# Patient Record
Sex: MALE | Race: OTHER | NOT HISPANIC OR LATINO | URBAN - METROPOLITAN AREA
[De-identification: names, ages, dates, MRNs, and addresses within clinical notes are randomized per-mention and may not be internally consistent; named-entity substitution may affect disease eponyms.]

---

## 2017-07-29 ENCOUNTER — EMERGENCY (EMERGENCY)
Facility: HOSPITAL | Age: 32
LOS: 0 days | Discharge: HOME | End: 2017-07-29
Attending: EMERGENCY MEDICINE
Payer: SELF-PAY

## 2017-07-29 VITALS
TEMPERATURE: 98 F | OXYGEN SATURATION: 98 % | DIASTOLIC BLOOD PRESSURE: 94 MMHG | HEART RATE: 98 BPM | WEIGHT: 285.06 LBS | RESPIRATION RATE: 16 BRPM | HEIGHT: 75 IN | SYSTOLIC BLOOD PRESSURE: 144 MMHG
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
## 2017-07-29 DIAGNOSIS — R41.82 ALTERED MENTAL STATUS, UNSPECIFIED: ICD-10-CM

## 2017-07-29 DIAGNOSIS — T59.91XA TOXIC EFFECT OF UNSPECIFIED GASES, FUMES AND VAPORS, ACCIDENTAL (UNINTENTIONAL), INITIAL ENCOUNTER: ICD-10-CM

## 2017-07-29 PROCEDURE — 99284 EMERGENCY DEPT VISIT MOD MDM: CPT

## 2017-07-29 NOTE — ED ADULT TRIAGE NOTE - CHIEF COMPLAINT QUOTE
pt was brought by ems for puffing some keyboard  and developed a period of unresponsiveness . pt states he was using that to get high .

## 2017-07-29 NOTE — ED PROVIDER NOTE - OBJECTIVE STATEMENT
31yo male with no pertinent pmh bibems for disorientation. Pt recalls was huffing aerosolized gas 1 hr ago. Per EMS, bystander noted pt was sleeping in car and called police. On arrival by police, pt was a little disoriented. On arrival by EMS, pt AOx3 and came to hospital willingly. Pt is now AOx3 denies symptoms before huffing and denies symptoms currently. Pt denies other drug use on board or alcohol.     ROS: No fever/chills. No photophobia/eye pain/changes in vision, No ear pain/sore throat/dysphagia, No chest pain/palpitations. No SOB/cough/stridor. No abdominal pain, N/V/D, no black/bloody bm. No dysuria/frequency/discharge, No headache. No Dizziness.  No rash.  No numbness/tingling/weakness. 33yo male with no pertinent pmh bibems for disorientation. Pt recalls was huffing aerosolized gas 1 hr ago to get "high". Per EMS, bystander noted pt was sleeping in car and called police. On arrival by police, pt was a little disoriented. On arrival by EMS, pt AOx3 and came to hospital willingly. Pt is now AOx3 denies symptoms before huffing and denies symptoms currently. Pt denies other drug use on board or alcohol.  Pt denies SI/HI/AH/VH.     ROS: No fever/chills. No photophobia/eye pain/changes in vision, No ear pain/sore throat/dysphagia, No chest pain/palpitations. No SOB/cough/stridor. No abdominal pain, N/V/D, no black/bloody bm. No dysuria/frequency/discharge, No headache. No Dizziness.  No rash.  No numbness/tingling/weakness.

## 2017-07-29 NOTE — ED PROVIDER NOTE - MEDICAL DECISION MAKING DETAILS
VSS. FS wnl, EKG wnl. Pt asymptomatic. Likely from huffing. Discussed results and outcome of testing with the patient.  Patient given copy of available results. Patient advised to please follow up with their PMD within the next 24 hours and return to the Emergency Department for worsening symptoms or any other concerns. VSS. . EKG wnl. Pt asymptomatic. Likely from huffing. Discussed results and outcome of testing with the patient.  Patient given copy of available results. Patient advised to please follow up with their PMD within the next 24 hours and return to the Emergency Department for worsening symptoms or any other concerns.

## 2017-07-29 NOTE — ED ADULT NURSE NOTE - CHPI ED SYMPTOMS NEG
no weakness/no numbness/no vomiting/no fever/no loss of consciousness/no confusion/no nausea/no dizziness/no blurred vision

## 2017-07-29 NOTE — ED ADULT NURSE NOTE - OBJECTIVE STATEMENT
To the ed via EMS bystanders observed him sleeping in the car. He admits to "huffing getting high. He is cooperative and came to the hospital willingly. Pt is  alert, cooperative and oriented able to ambulate steady gait.

## 2020-08-22 ENCOUNTER — INPATIENT (INPATIENT)
Facility: HOSPITAL | Age: 35
LOS: 13 days | Discharge: NURSING, REHAB OR LONG-TERM CARE | End: 2020-09-05
Attending: INTERNAL MEDICINE | Admitting: STUDENT IN AN ORGANIZED HEALTH CARE EDUCATION/TRAINING PROGRAM
Payer: MEDICAID

## 2020-08-22 ENCOUNTER — TRANSCRIPTION ENCOUNTER (OUTPATIENT)
Age: 35
End: 2020-08-22

## 2020-08-22 VITALS
WEIGHT: 240.08 LBS | HEIGHT: 75 IN | DIASTOLIC BLOOD PRESSURE: 86 MMHG | TEMPERATURE: 99 F | SYSTOLIC BLOOD PRESSURE: 124 MMHG | HEART RATE: 126 BPM | OXYGEN SATURATION: 100 % | RESPIRATION RATE: 18 BRPM

## 2020-08-22 DIAGNOSIS — S32.009A UNSPECIFIED FRACTURE OF UNSPECIFIED LUMBAR VERTEBRA, INITIAL ENCOUNTER FOR CLOSED FRACTURE: ICD-10-CM

## 2020-08-22 LAB
ALBUMIN SERPL ELPH-MCNC: 3.9 G/DL — SIGNIFICANT CHANGE UP (ref 3.3–5)
ALP SERPL-CCNC: 156 U/L — HIGH (ref 40–120)
ALT FLD-CCNC: 24 U/L — SIGNIFICANT CHANGE UP (ref 12–78)
ANION GAP SERPL CALC-SCNC: 9 MMOL/L — SIGNIFICANT CHANGE UP (ref 5–17)
APPEARANCE UR: CLEAR — SIGNIFICANT CHANGE UP
APTT BLD: 29.4 SEC — SIGNIFICANT CHANGE UP (ref 27.5–35.5)
AST SERPL-CCNC: 17 U/L — SIGNIFICANT CHANGE UP (ref 15–37)
BACTERIA # UR AUTO: ABNORMAL
BILIRUB SERPL-MCNC: 0.7 MG/DL — SIGNIFICANT CHANGE UP (ref 0.2–1.2)
BILIRUB UR-MCNC: NEGATIVE — SIGNIFICANT CHANGE UP
BUN SERPL-MCNC: 11 MG/DL — SIGNIFICANT CHANGE UP (ref 7–23)
CALCIUM SERPL-MCNC: 8.7 MG/DL — SIGNIFICANT CHANGE UP (ref 8.5–10.1)
CHLORIDE SERPL-SCNC: 101 MMOL/L — SIGNIFICANT CHANGE UP (ref 96–108)
CK SERPL-CCNC: 215 U/L — SIGNIFICANT CHANGE UP (ref 26–308)
CO2 SERPL-SCNC: 25 MMOL/L — SIGNIFICANT CHANGE UP (ref 22–31)
COLOR SPEC: YELLOW — SIGNIFICANT CHANGE UP
CREAT SERPL-MCNC: 1.04 MG/DL — SIGNIFICANT CHANGE UP (ref 0.5–1.3)
DIFF PNL FLD: NEGATIVE — SIGNIFICANT CHANGE UP
EPI CELLS # UR: SIGNIFICANT CHANGE UP
ETHANOL SERPL-MCNC: <10 MG/DL — SIGNIFICANT CHANGE UP (ref 0–10)
GLUCOSE BLDC GLUCOMTR-MCNC: 178 MG/DL — HIGH (ref 70–99)
GLUCOSE SERPL-MCNC: 178 MG/DL — HIGH (ref 70–99)
GLUCOSE UR QL: NEGATIVE MG/DL — SIGNIFICANT CHANGE UP
HCT VFR BLD CALC: 38.6 % — LOW (ref 39–50)
HGB BLD-MCNC: 13.9 G/DL — SIGNIFICANT CHANGE UP (ref 13–17)
INR BLD: 1.2 RATIO — HIGH (ref 0.88–1.16)
KETONES UR-MCNC: NEGATIVE — SIGNIFICANT CHANGE UP
LEUKOCYTE ESTERASE UR-ACNC: NEGATIVE — SIGNIFICANT CHANGE UP
LIDOCAIN IGE QN: 79 U/L — SIGNIFICANT CHANGE UP (ref 73–393)
MCHC RBC-ENTMCNC: 30.3 PG — SIGNIFICANT CHANGE UP (ref 27–34)
MCHC RBC-ENTMCNC: 36 GM/DL — SIGNIFICANT CHANGE UP (ref 32–36)
MCV RBC AUTO: 84.3 FL — SIGNIFICANT CHANGE UP (ref 80–100)
NITRITE UR-MCNC: NEGATIVE — SIGNIFICANT CHANGE UP
NRBC # BLD: 0 /100 WBCS — SIGNIFICANT CHANGE UP (ref 0–0)
PH UR: 6 — SIGNIFICANT CHANGE UP (ref 5–8)
PLATELET # BLD AUTO: 321 K/UL — SIGNIFICANT CHANGE UP (ref 150–400)
POTASSIUM SERPL-MCNC: 4.1 MMOL/L — SIGNIFICANT CHANGE UP (ref 3.5–5.3)
POTASSIUM SERPL-SCNC: 4.1 MMOL/L — SIGNIFICANT CHANGE UP (ref 3.5–5.3)
PROT SERPL-MCNC: 7.8 GM/DL — SIGNIFICANT CHANGE UP (ref 6–8.3)
PROT UR-MCNC: 30 MG/DL
PROTHROM AB SERPL-ACNC: 13.8 SEC — HIGH (ref 10.6–13.6)
RBC # BLD: 4.58 M/UL — SIGNIFICANT CHANGE UP (ref 4.2–5.8)
RBC # FLD: 12.5 % — SIGNIFICANT CHANGE UP (ref 10.3–14.5)
RBC CASTS # UR COMP ASSIST: SIGNIFICANT CHANGE UP /HPF (ref 0–4)
SARS-COV-2 RNA SPEC QL NAA+PROBE: SIGNIFICANT CHANGE UP
SODIUM SERPL-SCNC: 135 MMOL/L — SIGNIFICANT CHANGE UP (ref 135–145)
SP GR SPEC: 1.01 — SIGNIFICANT CHANGE UP (ref 1.01–1.02)
UROBILINOGEN FLD QL: NEGATIVE MG/DL — SIGNIFICANT CHANGE UP
WBC # BLD: 14.44 K/UL — HIGH (ref 3.8–10.5)
WBC # FLD AUTO: 14.44 K/UL — HIGH (ref 3.8–10.5)
WBC UR QL: SIGNIFICANT CHANGE UP

## 2020-08-22 PROCEDURE — 72133 CT LUMBAR SPINE W/O & W/DYE: CPT | Mod: 26

## 2020-08-22 PROCEDURE — 72100 X-RAY EXAM L-S SPINE 2/3 VWS: CPT | Mod: 26

## 2020-08-22 PROCEDURE — 72128 CT CHEST SPINE W/O DYE: CPT | Mod: 26

## 2020-08-22 PROCEDURE — 71045 X-RAY EXAM CHEST 1 VIEW: CPT | Mod: 26

## 2020-08-22 PROCEDURE — 72148 MRI LUMBAR SPINE W/O DYE: CPT | Mod: 26

## 2020-08-22 PROCEDURE — 93010 ELECTROCARDIOGRAM REPORT: CPT

## 2020-08-22 PROCEDURE — 99285 EMERGENCY DEPT VISIT HI MDM: CPT

## 2020-08-22 RX ORDER — SODIUM CHLORIDE 9 MG/ML
1000 INJECTION, SOLUTION INTRAVENOUS
Refills: 0 | Status: DISCONTINUED | OUTPATIENT
Start: 2020-08-22 | End: 2020-08-23

## 2020-08-22 RX ORDER — MORPHINE SULFATE 50 MG/1
4 CAPSULE, EXTENDED RELEASE ORAL ONCE
Refills: 0 | Status: DISCONTINUED | OUTPATIENT
Start: 2020-08-22 | End: 2020-08-22

## 2020-08-22 RX ORDER — SODIUM CHLORIDE 9 MG/ML
1000 INJECTION, SOLUTION INTRAVENOUS
Refills: 0 | Status: DISCONTINUED | OUTPATIENT
Start: 2020-08-22 | End: 2020-08-22

## 2020-08-22 RX ORDER — OXYCODONE AND ACETAMINOPHEN 5; 325 MG/1; MG/1
2 TABLET ORAL ONCE
Refills: 0 | Status: DISCONTINUED | OUTPATIENT
Start: 2020-08-22 | End: 2020-08-22

## 2020-08-22 RX ORDER — TETANUS TOXOID, REDUCED DIPHTHERIA TOXOID AND ACELLULAR PERTUSSIS VACCINE, ADSORBED 5; 2.5; 8; 8; 2.5 [IU]/.5ML; [IU]/.5ML; UG/.5ML; UG/.5ML; UG/.5ML
0.5 SUSPENSION INTRAMUSCULAR ONCE
Refills: 0 | Status: DISCONTINUED | OUTPATIENT
Start: 2020-08-22 | End: 2020-08-22

## 2020-08-22 RX ORDER — SODIUM CHLORIDE 9 MG/ML
1000 INJECTION INTRAMUSCULAR; INTRAVENOUS; SUBCUTANEOUS ONCE
Refills: 0 | Status: COMPLETED | OUTPATIENT
Start: 2020-08-22 | End: 2020-08-22

## 2020-08-22 RX ORDER — DIAZEPAM 5 MG
10 TABLET ORAL ONCE
Refills: 0 | Status: DISCONTINUED | OUTPATIENT
Start: 2020-08-22 | End: 2020-08-22

## 2020-08-22 RX ORDER — PIPERACILLIN AND TAZOBACTAM 4; .5 G/20ML; G/20ML
3.38 INJECTION, POWDER, LYOPHILIZED, FOR SOLUTION INTRAVENOUS EVERY 8 HOURS
Refills: 0 | Status: DISCONTINUED | OUTPATIENT
Start: 2020-08-22 | End: 2020-08-23

## 2020-08-22 RX ORDER — KETOROLAC TROMETHAMINE 30 MG/ML
60 SYRINGE (ML) INJECTION ONCE
Refills: 0 | Status: DISCONTINUED | OUTPATIENT
Start: 2020-08-22 | End: 2020-08-22

## 2020-08-22 RX ORDER — HYDROMORPHONE HYDROCHLORIDE 2 MG/ML
2 INJECTION INTRAMUSCULAR; INTRAVENOUS; SUBCUTANEOUS EVERY 4 HOURS
Refills: 0 | Status: DISCONTINUED | OUTPATIENT
Start: 2020-08-22 | End: 2020-08-23

## 2020-08-22 RX ADMIN — SODIUM CHLORIDE 75 MILLILITER(S): 9 INJECTION, SOLUTION INTRAVENOUS at 22:38

## 2020-08-22 RX ADMIN — Medication 60 MILLIGRAM(S): at 12:37

## 2020-08-22 RX ADMIN — Medication 60 MILLIGRAM(S): at 13:07

## 2020-08-22 RX ADMIN — OXYCODONE AND ACETAMINOPHEN 2 TABLET(S): 5; 325 TABLET ORAL at 13:37

## 2020-08-22 RX ADMIN — SODIUM CHLORIDE 50 MILLILITER(S): 9 INJECTION, SOLUTION INTRAVENOUS at 18:38

## 2020-08-22 RX ADMIN — Medication 10 MILLIGRAM(S): at 12:37

## 2020-08-22 RX ADMIN — HYDROMORPHONE HYDROCHLORIDE 2 MILLIGRAM(S): 2 INJECTION INTRAMUSCULAR; INTRAVENOUS; SUBCUTANEOUS at 21:33

## 2020-08-22 RX ADMIN — PIPERACILLIN AND TAZOBACTAM 25 GRAM(S): 4; .5 INJECTION, POWDER, LYOPHILIZED, FOR SOLUTION INTRAVENOUS at 21:33

## 2020-08-22 RX ADMIN — MORPHINE SULFATE 4 MILLIGRAM(S): 50 CAPSULE, EXTENDED RELEASE ORAL at 15:58

## 2020-08-22 RX ADMIN — MORPHINE SULFATE 4 MILLIGRAM(S): 50 CAPSULE, EXTENDED RELEASE ORAL at 16:30

## 2020-08-22 RX ADMIN — OXYCODONE AND ACETAMINOPHEN 2 TABLET(S): 5; 325 TABLET ORAL at 15:56

## 2020-08-22 RX ADMIN — SODIUM CHLORIDE 1000 MILLILITER(S): 9 INJECTION INTRAMUSCULAR; INTRAVENOUS; SUBCUTANEOUS at 12:58

## 2020-08-22 NOTE — CONSULT NOTE ADULT - SUBJECTIVE AND OBJECTIVE BOX
Patient is a 35y Male who presents c/o atraumatic intractable back pain since waking up this morning. Denies HS/LOC. Denies pain/injury elsewhere. Denies numbness/tingling/paresthesias/weakness. Denies bowel/bladder incontinence. Denies fevers/chills. No other complaints at this time.    HEALTH ISSUES - PROBLEM Dx:          MEDICATIONS  (STANDING):  lactated ringers. 1000 milliLiter(s) IV Continuous <Continuous>      Allergies    No Known Allergies    Intolerances        PAST MEDICAL & SURGICAL HISTORY:  No pertinent past medical history        Vital Signs Last 24 Hrs  T(C): 37.1 (08-22-20 @ 16:31), Max: 37.4 (08-22-20 @ 12:06)  T(F): 98.8 (08-22-20 @ 16:31), Max: 99.3 (08-22-20 @ 12:06)  HR: 99 (08-22-20 @ 16:31) (99 - 126)  BP: 143/95 (08-22-20 @ 16:31) (124/86 - 152/100)  BP(mean): --  RR: 18 (08-22-20 @ 16:31) (18 - 18)  SpO2: 99% (08-22-20 @ 16:31) (99% - 100%)    Gen: NAD  Spine PE:  Skin intact  No gross deformity  No midline TTP C/T/L/S spine  No bony step offs  No paraspinal muscle ttp/hypertonicity   Negative clonus  Negative babinski  Negative pak  No saddle anesthesia  +rectal tone    Motor:                   C5                C6              C7               C8           T1   R            5/5                5/5            5/5             5/5          5/5  L             5/5               5/5             5/5             5/5          5/5                L2             L3             L4               L5            S1  R         4/5           5/5          5/5             5/5           5/5  L          5/5          5/5           5/5             5/5           5/5    Sensory:            C5         C6         C7      C8       T1        (0=absent, 1=impaired, 2=normal, NT=not testable)  R         2            2           2        2         2  L          2            2           2        2         2               L2          L3         L4      L5       S1         (0=absent, 1=impaired, 2=normal, NT=not testable)  R         2            2            2        2        2  L          2            2           2        2         2                          13.9   14.44 )-----------( 321      ( 22 Aug 2020 12:55 )             38.6     22 Aug 2020 12:55    135    |  101    |  11     ----------------------------<  178    4.1     |  25     |  1.04     Ca    8.7        22 Aug 2020 12:55    TPro  7.8    /  Alb  3.9    /  TBili  0.7    /  DBili  x      /  AST  17     /  ALT  24     /  AlkPhos  156    22 Aug 2020 12:55          Imaging:   XRay lumbosacral spine: showing possible lumbosacral dissocation    A/P: 35y Male with atraumatic possible lumbosacral dissocation    No obvious neurological deficits other than R hip flexor weakness, however radiologic findings are concerning and may require operative intervention  NPO/IVF  Hold AC  Possible medical clearance needed for lumbosacral fixation  Pain control  FU Labs/imaging  SCDs  Will discuss with attending Dr. Villegas Patient is a 35y Male who presents c/o atraumatic intractable back pain since waking up this morning. Denies HS/LOC. Denies pain/injury elsewhere. Denies numbness/tingling/paresthesias/weakness. Denies bowel/bladder incontinence. Denies fevers/chills. No other complaints at this time.    HEALTH ISSUES - PROBLEM Dx:          MEDICATIONS  (STANDING):  lactated ringers. 1000 milliLiter(s) IV Continuous <Continuous>      Allergies    No Known Allergies    Intolerances        PAST MEDICAL & SURGICAL HISTORY:  No pertinent past medical history        Vital Signs Last 24 Hrs  T(C): 37.1 (08-22-20 @ 16:31), Max: 37.4 (08-22-20 @ 12:06)  T(F): 98.8 (08-22-20 @ 16:31), Max: 99.3 (08-22-20 @ 12:06)  HR: 99 (08-22-20 @ 16:31) (99 - 126)  BP: 143/95 (08-22-20 @ 16:31) (124/86 - 152/100)  BP(mean): --  RR: 18 (08-22-20 @ 16:31) (18 - 18)  SpO2: 99% (08-22-20 @ 16:31) (99% - 100%)    Gen: NAD  Spine PE:  Skin intact  No gross deformity  No midline TTP C/T/L/S spine  No bony step offs  No paraspinal muscle ttp/hypertonicity   Negative clonus  Negative babinski  Negative pak  No saddle anesthesia  +rectal tone    Motor:                   C5                C6              C7               C8           T1   R            5/5                5/5            5/5             5/5          5/5  L             5/5               5/5             5/5             5/5          5/5                L2             L3             L4               L5            S1  R         4/5           5/5          5/5             5/5           5/5  L          5/5          5/5           5/5             5/5           5/5    Sensory:            C5         C6         C7      C8       T1        (0=absent, 1=impaired, 2=normal, NT=not testable)  R         2            2           2        2         2  L          2            2           2        2         2               L2          L3         L4      L5       S1         (0=absent, 1=impaired, 2=normal, NT=not testable)  R         2            2            2        2        2  L          2            2           2        2         2                          13.9   14.44 )-----------( 321      ( 22 Aug 2020 12:55 )             38.6     22 Aug 2020 12:55    135    |  101    |  11     ----------------------------<  178    4.1     |  25     |  1.04     Ca    8.7        22 Aug 2020 12:55    TPro  7.8    /  Alb  3.9    /  TBili  0.7    /  DBili  x      /  AST  17     /  ALT  24     /  AlkPhos  156    22 Aug 2020 12:55          Imaging:   XRay lumbosacral spine: showing possible lumbosacral dissocation    A/P: 35y Male with atraumatic L5-S1 spondylptosis    No obvious neurological deficits other than R hip flexor weakness, however radiologic findings are concerning and may require operative intervention  NPO/IVF  Hold AC  Possible medical clearance needed for lumbosacral fixation  Pain control  FU Labs/imaging  SCDs  Will discuss with attending Dr. Villegas Patient is a 35y Male who presents c/o atraumatic intractable back pain since waking up this morning. Denies HS/LOC. Denies pain/injury elsewhere. Denies numbness/tingling/paresthesias/weakness. Denies bowel/bladder incontinence. Denies fevers/chills. No other complaints at this time.    HEALTH ISSUES - PROBLEM Dx:          MEDICATIONS  (STANDING):  lactated ringers. 1000 milliLiter(s) IV Continuous <Continuous>      Allergies    No Known Allergies    Intolerances        PAST MEDICAL & SURGICAL HISTORY:  No pertinent past medical history        Vital Signs Last 24 Hrs  T(C): 37.1 (08-22-20 @ 16:31), Max: 37.4 (08-22-20 @ 12:06)  T(F): 98.8 (08-22-20 @ 16:31), Max: 99.3 (08-22-20 @ 12:06)  HR: 99 (08-22-20 @ 16:31) (99 - 126)  BP: 143/95 (08-22-20 @ 16:31) (124/86 - 152/100)  BP(mean): --  RR: 18 (08-22-20 @ 16:31) (18 - 18)  SpO2: 99% (08-22-20 @ 16:31) (99% - 100%)    Gen: NAD  Spine PE:  Skin intact  No gross deformity  No midline TTP C/T/L/S spine  No bony step offs  No paraspinal muscle ttp/hypertonicity   Negative clonus  Negative babinski  Negative pak  No saddle anesthesia  +rectal tone    Motor:                   C5                C6              C7               C8           T1   R            5/5                5/5            5/5             5/5          5/5  L             5/5               5/5             5/5             5/5          5/5                L2             L3             L4               L5            S1  R         4/5           5/5          5/5             5/5           5/5  L          5/5          5/5           5/5             5/5           5/5    Sensory:            C5         C6         C7      C8       T1        (0=absent, 1=impaired, 2=normal, NT=not testable)  R         2            2           2        2         2  L          2            2           2        2         2               L2          L3         L4      L5       S1         (0=absent, 1=impaired, 2=normal, NT=not testable)  R         2            2            2        2        2  L          2            2           2        2         2                          13.9   14.44 )-----------( 321      ( 22 Aug 2020 12:55 )             38.6     22 Aug 2020 12:55    135    |  101    |  11     ----------------------------<  178    4.1     |  25     |  1.04     Ca    8.7        22 Aug 2020 12:55    TPro  7.8    /  Alb  3.9    /  TBili  0.7    /  DBili  x      /  AST  17     /  ALT  24     /  AlkPhos  156    22 Aug 2020 12:55          Imaging:   XRay lumbosacral spine: showing possible lumbosacral dissocation    A/P: 35y Male with atraumatic L5-S1 spondylptosis    No obvious neurological deficits other than R hip flexor weakness, however radiologic findings are concerning and may require operative intervention  NPO/IVF  Hold AC  medical clearance needed for lumbosacral fixation, possibly L3-S1  Strict bedrest  Pain control  FU Labs/imaging  SCDs  Will discuss with attending Dr. Villegas Patient is a 35y Male who presents c/o atraumatic intractable back pain since waking up this morning. Denies HS/LOC. Denies pain/injury elsewhere. Denies numbness/tingling/paresthesias/weakness. Denies bowel/bladder incontinence. Denies fevers/chills. No other complaints at this time.    HEALTH ISSUES - PROBLEM Dx:          MEDICATIONS  (STANDING):  lactated ringers. 1000 milliLiter(s) IV Continuous <Continuous>      Allergies    No Known Allergies    Intolerances        PAST MEDICAL & SURGICAL HISTORY:  No pertinent past medical history        Vital Signs Last 24 Hrs  T(C): 37.1 (08-22-20 @ 16:31), Max: 37.4 (08-22-20 @ 12:06)  T(F): 98.8 (08-22-20 @ 16:31), Max: 99.3 (08-22-20 @ 12:06)  HR: 99 (08-22-20 @ 16:31) (99 - 126)  BP: 143/95 (08-22-20 @ 16:31) (124/86 - 152/100)  BP(mean): --  RR: 18 (08-22-20 @ 16:31) (18 - 18)  SpO2: 99% (08-22-20 @ 16:31) (99% - 100%)    Gen: NAD  Spine PE:  Skin intact  No gross deformity  No midline TTP C/T/L/S spine  No bony step offs  No paraspinal muscle ttp/hypertonicity   Negative clonus  Negative babinski  Negative pak  No saddle anesthesia  +rectal tone    Motor:                   C5                C6              C7               C8           T1   R            5/5                5/5            5/5             5/5          5/5  L             5/5               5/5             5/5             5/5          5/5                L2             L3             L4               L5            S1  R         4/5           5/5          5/5             5/5           5/5  L          5/5          5/5           5/5             5/5           5/5    Sensory:            C5         C6         C7      C8       T1        (0=absent, 1=impaired, 2=normal, NT=not testable)  R         2            2           2        2         2  L          2            2           2        2         2               L2          L3         L4      L5       S1         (0=absent, 1=impaired, 2=normal, NT=not testable)  R         2            2            2        2        2  L          2            2           2        2         2                          13.9   14.44 )-----------( 321      ( 22 Aug 2020 12:55 )             38.6     22 Aug 2020 12:55    135    |  101    |  11     ----------------------------<  178    4.1     |  25     |  1.04     Ca    8.7        22 Aug 2020 12:55    TPro  7.8    /  Alb  3.9    /  TBili  0.7    /  DBili  x      /  AST  17     /  ALT  24     /  AlkPhos  156    22 Aug 2020 12:55          Imaging:   XRay lumbosacral spine: showing possible lumbosacral dissocation    A/P: 35y Male with atraumatic L5-S1 spondyloptosis    No obvious neurological deficits other than R hip flexor weakness, however radiologic findings are concerning and may require operative intervention  NPO/IVF  Hold AC  medical clearance needed for lumbosacral fixation, possibly L3-S1  Strict bedrest  Pain control  FU Labs/imaging  SCDs  Will discuss with attending Dr. Villegas

## 2020-08-22 NOTE — H&P ADULT - HISTORY OF PRESENT ILLNESS
34 y/o M no pertinent PMHx BIBA presents with c/o atraumatic lower  back pain since this morning. Denies nausea,  vomiting, fever, sob, chest pain,

## 2020-08-22 NOTE — ED PROVIDER NOTE - CLINICAL SUMMARY MEDICAL DECISION MAKING FREE TEXT BOX
pt with general back pain. will give pain medication, get x-ray, labs, and reassess. pt with general back pain. will give pain medication, get x-ray, labs, and reassess.  admitted for iv pain meds and reassessment pt with general back pain, abnormal xray ortho consulted  will give pain medication,, and reassess.  admitted for iv pain meds and reassessment

## 2020-08-22 NOTE — ED ADULT TRIAGE NOTE - HEIGHT IN FEET
Quality 226: Preventive Care And Screening: Tobacco Use: Screening And Cessation Intervention: Patient screened for tobacco use and is an ex/non-smoker Quality 111:Pneumonia Vaccination Status For Older Adults: Pneumococcal Vaccination Previously Received Detail Level: Generalized Quality 131: Pain Assessment And Follow-Up: Pain assessment NOT documented as being performed, documentation the patient is not eligible for a pain assessment using a standardized tool Quality 265: Biopsy Follow-Up: Biopsy results reviewed, communicated, tracked, and documented Quality 130: Documentation Of Current Medications In The Medical Record: Current Medications Documented Quality 110: Preventive Care And Screening: Influenza Immunization: Influenza Immunization previously received during influenza season Quality 431: Preventive Care And Screening: Unhealthy Alcohol Use - Screening: Patient screened for unhealthy alcohol use using a single question and scores less than 2 times per year 6

## 2020-08-22 NOTE — ED ADULT TRIAGE NOTE - CHIEF COMPLAINT QUOTE
atraumatic back pain for 4 hours today upon waking this morning  denies trama, denies difficult urinating , pedal and radial pulses equal and bilateral

## 2020-08-22 NOTE — CHART NOTE - NSCHARTNOTEFT_GEN_A_CORE
Notified by [facility] radiologist Dr. Armenta via phone at [phone] of updated MR and CT results with "severe findings"    MR LS:   IMPRESSION:  1. There are fractures of the L1 through L5 pedicles bilaterally leading  widening of the spinal canal with anterolisthesis of L5 on S1 with the entire  L5 vertebral body width, disruption of the anterior and posterior longitudinal  ligament at L5-S1 level.  2. There is a probable epidural hemorrhage and hemorrhage within the foramina  at L1-L5 levels.  3. There is enlargement of the psoas muscles bilaterally, with surrounding  edema and fluid.    CT LS: IMPRESSION:  1. There are fractures of the L1 through L5 pedicles bilaterally leading  widening of the spinal canal with anterolisthesis of L5 on S1 with the entire  L5 vertebral body width, disruption of the anterior and posterior longitudinal  ligaments at L5-S1 level.  2. Additionally, fractures of the L5 transverse processes.  3. There is epidural hemorrhage and foraminal hemorrhage at L1-L5 levels.  4. There is enlargement of the psoas muscles bilaterally, with surrounding  edema of fluid.  5. There is retroperitoneal fluid, probably retroperitoneal hemorrhage a  surrounding the psoas muscle and paraspinal tissues bilaterally.    Notified Ortho consult resFrances Cordova @ pager [phone] with findings.  No further recs at this time.

## 2020-08-22 NOTE — H&P ADULT - NSHPLABSRESULTS_GEN_ALL_CORE
13.9   14.44 )-----------( 321      ( 22 Aug 2020 12:55 )             38.6         135  |  101  |  11  ----------------------------<  178<H>  4.1   |  25  |  1.04    Ca    8.7      22 Aug 2020 12:55    TPro  7.8  /  Alb  3.9  /  TBili  0.7  /  DBili  x   /  AST  17  /  ALT  24  /  AlkPhos  156<H>  0822    PT/INR - ( 22 Aug 2020 18:35 )   PT: 13.8 sec;   INR: 1.20 ratio         PTT - ( 22 Aug 2020 18:35 )  PTT:29.4 sec  Urinalysis Basic - ( 22 Aug 2020 19:01 )    Color: Yellow / Appearance: Clear / S.010 / pH: x  Gluc: x / Ketone: Negative  / Bili: Negative / Urobili: Negative mg/dL   Blood: x / Protein: 30 mg/dL / Nitrite: Negative   Leuk Esterase: Negative / RBC: 0-2 /HPF / WBC 0-2   Sq Epi: x / Non Sq Epi: Occasional / Bacteria: Few

## 2020-08-22 NOTE — ED PROVIDER NOTE - PROGRESS NOTE DETAILS
Pt treated empirically for pain and  has no pain relief from meds and xray order to assessatraumatic back pain pt has no relief from pain medication, xray abnormal and Pt admitted for iv pain meds and orthro consult , possible surgical admission

## 2020-08-22 NOTE — CHART NOTE - NSCHARTNOTEFT_GEN_A_CORE
Patient s/e at bedside with Dr. Villegas. Reports no perineal anesthesia. Voided at bedside. Overall in pain but otherwise asymptomatic. D/w patient that his injury is highly unstable putting him at risk for paralysis, fecal/urinary incontinence, and further neurologic decline    PE  Vital Signs Last 24 Hrs  T(C): 37.8 (22 Aug 2020 21:50), Max: 37.8 (22 Aug 2020 21:50)  T(F): 100 (22 Aug 2020 21:50), Max: 100 (22 Aug 2020 21:50)  HR: 104 (22 Aug 2020 21:50) (99 - 126)  BP: 139/91 (22 Aug 2020 21:50) (124/86 - 152/100)  BP(mean): --  RR: 19 (22 Aug 2020 21:50) (18 - 19)  SpO2: 99% (22 Aug 2020 21:50) (99% - 100%)    Motor:                   C5                C6              C7               C8           T1   R            5/5                5/5            5/5             5/5          5/5  L             5/5               5/5             5/5             5/5          5/5                L2             L3             L4               L5            S1  R         5/5           5/5          5/5             5/5           5/5  L          5/5          5/5           5/5             5/5           5/5    Sensory:            C5         C6         C7      C8       T1        (0=absent, 1=impaired, 2=normal, NT=not testable)  R         2            2           2        2         2  L          2            2           2        2         2               L2          L3         L4      L5       S1         (0=absent, 1=impaired, 2=normal, NT=not testable)  R         2            2            2        2        2  L          2            2           2        2         2    +perineal sensation    Recommendations:  Patient has highly unstable lumbosacral dissociation, will require operative intervention tomorrow am  NPO/IVF  Hold AC  Strict bedrest  Recommend q1h neurovascular checks to r/o neurological compromise overnight  Please notify orthopaedic resident immediately if any new onset numbness/weakness develops  D/w Dr. Villegas who agrees with above plan.

## 2020-08-22 NOTE — H&P ADULT - PROBLEM SELECTOR PROBLEM 1
Closed fracture of lumbar vertebra, unspecified fracture morphology, unspecified lumbar vertebral level, initial encounter

## 2020-08-22 NOTE — ED PROVIDER NOTE - ATTENDING CONTRIBUTION TO CARE
I have seen the patient with the PA and agree with above examination and assessment and plan with the following addendum:        Focused PE:   General: NAD, alert and oriented  Head: Normocephalic, atraumatic  Eyes: PERRLA, EOMI  Cardiac: RRR, no murmurs, rubs or gallops  Resp: CTA, no wheezes, rales or ronchi  GI: Nondistended, nontender, no rebound or guarding  MSK: Back pain. Midline tenderness, no stepoff or deformity felt. Straight leg raise positive bilaterally.   Neuro: Alert and oriented, no focal deficits.  Ext: Non edematous, nontender.     Ddx: Atraumatic, no risk factors for epidural abscess, no fevers, no neuro deficits  Plan: pain control, cbc, cmp, xray, ortho consult, reassess

## 2020-08-22 NOTE — ED PROVIDER NOTE - CARE PLAN
Principal Discharge DX:	Intractable back pain  Secondary Diagnosis:	Back pain  Secondary Diagnosis:	Leukocytosis Principal Discharge DX:	Closed fracture of lumbar vertebra with spinal cord injury, initial encounter  Secondary Diagnosis:	Back pain  Secondary Diagnosis:	Leukocytosis  Secondary Diagnosis:	Intractable pain Principal Discharge DX:	Closed fracture of lumbar vertebra, unspecified fracture morphology, unspecified lumbar vertebral level, initial encounter  Secondary Diagnosis:	Back pain  Secondary Diagnosis:	Leukocytosis  Secondary Diagnosis:	Intractable pain

## 2020-08-22 NOTE — ED ADULT NURSE NOTE - NSIMPLEMENTINTERV_GEN_ALL_ED
Implemented All Fall with Harm Risk Interventions:  [place] to call system. Call bell, personal items and telephone within reach. Instruct patient to call for assistance. Room bathroom lighting operational. Non-slip footwear when patient is off stretcher. Physically safe environment: no spills, clutter or unnecessary equipment. Stretcher in lowest position, wheels locked, appropriate side rails in place. Provide visual cue, wrist band, yellow gown, etc. Monitor gait and stability. Monitor for mental status changes and reorient to person, place, and time. Review medications for side effects contributing to fall risk. Reinforce activity limits and safety measures with patient and family. Provide visual clues: red socks.

## 2020-08-22 NOTE — H&P ADULT - ATTENDING COMMENTS
s/p provisional fixation posterior - indicated for anterior interbody support - ALIF/xlif - R/B/E of the operation including blood loss/sexual dysfunction/need for addional surgery - neurologic injury disucssed and questions answered - he is well informed - surgery is necessary in order to allow him to attempt to moblize in any capacity in this emergency setting - there is not a nonsurgical approach to provide stablization for this fracture/dislocation of the spine

## 2020-08-22 NOTE — ED PROVIDER NOTE - OBJECTIVE STATEMENT
36 y/o M no pertinent PMHx BIBA presents with c/o back pain since this morning. 36 y/o M no pertinent PMHx BIBA presents with c/o atraumatic lower  back pain since this morning. Denies nausea,  vomiting, fever, sob, chest pain, flank pain, dysuria, hematuria, trauma,  incontinence, rash, neuro deficit 36 y/o M no pertinent PMHx BIBA presents with c/o atraumatic lower  back pain since this morning. Denies nausea,  vomiting, fever, sob, chest pain, flank pain, dysuria, hematuria, trauma,  incontinence, rash, neuro deficit, loc, head trauma Pt sts he woke up like this an denies street drug useage

## 2020-08-22 NOTE — H&P ADULT - ASSESSMENT
36 y/o M no pertinent PMHx BIBA presents with c/o atraumatic lower  back pain since this morning. Denies nausea,  vomiting, fever, sob, chest pain,

## 2020-08-22 NOTE — ED PROVIDER NOTE - MUSCULOSKELETAL, MLM
No spinal tenderness. pt is unable to walk limited to back pain. No spinal tenderness. pt is unable to walk limited to back pain. NO CVA  TENDERNES No spinal tenderness.  NO MIDLINE SPIANL TENDERNESS pt is unable to walk limited to back pain. NO CVA  NO RASH  Tenderness + SLE +RLE, Pt able to raise left and right leg but unable to sit up in the stretcher

## 2020-08-22 NOTE — ED PROVIDER NOTE - PRINCIPAL DIAGNOSIS
Intractable back pain Closed fracture of lumbar vertebra with spinal cord injury, initial encounter Closed fracture of lumbar vertebra, unspecified fracture morphology, unspecified lumbar vertebral level, initial encounter

## 2020-08-23 ENCOUNTER — RESULT REVIEW (OUTPATIENT)
Age: 35
End: 2020-08-23

## 2020-08-23 DIAGNOSIS — R52 PAIN, UNSPECIFIED: ICD-10-CM

## 2020-08-23 LAB
AMPHET UR-MCNC: NEGATIVE — SIGNIFICANT CHANGE UP
ANION GAP SERPL CALC-SCNC: 9 MMOL/L — SIGNIFICANT CHANGE UP (ref 5–17)
APTT BLD: 28.7 SEC — SIGNIFICANT CHANGE UP (ref 27.5–35.5)
BARBITURATES UR SCN-MCNC: NEGATIVE — SIGNIFICANT CHANGE UP
BENZODIAZ UR-MCNC: POSITIVE — SIGNIFICANT CHANGE UP
BUN SERPL-MCNC: 13 MG/DL — SIGNIFICANT CHANGE UP (ref 7–23)
CALCIUM SERPL-MCNC: 8 MG/DL — LOW (ref 8.5–10.1)
CHLORIDE SERPL-SCNC: 101 MMOL/L — SIGNIFICANT CHANGE UP (ref 96–108)
CO2 SERPL-SCNC: 23 MMOL/L — SIGNIFICANT CHANGE UP (ref 22–31)
COCAINE METAB.OTHER UR-MCNC: NEGATIVE — SIGNIFICANT CHANGE UP
CREAT SERPL-MCNC: 0.96 MG/DL — SIGNIFICANT CHANGE UP (ref 0.5–1.3)
CULTURE RESULTS: SIGNIFICANT CHANGE UP
GLUCOSE SERPL-MCNC: 122 MG/DL — HIGH (ref 70–99)
HCT VFR BLD CALC: 31.5 % — LOW (ref 39–50)
HCT VFR BLD CALC: 31.8 % — LOW (ref 39–50)
HGB BLD-MCNC: 10.9 G/DL — LOW (ref 13–17)
HGB BLD-MCNC: 11.2 G/DL — LOW (ref 13–17)
INR BLD: 1.25 RATIO — HIGH (ref 0.88–1.16)
MCHC RBC-ENTMCNC: 29.9 PG — SIGNIFICANT CHANGE UP (ref 27–34)
MCHC RBC-ENTMCNC: 30.6 PG — SIGNIFICANT CHANGE UP (ref 27–34)
MCHC RBC-ENTMCNC: 34.3 GM/DL — SIGNIFICANT CHANGE UP (ref 32–36)
MCHC RBC-ENTMCNC: 35.6 GM/DL — SIGNIFICANT CHANGE UP (ref 32–36)
MCV RBC AUTO: 86.1 FL — SIGNIFICANT CHANGE UP (ref 80–100)
MCV RBC AUTO: 87.1 FL — SIGNIFICANT CHANGE UP (ref 80–100)
METHADONE UR-MCNC: NEGATIVE — SIGNIFICANT CHANGE UP
NRBC # BLD: 0 /100 WBCS — SIGNIFICANT CHANGE UP (ref 0–0)
NRBC # BLD: 0 /100 WBCS — SIGNIFICANT CHANGE UP (ref 0–0)
OPIATES UR-MCNC: POSITIVE — SIGNIFICANT CHANGE UP
PCP SPEC-MCNC: SIGNIFICANT CHANGE UP
PCP UR-MCNC: NEGATIVE — SIGNIFICANT CHANGE UP
PLATELET # BLD AUTO: 228 K/UL — SIGNIFICANT CHANGE UP (ref 150–400)
PLATELET # BLD AUTO: 258 K/UL — SIGNIFICANT CHANGE UP (ref 150–400)
POTASSIUM SERPL-MCNC: 3.3 MMOL/L — LOW (ref 3.5–5.3)
POTASSIUM SERPL-SCNC: 3.3 MMOL/L — LOW (ref 3.5–5.3)
PROTHROM AB SERPL-ACNC: 14.4 SEC — HIGH (ref 10.6–13.6)
RBC # BLD: 3.65 M/UL — LOW (ref 4.2–5.8)
RBC # BLD: 3.66 M/UL — LOW (ref 4.2–5.8)
RBC # FLD: 12.6 % — SIGNIFICANT CHANGE UP (ref 10.3–14.5)
RBC # FLD: 13.1 % — SIGNIFICANT CHANGE UP (ref 10.3–14.5)
SARS-COV-2 IGG SERPL QL IA: NEGATIVE — SIGNIFICANT CHANGE UP
SARS-COV-2 IGM SERPL IA-ACNC: 0.08 INDEX — SIGNIFICANT CHANGE UP
SODIUM SERPL-SCNC: 133 MMOL/L — LOW (ref 135–145)
SPECIMEN SOURCE: SIGNIFICANT CHANGE UP
THC UR QL: NEGATIVE — SIGNIFICANT CHANGE UP
WBC # BLD: 10.58 K/UL — HIGH (ref 3.8–10.5)
WBC # BLD: 15.6 K/UL — HIGH (ref 3.8–10.5)
WBC # FLD AUTO: 10.58 K/UL — HIGH (ref 3.8–10.5)
WBC # FLD AUTO: 15.6 K/UL — HIGH (ref 3.8–10.5)

## 2020-08-23 PROCEDURE — 88304 TISSUE EXAM BY PATHOLOGIST: CPT | Mod: 26

## 2020-08-23 PROCEDURE — 74176 CT ABD & PELVIS W/O CONTRAST: CPT | Mod: 26

## 2020-08-23 RX ORDER — FOLIC ACID 0.8 MG
1 TABLET ORAL DAILY
Refills: 0 | Status: DISCONTINUED | OUTPATIENT
Start: 2020-08-24 | End: 2020-09-01

## 2020-08-23 RX ORDER — HYDROMORPHONE HYDROCHLORIDE 2 MG/ML
2 INJECTION INTRAMUSCULAR; INTRAVENOUS; SUBCUTANEOUS ONCE
Refills: 0 | Status: DISCONTINUED | OUTPATIENT
Start: 2020-08-23 | End: 2020-08-23

## 2020-08-23 RX ORDER — ACETAMINOPHEN 500 MG
650 TABLET ORAL EVERY 6 HOURS
Refills: 0 | Status: DISCONTINUED | OUTPATIENT
Start: 2020-08-24 | End: 2020-09-01

## 2020-08-23 RX ORDER — LANOLIN ALCOHOL/MO/W.PET/CERES
3 CREAM (GRAM) TOPICAL AT BEDTIME
Refills: 0 | Status: DISCONTINUED | OUTPATIENT
Start: 2020-08-24 | End: 2020-09-01

## 2020-08-23 RX ORDER — ASCORBIC ACID 60 MG
500 TABLET,CHEWABLE ORAL
Refills: 0 | Status: DISCONTINUED | OUTPATIENT
Start: 2020-08-24 | End: 2020-09-01

## 2020-08-23 RX ORDER — SENNA PLUS 8.6 MG/1
2 TABLET ORAL AT BEDTIME
Refills: 0 | Status: DISCONTINUED | OUTPATIENT
Start: 2020-08-24 | End: 2020-09-01

## 2020-08-23 RX ORDER — ONDANSETRON 8 MG/1
4 TABLET, FILM COATED ORAL EVERY 6 HOURS
Refills: 0 | Status: DISCONTINUED | OUTPATIENT
Start: 2020-08-24 | End: 2020-09-01

## 2020-08-23 RX ORDER — CHLORHEXIDINE GLUCONATE 213 G/1000ML
1 SOLUTION TOPICAL
Refills: 0 | Status: DISCONTINUED | OUTPATIENT
Start: 2020-08-23 | End: 2020-08-23

## 2020-08-23 RX ORDER — CYCLOBENZAPRINE HYDROCHLORIDE 10 MG/1
10 TABLET, FILM COATED ORAL EVERY 8 HOURS
Refills: 0 | Status: DISCONTINUED | OUTPATIENT
Start: 2020-08-24 | End: 2020-09-01

## 2020-08-23 RX ORDER — POTASSIUM CHLORIDE 20 MEQ
10 PACKET (EA) ORAL
Refills: 0 | Status: COMPLETED | OUTPATIENT
Start: 2020-08-23 | End: 2020-08-23

## 2020-08-23 RX ORDER — CEFAZOLIN SODIUM 1 G
2000 VIAL (EA) INJECTION EVERY 8 HOURS
Refills: 0 | Status: DISCONTINUED | OUTPATIENT
Start: 2020-08-24 | End: 2020-09-01

## 2020-08-23 RX ADMIN — Medication 100 MILLIEQUIVALENT(S): at 06:37

## 2020-08-23 RX ADMIN — Medication 100 MILLIEQUIVALENT(S): at 07:59

## 2020-08-23 RX ADMIN — HYDROMORPHONE HYDROCHLORIDE 2 MILLIGRAM(S): 2 INJECTION INTRAMUSCULAR; INTRAVENOUS; SUBCUTANEOUS at 00:27

## 2020-08-23 RX ADMIN — Medication 100 MILLIEQUIVALENT(S): at 05:34

## 2020-08-23 RX ADMIN — HYDROMORPHONE HYDROCHLORIDE 2 MILLIGRAM(S): 2 INJECTION INTRAMUSCULAR; INTRAVENOUS; SUBCUTANEOUS at 03:05

## 2020-08-23 RX ADMIN — CHLORHEXIDINE GLUCONATE 1 APPLICATION(S): 213 SOLUTION TOPICAL at 09:30

## 2020-08-23 RX ADMIN — HYDROMORPHONE HYDROCHLORIDE 2 MILLIGRAM(S): 2 INJECTION INTRAMUSCULAR; INTRAVENOUS; SUBCUTANEOUS at 00:23

## 2020-08-23 RX ADMIN — HYDROMORPHONE HYDROCHLORIDE 2 MILLIGRAM(S): 2 INJECTION INTRAMUSCULAR; INTRAVENOUS; SUBCUTANEOUS at 08:15

## 2020-08-23 RX ADMIN — HYDROMORPHONE HYDROCHLORIDE 2 MILLIGRAM(S): 2 INJECTION INTRAMUSCULAR; INTRAVENOUS; SUBCUTANEOUS at 08:00

## 2020-08-23 RX ADMIN — SODIUM CHLORIDE 75 MILLILITER(S): 9 INJECTION, SOLUTION INTRAVENOUS at 03:06

## 2020-08-23 RX ADMIN — PIPERACILLIN AND TAZOBACTAM 25 GRAM(S): 4; .5 INJECTION, POWDER, LYOPHILIZED, FOR SOLUTION INTRAVENOUS at 06:37

## 2020-08-23 RX ADMIN — HYDROMORPHONE HYDROCHLORIDE 2 MILLIGRAM(S): 2 INJECTION INTRAMUSCULAR; INTRAVENOUS; SUBCUTANEOUS at 03:20

## 2020-08-23 NOTE — CHART NOTE - NSCHARTNOTEFT_GEN_A_CORE
D/w ortho resident and ICU PA.  Chart reviewed.  Mr Zendejas is a 34 y/o male with no PMHX, presents to the ED with intractable back pain since yesterday AM.  Per HPI, denied any ?injury, fall, LOC.  Per chart notes, pt denied, numbness/tingling/paresthesias/weakness, bowel/bladder incontinence.  Pt had D/w ortho resident and ICU PA.  Chart reviewed.  Mr Zendejas is a 36 y/o male with no PMHX, presents to the ED with intractable back pain since yesterday AM.  Per HPI, denied any ?injury, fall, LOC.  Per chart notes, pt denied, numbness/tingling/paresthesias/weakness, bowel/bladder incontinence.  CT of thoraco-lumbosacral spine reports fractures of the L1 through L5 pedicles bilaterally leading widening of the spinal canal with anterolisthesis of L5 on S1 with the entire  L5 vertebral body width, disruption of the anterior and posterior longitudinal ligaments at L5-S1 level.  There are also fractures of the L5 transverse processes. There is epidural hemorrhage and foraminal hemorrhage at L1-L5 levels. There is enlargement of the psoas muscles bilaterally, with surroundingedema of fluid. There is retroperitoneal fluid, probably retroperitoneal hemorrhage a surrounding the psoas muscle and paraspinal tissues bilaterally. D/w ortho resident and ICU PA.  Chart reviewed.  Mr Zendejas is a 36 y/o male with no PMHX, presents to the ED with intractable back pain since yesterday AM.  Per HPI, denied any ?injury, fall, LOC.  Per chart notes, pt denied, numbness/tingling/paresthesias/weakness, bowel/bladder incontinence.  CT of thoraco-lumbosacral spine reports fractures of the L1 through L5 pedicles bilaterally leading widening of the spinal canal with anterolisthesis of L5 on S1 with the entire  L5 vertebral body width, disruption of the anterior and posterior longitudinal ligaments at L5-S1 level.  There are also fractures of the L5 transverse processes. There is epidural hemorrhage and foraminal hemorrhage at L1-L5 levels. There is enlargement of the psoas muscles bilaterally, with surrounding edema of fluid. There is retroperitoneal fluid, probably retroperitoneal hemorrhage a surrounding the psoas muscle and paraspinal tissues bilaterally.  MRI of Lumbosacral spine confirms same findings as above.    Assessment/Plan as above, D/w ortho resident and ICU PA.  Chart reviewed.  Mr Zendejas is a 34 y/o male with no PMHX, presents to the ED with intractable back pain since yesterday AM.  Per HPI, pt apparently fell down the stairs the night before.  Per chart notes, pt denied, numbness/tingling/paresthesias/weakness, bowel/bladder incontinence.  CT of thoraco-lumbosacral spine reports fractures of the L1 through L5 pedicles bilaterally leading widening of the spinal canal with anterolisthesis of L5 on S1 with the entire  L5 vertebral body width, disruption of the anterior and posterior longitudinal ligaments at L5-S1 level.  There are also fractures of the L5 transverse processes. There is epidural hemorrhage and foraminal hemorrhage at L1-L5 levels. There is enlargement of the psoas muscles bilaterally, with surrounding edema of fluid. There is retroperitoneal fluid, probably retroperitoneal hemorrhage a surrounding the psoas muscle and paraspinal tissues bilaterally.  MRI of Lumbosacral spine confirms same findings as above.    Assessment/Plan as above, L1-L5 fractures with disruption of ant and post ligaments at L1-L5 level, + ass epidural hemorrhage surrounding psoas muscle and paraspinal tissues.  Pt was evaluated by ortho - scheduled for spinal surgery -stabilization, laminectomy, decompression - this AM  Pt requires hourly neuro checks and if deteriorates, i.e., acute cord compression, may need urgent surgery  Will upgrade to ICU monitoring at this time.  Strict Bedrest, IV Hydration, analgesics, GI/DVT Preophylaxis

## 2020-08-23 NOTE — CHART NOTE - NSCHARTNOTEFT_GEN_A_CORE
Pt s/e at bedside    Motor:                   C5                C6              C7               C8           T1   R            5/5                5/5            5/5             5/5          5/5  L             5/5               5/5             5/5             5/5          5/5                L2             L3             L4               L5            S1  R         5/5           5/5          5/5             5/5           5/5  L          5/5          5/5           5/5             5/5           5/5    Sensory:            C5         C6         C7      C8       T1        (0=absent, 1=impaired, 2=normal, NT=not testable)  R         2            2           2        2         2  L          2            2           2        2         2               L2          L3         L4      L5       S1         (0=absent, 1=impaired, 2=normal, NT=not testable)  R         2            2            2        2        2  L          2            2           2        2         2      No obvious neuro deficits at this time. Appreciate q1h neuro checks. OR in Am. Appreciate excellent ICU/CCU care

## 2020-08-23 NOTE — CHART NOTE - NSCHARTNOTEFT_GEN_A_CORE
D/w ICU PA. Unable to accept patient due to insufficient nursing staff to comply with q1h neurovascular checks. Explained that patient is at high risk for neurological decline and requires frequent checks. Will discuss with nursing supervisor and escalate if needed.

## 2020-08-23 NOTE — PRE-OP CHECKLIST - SELECT TESTS ORDERED
CBC/INR/PT/PTT/BMP CBC/Urinalysis/BMP/CMP/Hepatic Function/PT/PTT/INR/EKG/CXR BMP/EKG/CXR/PT/PTT/Hepatic Function/CBC/CMP/INR/Type and Screen/Urinalysis

## 2020-08-23 NOTE — PROGRESS NOTE ADULT - SUBJECTIVE AND OBJECTIVE BOX
Patient is a 35y old  Male who presents with a chief complaint of low back pain (23 Aug 2020 06:37)      INTERVAL HPI/OVERNIGHT EVENTS:  pt is waiting for OR with no acute distress  MEDICATIONS  (STANDING):    MEDICATIONS  (PRN):      Allergies    No Known Allergies    Intolerances        REVIEW OF SYSTEMS:  CONSTITUTIONAL: No fever, weight loss, or fatigue  EYES: No eye pain, visual disturbances, or discharge  ENMT:  No difficulty hearing, tinnitus, vertigo; No sinus or throat pain  NECK: No pain or stiffness  BREASTS: No pain, masses, or nipple discharge  RESPIRATORY: No cough, wheezing, chills or hemoptysis; No shortness of breath  CARDIOVASCULAR: No chest pain, palpitations, dizziness, or leg swelling  GASTROINTESTINAL: No abdominal or epigastric pain. No nausea, vomiting, or hematemesis; No diarrhea or constipation. No melena or hematochezia.  GENITOURINARY: No dysuria, frequency, hematuria, or incontinence  NEUROLOGICAL: No headaches, memory loss, loss of strength, numbness, or tremors  SKIN: No itching, burning, rashes, or lesions   LYMPH NODES: No enlarged glands  ENDOCRINE: No heat or cold intolerance; No hair loss  MUSCULOSKELETAL: No joint pain or swelling; No muscle, back, or extremity pain  PSYCHIATRIC: No depression, anxiety, mood swings, or difficulty sleeping  HEME/LYMPH: No easy bruising, or bleeding gums  ALLERGY AND IMMUNOLOGIC: No hives or eczema    Vital Signs Last 24 Hrs  T(C): 37.1 (23 Aug 2020 11:27), Max: 37.8 (22 Aug 2020 21:50)  T(F): 98.7 (23 Aug 2020 11:27), Max: 100 (22 Aug 2020 21:50)  HR: 96 (23 Aug 2020 11:00) (86 - 108)  BP: 123/77 (23 Aug 2020 11:00) (114/71 - 145/46)  BP(mean): 88 (23 Aug 2020 11:00) (70 - 100)  RR: 12 (23 Aug 2020 11:00) (12 - 27)  SpO2: 98% (23 Aug 2020 11:00) (95% - 100%)    PHYSICAL EXAM:  GENERAL: NAD, well-groomed, well-developed  HEAD:  Atraumatic, Normocephalic  EYES: EOMI, PERRLA, conjunctiva and sclera clear  ENMT: No tonsillar erythema, exudates, or enlargement; Moist mucous membranes, Good dentition, No lesions  NECK: Supple, No JVD, Normal thyroid  NERVOUS SYSTEM:  Alert & Oriented X3, Good concentration; Motor Strength 5/5 B/L upper and lower extremities; DTRs 2+ intact and symmetric  CHEST/LUNG: Clear to percussion bilaterally; No rales, rhonchi, wheezing, or rubs  HEART: Regular rate and rhythm; No murmurs, rubs, or gallops  ABDOMEN: Soft, Nontender, Nondistended; Bowel sounds present  EXTREMITIES:  2+ Peripheral Pulses, No clubbing, cyanosis, or edema  LYMPH: No lymphadenopathy noted  SKIN: No rashes or lesions    LABS:                        11.2   10.58 )-----------( 258      ( 23 Aug 2020 04:21 )             31.5     08-    133<L>  |  101  |  13  ----------------------------<  122<H>  3.3<L>   |  23  |  0.96    Ca    8.0<L>      23 Aug 2020 04:21    TPro  7.8  /  Alb  3.9  /  TBili  0.7  /  DBili  x   /  AST  17  /  ALT  24  /  AlkPhos  156<H>  08-22    PT/INR - ( 23 Aug 2020 04:21 )   PT: 14.4 sec;   INR: 1.25 ratio         PTT - ( 23 Aug 2020 04:21 )  PTT:28.7 sec  Urinalysis Basic - ( 22 Aug 2020 19:01 )    Color: Yellow / Appearance: Clear / S.010 / pH: x  Gluc: x / Ketone: Negative  / Bili: Negative / Urobili: Negative mg/dL   Blood: x / Protein: 30 mg/dL / Nitrite: Negative   Leuk Esterase: Negative / RBC: 0-2 /HPF / WBC 0-2   Sq Epi: x / Non Sq Epi: Occasional / Bacteria: Few      CAPILLARY BLOOD GLUCOSE        CULTURES:    HEMOGLOBIN A1C:    CHOLESTEROL:        RADIOLOGY & ADDITIONAL TESTS:

## 2020-08-23 NOTE — PROGRESS NOTE ADULT - SUBJECTIVE AND OBJECTIVE BOX
Patient seen and examined at bedside. Pain well controlled. Denies nausea/vomiting. Denies any numbness or fecal/urinary incontinence. Voided spontaneously.    No Known Allergies      Vital Signs Last 24 Hrs  T(C): 37.1 (23 Aug 2020 01:45), Max: 37.8 (22 Aug 2020 21:50)  T(F): 98.7 (23 Aug 2020 01:45), Max: 100 (22 Aug 2020 21:50)  HR: 97 (23 Aug 2020 04:30) (93 - 126)  BP: 122/80 (23 Aug 2020 04:30) (117/69 - 152/100)  BP(mean): 88 (23 Aug 2020 04:30) (80 - 94)  RR: 19 (23 Aug 2020 04:30) (12 - 20)  SpO2: 97% (23 Aug 2020 04:30) (97% - 100%)    I&O's Detail    22 Aug 2020 07:01  -  23 Aug 2020 06:37  --------------------------------------------------------  IN:    lactated ringers.: 75 mL  Total IN: 75 mL    OUT:  Total OUT: 0 mL    Total NET: 75 mL          PE:   Gen: NAD  Spine:   Motor:                   C5                C6              C7               C8           T1   R            5/5                5/5            5/5             5/5          5/5  L             5/5               5/5             5/5             5/5          5/5                L2             L3             L4               L5            S1  R         5/5           5/5          5/5             5/5           5/5  L          5/5          5/5           5/5             5/5           5/5    Sensory:            C5         C6         C7      C8       T1        (0=absent, 1=impaired, 2=normal, NT=not testable)  R         2            2           2        2         2  L          2            2           2        2         2               L2          L3         L4      L5       S1         (0=absent, 1=impaired, 2=normal, NT=not testable)  R         2            2            2        2        2  L          2            2           2        2         2    DP 2+  Compartments soft  No calf ttp    A/P: 35yMale with complete lumbosacral dissociation going to OR today for possible L1-S1 PSF    -NPO/IVF  -No AC  -OR today  -Appreciated medical clearance  -FU AM labs  -Pain control  -PT/OT: Strict bedrest, spine precautions  -DVT ppx- SCDs  -Appreciate excellent ICU care

## 2020-08-23 NOTE — CONSULT NOTE ADULT - ASSESSMENT
34 y/o status post fall with spinous fracture and disrupted ligaments without neurovascular compromise at present. No signs of saddle anesthesia     Case discussed with EICU Dr. Deshpande   Suggest strict bedrest , Neuro checks q2h   Case discussed Ortho Resident Dr. Jain suggest defer preop CT Scan Abdomen and Pelvis in am proximate to surgery; surgical spinal intervention to occur in am. Acuity of case discussed   Maintain stabilize spine   Detailed discussion between nursing administration, ortho, ICU  -  ICU transfer at present with neuro monitoring with telemetry to minimize harm

## 2020-08-23 NOTE — CONSULT NOTE ADULT - SUBJECTIVE AND OBJECTIVE BOX
35 year old male describes fall down stairs and with injury to lower back pain 24 hours later. CT scan revealed closed lumbar fracture with OR pending in am     Patient seen moving all extremities irritated that he has to go to CT Scan for abdomen and pelvis.  Patient denies numbness and tingling in lower extremities, voiding     ICU Vital Signs Last 24 Hrs  T(C): 37.2 (22 Aug 2020 23:35), Max: 37.8 (22 Aug 2020 21:50)  T(F): 99 (22 Aug 2020 23:35), Max: 100 (22 Aug 2020 21:50)  HR: 108 (22 Aug 2020 23:35) (99 - 126)  BP: 136/78 (22 Aug 2020 23:35) (124/86 - 152/100)  RR: 18 (22 Aug 2020 23:35) (18 - 19)  SpO2: 97% (22 Aug 2020 23:35) (97% - 100%)    Physical Exam: GENERAL: NAD, well-groomed, well-developed, moving extermities bending at knee and instructed for strict bed rest   HEAD:  Atraumatic, Normocephalic  EYES: EOMI, PERRLA, conjunctiva and sclera clear  ENMT: No tonsillar erythema, exudates, or enlargement; Moist mucous membranes, Good dentition, No lesions  NECK: Supple, No JVD, Normal thyroid  NERVOUS SYSTEM:  Alert & Oriented X3, Good concentration; Motor Strength 5/5 B/L upper and lower extremities; DTRs 2+ intact and symmetric  CHEST/LUNG: Clear to percussion bilaterally; No rales, rhonchi, wheezing, or rubs  HEART: Regular rate and rhythm; No murmurs, rubs, or gallops  ABDOMEN: Soft, Nontender, Nondistended; Bowel sounds present  EXTREMITIES:  2+ Peripheral Pulses, No clubbing, cyanosis, or edema  LYMPH: No lymphadenopathy notedRECTAL: No masses, prostate normal size and smooth, Guiac negative   BREAST: No palpatble masses, skin no lesions no retractions, no discharages. adenexa no palpable masses noted   GYN: uterus normal size, adenexa no palpable masses, no CMT, no uterine discharge  SKIN: No rashes or lesions	  IMPRESSION:  1. There are fractures of the L1 through L5 pedicles bilaterally leading  widening of the spinal canal with anterolisthesis of L5 on S1 with the entire  L5 vertebral body width, disruption of the anterior and posterior longitudinal  ligament at L5-S1 level.  2. There is a probable epidural hemorrhage and hemorrhage within the foramina  at L1-L5 levels.  3. There is enlargement of the psoas muscles bilaterally, with surrounding  edema and fluid.    CT LS: IMPRESSION:  1. There are fractures of the L1 through L5 pedicles bilaterally leading  widening of the spinal canal with anterolisthesis of L5 on S1 with the entire  L5 vertebral body width, disruption of the anterior and posterior longitudinal  ligaments at L5-S1 level.  2. Additionally, fractures of the L5 transverse processes.  3. There is epidural hemorrhage and foraminal hemorrhage at L1-L5 levels.  4. There is enlargement of the psoas muscles bilaterally, with surrounding  edema of fluid.  5. There is retroperitoneal fluid, probably retroperitoneal hemorrhage

## 2020-08-24 DIAGNOSIS — N17.9 ACUTE KIDNEY FAILURE, UNSPECIFIED: ICD-10-CM

## 2020-08-24 LAB
ALBUMIN SERPL ELPH-MCNC: 2.4 G/DL — LOW (ref 3.3–5)
ALP SERPL-CCNC: 91 U/L — SIGNIFICANT CHANGE UP (ref 40–120)
ALT FLD-CCNC: 63 U/L — SIGNIFICANT CHANGE UP (ref 12–78)
ANION GAP SERPL CALC-SCNC: 5 MMOL/L — SIGNIFICANT CHANGE UP (ref 5–17)
ANION GAP SERPL CALC-SCNC: 6 MMOL/L — SIGNIFICANT CHANGE UP (ref 5–17)
ANION GAP SERPL CALC-SCNC: 6 MMOL/L — SIGNIFICANT CHANGE UP (ref 5–17)
ANION GAP SERPL CALC-SCNC: 7 MMOL/L — SIGNIFICANT CHANGE UP (ref 5–17)
ANION GAP SERPL CALC-SCNC: 9 MMOL/L — SIGNIFICANT CHANGE UP (ref 5–17)
APPEARANCE UR: CLEAR — SIGNIFICANT CHANGE UP
APTT BLD: 24.3 SEC — LOW (ref 27.5–35.5)
AST SERPL-CCNC: 104 U/L — HIGH (ref 15–37)
BACTERIA # UR AUTO: ABNORMAL
BASE EXCESS BLDA CALC-SCNC: -2.3 MMOL/L — LOW (ref -2–2)
BASE EXCESS BLDA CALC-SCNC: -3.9 MMOL/L — LOW (ref -2–2)
BILIRUB SERPL-MCNC: 0.5 MG/DL — SIGNIFICANT CHANGE UP (ref 0.2–1.2)
BILIRUB UR-MCNC: NEGATIVE — SIGNIFICANT CHANGE UP
BLOOD GAS COMMENTS: SIGNIFICANT CHANGE UP
BLOOD GAS COMMENTS: SIGNIFICANT CHANGE UP
BLOOD GAS SOURCE: SIGNIFICANT CHANGE UP
BLOOD GAS SOURCE: SIGNIFICANT CHANGE UP
BUN SERPL-MCNC: 23 MG/DL — SIGNIFICANT CHANGE UP (ref 7–23)
BUN SERPL-MCNC: 23 MG/DL — SIGNIFICANT CHANGE UP (ref 7–23)
BUN SERPL-MCNC: 24 MG/DL — HIGH (ref 7–23)
BUN SERPL-MCNC: 25 MG/DL — HIGH (ref 7–23)
BUN SERPL-MCNC: 26 MG/DL — HIGH (ref 7–23)
CALCIUM SERPL-MCNC: 7 MG/DL — LOW (ref 8.5–10.1)
CALCIUM SERPL-MCNC: 7.7 MG/DL — LOW (ref 8.5–10.1)
CHLORIDE SERPL-SCNC: 102 MMOL/L — SIGNIFICANT CHANGE UP (ref 96–108)
CHLORIDE SERPL-SCNC: 103 MMOL/L — SIGNIFICANT CHANGE UP (ref 96–108)
CHLORIDE SERPL-SCNC: 104 MMOL/L — SIGNIFICANT CHANGE UP (ref 96–108)
CHLORIDE SERPL-SCNC: 105 MMOL/L — SIGNIFICANT CHANGE UP (ref 96–108)
CHLORIDE SERPL-SCNC: 106 MMOL/L — SIGNIFICANT CHANGE UP (ref 96–108)
CK SERPL-CCNC: 5664 U/L — HIGH (ref 26–308)
CK SERPL-CCNC: CRITICAL HIGH U/L (ref 26–308)
CK SERPL-CCNC: CRITICAL HIGH U/L (ref 26–308)
CO2 SERPL-SCNC: 22 MMOL/L — SIGNIFICANT CHANGE UP (ref 22–31)
CO2 SERPL-SCNC: 23 MMOL/L — SIGNIFICANT CHANGE UP (ref 22–31)
CO2 SERPL-SCNC: 23 MMOL/L — SIGNIFICANT CHANGE UP (ref 22–31)
COLOR SPEC: YELLOW — SIGNIFICANT CHANGE UP
CREAT SERPL-MCNC: 1.31 MG/DL — HIGH (ref 0.5–1.3)
CREAT SERPL-MCNC: 1.61 MG/DL — HIGH (ref 0.5–1.3)
CREAT SERPL-MCNC: 1.7 MG/DL — HIGH (ref 0.5–1.3)
CREAT SERPL-MCNC: 1.98 MG/DL — HIGH (ref 0.5–1.3)
CREAT SERPL-MCNC: 2 MG/DL — HIGH (ref 0.5–1.3)
DIFF PNL FLD: ABNORMAL
EPI CELLS # UR: SIGNIFICANT CHANGE UP
GLUCOSE SERPL-MCNC: 141 MG/DL — HIGH (ref 70–99)
GLUCOSE SERPL-MCNC: 153 MG/DL — HIGH (ref 70–99)
GLUCOSE SERPL-MCNC: 158 MG/DL — HIGH (ref 70–99)
GLUCOSE SERPL-MCNC: 159 MG/DL — HIGH (ref 70–99)
GLUCOSE SERPL-MCNC: 174 MG/DL — HIGH (ref 70–99)
GLUCOSE UR QL: NEGATIVE MG/DL — SIGNIFICANT CHANGE UP
HCO3 BLDA-SCNC: 21 MMOL/L — SIGNIFICANT CHANGE UP (ref 21–29)
HCO3 BLDA-SCNC: 22 MMOL/L — SIGNIFICANT CHANGE UP (ref 21–29)
HCT VFR BLD CALC: 26.2 % — LOW (ref 39–50)
HCT VFR BLD CALC: 31 % — LOW (ref 39–50)
HGB BLD-MCNC: 10.6 G/DL — LOW (ref 13–17)
HGB BLD-MCNC: 9.4 G/DL — LOW (ref 13–17)
HOROWITZ INDEX BLDA+IHG-RTO: 30 — SIGNIFICANT CHANGE UP
HOROWITZ INDEX BLDA+IHG-RTO: 50 — SIGNIFICANT CHANGE UP
HYALINE CASTS # UR AUTO: ABNORMAL /LPF
INR BLD: 1.23 RATIO — HIGH (ref 0.88–1.16)
KETONES UR-MCNC: NEGATIVE — SIGNIFICANT CHANGE UP
LACTATE SERPL-SCNC: 1.1 MMOL/L — SIGNIFICANT CHANGE UP (ref 0.7–2)
LEUKOCYTE ESTERASE UR-ACNC: NEGATIVE — SIGNIFICANT CHANGE UP
MAGNESIUM SERPL-MCNC: 1.7 MG/DL — SIGNIFICANT CHANGE UP (ref 1.6–2.6)
MAGNESIUM SERPL-MCNC: 1.8 MG/DL — SIGNIFICANT CHANGE UP (ref 1.6–2.6)
MAGNESIUM SERPL-MCNC: 2 MG/DL — SIGNIFICANT CHANGE UP (ref 1.6–2.6)
MCHC RBC-ENTMCNC: 30.1 PG — SIGNIFICANT CHANGE UP (ref 27–34)
MCHC RBC-ENTMCNC: 30.2 PG — SIGNIFICANT CHANGE UP (ref 27–34)
MCHC RBC-ENTMCNC: 34.2 GM/DL — SIGNIFICANT CHANGE UP (ref 32–36)
MCHC RBC-ENTMCNC: 35.9 GM/DL — SIGNIFICANT CHANGE UP (ref 32–36)
MCV RBC AUTO: 84.2 FL — SIGNIFICANT CHANGE UP (ref 80–100)
MCV RBC AUTO: 88.1 FL — SIGNIFICANT CHANGE UP (ref 80–100)
NITRITE UR-MCNC: NEGATIVE — SIGNIFICANT CHANGE UP
NRBC # BLD: 0 /100 WBCS — SIGNIFICANT CHANGE UP (ref 0–0)
NRBC # BLD: 0 /100 WBCS — SIGNIFICANT CHANGE UP (ref 0–0)
PCO2 BLDA: 38 MMHG — SIGNIFICANT CHANGE UP (ref 32–46)
PCO2 BLDA: 39 MMHG — SIGNIFICANT CHANGE UP (ref 32–46)
PH BLD: 7.35 — SIGNIFICANT CHANGE UP (ref 7.35–7.45)
PH BLD: 7.38 — SIGNIFICANT CHANGE UP (ref 7.35–7.45)
PH UR: 6 — SIGNIFICANT CHANGE UP (ref 5–8)
PHOSPHATE SERPL-MCNC: 2.5 MG/DL — SIGNIFICANT CHANGE UP (ref 2.5–4.5)
PHOSPHATE SERPL-MCNC: 3.7 MG/DL — SIGNIFICANT CHANGE UP (ref 2.5–4.5)
PHOSPHATE SERPL-MCNC: 4.7 MG/DL — HIGH (ref 2.5–4.5)
PLATELET # BLD AUTO: 168 K/UL — SIGNIFICANT CHANGE UP (ref 150–400)
PLATELET # BLD AUTO: 192 K/UL — SIGNIFICANT CHANGE UP (ref 150–400)
PO2 BLDA: 142 MMHG — HIGH (ref 74–108)
PO2 BLDA: 213 MMHG — HIGH (ref 74–108)
POTASSIUM SERPL-MCNC: 4.4 MMOL/L — SIGNIFICANT CHANGE UP (ref 3.5–5.3)
POTASSIUM SERPL-MCNC: 5 MMOL/L — SIGNIFICANT CHANGE UP (ref 3.5–5.3)
POTASSIUM SERPL-MCNC: 5 MMOL/L — SIGNIFICANT CHANGE UP (ref 3.5–5.3)
POTASSIUM SERPL-MCNC: 5.7 MMOL/L — HIGH (ref 3.5–5.3)
POTASSIUM SERPL-MCNC: 6.6 MMOL/L — CRITICAL HIGH (ref 3.5–5.3)
POTASSIUM SERPL-SCNC: 4.4 MMOL/L — SIGNIFICANT CHANGE UP (ref 3.5–5.3)
POTASSIUM SERPL-SCNC: 5 MMOL/L — SIGNIFICANT CHANGE UP (ref 3.5–5.3)
POTASSIUM SERPL-SCNC: 5 MMOL/L — SIGNIFICANT CHANGE UP (ref 3.5–5.3)
POTASSIUM SERPL-SCNC: 5.7 MMOL/L — HIGH (ref 3.5–5.3)
POTASSIUM SERPL-SCNC: 6.6 MMOL/L — CRITICAL HIGH (ref 3.5–5.3)
PROT SERPL-MCNC: 5.3 GM/DL — LOW (ref 6–8.3)
PROT UR-MCNC: 15 MG/DL
PROTHROM AB SERPL-ACNC: 14.1 SEC — HIGH (ref 10.6–13.6)
RBC # BLD: 3.11 M/UL — LOW (ref 4.2–5.8)
RBC # BLD: 3.52 M/UL — LOW (ref 4.2–5.8)
RBC # FLD: 12.9 % — SIGNIFICANT CHANGE UP (ref 10.3–14.5)
RBC # FLD: 13.4 % — SIGNIFICANT CHANGE UP (ref 10.3–14.5)
RBC CASTS # UR COMP ASSIST: SIGNIFICANT CHANGE UP /HPF (ref 0–4)
SAO2 % BLDA: 100 % — HIGH (ref 92–96)
SAO2 % BLDA: 99 % — HIGH (ref 92–96)
SODIUM SERPL-SCNC: 130 MMOL/L — LOW (ref 135–145)
SODIUM SERPL-SCNC: 131 MMOL/L — LOW (ref 135–145)
SODIUM SERPL-SCNC: 132 MMOL/L — LOW (ref 135–145)
SODIUM SERPL-SCNC: 135 MMOL/L — SIGNIFICANT CHANGE UP (ref 135–145)
SODIUM SERPL-SCNC: 137 MMOL/L — SIGNIFICANT CHANGE UP (ref 135–145)
SP GR SPEC: 1.01 — SIGNIFICANT CHANGE UP (ref 1.01–1.02)
UROBILINOGEN FLD QL: NEGATIVE MG/DL — SIGNIFICANT CHANGE UP
WBC # BLD: 14.04 K/UL — HIGH (ref 3.8–10.5)
WBC # BLD: 15.39 K/UL — HIGH (ref 3.8–10.5)
WBC # FLD AUTO: 14.04 K/UL — HIGH (ref 3.8–10.5)
WBC # FLD AUTO: 15.39 K/UL — HIGH (ref 3.8–10.5)
WBC UR QL: SIGNIFICANT CHANGE UP

## 2020-08-24 PROCEDURE — 99291 CRITICAL CARE FIRST HOUR: CPT

## 2020-08-24 PROCEDURE — 93010 ELECTROCARDIOGRAM REPORT: CPT

## 2020-08-24 PROCEDURE — 71045 X-RAY EXAM CHEST 1 VIEW: CPT | Mod: 26

## 2020-08-24 RX ORDER — HYDROMORPHONE HYDROCHLORIDE 2 MG/ML
1 INJECTION INTRAMUSCULAR; INTRAVENOUS; SUBCUTANEOUS ONCE
Refills: 0 | Status: DISCONTINUED | OUTPATIENT
Start: 2020-08-24 | End: 2020-08-24

## 2020-08-24 RX ORDER — MAGNESIUM SULFATE 500 MG/ML
1 VIAL (ML) INJECTION ONCE
Refills: 0 | Status: COMPLETED | OUTPATIENT
Start: 2020-08-24 | End: 2020-08-24

## 2020-08-24 RX ORDER — ACETAMINOPHEN 500 MG
1000 TABLET ORAL ONCE
Refills: 0 | Status: COMPLETED | OUTPATIENT
Start: 2020-08-24 | End: 2020-08-24

## 2020-08-24 RX ORDER — MAGNESIUM SULFATE 500 MG/ML
2 VIAL (ML) INJECTION ONCE
Refills: 0 | Status: COMPLETED | OUTPATIENT
Start: 2020-08-24 | End: 2020-08-24

## 2020-08-24 RX ORDER — OXYCODONE HYDROCHLORIDE 5 MG/1
5 TABLET ORAL EVERY 4 HOURS
Refills: 0 | Status: DISCONTINUED | OUTPATIENT
Start: 2020-08-24 | End: 2020-08-24

## 2020-08-24 RX ORDER — PANTOPRAZOLE SODIUM 20 MG/1
40 TABLET, DELAYED RELEASE ORAL ONCE
Refills: 0 | Status: COMPLETED | OUTPATIENT
Start: 2020-08-24 | End: 2020-08-24

## 2020-08-24 RX ORDER — PHENYLEPHRINE HYDROCHLORIDE 10 MG/ML
0.5 INJECTION INTRAVENOUS
Qty: 40 | Refills: 0 | Status: DISCONTINUED | OUTPATIENT
Start: 2020-08-24 | End: 2020-08-25

## 2020-08-24 RX ORDER — CALCIUM GLUCONATE 100 MG/ML
2 VIAL (ML) INTRAVENOUS ONCE
Refills: 0 | Status: COMPLETED | OUTPATIENT
Start: 2020-08-24 | End: 2020-08-24

## 2020-08-24 RX ORDER — SODIUM CHLORIDE 9 MG/ML
1000 INJECTION, SOLUTION INTRAVENOUS
Refills: 0 | Status: DISCONTINUED | OUTPATIENT
Start: 2020-08-24 | End: 2020-08-26

## 2020-08-24 RX ORDER — SODIUM CHLORIDE 9 MG/ML
1000 INJECTION INTRAMUSCULAR; INTRAVENOUS; SUBCUTANEOUS ONCE
Refills: 0 | Status: COMPLETED | OUTPATIENT
Start: 2020-08-24 | End: 2020-08-24

## 2020-08-24 RX ORDER — HYDROMORPHONE HYDROCHLORIDE 2 MG/ML
1 INJECTION INTRAMUSCULAR; INTRAVENOUS; SUBCUTANEOUS EVERY 4 HOURS
Refills: 0 | Status: DISCONTINUED | OUTPATIENT
Start: 2020-08-24 | End: 2020-08-24

## 2020-08-24 RX ORDER — BENZOCAINE AND MENTHOL 5; 1 G/100ML; G/100ML
1 LIQUID ORAL
Refills: 0 | Status: DISCONTINUED | OUTPATIENT
Start: 2020-08-24 | End: 2020-08-24

## 2020-08-24 RX ORDER — PROPOFOL 10 MG/ML
10 INJECTION, EMULSION INTRAVENOUS
Qty: 1000 | Refills: 0 | Status: DISCONTINUED | OUTPATIENT
Start: 2020-08-24 | End: 2020-08-24

## 2020-08-24 RX ORDER — CHLORHEXIDINE GLUCONATE 213 G/1000ML
1 SOLUTION TOPICAL
Refills: 0 | Status: DISCONTINUED | OUTPATIENT
Start: 2020-08-24 | End: 2020-09-01

## 2020-08-24 RX ORDER — HYDROMORPHONE HYDROCHLORIDE 2 MG/ML
1.5 INJECTION INTRAMUSCULAR; INTRAVENOUS; SUBCUTANEOUS
Refills: 0 | Status: DISCONTINUED | OUTPATIENT
Start: 2020-08-24 | End: 2020-08-25

## 2020-08-24 RX ORDER — SODIUM CHLORIDE 9 MG/ML
500 INJECTION, SOLUTION INTRAVENOUS ONCE
Refills: 0 | Status: COMPLETED | OUTPATIENT
Start: 2020-08-24 | End: 2020-08-24

## 2020-08-24 RX ORDER — HYDROMORPHONE HYDROCHLORIDE 2 MG/ML
1 INJECTION INTRAMUSCULAR; INTRAVENOUS; SUBCUTANEOUS
Refills: 0 | Status: DISCONTINUED | OUTPATIENT
Start: 2020-08-24 | End: 2020-08-24

## 2020-08-24 RX ORDER — FENTANYL CITRATE 50 UG/ML
50 INJECTION INTRAVENOUS ONCE
Refills: 0 | Status: DISCONTINUED | OUTPATIENT
Start: 2020-08-24 | End: 2020-08-24

## 2020-08-24 RX ORDER — HYDROMORPHONE HYDROCHLORIDE 2 MG/ML
1 INJECTION INTRAMUSCULAR; INTRAVENOUS; SUBCUTANEOUS EVERY 6 HOURS
Refills: 0 | Status: DISCONTINUED | OUTPATIENT
Start: 2020-08-24 | End: 2020-08-24

## 2020-08-24 RX ORDER — OXYCODONE HYDROCHLORIDE 5 MG/1
10 TABLET ORAL EVERY 4 HOURS
Refills: 0 | Status: DISCONTINUED | OUTPATIENT
Start: 2020-08-24 | End: 2020-08-24

## 2020-08-24 RX ORDER — INSULIN HUMAN 100 [IU]/ML
10 INJECTION, SOLUTION SUBCUTANEOUS ONCE
Refills: 0 | Status: COMPLETED | OUTPATIENT
Start: 2020-08-24 | End: 2020-08-24

## 2020-08-24 RX ORDER — CHLORHEXIDINE GLUCONATE 213 G/1000ML
15 SOLUTION TOPICAL EVERY 12 HOURS
Refills: 0 | Status: DISCONTINUED | OUTPATIENT
Start: 2020-08-24 | End: 2020-08-24

## 2020-08-24 RX ORDER — FENTANYL CITRATE 50 UG/ML
0.5 INJECTION INTRAVENOUS
Qty: 2500 | Refills: 0 | Status: DISCONTINUED | OUTPATIENT
Start: 2020-08-24 | End: 2020-08-24

## 2020-08-24 RX ORDER — SODIUM CHLORIDE 9 MG/ML
1000 INJECTION INTRAMUSCULAR; INTRAVENOUS; SUBCUTANEOUS
Refills: 0 | Status: DISCONTINUED | OUTPATIENT
Start: 2020-08-24 | End: 2020-08-24

## 2020-08-24 RX ORDER — DEXTROSE 50 % IN WATER 50 %
50 SYRINGE (ML) INTRAVENOUS ONCE
Refills: 0 | Status: COMPLETED | OUTPATIENT
Start: 2020-08-24 | End: 2020-08-24

## 2020-08-24 RX ORDER — HYDROMORPHONE HYDROCHLORIDE 2 MG/ML
1 INJECTION INTRAMUSCULAR; INTRAVENOUS; SUBCUTANEOUS
Refills: 0 | Status: DISCONTINUED | OUTPATIENT
Start: 2020-08-24 | End: 2020-08-31

## 2020-08-24 RX ADMIN — SODIUM CHLORIDE 200 MILLILITER(S): 9 INJECTION, SOLUTION INTRAVENOUS at 22:21

## 2020-08-24 RX ADMIN — FENTANYL CITRATE 5.45 MICROGRAM(S)/KG/HR: 50 INJECTION INTRAVENOUS at 02:28

## 2020-08-24 RX ADMIN — FENTANYL CITRATE 50 MICROGRAM(S): 50 INJECTION INTRAVENOUS at 20:16

## 2020-08-24 RX ADMIN — Medication 100 MILLIGRAM(S): at 22:20

## 2020-08-24 RX ADMIN — HYDROMORPHONE HYDROCHLORIDE 1 MILLIGRAM(S): 2 INJECTION INTRAMUSCULAR; INTRAVENOUS; SUBCUTANEOUS at 11:17

## 2020-08-24 RX ADMIN — Medication 100 MILLIGRAM(S): at 14:22

## 2020-08-24 RX ADMIN — Medication 100 GRAM(S): at 15:07

## 2020-08-24 RX ADMIN — HYDROMORPHONE HYDROCHLORIDE 1 MILLIGRAM(S): 2 INJECTION INTRAMUSCULAR; INTRAVENOUS; SUBCUTANEOUS at 22:20

## 2020-08-24 RX ADMIN — Medication 100 MILLIGRAM(S): at 05:13

## 2020-08-24 RX ADMIN — CYCLOBENZAPRINE HYDROCHLORIDE 10 MILLIGRAM(S): 10 TABLET, FILM COATED ORAL at 14:19

## 2020-08-24 RX ADMIN — HYDROMORPHONE HYDROCHLORIDE 1 MILLIGRAM(S): 2 INJECTION INTRAMUSCULAR; INTRAVENOUS; SUBCUTANEOUS at 14:23

## 2020-08-24 RX ADMIN — OXYCODONE HYDROCHLORIDE 5 MILLIGRAM(S): 5 TABLET ORAL at 11:49

## 2020-08-24 RX ADMIN — HYDROMORPHONE HYDROCHLORIDE 1 MILLIGRAM(S): 2 INJECTION INTRAMUSCULAR; INTRAVENOUS; SUBCUTANEOUS at 22:35

## 2020-08-24 RX ADMIN — HYDROMORPHONE HYDROCHLORIDE 1 MILLIGRAM(S): 2 INJECTION INTRAMUSCULAR; INTRAVENOUS; SUBCUTANEOUS at 14:30

## 2020-08-24 RX ADMIN — PROPOFOL 6.53 MICROGRAM(S)/KG/MIN: 10 INJECTION, EMULSION INTRAVENOUS at 02:29

## 2020-08-24 RX ADMIN — Medication 500 MILLIGRAM(S): at 18:31

## 2020-08-24 RX ADMIN — HYDROMORPHONE HYDROCHLORIDE 1 MILLIGRAM(S): 2 INJECTION INTRAMUSCULAR; INTRAVENOUS; SUBCUTANEOUS at 19:16

## 2020-08-24 RX ADMIN — HYDROMORPHONE HYDROCHLORIDE 1 MILLIGRAM(S): 2 INJECTION INTRAMUSCULAR; INTRAVENOUS; SUBCUTANEOUS at 11:45

## 2020-08-24 RX ADMIN — HYDROMORPHONE HYDROCHLORIDE 1 MILLIGRAM(S): 2 INJECTION INTRAMUSCULAR; INTRAVENOUS; SUBCUTANEOUS at 15:53

## 2020-08-24 RX ADMIN — SODIUM CHLORIDE 125 MILLILITER(S): 9 INJECTION INTRAMUSCULAR; INTRAVENOUS; SUBCUTANEOUS at 04:24

## 2020-08-24 RX ADMIN — PROPOFOL 6.53 MICROGRAM(S)/KG/MIN: 10 INJECTION, EMULSION INTRAVENOUS at 05:12

## 2020-08-24 RX ADMIN — Medication 1 MILLIGRAM(S): at 11:49

## 2020-08-24 RX ADMIN — HYDROMORPHONE HYDROCHLORIDE 1 MILLIGRAM(S): 2 INJECTION INTRAMUSCULAR; INTRAVENOUS; SUBCUTANEOUS at 19:00

## 2020-08-24 RX ADMIN — CHLORHEXIDINE GLUCONATE 1 APPLICATION(S): 213 SOLUTION TOPICAL at 09:17

## 2020-08-24 RX ADMIN — OXYCODONE HYDROCHLORIDE 5 MILLIGRAM(S): 5 TABLET ORAL at 18:31

## 2020-08-24 RX ADMIN — PROPOFOL 6.53 MICROGRAM(S)/KG/MIN: 10 INJECTION, EMULSION INTRAVENOUS at 09:15

## 2020-08-24 RX ADMIN — SODIUM CHLORIDE 200 MILLILITER(S): 9 INJECTION, SOLUTION INTRAVENOUS at 14:23

## 2020-08-24 RX ADMIN — PHENYLEPHRINE HYDROCHLORIDE 20.4 MICROGRAM(S)/KG/MIN: 10 INJECTION INTRAVENOUS at 22:34

## 2020-08-24 RX ADMIN — INSULIN HUMAN 10 UNIT(S): 100 INJECTION, SOLUTION SUBCUTANEOUS at 03:04

## 2020-08-24 RX ADMIN — Medication 200 GRAM(S): at 03:22

## 2020-08-24 RX ADMIN — SODIUM CHLORIDE 500 MILLILITER(S): 9 INJECTION, SOLUTION INTRAVENOUS at 07:58

## 2020-08-24 RX ADMIN — HYDROMORPHONE HYDROCHLORIDE 1 MILLIGRAM(S): 2 INJECTION INTRAMUSCULAR; INTRAVENOUS; SUBCUTANEOUS at 16:10

## 2020-08-24 RX ADMIN — CHLORHEXIDINE GLUCONATE 15 MILLILITER(S): 213 SOLUTION TOPICAL at 05:13

## 2020-08-24 RX ADMIN — Medication 400 MILLIGRAM(S): at 20:01

## 2020-08-24 RX ADMIN — Medication 1 TABLET(S): at 11:49

## 2020-08-24 RX ADMIN — OXYCODONE HYDROCHLORIDE 5 MILLIGRAM(S): 5 TABLET ORAL at 12:15

## 2020-08-24 RX ADMIN — SODIUM CHLORIDE 150 MILLILITER(S): 9 INJECTION, SOLUTION INTRAVENOUS at 09:00

## 2020-08-24 RX ADMIN — OXYCODONE HYDROCHLORIDE 5 MILLIGRAM(S): 5 TABLET ORAL at 18:58

## 2020-08-24 RX ADMIN — PHENYLEPHRINE HYDROCHLORIDE 20.4 MICROGRAM(S)/KG/MIN: 10 INJECTION INTRAVENOUS at 02:28

## 2020-08-24 RX ADMIN — CYCLOBENZAPRINE HYDROCHLORIDE 10 MILLIGRAM(S): 10 TABLET, FILM COATED ORAL at 21:37

## 2020-08-24 RX ADMIN — FENTANYL CITRATE 50 MICROGRAM(S): 50 INJECTION INTRAVENOUS at 20:01

## 2020-08-24 RX ADMIN — SODIUM CHLORIDE 200 MILLILITER(S): 9 INJECTION, SOLUTION INTRAVENOUS at 23:32

## 2020-08-24 RX ADMIN — Medication 50 MILLILITER(S): at 03:04

## 2020-08-24 RX ADMIN — Medication 1000 MILLIGRAM(S): at 20:16

## 2020-08-24 RX ADMIN — SODIUM CHLORIDE 1000 MILLILITER(S): 9 INJECTION INTRAMUSCULAR; INTRAVENOUS; SUBCUTANEOUS at 04:16

## 2020-08-24 RX ADMIN — Medication 50 GRAM(S): at 23:31

## 2020-08-24 RX ADMIN — PANTOPRAZOLE SODIUM 40 MILLIGRAM(S): 20 TABLET, DELAYED RELEASE ORAL at 02:35

## 2020-08-24 NOTE — DIETITIAN INITIAL EVALUATION ADULT. - PERTINENT MEDS FT
MEDICATIONS  (STANDING):  ascorbic acid 500 milliGRAM(s) Oral two times a day  ceFAZolin   IVPB 2000 milliGRAM(s) IV Intermittent every 8 hours  chlorhexidine 4% Liquid 1 Application(s) Topical <User Schedule>  cyclobenzaprine 10 milliGRAM(s) Oral every 8 hours  folic acid 1 milliGRAM(s) Oral daily  HYDROmorphone  Injectable 1 milliGRAM(s) IV Push once  lactated ringers. 1000 milliLiter(s) (200 mL/Hr) IV Continuous <Continuous>  multivitamin 1 Tablet(s) Oral daily  phenylephrine    Infusion 0.5 MICROgram(s)/kG/Min (20.4 mL/Hr) IV Continuous <Continuous>  senna 2 Tablet(s) Oral at bedtime    MEDICATIONS  (PRN):  acetaminophen   Tablet .. 650 milliGRAM(s) Oral every 6 hours PRN Temp greater or equal to 38C (100.4F)  HYDROmorphone  Injectable 1 milliGRAM(s) IV Push every 4 hours PRN Severe Pain (7 - 10)  melatonin 3 milliGRAM(s) Oral at bedtime PRN Sleep  ondansetron Injectable 4 milliGRAM(s) IV Push every 6 hours PRN Nausea  oxyCODONE    IR 5 milliGRAM(s) Oral every 4 hours PRN Moderate Pain (4 - 6)

## 2020-08-24 NOTE — DIETITIAN INITIAL EVALUATION ADULT. - OTHER INFO
Pt reported good appetite. Pt follows mostly regular diet at home, however has been trying to eat healthier by eliminating sugar, eating more fruits/vegetables, less red meat. Pt reported weight fluctuates, however UBW around 240#. Pt denied difficulty chewing/swallowing; denied n/v/d/c; denied food allergies.

## 2020-08-24 NOTE — CONSULT NOTE ADULT - SUBJECTIVE AND OBJECTIVE BOX
[facility] NEPHROLOGY SERVICES, [facility]  NEPHROLOGY AND HYPERTENSION  [address]  [address]  [place] [address]  [phone]    MD DARRYL RICH MD ANDREY GONCHARUK, MD MADHU KORRAPATI, MD YELENA ROSENBERG, MD BINNY KOSHY, MD CHRISTOPHER CAPUTO, MD EDWARD BOVER, MD      Information from chart:  "Patient is a 35y old  Male who presents with a chief complaint of low back pain (24 Aug 2020 12:40)    HPI:  34 y/o M no pertinent PMHx BIBA presents with c/o atraumatic lower  back pain since this morning. Denies nausea,  vomiting, fever, sob, chest pain, (22 Aug 2020 19:16)   "    Patient with noted CHELO and hyperkalemia;   Post PRBC tx; CT with IV contrast  Post op with estimated 2200 ml EBL  Noted elevated CPK trend    PAST MEDICAL & SURGICAL HISTORY:  No pertinent past medical history    FAMILY HISTORY:    Allergies    No Known Allergies    Intolerances      Home Medications:    MEDICATIONS  (STANDING):  ascorbic acid 500 milliGRAM(s) Oral two times a day  ceFAZolin   IVPB 2000 milliGRAM(s) IV Intermittent every 8 hours  chlorhexidine 4% Liquid 1 Application(s) Topical <User Schedule>  cyclobenzaprine 10 milliGRAM(s) Oral every 8 hours  folic acid 1 milliGRAM(s) Oral daily  lactated ringers. 1000 milliLiter(s) (200 mL/Hr) IV Continuous <Continuous>  multivitamin 1 Tablet(s) Oral daily  phenylephrine    Infusion 0.5 MICROgram(s)/kG/Min (20.4 mL/Hr) IV Continuous <Continuous>  senna 2 Tablet(s) Oral at bedtime    MEDICATIONS  (PRN):  acetaminophen   Tablet .. 650 milliGRAM(s) Oral every 6 hours PRN Temp greater or equal to 38C (100.4F)  HYDROmorphone  Injectable 1 milliGRAM(s) IV Push every 3 hours PRN Moderate Pain (4 - 6)  HYDROmorphone  Injectable 1.5 milliGRAM(s) IV Push every 3 hours PRN Severe Pain (7 - 10)  melatonin 3 milliGRAM(s) Oral at bedtime PRN Sleep  ondansetron Injectable 4 milliGRAM(s) IV Push every 6 hours PRN Nausea    Vital Signs Last 24 Hrs  T(C): 37.1 (24 Aug 2020 20:12), Max: 37.3 (24 Aug 2020 07:32)  T(F): 98.8 (24 Aug 2020 20:12), Max: 99.1 (24 Aug 2020 07:32)  HR: 121 (24 Aug 2020 22:00) (87 - 135)  BP: 158/128 (24 Aug 2020 19:10) (97/75 - 158/128)  BP(mean): 136 (24 Aug 2020 19:10) (77 - 136)  RR: 0 (24 Aug 2020 22:00) (0 - 32)  SpO2: 99% (24 Aug 2020 22:00) (96% - 100%)  Mode: CPAP with PS  FiO2: 35  PEEP: 5  PS: 5  ITime: 0.9  MAP: 7  PIP: 11    Daily     Daily Weight in k.1 (24 Aug 2020 15:09)    20 @ 07:01  -  20 @ 07:00  --------------------------------------------------------  IN: 1409.2 mL / OUT: 2215 mL / NET: -805.8 mL    20 @ 07:01  -  20 @ 22:30  --------------------------------------------------------  IN: 3010.9 mL / OUT: 2270 mL / NET: 740.9 mL      CAPILLARY BLOOD GLUCOSE        PHYSICAL EXAM:      T(C): 37.1 (20 @ 20:12), Max: 37.3 (20 @ 07:32)  HR: 121 (20 @ 22:00) (87 - 135)  BP: 158/128 (20 @ 19:10) (97/75 - 158/128)  RR: 0 (20 22:00) (0 - 32)  SpO2: 99% (20 @ 22:00) (96% - 100%)  Wt(kg): --  Lungs clear  Heart S1S2  Abd soft NT ND  Extremities:   tr edema                  137  |  106  |  26<H>  ----------------------------<  174<H>  5.0   |  22  |  1.61<H>    Ca    7.7<L>      24 Aug 2020 11:46  Phos  3.7       Mg     1.7         TPro  5.3<L>  /  Alb  2.4<L>  /  TBili  0.5  /  DBili  x   /  AST  104<H>  /  ALT  63  /  AlkPhos  91                            9.4    15.39 )-----------( 192      ( 24 Aug 2020 11:47 )             26.2     Creatinine Trend: 1.61<--, 1.70<--, 2.00<--, 1.98<--, 0.96<--, 1.04<--  Urinalysis Basic - ( 24 Aug 2020 18:38 )    Color: Yellow / Appearance: Clear / S.015 / pH: x  Gluc: x / Ketone: Negative  / Bili: Negative / Urobili: Negative mg/dL   Blood: x / Protein: 15 mg/dL / Nitrite: Negative   Leuk Esterase: Negative / RBC: 0-2 /HPF / WBC 3-5   Sq Epi: x / Non Sq Epi: Occasional / Bacteria: Occasional      ABG - ( 24 Aug 2020 10:02 )  pH, Arterial: x     pH, Blood: 7.38  /  pCO2: 38    /  pO2: 142   / HCO3: 22    / Base Excess: -2.3  /  SaO2: 99            Assessment   CHELO multifactorial, pre renal azotemia; ATN risk with low MAP, IV contrast NSAIDs effect, rising CPK and blood loss  Hyperkalemia, spurious with CHELO and PRBC;    Plan  Agree with -200 ml/ hr LR   Follow CPK trend, if >10K will add IV bicarbonate for urine pH goal > 6.5    Beny Peters MD

## 2020-08-24 NOTE — CHART NOTE - NSCHARTNOTEFT_GEN_A_CORE
Rect pt from OR accompanied by anesthesia, ortho surgeon and RN, pt remains intubated, stable vitals post aleida, paralyzed. Per ortho pt s/p T11-Pelvis fusion with L2-S1 laminectomy. Orders placed by ortho team. Pt with 2 JETHRO drains in place and large midline back incision. He is s/p ~2200 mL blood loss in OR, and received 4 units prbc, 1 unit ffp and 1 unit plt intraop. No dvt chemoprophylaxis for now, scd only. Will obtain stat post op labs, abg and chest xray to confirm ett placement. Current plan to sedate and ensure adequate pain control with goal MAP 80 mmHg for adequate spinal perfusion. Will sbt in AM with hopes to extubate. Patient sister / family contacted to inform of pt status and completion of operative case and plan.

## 2020-08-24 NOTE — PROGRESS NOTE ADULT - SUBJECTIVE AND OBJECTIVE BOX
INTERVAL HPI/OVERNIGHT EVENTS:    S/P OR, noted to have EBL of 2200 now s/p Spondylolisthesis of lumbosacral region; Fusion of 7 to 12 spinal segments by posterior approach.  Now s/p 4 units PRBC, 1FFP and 1 Plt. s/p 1L bolus and 500cc bolus this am.  Phenylephrine 0.4mcg/kg/min    CENTRAL LINE: [ ] YES [ x] NO  LOCATION:       BUTLER: [x ] YES [ ] NO        A-LINE:  [x ] YES [ ] NO  LOCATION:       GLOBAL ISSUE/BEST PRACTICE:  Analgesia: Fentanyl   Sedation: NA  HOB elevation: yes  Stress ulcer prophylaxis: Protonix  VTE prophylaxis: HSQ  Oral Care: Chlorhexidine  Glycemic control: ISS/Lantus  Nutrition: NPO    REVIEW OF SYSTEMS: [x] Unable to obtain because: intubated and sedated    PHYSICAL EXAM:  Gen: intubated and sedated  HEENT: Intubated with ETT   EYES: EOMI, PERRLA, conjunctiva and sclera clear  ENMT: No tonsillar erythema, exudates, or enlargement; Moist mucous membranes, No lesions  NECK: Supple, No JVD, Normal thyroid  CHEST/LUNG: Clear to auscultation bilaterally; No rales, rhonchi, wheezing, or rubs  HEART: Regular rate and rhythm; No murmurs, rubs, or gallops  ABDOMEN: Soft, Nontender, Nondistended; Bowel sounds present  EXTREMITIES:  2+ Peripheral Pulses, No clubbing, cyanosis, or edema  BACK: large dressing c/d/i; JETHRO L 100cc/hr and R   NERVOUS SYSTEM:  Alert & Oriented, intubated moving all extremities;     ICU Vital Signs Last 24 Hrs  T(C): 37.3 (24 Aug 2020 07:32), Max: 37.3 (24 Aug 2020 07:32)  T(F): 99.1 (24 Aug 2020 07:32), Max: 99.1 (24 Aug 2020 07:32)  HR: 99 (24 Aug 2020 09:00) (86 - 122)  BP: 107/87 (24 Aug 2020 02:05) (97/75 - 128/76)  BP(mean): 92 (24 Aug 2020 02:05) (77 - 92)  ABP: 122/68 (24 Aug 2020 09:00) (98/59 - 130/74)  ABP(mean): 81 (24 Aug 2020 09:00) (70 - 87)  RR: 20 (24 Aug 2020 09:00) (11 - 22)  SpO2: 100% (24 Aug 2020 09:00) (98% - 100%)      I&O's Detail    23 Aug 2020 07:01  -  24 Aug 2020 07:00  --------------------------------------------------------  IN:    fentaNYL  Infusion: 103.3 mL    IV PiggyBack: 400 mL    lactated ringers.: 300 mL    phenylephrine   Infusion: 93.8 mL    propofol Infusion: 137.1 mL    sodium chloride 0.9%: 375 mL  Total IN: 1409.2 mL    OUT:    Bulb: 475 mL    Bulb: 30 mL    Voided: 1710 mL  Total OUT: 2215 mL    Total NET: -805.8 mL      24 Aug 2020 07:01  -  24 Aug 2020 09:31  --------------------------------------------------------  IN:    fentaNYL  Infusion: 54.4 mL    lactated ringers.: 300 mL    phenylephrine   Infusion: 36 mL    propofol Infusion: 52.2 mL  Total IN: 442.6 mL    OUT:    Bulb: 200 mL    Indwelling Catheter - Urethral: 100 mL  Total OUT: 300 mL    Total NET: 142.6 mL          MEDICATIONS  NEURO  Meds: acetaminophen   Tablet .. 650 milliGRAM(s) Oral every 6 hours PRN Temp greater or equal to 38C (100.4F)  cyclobenzaprine 10 milliGRAM(s) Oral every 8 hours  fentaNYL   Infusion 0.5 MICROgram(s)/kG/Hr (5.45 mL/Hr) IV Continuous <Continuous>  melatonin 3 milliGRAM(s) Oral at bedtime PRN Sleep  ondansetron Injectable 4 milliGRAM(s) IV Push every 6 hours PRN Nausea  propofol Infusion 10 MICROgram(s)/kG/Min (6.53 mL/Hr) IV Continuous <Continuous>    RESPIRATORY  ABG - ( 24 Aug 2020 02:16 )  pH: x     /  pCO2: x     /  pO2: x     / HCO3: x     / Base Excess: x     /  SaO2: x       Lactate: 1.1    Meds:     CARDIOVASCULAR  Meds: phenylephrine    Infusion 0.5 MICROgram(s)/kG/Min (20.4 mL/Hr) IV Continuous <Continuous>    GI/NUTRITION  Meds: senna 2 Tablet(s) Oral at bedtime    GENITOURINARY  Meds: ascorbic acid 500 milliGRAM(s) Oral two times a day  folic acid 1 milliGRAM(s) Oral daily  lactated ringers. 1000 milliLiter(s) IV Continuous <Continuous>  multivitamin 1 Tablet(s) Oral daily    HEMATOLOGIC  Meds:   [x] VTE Prophylaxis  INFECTIOUS DISEASES  Meds: ceFAZolin   IVPB 2000 milliGRAM(s) IV Intermittent every 8 hours    ENDOCRINE  CAPILLARY BLOOD GLUCOSE        Meds:   OTHER MEDICATIONS:  chlorhexidine 0.12% Liquid 15 milliLiter(s) Oral Mucosa every 12 hours  chlorhexidine 4% Liquid 1 Application(s) Topical <User Schedule>  :    LABS:                        10.6   14.04 )-----------( 168      ( 24 Aug 2020 02:16 )             31.0      08-24    135  |  105  |  24<H>  ----------------------------<  153<H>  5.0   |  23  |  1.70<H>    Ca    7.7<L>      24 Aug 2020 05:56  Phos  4.7     08-  Mg     2.0     -24    TPro  5.3<L>  /  Alb  2.4<L>  /  TBili  0.5  /  DBili  x   /  AST  104<H>  /  ALT  63  /  AlkPhos  91  08-24    PT/INR - ( 24 Aug 2020 02:16 )   PT: 14.1 sec;   INR: 1.23 ratio         PTT - ( 24 Aug 2020 02:16 )  PTT:24.3 sec  ## COVID Panel  COVID-19 PCR: NotDetec (20 @ 15:44)    Urinalysis Basic - ( 22 Aug 2020 19:01 )    Color: Yellow / Appearance: Clear / S.010 / pH: x  Gluc: x / Ketone: Negative  / Bili: Negative / Urobili: Negative mg/dL   Blood: x / Protein: 30 mg/dL / Nitrite: Negative   Leuk Esterase: Negative / RBC: 0-2 /HPF / WBC 0-2   Sq Epi: x / Non Sq Epi: Occasional / Bacteria: Few      Culture Results:   <10,000 CFU/mL Normal Urogenital Shira ( @ 01:17)      Mode: CPAP with PS, FiO2: 35, PEEP: 5, PS: 5, ITime: 0.9, MAP: 6, PIP: 11      RADIOLOGY & ADDITIONAL STUDIES: INTERVAL HPI/OVERNIGHT EVENTS:    S/P OR, noted to have EBL of 2200 now s/p T11-pelvis posterior spinal fusion, L2-S1 Laminectomy.  Now s/p 4 units PRBC, 1FFP and 1 Plt. s/p 1L bolus and 500cc bolus this am.  Phenylephrine 0.4mcg/kg/min    CENTRAL LINE: [ ] YES [ x] NO  LOCATION:       BUTLER: [x ] YES [ ] NO        A-LINE:  [x ] YES [ ] NO  LOCATION:       GLOBAL ISSUE/BEST PRACTICE:  Analgesia: Fentanyl   Sedation: NA  HOB elevation: yes  Stress ulcer prophylaxis: Protonix  VTE prophylaxis: HSQ  Oral Care: Chlorhexidine  Glycemic control: ISS/Lantus  Nutrition: NPO    REVIEW OF SYSTEMS: [x] Unable to obtain because: intubated and sedated    PHYSICAL EXAM:  Gen: intubated and sedated  HEENT: Intubated with ETT   EYES: EOMI, PERRLA, conjunctiva and sclera clear  ENMT: No tonsillar erythema, exudates, or enlargement; Moist mucous membranes, No lesions  NECK: Supple, No JVD, Normal thyroid  CHEST/LUNG: Clear to auscultation bilaterally; No rales, rhonchi, wheezing, or rubs  HEART: Regular rate and rhythm; No murmurs, rubs, or gallops  ABDOMEN: Soft, Nontender, Nondistended; Bowel sounds present  EXTREMITIES:  2+ Peripheral Pulses, No clubbing, cyanosis, or edema;   BACK: large dressing c/d/i; JETHRO L 100cc/hr and R   NERVOUS SYSTEM:  Alert & Oriented, intubated moving all extremities;     Exam after extubation:  LE: noted to have decreased plantar flexion strength noted to 1/5 and dorseflexion strength is 4/5 bilaterally; hip flexion 4/5 on r and 3+/5 on L     ICU Vital Signs Last 24 Hrs  T(C): 37.3 (24 Aug 2020 07:32), Max: 37.3 (24 Aug 2020 07:32)  T(F): 99.1 (24 Aug 2020 07:32), Max: 99.1 (24 Aug 2020 07:32)  HR: 99 (24 Aug 2020 09:00) (86 - 122)  BP: 107/87 (24 Aug 2020 02:05) (97/75 - 128/76)  BP(mean): 92 (24 Aug 2020 02:05) (77 - 92)  ABP: 122/68 (24 Aug 2020 09:00) (98/59 - 130/74)  ABP(mean): 81 (24 Aug 2020 09:00) (70 - 87)  RR: 20 (24 Aug 2020 09:00) (11 - 22)  SpO2: 100% (24 Aug 2020 09:00) (98% - 100%)      I&O's Detail    23 Aug 2020 07:01  -  24 Aug 2020 07:00  --------------------------------------------------------  IN:    fentaNYL  Infusion: 103.3 mL    IV PiggyBack: 400 mL    lactated ringers.: 300 mL    phenylephrine   Infusion: 93.8 mL    propofol Infusion: 137.1 mL    sodium chloride 0.9%: 375 mL  Total IN: 1409.2 mL    OUT:    Bulb: 475 mL    Bulb: 30 mL    Voided: 1710 mL  Total OUT: 2215 mL    Total NET: -805.8 mL      24 Aug 2020 07:01  -  24 Aug 2020 09:31  --------------------------------------------------------  IN:    fentaNYL  Infusion: 54.4 mL    lactated ringers.: 300 mL    phenylephrine   Infusion: 36 mL    propofol Infusion: 52.2 mL  Total IN: 442.6 mL    OUT:    Bulb: 200 mL    Indwelling Catheter - Urethral: 100 mL  Total OUT: 300 mL    Total NET: 142.6 mL          MEDICATIONS  NEURO  Meds: acetaminophen   Tablet .. 650 milliGRAM(s) Oral every 6 hours PRN Temp greater or equal to 38C (100.4F)  cyclobenzaprine 10 milliGRAM(s) Oral every 8 hours  fentaNYL   Infusion 0.5 MICROgram(s)/kG/Hr (5.45 mL/Hr) IV Continuous <Continuous>  melatonin 3 milliGRAM(s) Oral at bedtime PRN Sleep  ondansetron Injectable 4 milliGRAM(s) IV Push every 6 hours PRN Nausea  propofol Infusion 10 MICROgram(s)/kG/Min (6.53 mL/Hr) IV Continuous <Continuous>    RESPIRATORY  ABG - ( 24 Aug 2020 02:16 )  pH: x     /  pCO2: x     /  pO2: x     / HCO3: x     / Base Excess: x     /  SaO2: x       Lactate: 1.1    Meds:     CARDIOVASCULAR  Meds: phenylephrine    Infusion 0.5 MICROgram(s)/kG/Min (20.4 mL/Hr) IV Continuous <Continuous>    GI/NUTRITION  Meds: senna 2 Tablet(s) Oral at bedtime    GENITOURINARY  Meds: ascorbic acid 500 milliGRAM(s) Oral two times a day  folic acid 1 milliGRAM(s) Oral daily  lactated ringers. 1000 milliLiter(s) IV Continuous <Continuous>  multivitamin 1 Tablet(s) Oral daily    HEMATOLOGIC  Meds:   [x] VTE Prophylaxis  INFECTIOUS DISEASES  Meds: ceFAZolin   IVPB 2000 milliGRAM(s) IV Intermittent every 8 hours    ENDOCRINE  CAPILLARY BLOOD GLUCOSE        Meds:   OTHER MEDICATIONS:  chlorhexidine 0.12% Liquid 15 milliLiter(s) Oral Mucosa every 12 hours  chlorhexidine 4% Liquid 1 Application(s) Topical <User Schedule>  :    LABS:                        10.6   14.04 )-----------( 168      ( 24 Aug 2020 02:16 )             31.0      08-24    135  |  105  |  24<H>  ----------------------------<  153<H>  5.0   |  23  |  1.70<H>    Ca    7.7<L>      24 Aug 2020 05:56  Phos  4.7     08-  Mg     2.0     -24    TPro  5.3<L>  /  Alb  2.4<L>  /  TBili  0.5  /  DBili  x   /  AST  104<H>  /  ALT  63  /  AlkPhos  91  08-24    PT/INR - ( 24 Aug 2020 02:16 )   PT: 14.1 sec;   INR: 1.23 ratio         PTT - ( 24 Aug 2020 02:16 )  PTT:24.3 sec  ## COVID Panel  COVID-19 PCR: NotDetec (20 @ 15:44)    Urinalysis Basic - ( 22 Aug 2020 19:01 )    Color: Yellow / Appearance: Clear / S.010 / pH: x  Gluc: x / Ketone: Negative  / Bili: Negative / Urobili: Negative mg/dL   Blood: x / Protein: 30 mg/dL / Nitrite: Negative   Leuk Esterase: Negative / RBC: 0-2 /HPF / WBC 0-2   Sq Epi: x / Non Sq Epi: Occasional / Bacteria: Few      Culture Results:   <10,000 CFU/mL Normal Urogenital Shira ( @ 01:17)      Mode: CPAP with PS, FiO2: 35, PEEP: 5, PS: 5, ITime: 0.9, MAP: 6, PIP: 11      RADIOLOGY & ADDITIONAL STUDIES: INTERVAL HPI/OVERNIGHT EVENTS:    S/P OR, noted to have EBL of 2200 now s/p T11-pelvis posterior spinal fusion, L2-S1 Laminectomy.  Now s/p 4 units PRBC, 1FFP and 1 Plt. s/p 1L bolus and 500cc bolus this am.  Phenylephrine 0.4mcg/kg/min    CENTRAL LINE: [ ] YES [ x] NO  LOCATION:       BUTLER: [x ] YES [ ] NO        A-LINE:  [x ] YES [ ] NO  LOCATION:       GLOBAL ISSUE/BEST PRACTICE:  Analgesia: Fentanyl   Sedation: NA  HOB elevation: yes  Stress ulcer prophylaxis: Protonix  VTE prophylaxis: HSQ  Oral Care: Chlorhexidine  Glycemic control: ISS/Lantus  Nutrition: NPO    REVIEW OF SYSTEMS: [x] Unable to obtain because: intubated and sedated    PHYSICAL EXAM:  Gen: intubated and sedated  HEENT: Intubated with ETT   EYES: EOMI, PERRLA, conjunctiva and sclera clear  ENMT: No tonsillar erythema, exudates, or enlargement; Moist mucous membranes, No lesions  NECK: Supple, No JVD, Normal thyroid  CHEST/LUNG: Clear to auscultation bilaterally; No rales, rhonchi, wheezing, or rubs  HEART: Regular rate and rhythm; No murmurs, rubs, or gallops  ABDOMEN: Soft, Nontender, Nondistended; Bowel sounds present  EXTREMITIES:  2+ Peripheral Pulses, No clubbing, cyanosis, or edema;   BACK: large dressing c/d/i; JETHRO L 100cc/hr and R   NERVOUS SYSTEM:  Alert & Oriented, intubated moving all extremities;     Exam after extubation:  LE: noted to have decreased dorsiflexion strength noted to 2/5 and plantarflexion strength is 4/5 bilaterally; hip flexion 4/5 on r and 3+/5 on L     ICU Vital Signs Last 24 Hrs  T(C): 37.3 (24 Aug 2020 07:32), Max: 37.3 (24 Aug 2020 07:32)  T(F): 99.1 (24 Aug 2020 07:32), Max: 99.1 (24 Aug 2020 07:32)  HR: 99 (24 Aug 2020 09:00) (86 - 122)  BP: 107/87 (24 Aug 2020 02:05) (97/75 - 128/76)  BP(mean): 92 (24 Aug 2020 02:05) (77 - 92)  ABP: 122/68 (24 Aug 2020 09:00) (98/59 - 130/74)  ABP(mean): 81 (24 Aug 2020 09:00) (70 - 87)  RR: 20 (24 Aug 2020 09:00) (11 - 22)  SpO2: 100% (24 Aug 2020 09:00) (98% - 100%)      I&O's Detail    23 Aug 2020 07:01  -  24 Aug 2020 07:00  --------------------------------------------------------  IN:    fentaNYL  Infusion: 103.3 mL    IV PiggyBack: 400 mL    lactated ringers.: 300 mL    phenylephrine   Infusion: 93.8 mL    propofol Infusion: 137.1 mL    sodium chloride 0.9%: 375 mL  Total IN: 1409.2 mL    OUT:    Bulb: 475 mL    Bulb: 30 mL    Voided: 1710 mL  Total OUT: 2215 mL    Total NET: -805.8 mL      24 Aug 2020 07:01  -  24 Aug 2020 09:31  --------------------------------------------------------  IN:    fentaNYL  Infusion: 54.4 mL    lactated ringers.: 300 mL    phenylephrine   Infusion: 36 mL    propofol Infusion: 52.2 mL  Total IN: 442.6 mL    OUT:    Bulb: 200 mL    Indwelling Catheter - Urethral: 100 mL  Total OUT: 300 mL    Total NET: 142.6 mL          MEDICATIONS  NEURO  Meds: acetaminophen   Tablet .. 650 milliGRAM(s) Oral every 6 hours PRN Temp greater or equal to 38C (100.4F)  cyclobenzaprine 10 milliGRAM(s) Oral every 8 hours  fentaNYL   Infusion 0.5 MICROgram(s)/kG/Hr (5.45 mL/Hr) IV Continuous <Continuous>  melatonin 3 milliGRAM(s) Oral at bedtime PRN Sleep  ondansetron Injectable 4 milliGRAM(s) IV Push every 6 hours PRN Nausea  propofol Infusion 10 MICROgram(s)/kG/Min (6.53 mL/Hr) IV Continuous <Continuous>    RESPIRATORY  ABG - ( 24 Aug 2020 02:16 )  pH: x     /  pCO2: x     /  pO2: x     / HCO3: x     / Base Excess: x     /  SaO2: x       Lactate: 1.1    Meds:     CARDIOVASCULAR  Meds: phenylephrine    Infusion 0.5 MICROgram(s)/kG/Min (20.4 mL/Hr) IV Continuous <Continuous>    GI/NUTRITION  Meds: senna 2 Tablet(s) Oral at bedtime    GENITOURINARY  Meds: ascorbic acid 500 milliGRAM(s) Oral two times a day  folic acid 1 milliGRAM(s) Oral daily  lactated ringers. 1000 milliLiter(s) IV Continuous <Continuous>  multivitamin 1 Tablet(s) Oral daily    HEMATOLOGIC  Meds:   [x] VTE Prophylaxis  INFECTIOUS DISEASES  Meds: ceFAZolin   IVPB 2000 milliGRAM(s) IV Intermittent every 8 hours    ENDOCRINE  CAPILLARY BLOOD GLUCOSE        Meds:   OTHER MEDICATIONS:  chlorhexidine 0.12% Liquid 15 milliLiter(s) Oral Mucosa every 12 hours  chlorhexidine 4% Liquid 1 Application(s) Topical <User Schedule>  :    LABS:                        10.6   14.04 )-----------( 168      ( 24 Aug 2020 02:16 )             31.0      08-24    135  |  105  |  24<H>  ----------------------------<  153<H>  5.0   |  23  |  1.70<H>    Ca    7.7<L>      24 Aug 2020 05:56  Phos  4.7     -  Mg     2.0     -24    TPro  5.3<L>  /  Alb  2.4<L>  /  TBili  0.5  /  DBili  x   /  AST  104<H>  /  ALT  63  /  AlkPhos  91  08-24    PT/INR - ( 24 Aug 2020 02:16 )   PT: 14.1 sec;   INR: 1.23 ratio         PTT - ( 24 Aug 2020 02:16 )  PTT:24.3 sec  ## COVID Panel  COVID-19 PCR: NotDetec (20 @ 15:44)    Urinalysis Basic - ( 22 Aug 2020 19:01 )    Color: Yellow / Appearance: Clear / S.010 / pH: x  Gluc: x / Ketone: Negative  / Bili: Negative / Urobili: Negative mg/dL   Blood: x / Protein: 30 mg/dL / Nitrite: Negative   Leuk Esterase: Negative / RBC: 0-2 /HPF / WBC 0-2   Sq Epi: x / Non Sq Epi: Occasional / Bacteria: Few      Culture Results:   <10,000 CFU/mL Normal Urogenital Shira ( @ 01:17)      Mode: CPAP with PS, FiO2: 35, PEEP: 5, PS: 5, ITime: 0.9, MAP: 6, PIP: 11      RADIOLOGY & ADDITIONAL STUDIES:

## 2020-08-24 NOTE — AIRWAY REMOVAL NOTE  ADULT & PEDS - ARTIFICAL AIRWAY REMOVAL COMMENTS
extubated as per order without any complications, grace well, able to lift head up, speak,alert & awake,placed on oxygen 3 lNC

## 2020-08-24 NOTE — PROGRESS NOTE ADULT - SUBJECTIVE AND OBJECTIVE BOX
Pt seen and examined at bedside, resting comfortably. No acute events overnight, patient remains intubated and sedated. Denies numbness/tingling, chest pain, SOB.    T(C): 37.2 (08-24-20 @ 01:28), Max: 37.2 (08-24-20 @ 01:28)  HR: 87 (08-24-20 @ 05:00) (86 - 110)  BP: 107/87 (08-24-20 @ 02:05) (97/75 - 145/46)  RR: 20 (08-24-20 @ 05:00) (12 - 27)  SpO2: 100% (08-24-20 @ 05:00) (95% - 100%)                          10.6   14.04 )-----------( 168      ( 24 Aug 2020 02:16 )             31.0     24 Aug 2020 03:07    132    |  104    |  25     ----------------------------<  159    5.7     |  22     |  2.00     Ca    7.7        24 Aug 2020 03:07  Phos  4.7       24 Aug 2020 02:16  Mg     2.0       24 Aug 2020 02:16    TPro  5.3    /  Alb  2.4    /  TBili  0.5    /  DBili  x      /  AST  104    /  ALT  63     /  AlkPhos  91     24 Aug 2020 02:16      Physical Exam:  Gen: Intubated, sedated    Spine Exam:  Drains in place  Dressing C/D/I    Unable to do full motor/sensory exam due to patient being intubated and sedated  Patient moving all 4 extremities spontaneously with reduction of sedation    A/P: 35yMale s/p T11-pelvis posterior spinal fusion, L2-S1 Laminectomy    - Medical management per ICU team  - Monitor drain output  - TLSO to be ordered today  - Pain control  - PT/OT after extubation  - WBAT  - DVT prophylaxis - SCDs only  - Antibiotics until drains removed  - Encourage incentive spirometry, deep breathing exercises  - Will discuss with attending, Dr. Villegas and advise if plan changes    Umer Jones, DO  PGY-2, Orthopaedic Surgery Pt seen and examined at bedside, resting comfortably. No acute events overnight, patient remains intubated and sedated. Unable to provide ROS due to mental status.    T(C): 37.2 (08-24-20 @ 01:28), Max: 37.2 (08-24-20 @ 01:28)  HR: 87 (08-24-20 @ 05:00) (86 - 110)  BP: 107/87 (08-24-20 @ 02:05) (97/75 - 145/46)  RR: 20 (08-24-20 @ 05:00) (12 - 27)  SpO2: 100% (08-24-20 @ 05:00) (95% - 100%)                          10.6   14.04 )-----------( 168      ( 24 Aug 2020 02:16 )             31.0     24 Aug 2020 03:07    132    |  104    |  25     ----------------------------<  159    5.7     |  22     |  2.00     Ca    7.7        24 Aug 2020 03:07  Phos  4.7       24 Aug 2020 02:16  Mg     2.0       24 Aug 2020 02:16    TPro  5.3    /  Alb  2.4    /  TBili  0.5    /  DBili  x      /  AST  104    /  ALT  63     /  AlkPhos  91     24 Aug 2020 02:16      Physical Exam:  Gen: Intubated, sedated    Spine Exam:  Drains in place  Dressing C/D/I    Unable to do full motor/sensory exam due to patient being intubated and sedated  Patient moving all 4 extremities spontaneously with reduction of sedation    A/P: 35yMale s/p T11-pelvis posterior spinal fusion, L2-S1 Laminectomy    - Medical management per ICU team  - Monitor drain output  - TLSO to be ordered today  - Pain control  - PT/OT after extubation  - WBAT  - DVT prophylaxis - SCDs only  - Antibiotics until drains removed  - Encourage incentive spirometry, deep breathing exercises  - Will discuss with attending, Dr. Villegas and advise if plan changes    Umer Jones, DO  PGY-2, Orthopaedic Surgery Pt seen and examined at bedside, intubated. No acute events overnight, patient remains intubated and sedated. Unable to provide ROS due to sedation.    T(C): 37.2 (08-24-20 @ 01:28), Max: 37.2 (08-24-20 @ 01:28)  HR: 87 (08-24-20 @ 05:00) (86 - 110)  BP: 107/87 (08-24-20 @ 02:05) (97/75 - 145/46)  RR: 20 (08-24-20 @ 05:00) (12 - 27)  SpO2: 100% (08-24-20 @ 05:00) (95% - 100%)                          10.6   14.04 )-----------( 168      ( 24 Aug 2020 02:16 )             31.0     24 Aug 2020 03:07    132    |  104    |  25     ----------------------------<  159    5.7     |  22     |  2.00     Ca    7.7        24 Aug 2020 03:07  Phos  4.7       24 Aug 2020 02:16  Mg     2.0       24 Aug 2020 02:16    TPro  5.3    /  Alb  2.4    /  TBili  0.5    /  DBili  x      /  AST  104    /  ALT  63     /  AlkPhos  91     24 Aug 2020 02:16      Physical Exam:  Gen: Intubated, sedated    Spine Exam:  Drains in place  Dressing C/D/I    Unable to do full motor/sensory exam due to patient being intubated and sedated  Patient moving all 4 extremities spontaneously with reduction of sedation    A/P: 35yMale s/p T11-pelvis posterior spinal fusion, L2-S1 Laminectomy    - Medical management per ICU team  - Monitor drain output  - TLSO to be ordered today  - Pain control  - PT/OT after extubation  - WBAT  - DVT prophylaxis - SCDs only  - Antibiotics until drains removed  - Encourage incentive spirometry, deep breathing exercises  - Will discuss with attending, Dr. Villegas and advise if plan changes    Umer Jones, DO  PGY-2, Orthopaedic Surgery

## 2020-08-24 NOTE — PROGRESS NOTE ADULT - ASSESSMENT
34 y/o status post fall with spinous fracture and disrupted ligaments without neurovascular compromise at present. No signs of saddle anesthesia   L1-L5 fractures with disruption of ant and post ligaments at L1-L5 level, + ass epidural hemorrhage surrounding psoas muscle and paraspinal tissues; now s/p OR, noted to have EBL of 2200 now s/p T11-pelvis posterior spinal fusion, L2-S1 Laminectomy; .  S/p 4 units PRBC, 1FFP and 1 Plt. s/p 1L bolus and 500cc bolus this am. Pt was on phenylephrine 0.4mcg/kg/min this am, now weaned off after extubation.      Neuro: Exam as noted above, patient requiring pain control with Dilaudid 1mg q4h; oxycodone 10mg q6h; neuro checks q4H  CV: Maintain map>80 as per ortho recs  Pulm: No acute issues  GI: Regular diet  Renal/Metabolic: CHELO 2/2 likely rhabdomyolysis as expected as per ortho team; trend ck; monitor bun/cr. Cont IVF resusucitation for elevated CK;   ID: Cefazolin for ppx  Heme: S/p 2200 blood loss in OR; s/p 4 units prbc, 1FFP, and 1Plt.  Endo: No acute issues  Skin: Dressing clean dry and intact; JETHRO on L noted to have ~100cc/hr of serosanguinous fluid; ortho team aware  Dispo: Extubated this AM with rhabdomyolysis and CHELO; will continue to monitor.

## 2020-08-24 NOTE — PROGRESS NOTE ADULT - SUBJECTIVE AND OBJECTIVE BOX
Patient is a 35y old  Male who presents with a chief complaint of low back pain (24 Aug 2020 09:30)      INTERVAL HPI/OVERNIGHT EVENTS:  pt still in pain he said  MEDICATIONS  (STANDING):  ascorbic acid 500 milliGRAM(s) Oral two times a day  ceFAZolin   IVPB 2000 milliGRAM(s) IV Intermittent every 8 hours  chlorhexidine 4% Liquid 1 Application(s) Topical <User Schedule>  cyclobenzaprine 10 milliGRAM(s) Oral every 8 hours  fentaNYL   Infusion 0.5 MICROgram(s)/kG/Hr (5.45 mL/Hr) IV Continuous <Continuous>  folic acid 1 milliGRAM(s) Oral daily  lactated ringers. 1000 milliLiter(s) (150 mL/Hr) IV Continuous <Continuous>  multivitamin 1 Tablet(s) Oral daily  phenylephrine    Infusion 0.5 MICROgram(s)/kG/Min (20.4 mL/Hr) IV Continuous <Continuous>  propofol Infusion 10 MICROgram(s)/kG/Min (6.53 mL/Hr) IV Continuous <Continuous>  senna 2 Tablet(s) Oral at bedtime    MEDICATIONS  (PRN):  acetaminophen   Tablet .. 650 milliGRAM(s) Oral every 6 hours PRN Temp greater or equal to 38C (100.4F)  HYDROmorphone  Injectable 1 milliGRAM(s) IV Push every 4 hours PRN Severe Pain (7 - 10)  melatonin 3 milliGRAM(s) Oral at bedtime PRN Sleep  ondansetron Injectable 4 milliGRAM(s) IV Push every 6 hours PRN Nausea  oxyCODONE    IR 5 milliGRAM(s) Oral every 4 hours PRN Moderate Pain (4 - 6)      Allergies    No Known Allergies    Intolerances        REVIEW OF SYSTEMS:  CONSTITUTIONAL: No fever, weight loss, or fatigue  EYES: No eye pain, visual disturbances, or discharge  ENMT:  No difficulty hearing, tinnitus, vertigo; No sinus or throat pain  NECK: No pain or stiffness  BREASTS: No pain, masses, or nipple discharge  RESPIRATORY: No cough, wheezing, chills or hemoptysis; No shortness of breath  CARDIOVASCULAR: No chest pain, palpitations, dizziness, or leg swelling  GASTROINTESTINAL: No abdominal or epigastric pain. No nausea, vomiting, or hematemesis; No diarrhea or constipation. No melena or hematochezia.  GENITOURINARY: No dysuria, frequency, hematuria, or incontinence  NEUROLOGICAL: No headaches, memory loss, loss of strength, numbness, or tremors  SKIN: No itching, burning, rashes, or lesions   LYMPH NODES: No enlarged glands  ENDOCRINE: No heat or cold intolerance; No hair loss  MUSCULOSKELETAL: No joint pain or swelling; No muscle, back, or extremity pain  PSYCHIATRIC: No depression, anxiety, mood swings, or difficulty sleeping  HEME/LYMPH: No easy bruising, or bleeding gums  ALLERGY AND IMMUNOLOGIC: No hives or eczema    Vital Signs Last 24 Hrs  T(C): 36.9 (24 Aug 2020 11:14), Max: 37.3 (24 Aug 2020 07:32)  T(F): 98.4 (24 Aug 2020 11:14), Max: 99.1 (24 Aug 2020 07:32)  HR: 122 (24 Aug 2020 12:00) (87 - 135)  BP: 107/87 (24 Aug 2020 02:05) (97/75 - 107/87)  BP(mean): 92 (24 Aug 2020 02:05) (77 - 92)  RR: 20 (24 Aug 2020 12:00) (11 - 27)  SpO2: 100% (24 Aug 2020 12:00) (100% - 100%)    PHYSICAL EXAM:  GENERAL: NAD, well-groomed, well-developed  HEAD:  Atraumatic, Normocephalic  EYES: EOMI, PERRLA, conjunctiva and sclera clear  ENMT: No tonsillar erythema, exudates, or enlargement; Moist mucous membranes, Good dentition, No lesions  NECK: Supple, No JVD, Normal thyroid  NERVOUS SYSTEM:  Alert & Oriented X3, Good concentration; Motor Strength 5/5 B/L upper and lower extremities; DTRs 2+ intact and symmetric  CHEST/LUNG: Clear to percussion bilaterally; No rales, rhonchi, wheezing, or rubs  HEART: Regular rate and rhythm; No murmurs, rubs, or gallops  ABDOMEN: Soft, Nontender, Nondistended; Bowel sounds present  EXTREMITIES:  2+ Peripheral Pulses, No clubbing, cyanosis, or edema  LYMPH: No lymphadenopathy noted  SKIN: No rashes or lesions    LABS:                        9.4    15.39 )-----------( 192      ( 24 Aug 2020 11:47 )             26.2     08-24    137  |  106  |  26<H>  ----------------------------<  174<H>  5.0   |  22  |  1.61<H>    Ca    7.7<L>      24 Aug 2020 11:46  Phos  3.7       Mg     1.7         TPro  5.3<L>  /  Alb  2.4<L>  /  TBili  0.5  /  DBili  x   /  AST  104<H>  /  ALT  63  /  AlkPhos  91      PT/INR - ( 24 Aug 2020 02:16 )   PT: 14.1 sec;   INR: 1.23 ratio         PTT - ( 24 Aug 2020 02:16 )  PTT:24.3 sec  Urinalysis Basic - ( 22 Aug 2020 19:01 )    Color: Yellow / Appearance: Clear / S.010 / pH: x  Gluc: x / Ketone: Negative  / Bili: Negative / Urobili: Negative mg/dL   Blood: x / Protein: 30 mg/dL / Nitrite: Negative   Leuk Esterase: Negative / RBC: 0-2 /HPF / WBC 0-2   Sq Epi: x / Non Sq Epi: Occasional / Bacteria: Few      CAPILLARY BLOOD GLUCOSE        CULTURES:  Culture Results:   <10,000 CFU/mL Normal Urogenital Shira ( @ 01:17)    HEMOGLOBIN A1C:    CHOLESTEROL:    ABG - ( 24 Aug 2020 10:02 )  pH, Arterial: x     pH, Blood: 7.38  /  pCO2: 38    /  pO2: 142   / HCO3: 22    / Base Excess: -2.3  /  SaO2: 99                  RADIOLOGY & ADDITIONAL TESTS:

## 2020-08-24 NOTE — PROGRESS NOTE ADULT - SUBJECTIVE AND OBJECTIVE BOX
Ortho post-op check    Pt seen and examined at bedside, resting comfortably. Tolerated procedure well without complications. Denies numbness/tingling, chest pain, SOB.    T(C): 37.2 (08-24-20 @ 01:28), Max: 37.2 (08-24-20 @ 01:28)  HR: 87 (08-24-20 @ 05:00) (86 - 110)  BP: 107/87 (08-24-20 @ 02:05) (97/75 - 145/46)  RR: 20 (08-24-20 @ 05:00) (12 - 27)  SpO2: 100% (08-24-20 @ 05:00) (95% - 100%)                          10.6   14.04 )-----------( 168      ( 24 Aug 2020 02:16 )             31.0     24 Aug 2020 03:07    132    |  104    |  25     ----------------------------<  159    5.7     |  22     |  2.00     Ca    7.7        24 Aug 2020 03:07  Phos  4.7       24 Aug 2020 02:16  Mg     2.0       24 Aug 2020 02:16    TPro  5.3    /  Alb  2.4    /  TBili  0.5    /  DBili  x      /  AST  104    /  ALT  63     /  AlkPhos  91     24 Aug 2020 02:16      Physical Exam:  Gen: Intubated, sedated    Spine Exam:  Drains in place  Dressing C/D/I    Unable to do full motor/sensory exam due to patient being intubated and sedated  Patient moving all 4 extremities spontaneously with reduction of sedation    A/P: 35yMale s/p T11-pelvis posterior spinal fusion, L2-S1 Laminectomy    Tolerated procedure well without complications    - Medical management per ICU team  - Monitor drain output  - TLSO to be ordered today  - Pain control  - PT/OT after extubation  - WBAT  - DVT prophylaxis - SCDs only  - Antibiotics until drains removed  - Encourage incentive spirometry, deep breathing exercises  - Will discuss with attending, Dr. Villegas and advise if plan changes    Umer Jones, DO  PGY-2, Orthopaedic Surgery Ortho post-op check    Pt seen and examined at bedside, resting comfortably. Tolerated procedure well without complications. Unable to provide ROS due to mental status.    T(C): 37.2 (08-24-20 @ 01:28), Max: 37.2 (08-24-20 @ 01:28)  HR: 87 (08-24-20 @ 05:00) (86 - 110)  BP: 107/87 (08-24-20 @ 02:05) (97/75 - 145/46)  RR: 20 (08-24-20 @ 05:00) (12 - 27)  SpO2: 100% (08-24-20 @ 05:00) (95% - 100%)                          10.6   14.04 )-----------( 168      ( 24 Aug 2020 02:16 )             31.0     24 Aug 2020 03:07    132    |  104    |  25     ----------------------------<  159    5.7     |  22     |  2.00     Ca    7.7        24 Aug 2020 03:07  Phos  4.7       24 Aug 2020 02:16  Mg     2.0       24 Aug 2020 02:16    TPro  5.3    /  Alb  2.4    /  TBili  0.5    /  DBili  x      /  AST  104    /  ALT  63     /  AlkPhos  91     24 Aug 2020 02:16      Physical Exam:  Gen: Intubated, sedated    Spine Exam:  Drains in place  Dressing C/D/I    Unable to do full motor/sensory exam due to patient being intubated and sedated  Patient moving all 4 extremities spontaneously with reduction of sedation    A/P: 35yMale s/p T11-pelvis posterior spinal fusion, L2-S1 Laminectomy    Tolerated procedure well without complications    - Medical management per ICU team  - Monitor drain output  - TLSO to be ordered today  - Pain control  - PT/OT after extubation  - WBAT  - DVT prophylaxis - SCDs only  - Antibiotics until drains removed  - Encourage incentive spirometry, deep breathing exercises  - Will discuss with attending, Dr. Villegas and advise if plan changes    Umer Jones, DO  PGY-2, Orthopaedic Surgery Ortho post-op check    Pt seen and examined at bedside, intubated. Tolerated procedure well without complications. Unable to provide ROS due to sedation.    T(C): 37.2 (08-24-20 @ 01:28), Max: 37.2 (08-24-20 @ 01:28)  HR: 87 (08-24-20 @ 05:00) (86 - 110)  BP: 107/87 (08-24-20 @ 02:05) (97/75 - 145/46)  RR: 20 (08-24-20 @ 05:00) (12 - 27)  SpO2: 100% (08-24-20 @ 05:00) (95% - 100%)                          10.6   14.04 )-----------( 168      ( 24 Aug 2020 02:16 )             31.0     24 Aug 2020 03:07    132    |  104    |  25     ----------------------------<  159    5.7     |  22     |  2.00     Ca    7.7        24 Aug 2020 03:07  Phos  4.7       24 Aug 2020 02:16  Mg     2.0       24 Aug 2020 02:16    TPro  5.3    /  Alb  2.4    /  TBili  0.5    /  DBili  x      /  AST  104    /  ALT  63     /  AlkPhos  91     24 Aug 2020 02:16      Physical Exam:  Gen: Intubated, sedated    Spine Exam:  Drains in place  Dressing C/D/I    Unable to do full motor/sensory exam due to patient being intubated and sedated  Patient moving all 4 extremities spontaneously with reduction of sedation    A/P: 35yMale s/p T11-pelvis posterior spinal fusion, L2-S1 Laminectomy    Tolerated procedure well without complications    - Medical management per ICU team  - Monitor drain output  - TLSO to be ordered today  - Pain control  - PT/OT after extubation  - WBAT  - DVT prophylaxis - SCDs only  - Antibiotics until drains removed  - Encourage incentive spirometry, deep breathing exercises  - Will discuss with attending, Dr. Villegas and advise if plan changes    Umer Jones, DO  PGY-2, Orthopaedic Surgery

## 2020-08-24 NOTE — DIETITIAN INITIAL EVALUATION ADULT. - PERTINENT LABORATORY DATA
08-24 Na 137  Glu 174   K+ 5.0  Cr 1.61 BUN 26  WBC 15.39 K/uL<H> 08-24 Phos 3.7 mg/dL 08-24 Alb 2.4 g/dL<L>

## 2020-08-24 NOTE — BRIEF OPERATIVE NOTE - NSICDXBRIEFPOSTOP_GEN_ALL_CORE_FT
POST-OP DIAGNOSIS:  Spondylolisthesis of lumbosacral region 24-Aug-2020 02:08:25 Traumatic, L5-S1 spondyloptosis Umer Jones

## 2020-08-25 LAB
ANION GAP SERPL CALC-SCNC: 6 MMOL/L — SIGNIFICANT CHANGE UP (ref 5–17)
BUN SERPL-MCNC: 23 MG/DL — SIGNIFICANT CHANGE UP (ref 7–23)
CALCIUM SERPL-MCNC: 7.2 MG/DL — LOW (ref 8.5–10.1)
CHLORIDE SERPL-SCNC: 103 MMOL/L — SIGNIFICANT CHANGE UP (ref 96–108)
CK SERPL-CCNC: 9189 U/L — HIGH (ref 26–308)
CO2 SERPL-SCNC: 25 MMOL/L — SIGNIFICANT CHANGE UP (ref 22–31)
CREAT SERPL-MCNC: 1.03 MG/DL — SIGNIFICANT CHANGE UP (ref 0.5–1.3)
GLUCOSE SERPL-MCNC: 136 MG/DL — HIGH (ref 70–99)
HCT VFR BLD CALC: 22 % — LOW (ref 39–50)
HCT VFR BLD CALC: 22.6 % — LOW (ref 39–50)
HCT VFR BLD CALC: 26.1 % — LOW (ref 39–50)
HGB BLD-MCNC: 7.9 G/DL — LOW (ref 13–17)
HGB BLD-MCNC: 8 G/DL — LOW (ref 13–17)
HGB BLD-MCNC: 9 G/DL — LOW (ref 13–17)
MAGNESIUM SERPL-MCNC: 2.2 MG/DL — SIGNIFICANT CHANGE UP (ref 1.6–2.6)
MCHC RBC-ENTMCNC: 30 PG — SIGNIFICANT CHANGE UP (ref 27–34)
MCHC RBC-ENTMCNC: 30.7 PG — SIGNIFICANT CHANGE UP (ref 27–34)
MCHC RBC-ENTMCNC: 30.8 PG — SIGNIFICANT CHANGE UP (ref 27–34)
MCHC RBC-ENTMCNC: 34.5 GM/DL — SIGNIFICANT CHANGE UP (ref 32–36)
MCHC RBC-ENTMCNC: 35.4 GM/DL — SIGNIFICANT CHANGE UP (ref 32–36)
MCHC RBC-ENTMCNC: 35.9 GM/DL — SIGNIFICANT CHANGE UP (ref 32–36)
MCV RBC AUTO: 85.6 FL — SIGNIFICANT CHANGE UP (ref 80–100)
MCV RBC AUTO: 86.9 FL — SIGNIFICANT CHANGE UP (ref 80–100)
MCV RBC AUTO: 87 FL — SIGNIFICANT CHANGE UP (ref 80–100)
NRBC # BLD: 0 /100 WBCS — SIGNIFICANT CHANGE UP (ref 0–0)
PHOSPHATE SERPL-MCNC: 2.4 MG/DL — LOW (ref 2.5–4.5)
PLATELET # BLD AUTO: 126 K/UL — LOW (ref 150–400)
PLATELET # BLD AUTO: 134 K/UL — LOW (ref 150–400)
PLATELET # BLD AUTO: 147 K/UL — LOW (ref 150–400)
POTASSIUM SERPL-MCNC: 4.1 MMOL/L — SIGNIFICANT CHANGE UP (ref 3.5–5.3)
POTASSIUM SERPL-SCNC: 4.1 MMOL/L — SIGNIFICANT CHANGE UP (ref 3.5–5.3)
RBC # BLD: 2.57 M/UL — LOW (ref 4.2–5.8)
RBC # BLD: 2.6 M/UL — LOW (ref 4.2–5.8)
RBC # BLD: 3 M/UL — LOW (ref 4.2–5.8)
RBC # FLD: 13.4 % — SIGNIFICANT CHANGE UP (ref 10.3–14.5)
RBC # FLD: 13.4 % — SIGNIFICANT CHANGE UP (ref 10.3–14.5)
RBC # FLD: 13.5 % — SIGNIFICANT CHANGE UP (ref 10.3–14.5)
SODIUM SERPL-SCNC: 134 MMOL/L — LOW (ref 135–145)
WBC # BLD: 10.36 K/UL — SIGNIFICANT CHANGE UP (ref 3.8–10.5)
WBC # BLD: 10.49 K/UL — SIGNIFICANT CHANGE UP (ref 3.8–10.5)
WBC # BLD: 10.96 K/UL — HIGH (ref 3.8–10.5)
WBC # FLD AUTO: 10.36 K/UL — SIGNIFICANT CHANGE UP (ref 3.8–10.5)
WBC # FLD AUTO: 10.49 K/UL — SIGNIFICANT CHANGE UP (ref 3.8–10.5)
WBC # FLD AUTO: 10.96 K/UL — HIGH (ref 3.8–10.5)

## 2020-08-25 PROCEDURE — 99291 CRITICAL CARE FIRST HOUR: CPT

## 2020-08-25 PROCEDURE — 99221 1ST HOSP IP/OBS SF/LOW 40: CPT

## 2020-08-25 RX ORDER — FUROSEMIDE 40 MG
40 TABLET ORAL ONCE
Refills: 0 | Status: COMPLETED | OUTPATIENT
Start: 2020-08-25 | End: 2020-08-25

## 2020-08-25 RX ORDER — OXYCODONE HYDROCHLORIDE 5 MG/1
10 TABLET ORAL EVERY 4 HOURS
Refills: 0 | Status: DISCONTINUED | OUTPATIENT
Start: 2020-08-25 | End: 2020-09-01

## 2020-08-25 RX ADMIN — OXYCODONE HYDROCHLORIDE 10 MILLIGRAM(S): 5 TABLET ORAL at 11:21

## 2020-08-25 RX ADMIN — Medication 500 MILLIGRAM(S): at 18:04

## 2020-08-25 RX ADMIN — OXYCODONE HYDROCHLORIDE 10 MILLIGRAM(S): 5 TABLET ORAL at 19:48

## 2020-08-25 RX ADMIN — Medication 500 MILLIGRAM(S): at 05:55

## 2020-08-25 RX ADMIN — HYDROMORPHONE HYDROCHLORIDE 1.5 MILLIGRAM(S): 2 INJECTION INTRAMUSCULAR; INTRAVENOUS; SUBCUTANEOUS at 08:28

## 2020-08-25 RX ADMIN — HYDROMORPHONE HYDROCHLORIDE 1 MILLIGRAM(S): 2 INJECTION INTRAMUSCULAR; INTRAVENOUS; SUBCUTANEOUS at 22:51

## 2020-08-25 RX ADMIN — CYCLOBENZAPRINE HYDROCHLORIDE 10 MILLIGRAM(S): 10 TABLET, FILM COATED ORAL at 05:55

## 2020-08-25 RX ADMIN — CYCLOBENZAPRINE HYDROCHLORIDE 10 MILLIGRAM(S): 10 TABLET, FILM COATED ORAL at 21:01

## 2020-08-25 RX ADMIN — SODIUM CHLORIDE 200 MILLILITER(S): 9 INJECTION, SOLUTION INTRAVENOUS at 05:55

## 2020-08-25 RX ADMIN — HYDROMORPHONE HYDROCHLORIDE 1 MILLIGRAM(S): 2 INJECTION INTRAMUSCULAR; INTRAVENOUS; SUBCUTANEOUS at 22:21

## 2020-08-25 RX ADMIN — HYDROMORPHONE HYDROCHLORIDE 1 MILLIGRAM(S): 2 INJECTION INTRAMUSCULAR; INTRAVENOUS; SUBCUTANEOUS at 01:37

## 2020-08-25 RX ADMIN — Medication 1 TABLET(S): at 11:06

## 2020-08-25 RX ADMIN — SODIUM CHLORIDE 100 MILLILITER(S): 9 INJECTION, SOLUTION INTRAVENOUS at 20:59

## 2020-08-25 RX ADMIN — OXYCODONE HYDROCHLORIDE 10 MILLIGRAM(S): 5 TABLET ORAL at 15:42

## 2020-08-25 RX ADMIN — OXYCODONE HYDROCHLORIDE 10 MILLIGRAM(S): 5 TABLET ORAL at 11:06

## 2020-08-25 RX ADMIN — Medication 100 MILLIGRAM(S): at 05:55

## 2020-08-25 RX ADMIN — HYDROMORPHONE HYDROCHLORIDE 1 MILLIGRAM(S): 2 INJECTION INTRAMUSCULAR; INTRAVENOUS; SUBCUTANEOUS at 17:02

## 2020-08-25 RX ADMIN — Medication 62.5 MILLIMOLE(S): at 05:55

## 2020-08-25 RX ADMIN — OXYCODONE HYDROCHLORIDE 10 MILLIGRAM(S): 5 TABLET ORAL at 16:12

## 2020-08-25 RX ADMIN — Medication 1 MILLIGRAM(S): at 11:06

## 2020-08-25 RX ADMIN — Medication 100 MILLIGRAM(S): at 21:01

## 2020-08-25 RX ADMIN — Medication 40 MILLIGRAM(S): at 21:01

## 2020-08-25 RX ADMIN — HYDROMORPHONE HYDROCHLORIDE 1.5 MILLIGRAM(S): 2 INJECTION INTRAMUSCULAR; INTRAVENOUS; SUBCUTANEOUS at 03:52

## 2020-08-25 RX ADMIN — Medication 100 MILLIGRAM(S): at 14:28

## 2020-08-25 RX ADMIN — OXYCODONE HYDROCHLORIDE 10 MILLIGRAM(S): 5 TABLET ORAL at 20:51

## 2020-08-25 RX ADMIN — CYCLOBENZAPRINE HYDROCHLORIDE 10 MILLIGRAM(S): 10 TABLET, FILM COATED ORAL at 14:28

## 2020-08-25 RX ADMIN — HYDROMORPHONE HYDROCHLORIDE 1 MILLIGRAM(S): 2 INJECTION INTRAMUSCULAR; INTRAVENOUS; SUBCUTANEOUS at 17:17

## 2020-08-25 RX ADMIN — HYDROMORPHONE HYDROCHLORIDE 1 MILLIGRAM(S): 2 INJECTION INTRAMUSCULAR; INTRAVENOUS; SUBCUTANEOUS at 01:22

## 2020-08-25 RX ADMIN — HYDROMORPHONE HYDROCHLORIDE 1.5 MILLIGRAM(S): 2 INJECTION INTRAMUSCULAR; INTRAVENOUS; SUBCUTANEOUS at 08:13

## 2020-08-25 RX ADMIN — SENNA PLUS 2 TABLET(S): 8.6 TABLET ORAL at 21:01

## 2020-08-25 RX ADMIN — HYDROMORPHONE HYDROCHLORIDE 1.5 MILLIGRAM(S): 2 INJECTION INTRAMUSCULAR; INTRAVENOUS; SUBCUTANEOUS at 03:37

## 2020-08-25 NOTE — CONSULT NOTE ADULT - ASSESSMENT
IR consulted for IVC retrievable filter placement.    Pt had spinal surgery 2 days prior and will have upcoming spinal surgeries.  Pt cannot be anticoagulated 2/2 high risk of bleeding.  Pt will need an IVC filter due to long periods of immobility.      Pt febrile however pt had a transfusion today.  No leukocytosis.   Per critical care MD, there are no signs of infection/sepsis    -Will place IVC filter tomorrow  -d/w patient, consent obtained  -will place to remove filter in 5-6 months IR consulted for IVC retrievable filter placement.    Pt had spinal surgery 2 days prior and will have upcoming spinal surgeries.  Pt cannot be anticoagulated 2/2 high risk of bleeding.  Pt will need an IVC filter due to long periods of immobility.      Pt febrile however pt had a transfusion today.  No leukocytosis.   Per critical care MD, there are no signs of infection/sepsis    -Will place IVC filter tomorrow  -d/w patient, consent obtained  -will plan to remove filter in 5-6 months

## 2020-08-25 NOTE — CONSULT NOTE ADULT - SUBJECTIVE AND OBJECTIVE BOX
IR consulted for IVC filter placement.        HPI:  36 y/o M no pertinent PMHx BIBA presents with c/o atraumatic lower  back pain since this morning. Denies nausea,  vomiting, fever, sob, chest pain, (22 Aug 2020 19:16)        PAST MEDICAL & SURGICAL HISTORY:  No pertinent past medical history      Allergies    No Known Allergies    Intolerances        MEDICATIONS  (STANDING):  ascorbic acid 500 milliGRAM(s) Oral two times a day  ceFAZolin   IVPB 2000 milliGRAM(s) IV Intermittent every 8 hours  chlorhexidine 4% Liquid 1 Application(s) Topical <User Schedule>  cyclobenzaprine 10 milliGRAM(s) Oral every 8 hours  folic acid 1 milliGRAM(s) Oral daily  lactated ringers. 1000 milliLiter(s) (200 mL/Hr) IV Continuous <Continuous>  multivitamin 1 Tablet(s) Oral daily  phenylephrine    Infusion 0.5 MICROgram(s)/kG/Min (20.4 mL/Hr) IV Continuous <Continuous>  senna 2 Tablet(s) Oral at bedtime    MEDICATIONS  (PRN):  acetaminophen   Tablet .. 650 milliGRAM(s) Oral every 6 hours PRN Temp greater or equal to 38C (100.4F)  HYDROmorphone  Injectable 1 milliGRAM(s) IV Push every 3 hours PRN Severe Pain (7 - 10)  melatonin 3 milliGRAM(s) Oral at bedtime PRN Sleep  ondansetron Injectable 4 milliGRAM(s) IV Push every 6 hours PRN Nausea  oxyCODONE    IR 10 milliGRAM(s) Oral every 4 hours PRN Moderate Pain (4 - 6)        SOCIAL HISTORY:    FAMILY HISTORY:        PHYSICAL EXAM:    Vital Signs Last 24 Hrs  T(C): 37 (25 Aug 2020 12:45), Max: 37.6 (25 Aug 2020 10:30)  T(F): 98.6 (25 Aug 2020 12:45), Max: 99.6 (25 Aug 2020 10:30)  HR: 110 (25 Aug 2020 15:40) (104 - 124)  BP: 137/75 (25 Aug 2020 15:40) (104/83 - 158/128)  BP(mean): 89 (25 Aug 2020 15:40) (83 - 136)  RR: 28 (25 Aug 2020 15:40) (0 - 30)  SpO2: 100% (25 Aug 2020 15:40) (94% - 100%)    General:  Appears stated age, well-groomed, well-nourished, no distress  Lungs:  CTAB  Cardiovascular:  good S1, S2,   Abdomen:  Soft, non-tender, non-distended,   Extremities:  no calf tenderness/swelling b/l  Musculoskeletal:  Full ROM in all joints w/o swelling/tenderness/effusion  Neuro/Psych:  A &O x 3    LABS:                        8.0    10.36 )-----------( 126      ( 25 Aug 2020 15:23 )             22.6     08-25    134<L>  |  103  |  23  ----------------------------<  136<H>  4.1   |  25  |  1.03    Ca    7.2<L>      25 Aug 2020 03:38  Phos  2.4     08-25  Mg     2.2     08-25    TPro  5.3<L>  /  Alb  2.4<L>  /  TBili  0.5  /  DBili  x   /  AST  104<H>  /  ALT  63  /  AlkPhos  91  08-24    PT/INR - ( 24 Aug 2020 02:16 )   PT: 14.1 sec;   INR: 1.23 ratio         PTT - ( 24 Aug 2020 02:16 )  PTT:24.3 sec  Urinalysis Basic - ( 24 Aug 2020 18:38 )    Color: Yellow / Appearance: Clear / S.015 / pH: x  Gluc: x / Ketone: Negative  / Bili: Negative / Urobili: Negative mg/dL   Blood: x / Protein: 15 mg/dL / Nitrite: Negative   Leuk Esterase: Negative / RBC: 0-2 /HPF / WBC 3-5   Sq Epi: x / Non Sq Epi: Occasional / Bacteria: Occasional        RADIOLOGY & ADDITIONAL STUDIES: IR consulted for IVC retrievable filter placement.        34 y/o status post fall with spinous fracture and disrupted ligaments without neurovascular compromise at present. No signs of saddle anesthesia   L1-L5 fractures with disruption of ant and post ligaments at L1-L5 level, + ass epidural hemorrhage surrounding psoas muscle and paraspinal tissues; now s/p OR, noted to have EBL of 2200 now s/p T11-pelvis posterior spinal fusion, L2-S1 Laminectomy; .  S/p 4 units PRBC, 1FFP and 1 Plt. s/p 1L bolus and 500cc bolus this am. Pt was on phenylephrine 0.4mcg/kg/min this am, after dilaudid, weaned after receiving 1 unit prbc.          PAST MEDICAL & SURGICAL HISTORY:  No pertinent past medical history      Allergies    No Known Allergies    Intolerances        MEDICATIONS  (STANDING):  ascorbic acid 500 milliGRAM(s) Oral two times a day  ceFAZolin   IVPB 2000 milliGRAM(s) IV Intermittent every 8 hours  chlorhexidine 4% Liquid 1 Application(s) Topical <User Schedule>  cyclobenzaprine 10 milliGRAM(s) Oral every 8 hours  folic acid 1 milliGRAM(s) Oral daily  lactated ringers. 1000 milliLiter(s) (200 mL/Hr) IV Continuous <Continuous>  multivitamin 1 Tablet(s) Oral daily  phenylephrine    Infusion 0.5 MICROgram(s)/kG/Min (20.4 mL/Hr) IV Continuous <Continuous>  senna 2 Tablet(s) Oral at bedtime    MEDICATIONS  (PRN):  acetaminophen   Tablet .. 650 milliGRAM(s) Oral every 6 hours PRN Temp greater or equal to 38C (100.4F)  HYDROmorphone  Injectable 1 milliGRAM(s) IV Push every 3 hours PRN Severe Pain (7 - 10)  melatonin 3 milliGRAM(s) Oral at bedtime PRN Sleep  ondansetron Injectable 4 milliGRAM(s) IV Push every 6 hours PRN Nausea  oxyCODONE    IR 10 milliGRAM(s) Oral every 4 hours PRN Moderate Pain (4 - 6)        SOCIAL HISTORY:    FAMILY HISTORY:        PHYSICAL EXAM:    Vital Signs Last 24 Hrs  T(C): 37 (25 Aug 2020 12:45), Max: 37.6 (25 Aug 2020 10:30)  T(F): 98.6 (25 Aug 2020 12:45), Max: 99.6 (25 Aug 2020 10:30)  HR: 110 (25 Aug 2020 15:40) (104 - 124)  BP: 137/75 (25 Aug 2020 15:40) (104/83 - 158/128)  BP(mean): 89 (25 Aug 2020 15:40) (83 - 136)  RR: 28 (25 Aug 2020 15:40) (0 - 30)  SpO2: 100% (25 Aug 2020 15:40) (94% - 100%)    General:  Appears stated age, well-groomed, well-nourished, no distress  Lungs:  CTAB, tachypneic  Cardiovascular:  tachycardic  Abdomen:  Soft, non-tender, non-distended,   Extremities:  no edema   Neuro/Psych:  A &O x 3    LABS:                        8.0    10.36 )-----------( 126      ( 25 Aug 2020 15:23 )             22.6     08-25    134<L>  |  103  |  23  ----------------------------<  136<H>  4.1   |  25  |  1.03    Ca    7.2<L>      25 Aug 2020 03:38  Phos  2.4     08-25  Mg     2.2     08-25    TPro  5.3<L>  /  Alb  2.4<L>  /  TBili  0.5  /  DBili  x   /  AST  104<H>  /  ALT  63  /  AlkPhos  91  08-24    PT/INR - ( 24 Aug 2020 02:16 )   PT: 14.1 sec;   INR: 1.23 ratio         PTT - ( 24 Aug 2020 02:16 )  PTT:24.3 sec  Urinalysis Basic - ( 24 Aug 2020 18:38 )    Color: Yellow / Appearance: Clear / S.015 / pH: x  Gluc: x / Ketone: Negative  / Bili: Negative / Urobili: Negative mg/dL   Blood: x / Protein: 15 mg/dL / Nitrite: Negative   Leuk Esterase: Negative / RBC: 0-2 /HPF / WBC 3-5   Sq Epi: x / Non Sq Epi: Occasional / Bacteria: Occasional        RADIOLOGY & ADDITIONAL STUDIES:

## 2020-08-25 NOTE — PROGRESS NOTE ADULT - SUBJECTIVE AND OBJECTIVE BOX
[facility] NEPHROLOGY SERVICES, [facility]  NEPHROLOGY AND HYPERTENSION  [address]  [address]  [place] [address]  [phone]    MD DARRYL RICH MD ANDREY GONCHARUK, MD MADHU KORRAPATI, MD YELENA ROSENBERG, MD BINNY KOSHY, MD CHRISTOPHER CAPUTO, MD SIGRID MCINTYRE MD          Patient events noted  Feels ok     MEDICATIONS  (STANDING):  ascorbic acid 500 milliGRAM(s) Oral two times a day  ceFAZolin   IVPB 2000 milliGRAM(s) IV Intermittent every 8 hours  chlorhexidine 4% Liquid 1 Application(s) Topical <User Schedule>  cyclobenzaprine 10 milliGRAM(s) Oral every 8 hours  folic acid 1 milliGRAM(s) Oral daily  furosemide   Injectable 40 milliGRAM(s) IV Push once  lactated ringers. 1000 milliLiter(s) (200 mL/Hr) IV Continuous <Continuous>  multivitamin 1 Tablet(s) Oral daily  senna 2 Tablet(s) Oral at bedtime    MEDICATIONS  (PRN):  acetaminophen   Tablet .. 650 milliGRAM(s) Oral every 6 hours PRN Temp greater or equal to 38C (100.4F)  HYDROmorphone  Injectable 1 milliGRAM(s) IV Push every 3 hours PRN Severe Pain (7 - 10)  melatonin 3 milliGRAM(s) Oral at bedtime PRN Sleep  ondansetron Injectable 4 milliGRAM(s) IV Push every 6 hours PRN Nausea  oxyCODONE    IR 10 milliGRAM(s) Oral every 4 hours PRN Moderate Pain (4 - 6)      08-24-20 @ 07:01  -  08-25-20 @ 07:00  --------------------------------------------------------  IN: 5403.8 mL / OUT: 3745 mL / NET: 1658.8 mL    08-25-20 @ 07:01  -  08-25-20 @ 19:58  --------------------------------------------------------  IN: 2186 mL / OUT: 1690 mL / NET: 496 mL      PHYSICAL EXAM:      T(C): 37.7 (08-25-20 @ 17:45), Max: 38.2 (08-25-20 @ 16:00)  HR: 107 (08-25-20 @ 19:30) (104 - 121)  BP: 137/74 (08-25-20 @ 19:00) (124/82 - 145/76)  RR: 28 (08-25-20 @ 19:30) (0 - 30)  SpO2: 99% (08-25-20 @ 19:30) (94% - 100%)  Wt(kg): --  Lungs clear  Heart S1S2  Abd soft NT ND  Extremities:   1 -2 edema                                    8.0    10.36 )-----------( 126      ( 25 Aug 2020 15:23 )             22.6     08-25    134<L>  |  103  |  23  ----------------------------<  136<H>  4.1   |  25  |  1.03    Ca    7.2<L>      25 Aug 2020 03:38  Phos  2.4     08-25  Mg     2.2     08-25    TPro  5.3<L>  /  Alb  2.4<L>  /  TBili  0.5  /  DBili  x   /  AST  104<H>  /  ALT  63  /  AlkPhos  91  08-24    ABG - ( 24 Aug 2020 10:02 )  pH, Arterial: x     pH, Blood: 7.38  /  pCO2: 38    /  pO2: 142   / HCO3: 22    / Base Excess: -2.3  /  SaO2: 99                LIVER FUNCTIONS - ( 24 Aug 2020 02:16 )  Alb: 2.4 g/dL / Pro: 5.3 gm/dL / ALK PHOS: 91 U/L / ALT: 63 U/L / AST: 104 U/L / GGT: x           Creatinine Trend: 1.03<--, 1.31<--, 1.61<--, 1.70<--, 2.00<--, 1.98<--    CPK Trend  08-25-20 @ 03:38   -  9189<H>  --  08-24-20 @ 22:11   -  27042<HH>  --  08-24-20 @ 11:46   -  66334<HH>  --  08-24-20 @ 03:07   -  5664<H>  --      Assessment   CHELO multifactorial, pre renal azotemia; ATN risk with low MAP, IV contrast NSAIDs effect, rising CPK and blood loss  Hyperkalemia, spurious with CHELO and PRBC;  Improving CPK < 10K    Plan  Decrease IVF to 100 ml hr  Lasix 40 mg IV to maintain balance  Follow CPK trend, if >10K will add IV bicarbonate for urine pH goal > 6.5      Beny Peters MD

## 2020-08-25 NOTE — PROGRESS NOTE ADULT - SUBJECTIVE AND OBJECTIVE BOX
INTERVAL HPI/OVERNIGHT EVENTS:    Noted to have hypotension after receiving dilaudid iVP this AM with drop in H/H as well.  given 1 unit prbc given persistent drain output from L JETHRO of 900cc of blood discharge; ortho aware.  Weaned off phenylephrine later this am.    CENTRAL LINE: [ ] YES [ x] NO  LOCATION:       BUTLER: [ x] YES [ ] NO        A-LINE:  [ x] YES [ ] NO  to be removed     GLOBAL ISSUE/BEST PRACTICE:  Analgesia: oxycodone, dilaudid  Sedation:  NA  HOB elevation: yes  Stress ulcer prophylaxis: Protonix  VTE prophylaxis: HSQ  Oral Care: Chlorhexidine  Glycemic control: ISS/Lantus  Nutrition: Reg diet    REVIEW OF SYSTEMS: [x] Unable to obtain because:    CONSTITUTIONAL: No fever, weight loss, or fatigue  EYES: No eye pain, visual disturbances, or discharge  ENMT:  No difficulty hearing, tinnitus, vertigo; No sinus or throat pain  NECK: No pain or stiffness  RESPIRATORY: No cough, wheezing, chills or hemoptysis; No shortness of breath  CARDIOVASCULAR: No chest pain, palpitations, dizziness, or leg swelling  GASTROINTESTINAL: No abdominal or epigastric pain. No nausea, vomiting, or hematemesis; No diarrhea or constipation. No melena or hematochezia.  GENITOURINARY: No dysuria, frequency, hematuria, or incontinence  NEUROLOGICAL: + back pain; weakness with dorsiflexion bilaterally; No headaches, memory loss, numbness, or tremors  SKIN: No itching, burning, rashes, or lesions     PHYSICAL EXAM:    GENERAL: NAD, well-groomed, well-developed  HEAD:  Atraumatic, Normocephalic  EYES: EOMI, PERRLA, conjunctiva and sclera clear  CHEST/LUNG: Clear to auscultation bilaterally; No rales, rhonchi, wheezing, or rubs  HEART: Regular rate and rhythm; No murmurs, rubs, or gallops  ABDOMEN: Soft, Nontender, Nondistended; Bowel sounds present  EXTREMITIES:  2+ Peripheral Pulses, No clubbing, cyanosis, or edema; compartments in thigh and calf soft no edema.    BACK L JETHRO ~80-100cc/hr of bloody/serosanginous output; dressing clean dry intact.   NERVOUS SYSTEM:  Alert & Oriented X3, Good concentration; limited 2/2 pain; noted to have decreased plantar flexion strength noted to 4/5 and dorseflexion strength is 2/5 bilaterally; hip flexion 4/5 on r and 3+/5 on L ; DTRs 2+ intact and symmetric; sensation intact;     ICU Vital Signs Last 24 Hrs  T(C): 37 (25 Aug 2020 12:45), Max: 37.6 (25 Aug 2020 10:30)  T(F): 98.6 (25 Aug 2020 12:45), Max: 99.6 (25 Aug 2020 10:30)  HR: 112 (25 Aug 2020 13:30) (104 - 124)  BP: 131/70 (25 Aug 2020 13:30) (104/83 - 158/128)  BP(mean): 84 (25 Aug 2020 13:30) (84 - 136)  ABP: 139/94 (25 Aug 2020 10:30) (72/56 - 139/94)  ABP(mean): 105 (25 Aug 2020 10:30) (61 - 109)  RR: 16 (25 Aug 2020 13:30) (0 - 26)  SpO2: 99% (25 Aug 2020 13:30) (94% - 100%)      I&O's Detail    24 Aug 2020 07:  -  25 Aug 2020 07:00  --------------------------------------------------------  IN:    fentaNYL  Infusion: 54.4 mL    IV PiggyBack: 550 mL    lactated ringers.: 4650 mL    phenylephrine   Infusion: 97.2 mL    propofol Infusion: 52.2 mL  Total IN: 5403.8 mL    OUT:    Bulb: 1400 mL    Bulb: 135 mL    Indwelling Catheter - Urethral: 2210 mL  Total OUT: 3745 mL    Total NET: 1658.8 mL      25 Aug 2020 07:01  -  25 Aug 2020 15:27  --------------------------------------------------------  IN:    lactated ringers.: 1000 mL    Packed Red Blood Cells: 328 mL    phenylephrine   Infusion: 8 mL  Total IN: 1336 mL    OUT:    Bulb: 120 mL    Bulb: 30 mL    Indwelling Catheter - Urethral: 550 mL  Total OUT: 700 mL    Total NET: 636 mL          MEDICATIONS  NEURO  Meds: acetaminophen   Tablet .. 650 milliGRAM(s) Oral every 6 hours PRN Temp greater or equal to 38C (100.4F)  cyclobenzaprine 10 milliGRAM(s) Oral every 8 hours  HYDROmorphone  Injectable 1 milliGRAM(s) IV Push every 3 hours PRN Severe Pain (7 - 10)  melatonin 3 milliGRAM(s) Oral at bedtime PRN Sleep  ondansetron Injectable 4 milliGRAM(s) IV Push every 6 hours PRN Nausea  oxyCODONE    IR 10 milliGRAM(s) Oral every 4 hours PRN Moderate Pain (4 - 6)    RESPIRATORY  ABG - ( 24 Aug 2020 10:02 )  pH: x     /  pCO2: 38    /  pO2: 142   / HCO3: 22    / Base Excess: -2.3  /  SaO2: 99      Lactate: x                Meds:   CARDIOVASCULAR  Meds: phenylephrine    Infusion 0.5 MICROgram(s)/kG/Min (20.4 mL/Hr) IV Continuous <Continuous>    GI/NUTRITION  Meds: senna 2 Tablet(s) Oral at bedtime    GENITOURINARY  Meds: ascorbic acid 500 milliGRAM(s) Oral two times a day  folic acid 1 milliGRAM(s) Oral daily  lactated ringers. 1000 milliLiter(s) IV Continuous <Continuous>  multivitamin 1 Tablet(s) Oral daily    HEMATOLOGIC  Meds:   [x] VTE Prophylaxis  INFECTIOUS DISEASES  Meds: ceFAZolin   IVPB 2000 milliGRAM(s) IV Intermittent every 8 hours    ENDOCRINE  CAPILLARY BLOOD GLUCOSE        Meds:   OTHER MEDICATIONS:  chlorhexidine 4% Liquid 1 Application(s) Topical <User Schedule>  :    LABS:                        7.9    10.96 )-----------( 147      ( 25 Aug 2020 03:38 )             22.0      08-25    134<L>  |  103  |  23  ----------------------------<  136<H>  4.1   |  25  |  1.03    Ca    7.2<L>      25 Aug 2020 03:38  Phos  2.4     08-25  Mg     2.2     08-25    TPro  5.3<L>  /  Alb  2.4<L>  /  TBili  0.5  /  DBili  x   /  AST  104<H>  /  ALT  63  /  AlkPhos  91  08-24    PT/INR - ( 24 Aug 2020 02:16 )   PT: 14.1 sec;   INR: 1.23 ratio         PTT - ( 24 Aug 2020 02:16 )  PTT:24.3 sec  ## COVID Panel  COVID-19 PCR: NotDetec (20 @ 15:44)  	  Urinalysis Basic - ( 24 Aug 2020 18:38 )    Color: Yellow / Appearance: Clear / S.015 / pH: x  Gluc: x / Ketone: Negative  / Bili: Negative / Urobili: Negative mg/dL   Blood: x / Protein: 15 mg/dL / Nitrite: Negative   Leuk Esterase: Negative / RBC: 0-2 /HPF / WBC 3-5   Sq Epi: x / Non Sq Epi: Occasional / Bacteria: Occasional      Culture Results:   <10,000 CFU/mL Normal Urogenital Shira ( @ 01:17)            RADIOLOGY & ADDITIONAL STUDIES:

## 2020-08-25 NOTE — PROGRESS NOTE ADULT - SUBJECTIVE AND OBJECTIVE BOX
Patient is a 35y old  Male who presents with a chief complaint of low back pain (25 Aug 2020 11:45)      INTERVAL HPI/OVERNIGHT EVENTS:  pt is doing better  MEDICATIONS  (STANDING):  ascorbic acid 500 milliGRAM(s) Oral two times a day  ceFAZolin   IVPB 2000 milliGRAM(s) IV Intermittent every 8 hours  chlorhexidine 4% Liquid 1 Application(s) Topical <User Schedule>  cyclobenzaprine 10 milliGRAM(s) Oral every 8 hours  folic acid 1 milliGRAM(s) Oral daily  lactated ringers. 1000 milliLiter(s) (200 mL/Hr) IV Continuous <Continuous>  multivitamin 1 Tablet(s) Oral daily  phenylephrine    Infusion 0.5 MICROgram(s)/kG/Min (20.4 mL/Hr) IV Continuous <Continuous>  senna 2 Tablet(s) Oral at bedtime    MEDICATIONS  (PRN):  acetaminophen   Tablet .. 650 milliGRAM(s) Oral every 6 hours PRN Temp greater or equal to 38C (100.4F)  HYDROmorphone  Injectable 1 milliGRAM(s) IV Push every 3 hours PRN Severe Pain (7 - 10)  melatonin 3 milliGRAM(s) Oral at bedtime PRN Sleep  ondansetron Injectable 4 milliGRAM(s) IV Push every 6 hours PRN Nausea  oxyCODONE    IR 10 milliGRAM(s) Oral every 4 hours PRN Moderate Pain (4 - 6)      Allergies    No Known Allergies    Intolerances        REVIEW OF SYSTEMS:  CONSTITUTIONAL: No fever, weight loss, or fatigue  EYES: No eye pain, visual disturbances, or discharge  ENMT:  No difficulty hearing, tinnitus, vertigo; No sinus or throat pain  NECK: No pain or stiffness  BREASTS: No pain, masses, or nipple discharge  RESPIRATORY: No cough, wheezing, chills or hemoptysis; No shortness of breath  CARDIOVASCULAR: No chest pain, palpitations, dizziness, or leg swelling  GASTROINTESTINAL: No abdominal or epigastric pain. No nausea, vomiting, or hematemesis; No diarrhea or constipation. No melena or hematochezia.  GENITOURINARY: No dysuria, frequency, hematuria, or incontinence  NEUROLOGICAL: No headaches, memory loss, loss of strength, numbness, or tremors  SKIN: No itching, burning, rashes, or lesions   LYMPH NODES: No enlarged glands  ENDOCRINE: No heat or cold intolerance; No hair loss  MUSCULOSKELETAL: No joint pain or swelling; No muscle, back, or extremity pain  PSYCHIATRIC: No depression, anxiety, mood swings, or difficulty sleeping  HEME/LYMPH: No easy bruising, or bleeding gums  ALLERGY AND IMMUNOLOGIC: No hives or eczema    Vital Signs Last 24 Hrs  T(C): 37.3 (25 Aug 2020 10:58), Max: 37.6 (25 Aug 2020 10:30)  T(F): 99.1 (25 Aug 2020 10:58), Max: 99.6 (25 Aug 2020 10:30)  HR: 111 (25 Aug 2020 12:30) (104 - 124)  BP: 140/72 (25 Aug 2020 12:00) (104/83 - 158/128)  BP(mean): 84 (25 Aug 2020 12:00) (84 - 136)  RR: 14 (25 Aug 2020 12:30) (0 - 32)  SpO2: 100% (25 Aug 2020 12:30) (94% - 100%)    PHYSICAL EXAM:  GENERAL: NAD, well-groomed, well-developed  HEAD:  Atraumatic, Normocephalic  EYES: EOMI, PERRLA, conjunctiva and sclera clear  ENMT: No tonsillar erythema, exudates, or enlargement; Moist mucous membranes, Good dentition, No lesions  NECK: Supple, No JVD, Normal thyroid  NERVOUS SYSTEM:  Alert & Oriented X3, Good concentration; Motor Strength 5/5 B/L upper and lower extremities; DTRs 2+ intact and symmetric  CHEST/LUNG: Clear to percussion bilaterally; No rales, rhonchi, wheezing, or rubs  HEART: Regular rate and rhythm; No murmurs, rubs, or gallops  ABDOMEN: Soft, Nontender, Nondistended; Bowel sounds present  EXTREMITIES:  2+ Peripheral Pulses, No clubbing, cyanosis, or edema  LYMPH: No lymphadenopathy noted  SKIN: No rashes or lesions    LABS:                        7.9    10.96 )-----------( 147      ( 25 Aug 2020 03:38 )             22.0     08-25    134<L>  |  103  |  23  ----------------------------<  136<H>  4.1   |  25  |  1.03    Ca    7.2<L>      25 Aug 2020 03:38  Phos  2.4     08-25  Mg     2.2     08-25    TPro  5.3<L>  /  Alb  2.4<L>  /  TBili  0.5  /  DBili  x   /  AST  104<H>  /  ALT  63  /  AlkPhos  91  08-24    PT/INR - ( 24 Aug 2020 02:16 )   PT: 14.1 sec;   INR: 1.23 ratio         PTT - ( 24 Aug 2020 02:16 )  PTT:24.3 sec  Urinalysis Basic - ( 24 Aug 2020 18:38 )    Color: Yellow / Appearance: Clear / S.015 / pH: x  Gluc: x / Ketone: Negative  / Bili: Negative / Urobili: Negative mg/dL   Blood: x / Protein: 15 mg/dL / Nitrite: Negative   Leuk Esterase: Negative / RBC: 0-2 /HPF / WBC 3-5   Sq Epi: x / Non Sq Epi: Occasional / Bacteria: Occasional      CAPILLARY BLOOD GLUCOSE        CULTURES:  Culture Results:   <10,000 CFU/mL Normal Urogenital Shira ( @ 01:17)    HEMOGLOBIN A1C:    CHOLESTEROL:    ABG - ( 24 Aug 2020 10:02 )  pH, Arterial: x     pH, Blood: 7.38  /  pCO2: 38    /  pO2: 142   / HCO3: 22    / Base Excess: -2.3  /  SaO2: 99                  RADIOLOGY & ADDITIONAL TESTS:

## 2020-08-25 NOTE — PROGRESS NOTE ADULT - SUBJECTIVE AND OBJECTIVE BOX
Patient seen and examined at bedside. Pain well controlled. Denies nausea/vomiting.    No Known Allergies      Vital Signs Last 24 Hrs  T(C): 36.8 (25 Aug 2020 05:03), Max: 37.4 (24 Aug 2020 23:14)  T(F): 98.2 (25 Aug 2020 05:03), Max: 99.3 (24 Aug 2020 23:14)  HR: 106 (25 Aug 2020 06:30) (95 - 135)  BP: 158/128 (24 Aug 2020 19:10) (104/83 - 158/128)  BP(mean): 136 (24 Aug 2020 19:10) (86 - 136)  RR: 0 (25 Aug 2020 06:30) (0 - 32)  SpO2: 97% (25 Aug 2020 06:30) (94% - 100%)    I&O's Detail    24 Aug 2020 07:01  -  25 Aug 2020 07:00  --------------------------------------------------------  IN:    fentaNYL  Infusion: 54.4 mL    IV PiggyBack: 300 mL    lactated ringers.: 4450 mL    phenylephrine   Infusion: 97.2 mL    propofol Infusion: 52.2 mL  Total IN: 4953.8 mL    OUT:    Bulb: 120 mL    Bulb: 1000 mL    Indwelling Catheter - Urethral: 1875 mL  Total OUT: 2995 mL    Total NET: 1958.8 mL          PE:   Gen: NAD  Spine:   Dressing c/d/i  +JPx2 draining SS fluid  Motor:                   C5                C6              C7               C8           T1   R            5/5                5/5            5/5             5/5          5/5  L             5/5               5/5             5/5             5/5          5/5                L2             L3             L4               L5            S1  R         2/5           2/5          5/5             2/5           2/5  L          2/5          2/5           5/5             2/5           2/5    Sensory:            C5         C6         C7      C8       T1        (0=absent, 1=impaired, 2=normal, NT=not testable)  R         2            2           2        2         2  L          2            2           2        2         2               L2          L3         L4      L5       S1         (0=absent, 1=impaired, 2=normal, NT=not testable)  R         2            2            2        2        2  L          2            2           2        2         2    DP 2+  Compartments soft  No calf ttp    A/P: 35yMale s/p T11-Pelvis PSF, L2-5 laminectomy POD 2    -FU AM labs  -Pain control  -Monitor Drain output  -PT/OT: WBAT  -DVT ppx- SCDs  -No intraop dural tear  -Appreciate excellent ICU management  -Trend CKs Patient seen and examined at bedside. Pain well controlled. Denies nausea/vomiting. NO headaches    No Known Allergies      Vital Signs Last 24 Hrs  T(C): 36.8 (25 Aug 2020 05:03), Max: 37.4 (24 Aug 2020 23:14)  T(F): 98.2 (25 Aug 2020 05:03), Max: 99.3 (24 Aug 2020 23:14)  HR: 106 (25 Aug 2020 06:30) (95 - 135)  BP: 158/128 (24 Aug 2020 19:10) (104/83 - 158/128)  BP(mean): 136 (24 Aug 2020 19:10) (86 - 136)  RR: 0 (25 Aug 2020 06:30) (0 - 32)  SpO2: 97% (25 Aug 2020 06:30) (94% - 100%)    I&O's Detail    24 Aug 2020 07:01  -  25 Aug 2020 07:00  --------------------------------------------------------  IN:    fentaNYL  Infusion: 54.4 mL    IV PiggyBack: 300 mL    lactated ringers.: 4450 mL    phenylephrine   Infusion: 97.2 mL    propofol Infusion: 52.2 mL  Total IN: 4953.8 mL    OUT:    Bulb: 120 mL    Bulb: 1000 mL    Indwelling Catheter - Urethral: 1875 mL  Total OUT: 2995 mL    Total NET: 1958.8 mL          PE:   Gen: NAD  Spine:   Dressing c/d/i  +JPx2 draining SS fluid  Motor:                   C5                C6              C7               C8           T1   R            5/5                5/5            5/5             5/5          5/5  L             5/5               5/5             5/5             5/5          5/5                L2             L3             L4               L5            S1  R         2/5           2/5          5/5             2/5           2/5  L          2/5          2/5           5/5             2/5           2/5    Sensory:            C5         C6         C7      C8       T1        (0=absent, 1=impaired, 2=normal, NT=not testable)  R         2            2           2        2         2  L          2            2           2        2         2               L2          L3         L4      L5       S1         (0=absent, 1=impaired, 2=normal, NT=not testable)  R         2            2            2        2        2  L          2            2           2        2         2    DP 2+  Compartments soft  No calf ttp    A/P: 35yMale s/p T11-Pelvis PSF, L2-5 laminectomy POD 2    -FU AM labs  -Pain control  -Monitor Drain output  -PT/OT: WBAT  -DVT ppx- SCDs  -No intraop dural tear  -Appreciate excellent ICU management  -Trend CKs

## 2020-08-25 NOTE — PROGRESS NOTE ADULT - ASSESSMENT
36 y/o status post fall with spinous fracture and disrupted ligaments without neurovascular compromise at present. No signs of saddle anesthesia   L1-L5 fractures with disruption of ant and post ligaments at L1-L5 level, + ass epidural hemorrhage surrounding psoas muscle and paraspinal tissues; now s/p OR, noted to have EBL of 2200 now s/p T11-pelvis posterior spinal fusion, L2-S1 Laminectomy; .  S/p 4 units PRBC, 1FFP and 1 Plt. s/p 1L bolus and 500cc bolus this am. Pt was on phenylephrine 0.4mcg/kg/min this am, after dilaudid, weaned after receiving 1 unit prbc. .      Neuro: Exam as noted above, patient requiring pain control with Dilaudid 1mg q4h; oxycodone 10mg q4h; neuro checks q4H  CV: Maintain map>80 as per ortho recs; briefly required phenylephrine today resolved with 1 units prbc and after dilaudid ivp.    Pulm: No acute issues  GI: Regular diet  Renal/Metabolic: CHELO 2/2 likely rhabdomyolysis as expected as per ortho team; trend ck; monitor bun/cr. Cont IVF resusucitation with CK now downtrending and CHELO now improved.   ID: Cefazolin for ppx while drains remain.    Heme: S/p 2200 blood loss in OR; s/p now 5 units prbc, 1FFP, and 1Plt; repeat cbc today.    Endo: No acute issues  Skin: Dressing clean dry and intact; JETHRO on L noted to have ~100cc/hr of serosanguinous fluid; ortho team aware  Dispo: Extubated this AM with rhabdomyolysis and CHELO; will continue to monitor.

## 2020-08-25 NOTE — PROGRESS NOTE ADULT - SUBJECTIVE AND OBJECTIVE BOX
POD s/p open reduction lumbar fracture     bilateral ankle dorsiflexion loss   sensory appears intact

## 2020-08-25 NOTE — PROGRESS NOTE ADULT - ASSESSMENT
Getting resuscitation/medical treatment after L2-5 fracture dislocatoin which underwent large open reduction with initial stabilzation    Going to need interbody stabilizatoin once medically optomized - provisionally will plan on next week - will need xlif/alif     Will follow along ICU care at this point     Optomistic for return of tib ant function at this point

## 2020-08-26 LAB
ANION GAP SERPL CALC-SCNC: 6 MMOL/L — SIGNIFICANT CHANGE UP (ref 5–17)
BASOPHILS # BLD AUTO: 0.01 K/UL — SIGNIFICANT CHANGE UP (ref 0–0.2)
BASOPHILS NFR BLD AUTO: 0.1 % — SIGNIFICANT CHANGE UP (ref 0–2)
BLD GP AB SCN SERPL QL: SIGNIFICANT CHANGE UP
BUN SERPL-MCNC: 10 MG/DL — SIGNIFICANT CHANGE UP (ref 7–23)
CALCIUM SERPL-MCNC: 7.2 MG/DL — LOW (ref 8.5–10.1)
CHLORIDE SERPL-SCNC: 100 MMOL/L — SIGNIFICANT CHANGE UP (ref 96–108)
CK SERPL-CCNC: 4861 U/L — HIGH (ref 26–308)
CO2 SERPL-SCNC: 26 MMOL/L — SIGNIFICANT CHANGE UP (ref 22–31)
CREAT SERPL-MCNC: 0.64 MG/DL — SIGNIFICANT CHANGE UP (ref 0.5–1.3)
EOSINOPHIL # BLD AUTO: 0.02 K/UL — SIGNIFICANT CHANGE UP (ref 0–0.5)
EOSINOPHIL NFR BLD AUTO: 0.2 % — SIGNIFICANT CHANGE UP (ref 0–6)
GLUCOSE SERPL-MCNC: 117 MG/DL — HIGH (ref 70–99)
HCT VFR BLD CALC: 22.4 % — LOW (ref 39–50)
HCT VFR BLD CALC: 23 % — LOW (ref 39–50)
HCT VFR BLD CALC: 24.5 % — LOW (ref 39–50)
HGB BLD-MCNC: 7.9 G/DL — LOW (ref 13–17)
HGB BLD-MCNC: 8.2 G/DL — LOW (ref 13–17)
HGB BLD-MCNC: 8.6 G/DL — LOW (ref 13–17)
IMM GRANULOCYTES NFR BLD AUTO: 1.5 % — SIGNIFICANT CHANGE UP (ref 0–1.5)
LYMPHOCYTES # BLD AUTO: 0.58 K/UL — LOW (ref 1–3.3)
LYMPHOCYTES # BLD AUTO: 6.7 % — LOW (ref 13–44)
MAGNESIUM SERPL-MCNC: 2 MG/DL — SIGNIFICANT CHANGE UP (ref 1.6–2.6)
MCHC RBC-ENTMCNC: 30.3 PG — SIGNIFICANT CHANGE UP (ref 27–34)
MCHC RBC-ENTMCNC: 30.4 PG — SIGNIFICANT CHANGE UP (ref 27–34)
MCHC RBC-ENTMCNC: 30.5 PG — SIGNIFICANT CHANGE UP (ref 27–34)
MCHC RBC-ENTMCNC: 35.1 GM/DL — SIGNIFICANT CHANGE UP (ref 32–36)
MCHC RBC-ENTMCNC: 35.3 GM/DL — SIGNIFICANT CHANGE UP (ref 32–36)
MCHC RBC-ENTMCNC: 35.7 GM/DL — SIGNIFICANT CHANGE UP (ref 32–36)
MCV RBC AUTO: 85.5 FL — SIGNIFICANT CHANGE UP (ref 80–100)
MCV RBC AUTO: 86.2 FL — SIGNIFICANT CHANGE UP (ref 80–100)
MCV RBC AUTO: 86.3 FL — SIGNIFICANT CHANGE UP (ref 80–100)
MONOCYTES # BLD AUTO: 0.7 K/UL — SIGNIFICANT CHANGE UP (ref 0–0.9)
MONOCYTES NFR BLD AUTO: 8.1 % — SIGNIFICANT CHANGE UP (ref 2–14)
NEUTROPHILS # BLD AUTO: 7.24 K/UL — SIGNIFICANT CHANGE UP (ref 1.8–7.4)
NEUTROPHILS NFR BLD AUTO: 83.4 % — HIGH (ref 43–77)
NRBC # BLD: 0 /100 WBCS — SIGNIFICANT CHANGE UP (ref 0–0)
NRBC # BLD: 0 /100 WBCS — SIGNIFICANT CHANGE UP (ref 0–0)
NRBC # BLD: 1 /100 WBCS — HIGH (ref 0–0)
PHOSPHATE SERPL-MCNC: 2.2 MG/DL — LOW (ref 2.5–4.5)
PLATELET # BLD AUTO: 110 K/UL — LOW (ref 150–400)
PLATELET # BLD AUTO: 113 K/UL — LOW (ref 150–400)
PLATELET # BLD AUTO: 123 K/UL — LOW (ref 150–400)
POTASSIUM SERPL-MCNC: 3.4 MMOL/L — LOW (ref 3.5–5.3)
POTASSIUM SERPL-SCNC: 3.4 MMOL/L — LOW (ref 3.5–5.3)
RBC # BLD: 2.6 M/UL — LOW (ref 4.2–5.8)
RBC # BLD: 2.69 M/UL — LOW (ref 4.2–5.8)
RBC # BLD: 2.84 M/UL — LOW (ref 4.2–5.8)
RBC # FLD: 13.4 % — SIGNIFICANT CHANGE UP (ref 10.3–14.5)
RBC # FLD: 13.6 % — SIGNIFICANT CHANGE UP (ref 10.3–14.5)
RBC # FLD: 13.6 % — SIGNIFICANT CHANGE UP (ref 10.3–14.5)
SODIUM SERPL-SCNC: 132 MMOL/L — LOW (ref 135–145)
SURGICAL PATHOLOGY STUDY: SIGNIFICANT CHANGE UP
WBC # BLD: 7.57 K/UL — SIGNIFICANT CHANGE UP (ref 3.8–10.5)
WBC # BLD: 8.68 K/UL — SIGNIFICANT CHANGE UP (ref 3.8–10.5)
WBC # BLD: 9.15 K/UL — SIGNIFICANT CHANGE UP (ref 3.8–10.5)
WBC # FLD AUTO: 7.57 K/UL — SIGNIFICANT CHANGE UP (ref 3.8–10.5)
WBC # FLD AUTO: 8.68 K/UL — SIGNIFICANT CHANGE UP (ref 3.8–10.5)
WBC # FLD AUTO: 9.15 K/UL — SIGNIFICANT CHANGE UP (ref 3.8–10.5)

## 2020-08-26 PROCEDURE — 37191 INS ENDOVAS VENA CAVA FILTR: CPT

## 2020-08-26 PROCEDURE — 99233 SBSQ HOSP IP/OBS HIGH 50: CPT

## 2020-08-26 RX ORDER — POTASSIUM CHLORIDE 20 MEQ
40 PACKET (EA) ORAL ONCE
Refills: 0 | Status: COMPLETED | OUTPATIENT
Start: 2020-08-26 | End: 2020-08-26

## 2020-08-26 RX ORDER — POTASSIUM PHOSPHATE, MONOBASIC POTASSIUM PHOSPHATE, DIBASIC 236; 224 MG/ML; MG/ML
15 INJECTION, SOLUTION INTRAVENOUS ONCE
Refills: 0 | Status: COMPLETED | OUTPATIENT
Start: 2020-08-26 | End: 2020-08-26

## 2020-08-26 RX ORDER — POLYETHYLENE GLYCOL 3350 17 G/17G
17 POWDER, FOR SOLUTION ORAL DAILY
Refills: 0 | Status: DISCONTINUED | OUTPATIENT
Start: 2020-08-26 | End: 2020-09-01

## 2020-08-26 RX ORDER — FUROSEMIDE 40 MG
40 TABLET ORAL ONCE
Refills: 0 | Status: COMPLETED | OUTPATIENT
Start: 2020-08-26 | End: 2020-08-26

## 2020-08-26 RX ADMIN — OXYCODONE HYDROCHLORIDE 10 MILLIGRAM(S): 5 TABLET ORAL at 13:12

## 2020-08-26 RX ADMIN — Medication 500 MILLIGRAM(S): at 05:19

## 2020-08-26 RX ADMIN — HYDROMORPHONE HYDROCHLORIDE 1 MILLIGRAM(S): 2 INJECTION INTRAMUSCULAR; INTRAVENOUS; SUBCUTANEOUS at 19:56

## 2020-08-26 RX ADMIN — HYDROMORPHONE HYDROCHLORIDE 1 MILLIGRAM(S): 2 INJECTION INTRAMUSCULAR; INTRAVENOUS; SUBCUTANEOUS at 06:00

## 2020-08-26 RX ADMIN — HYDROMORPHONE HYDROCHLORIDE 1 MILLIGRAM(S): 2 INJECTION INTRAMUSCULAR; INTRAVENOUS; SUBCUTANEOUS at 05:19

## 2020-08-26 RX ADMIN — Medication 40 MILLIEQUIVALENT(S): at 18:40

## 2020-08-26 RX ADMIN — CYCLOBENZAPRINE HYDROCHLORIDE 10 MILLIGRAM(S): 10 TABLET, FILM COATED ORAL at 21:46

## 2020-08-26 RX ADMIN — HYDROMORPHONE HYDROCHLORIDE 1 MILLIGRAM(S): 2 INJECTION INTRAMUSCULAR; INTRAVENOUS; SUBCUTANEOUS at 14:36

## 2020-08-26 RX ADMIN — HYDROMORPHONE HYDROCHLORIDE 1 MILLIGRAM(S): 2 INJECTION INTRAMUSCULAR; INTRAVENOUS; SUBCUTANEOUS at 23:49

## 2020-08-26 RX ADMIN — OXYCODONE HYDROCHLORIDE 10 MILLIGRAM(S): 5 TABLET ORAL at 12:57

## 2020-08-26 RX ADMIN — HYDROMORPHONE HYDROCHLORIDE 1 MILLIGRAM(S): 2 INJECTION INTRAMUSCULAR; INTRAVENOUS; SUBCUTANEOUS at 10:46

## 2020-08-26 RX ADMIN — OXYCODONE HYDROCHLORIDE 10 MILLIGRAM(S): 5 TABLET ORAL at 17:24

## 2020-08-26 RX ADMIN — Medication 1 TABLET(S): at 14:43

## 2020-08-26 RX ADMIN — OXYCODONE HYDROCHLORIDE 10 MILLIGRAM(S): 5 TABLET ORAL at 02:30

## 2020-08-26 RX ADMIN — Medication 500 MILLIGRAM(S): at 17:23

## 2020-08-26 RX ADMIN — OXYCODONE HYDROCHLORIDE 10 MILLIGRAM(S): 5 TABLET ORAL at 08:10

## 2020-08-26 RX ADMIN — OXYCODONE HYDROCHLORIDE 10 MILLIGRAM(S): 5 TABLET ORAL at 07:49

## 2020-08-26 RX ADMIN — Medication 100 MILLIGRAM(S): at 21:45

## 2020-08-26 RX ADMIN — HYDROMORPHONE HYDROCHLORIDE 1 MILLIGRAM(S): 2 INJECTION INTRAMUSCULAR; INTRAVENOUS; SUBCUTANEOUS at 19:42

## 2020-08-26 RX ADMIN — SENNA PLUS 2 TABLET(S): 8.6 TABLET ORAL at 21:46

## 2020-08-26 RX ADMIN — OXYCODONE HYDROCHLORIDE 10 MILLIGRAM(S): 5 TABLET ORAL at 21:46

## 2020-08-26 RX ADMIN — HYDROMORPHONE HYDROCHLORIDE 1 MILLIGRAM(S): 2 INJECTION INTRAMUSCULAR; INTRAVENOUS; SUBCUTANEOUS at 10:31

## 2020-08-26 RX ADMIN — POTASSIUM PHOSPHATE, MONOBASIC POTASSIUM PHOSPHATE, DIBASIC 62.5 MILLIMOLE(S): 236; 224 INJECTION, SOLUTION INTRAVENOUS at 08:51

## 2020-08-26 RX ADMIN — CYCLOBENZAPRINE HYDROCHLORIDE 10 MILLIGRAM(S): 10 TABLET, FILM COATED ORAL at 06:27

## 2020-08-26 RX ADMIN — OXYCODONE HYDROCHLORIDE 10 MILLIGRAM(S): 5 TABLET ORAL at 22:46

## 2020-08-26 RX ADMIN — Medication 40 MILLIGRAM(S): at 18:40

## 2020-08-26 RX ADMIN — HYDROMORPHONE HYDROCHLORIDE 1 MILLIGRAM(S): 2 INJECTION INTRAMUSCULAR; INTRAVENOUS; SUBCUTANEOUS at 14:51

## 2020-08-26 RX ADMIN — CYCLOBENZAPRINE HYDROCHLORIDE 10 MILLIGRAM(S): 10 TABLET, FILM COATED ORAL at 14:36

## 2020-08-26 RX ADMIN — HYDROMORPHONE HYDROCHLORIDE 1 MILLIGRAM(S): 2 INJECTION INTRAMUSCULAR; INTRAVENOUS; SUBCUTANEOUS at 23:29

## 2020-08-26 RX ADMIN — OXYCODONE HYDROCHLORIDE 10 MILLIGRAM(S): 5 TABLET ORAL at 03:00

## 2020-08-26 RX ADMIN — Medication 100 MILLIGRAM(S): at 05:19

## 2020-08-26 RX ADMIN — Medication 1 MILLIGRAM(S): at 14:42

## 2020-08-26 RX ADMIN — CHLORHEXIDINE GLUCONATE 1 APPLICATION(S): 213 SOLUTION TOPICAL at 06:23

## 2020-08-26 RX ADMIN — Medication 3 MILLIGRAM(S): at 21:46

## 2020-08-26 RX ADMIN — Medication 100 MILLIGRAM(S): at 14:42

## 2020-08-26 NOTE — PROGRESS NOTE ADULT - SUBJECTIVE AND OBJECTIVE BOX
Patient is a 35y old  Male who presents with a chief complaint of low back pain (26 Aug 2020 11:24)      INTERVAL HPI/OVERNIGHT EVENTS:  pt is doing better  MEDICATIONS  (STANDING):  ascorbic acid 500 milliGRAM(s) Oral two times a day  ceFAZolin   IVPB 2000 milliGRAM(s) IV Intermittent every 8 hours  chlorhexidine 4% Liquid 1 Application(s) Topical <User Schedule>  cyclobenzaprine 10 milliGRAM(s) Oral every 8 hours  folic acid 1 milliGRAM(s) Oral daily  multivitamin 1 Tablet(s) Oral daily  polyethylene glycol 3350 17 Gram(s) Oral daily  senna 2 Tablet(s) Oral at bedtime    MEDICATIONS  (PRN):  acetaminophen   Tablet .. 650 milliGRAM(s) Oral every 6 hours PRN Temp greater or equal to 38C (100.4F)  HYDROmorphone  Injectable 1 milliGRAM(s) IV Push every 3 hours PRN Severe Pain (7 - 10)  melatonin 3 milliGRAM(s) Oral at bedtime PRN Sleep  ondansetron Injectable 4 milliGRAM(s) IV Push every 6 hours PRN Nausea  oxyCODONE    IR 10 milliGRAM(s) Oral every 4 hours PRN Moderate Pain (4 - 6)      Allergies    No Known Allergies    Intolerances        REVIEW OF SYSTEMS:  CONSTITUTIONAL: No fever, weight loss, or fatigue  EYES: No eye pain, visual disturbances, or discharge  ENMT:  No difficulty hearing, tinnitus, vertigo; No sinus or throat pain  NECK: No pain or stiffness  BREASTS: No pain, masses, or nipple discharge  RESPIRATORY: No cough, wheezing, chills or hemoptysis; No shortness of breath  CARDIOVASCULAR: No chest pain, palpitations, dizziness, or leg swelling  GASTROINTESTINAL: No abdominal or epigastric pain. No nausea, vomiting, or hematemesis; No diarrhea or constipation. No melena or hematochezia.  GENITOURINARY: No dysuria, frequency, hematuria, or incontinence  NEUROLOGICAL: No headaches, memory loss, loss of strength, numbness, or tremors  SKIN: No itching, burning, rashes, or lesions   LYMPH NODES: No enlarged glands  ENDOCRINE: No heat or cold intolerance; No hair loss  MUSCULOSKELETAL: No joint pain or swelling; No muscle, back, or extremity pain  PSYCHIATRIC: No depression, anxiety, mood swings, or difficulty sleeping  HEME/LYMPH: No easy bruising, or bleeding gums  ALLERGY AND IMMUNOLOGIC: No hives or eczema    Vital Signs Last 24 Hrs  T(C): 36.8 (26 Aug 2020 12:37), Max: 38.2 (25 Aug 2020 16:00)  T(F): 98.3 (26 Aug 2020 12:37), Max: 100.8 (25 Aug 2020 16:00)  HR: 102 (26 Aug 2020 12:37) (101 - 119)  BP: 129/73 (26 Aug 2020 12:37) (124/78 - 151/88)  BP(mean): 85 (26 Aug 2020 12:37) (80 - 97)  RR: 26 (26 Aug 2020 12:37) (16 - 30)  SpO2: 97% (26 Aug 2020 12:37) (96% - 100%)    PHYSICAL EXAM:  GENERAL: NAD, well-groomed, well-developed  HEAD:  Atraumatic, Normocephalic  EYES: EOMI, PERRLA, conjunctiva and sclera clear  ENMT: No tonsillar erythema, exudates, or enlargement; Moist mucous membranes, Good dentition, No lesions  NECK: Supple, No JVD, Normal thyroid  NERVOUS SYSTEM:  Alert & Oriented X3, Good concentration; Motor Strength 5/5 B/L upper and lower extremities; DTRs 2+ intact and symmetric  CHEST/LUNG: Clear to percussion bilaterally; No rales, rhonchi, wheezing, or rubs  HEART: Regular rate and rhythm; No murmurs, rubs, or gallops  ABDOMEN: Soft, Nontender, Nondistended; Bowel sounds present  EXTREMITIES:  2+ Peripheral Pulses, No clubbing, cyanosis, or edema  LYMPH: No lymphadenopathy noted  SKIN: No rashes or lesions    LABS:                        7.9    8.68  )-----------( 113      ( 26 Aug 2020 10:34 )             22.4     08-26    132<L>  |  100  |  10  ----------------------------<  117<H>  3.4<L>   |  26  |  0.64    Ca    7.2<L>      26 Aug 2020 05:13  Phos  2.2     08-26  Mg     2.0     08-26        Urinalysis Basic - ( 24 Aug 2020 18:38 )    Color: Yellow / Appearance: Clear / S.015 / pH: x  Gluc: x / Ketone: Negative  / Bili: Negative / Urobili: Negative mg/dL   Blood: x / Protein: 15 mg/dL / Nitrite: Negative   Leuk Esterase: Negative / RBC: 0-2 /HPF / WBC 3-5   Sq Epi: x / Non Sq Epi: Occasional / Bacteria: Occasional      CAPILLARY BLOOD GLUCOSE        CULTURES:    HEMOGLOBIN A1C:    CHOLESTEROL:        RADIOLOGY & ADDITIONAL TESTS:

## 2020-08-26 NOTE — PROGRESS NOTE ADULT - ASSESSMENT
36 y/o status post fall with spinous fracture and disrupted ligaments without neurovascular compromise at present. No signs of saddle anesthesia   L1-L5 fractures with disruption of ant and post ligaments at L1-L5 level, + ass epidural hemorrhage surrounding psoas muscle and paraspinal tissues; now s/p OR, noted to have EBL of 2200 now s/p T11-pelvis posterior spinal fusion, L2-S1 Laminectomy.  NPO overnight for possible IVC filter placement today with IR.     Neuro: Exam as noted above, patient requiring pain control with Dilaudid 1mg q4h; oxycodone 10mg q4h; neuro checks q4H  CV: Maintain map>80 as per ortho recs; off phenylephrine since yesterday after IVP dilaudid and dropping H/H likely from wound drainage, now improved  Pulm: No acute issues  GI: Regular diet  Renal/Metabolic: CHELO 2/2 likely rhabdomyolysis as expected as per ortho team; trend ck; monitor bun/cr. Cont IVF resusucitation with CK now downtrending and CHELO now improved.   ID: Cefazolin for ppx while drains remain.  Low grade fever yesterday, no further fevers, no constitutional symptoms to suggest infection at this time.    Heme: S/p 2200 blood loss in OR; s/p now 5 units prbc, 1FFP, and 1Plt; repeat cbc today.    Endo: No acute issues  Skin: Dressing clean dry and intact; JETHRO on L noted to have decreased drainage only 250cc/shift of serosanguinous fluid; ortho team aware  Dispo: Remains stable, likely transfer to floor pending staged ortho procedures; will go to IR for IVC filter today.  Plan discussed with patient at bedside.

## 2020-08-26 NOTE — CHART NOTE - NSCHARTNOTEFT_GEN_A_CORE
36 y/o status post fall with spinous fracture and disrupted ligaments without neurovascular compromise at present. No signs of saddle anesthesia   L1-L5 fractures with disruption of ant and post ligaments at L1-L5 level, + ass epidural hemorrhage surrounding psoas muscle and paraspinal tissues; now s/p OR, noted to have EBL of 2200 now s/p T11-pelvis posterior spinal fusion, L2-S1 Laminectomy.  s/p IVC filter placement today with IR.     Neuro: Exam as noted above, patient requiring pain control with Dilaudid 1mg q4h; oxycodone 10mg q4h; neuro checks q4H  CV: Maintain map>80 as per ortho recs; off phenylephrine since yesterday after IVP dilaudid and dropping H/H likely from wound drainage, now improved  Pulm: No acute issues  GI: Regular diet  Renal/Metabolic: CHELO 2/2 likely rhabdomyolysis as expected as per ortho team; trend ck; monitor bun/cr. Cont IVF resusucitation with CK now downtrending and CHELO now improved.   ID: Cefazolin for ppx while drains remain.  Low grade fever yesterday, no further fevers, no constitutional symptoms to suggest infection at this time.    Heme: S/p 2200 blood loss in OR; s/p now 5 units prbc, 1FFP, and 1Plt; repeat cbc today.    Endo: No acute issues  Skin: Dressing clean dry and intact; JETHRO on L noted to have decreased drainage only 250cc/shift of serosanguinous fluid; ortho team aware    Dispo: Stable for transfer to medical floor.    Ortho team aware and signout given.  Pt seen and d/w ICU attending Dr Whitmore  Pt placed under Dr Haile

## 2020-08-26 NOTE — PROGRESS NOTE ADULT - SUBJECTIVE AND OBJECTIVE BOX
INTERVAL HPI/OVERNIGHT EVENTS:    Noted to have isolated low grade fever yesterday, no other signs of infection noted;  Encouraging incentive spirometry, no cough, no urinary complaints at this time.  Now with normal temps.  JETHRO outputs now L 250 R 150 overnight.  NPO for IVC filter  CENTRAL LINE: [ ] YES [ x] NO  LOCATION:       BUTLER: [ x] YES [ ] NO        A-LINE:  [ ] YES [x ] NO  to be removed     GLOBAL ISSUE/BEST PRACTICE:  Analgesia: oxycodone, dilaudid  Sedation:  NA  HOB elevation: yes  Stress ulcer prophylaxis: Protonix  VTE prophylaxis: HSQ  Oral Care: Chlorhexidine  Glycemic control: ISS/Lantus  Nutrition: Reg diet    REVIEW OF SYSTEMS: [x] Unable to obtain because:    CONSTITUTIONAL: No fever overnight, weight loss, or fatigue  EYES: No eye pain, visual disturbances, or discharge  ENMT:  No difficulty hearing, tinnitus, vertigo; No sinus or throat pain  NECK: No pain or stiffness  RESPIRATORY: No cough, wheezing, chills or hemoptysis; No shortness of breath  CARDIOVASCULAR: No chest pain, palpitations, dizziness, or leg swelling  GASTROINTESTINAL: No abdominal or epigastric pain. No nausea, vomiting, or hematemesis; No diarrhea or constipation. No melena or hematochezia.  GENITOURINARY: No dysuria, frequency, hematuria, or incontinence  NEUROLOGICAL: + back pain; weakness with dorsiflexion bilaterally; No headaches, memory loss, numbness, or tremors  SKIN: No itching, burning, rashes, or lesions     PHYSICAL EXAM:    GENERAL: NAD, well-groomed, well-developed  HEAD:  Atraumatic, Normocephalic  EYES: EOMI, PERRLA, conjunctiva and sclera clear  CHEST/LUNG: Clear to auscultation bilaterally; No rales, rhonchi, wheezing, or rubs  HEART: Regular rate and rhythm; No murmurs, rubs, or gallops  ABDOMEN: Soft, Nontender, Nondistended; Bowel sounds present  EXTREMITIES:  2+ Peripheral Pulses, No clubbing, cyanosis, or edema; compartments in thigh and calf soft no edema.    BACK L JETHRO ~250 over 12 hours of bloody/serosanginous output; dressing clean dry intact.   NERVOUS SYSTEM:  Alert & Oriented X3, Good concentration; limited 2/2 pain; noted to have decreased plantar flexion strength noted to 4/5 and dorsiflexion strength is 2/5 bilaterally; hip flexion 4/5 on r and 3+/5 on L ; DTRs 2+ intact and symmetric; sensation intact;     ICU Vital Signs Last 24 Hrs  T(C): 36.9 (26 Aug 2020 04:58), Max: 38.2 (25 Aug 2020 16:00)  T(F): 98.4 (26 Aug 2020 04:58), Max: 100.8 (25 Aug 2020 16:00)  HR: 101 (26 Aug 2020 09:30) (101 - 119)  BP: 128/73 (26 Aug 2020 09:00) (124/78 - 151/88)  BP(mean): 85 (26 Aug 2020 09:00) (80 - 97)  ABP: --  ABP(mean): --  RR: 21 (26 Aug 2020 09:30) (14 - 30)  SpO2: 99% (26 Aug 2020 09:30) (98% - 100%)      I&O's Detail    25 Aug 2020 07:01  -  26 Aug 2020 07:00  --------------------------------------------------------  IN:    IV PiggyBack: 100 mL    lactated ringers.: 3100 mL    Packed Red Blood Cells: 634 mL    phenylephrine   Infusion: 8 mL  Total IN: 3842 mL    OUT:    Bulb: 380 mL    Bulb: 210 mL    Indwelling Catheter - Urethral: 3145 mL  Total OUT: 3735 mL    Total NET: 107 mL      26 Aug 2020 07:01  -  26 Aug 2020 11:24  --------------------------------------------------------  IN:    lactated ringers.: 200 mL  Total IN: 200 mL    OUT:    Bulb: 100 mL    Bulb: 100 mL    Indwelling Catheter - Urethral: 300 mL  Total OUT: 500 mL    Total NET: -300 mL          MEDICATIONS  NEURO  Meds: acetaminophen   Tablet .. 650 milliGRAM(s) Oral every 6 hours PRN Temp greater or equal to 38C (100.4F)  cyclobenzaprine 10 milliGRAM(s) Oral every 8 hours  HYDROmorphone  Injectable 1 milliGRAM(s) IV Push every 3 hours PRN Severe Pain (7 - 10)  melatonin 3 milliGRAM(s) Oral at bedtime PRN Sleep  ondansetron Injectable 4 milliGRAM(s) IV Push every 6 hours PRN Nausea  oxyCODONE    IR 10 milliGRAM(s) Oral every 4 hours PRN Moderate Pain (4 - 6)    RESPIRATORY    Meds:   CARDIOVASCULAR  Meds:   GI/NUTRITION  Meds: polyethylene glycol 3350 17 Gram(s) Oral daily  senna 2 Tablet(s) Oral at bedtime    GENITOURINARY  Meds: ascorbic acid 500 milliGRAM(s) Oral two times a day  folic acid 1 milliGRAM(s) Oral daily  multivitamin 1 Tablet(s) Oral daily    HEMATOLOGIC  Meds:   [x] VTE Prophylaxis  INFECTIOUS DISEASES  Meds: ceFAZolin   IVPB 2000 milliGRAM(s) IV Intermittent every 8 hours    ENDOCRINE  CAPILLARY BLOOD GLUCOSE        Meds:   OTHER MEDICATIONS:  chlorhexidine 4% Liquid 1 Application(s) Topical <User Schedule>  :    LABS:                        7.9    8.68  )-----------( 113      ( 26 Aug 2020 10:34 )             22.4      08-26    132<L>  |  100  |  10  ----------------------------<  117<H>  3.4<L>   |  26  |  0.64    Ca    7.2<L>      26 Aug 2020 05:13  Phos  2.2     08-  Mg     2.0     -    	    ## COVID Panel  COVID-19 PCR: NotDetec (20 @ 15:44)    Urinalysis Basic - ( 24 Aug 2020 18:38 )    Color: Yellow / Appearance: Clear / S.015 / pH: x  Gluc: x / Ketone: Negative  / Bili: Negative / Urobili: Negative mg/dL   Blood: x / Protein: 15 mg/dL / Nitrite: Negative   Leuk Esterase: Negative / RBC: 0-2 /HPF / WBC 3-5   Sq Epi: x / Non Sq Epi: Occasional / Bacteria: Occasional                RADIOLOGY & ADDITIONAL STUDIES:

## 2020-08-26 NOTE — PROGRESS NOTE ADULT - ASSESSMENT
A/P: 35yMale s/p T11-Pelvis PSF, L2-5 laminectomy POD 3    -FU AM labs  -Pain control  -Monitor Drain output  - Transfuse PRN d/w ICU team will hold on on transfusion at this time  - Passed some gas during examination, abdomen soft, ICU to give some oral GI med help  - Plan for IR retrievable IVC filter today due to immobilization risk of blood clot  -PT/OT: WBAT  -DVT ppx- SCDs  -No intraop dural tear  -Appreciate excellent ICU management  -Trend CKs

## 2020-08-26 NOTE — PROGRESS NOTE ADULT - SUBJECTIVE AND OBJECTIVE BOX
[facility] NEPHROLOGY SERVICES, [facility]  NEPHROLOGY AND HYPERTENSION  [address]  [address]  [place] [address]  [phone]    MD DARRYL RICH MD ANDREY GONCHARUK, MD MADHU KORRAPATI, MD YELENA ROSENBERG, MD RAKESH SHANKAR, MD SIGRID MCINTYRE MD          Patient events noted  For IVC filter    MEDICATIONS  (STANDING):  ascorbic acid 500 milliGRAM(s) Oral two times a day  ceFAZolin   IVPB 2000 milliGRAM(s) IV Intermittent every 8 hours  chlorhexidine 4% Liquid 1 Application(s) Topical <User Schedule>  cyclobenzaprine 10 milliGRAM(s) Oral every 8 hours  folic acid 1 milliGRAM(s) Oral daily  multivitamin 1 Tablet(s) Oral daily  polyethylene glycol 3350 17 Gram(s) Oral daily  senna 2 Tablet(s) Oral at bedtime    MEDICATIONS  (PRN):  acetaminophen   Tablet .. 650 milliGRAM(s) Oral every 6 hours PRN Temp greater or equal to 38C (100.4F)  HYDROmorphone  Injectable 1 milliGRAM(s) IV Push every 3 hours PRN Severe Pain (7 - 10)  melatonin 3 milliGRAM(s) Oral at bedtime PRN Sleep  ondansetron Injectable 4 milliGRAM(s) IV Push every 6 hours PRN Nausea  oxyCODONE    IR 10 milliGRAM(s) Oral every 4 hours PRN Moderate Pain (4 - 6)      08-25-20 @ 07:01 - 08-26-20 @ 07:00  --------------------------------------------------------  IN: 3842 mL / OUT: 3735 mL / NET: 107 mL    08-26-20 @ 07:01 - 08-26-20 @ 17:48  --------------------------------------------------------  IN: 300 mL / OUT: 900 mL / NET: -600 mL      PHYSICAL EXAM:      T(C): 36.4 (08-26-20 @ 15:21), Max: 37.4 (08-25-20 @ 21:37)  HR: 108 (08-26-20 @ 16:00) (101 - 119)  BP: 134/76 (08-26-20 @ 16:00) (124/78 - 151/88)  RR: 26 (08-26-20 @ 16:00) (15 - 28)  SpO2: 100% (08-26-20 @ 16:00) (96% - 100%)  Wt(kg): --  Lungs clear  Heart S1S2  Abd soft NT ND  Extremities:   1-2  edema                                    7.9    8.68  )-----------( 113      ( 26 Aug 2020 10:34 )             22.4     08-26    132<L>  |  100  |  10  ----------------------------<  117<H>  3.4<L>   |  26  |  0.64    Ca    7.2<L>      26 Aug 2020 05:13  Phos  2.2     08-26  Mg     2.0     08-26    CPK Trend  08-26-20 @ 10:34   -  4861<H>  --  08-25-20 @ 03:38   -  9189<H>  --  08-24-20 @ 22:11   -  79487<HH>  --  08-24-20 @ 11:46   -  28702<HH>  --  08-24-20 @ 03:07   -  5664<H>  --        Creatinine Trend: 0.64<--, 1.03<--, 1.31<--, 1.61<--, 1.70<--, 2.00<--    Assessment   CHELO multifactorial, pre renal azotemia; ATN risk with low MAP, IV contrast NSAIDs effect, rising CPK and blood loss  Hyperkalemia, spurious with CHELO and PRBC;  Improving CPK < 5 k, low risk for pigment injury    Plan  D/C IVF  Lasix once dose;     Beny Peters MD

## 2020-08-26 NOTE — PROGRESS NOTE ADULT - SUBJECTIVE AND OBJECTIVE BOX
Pt S/E at bedside, no acute events overnight, pain controlled, reports some difficulty passing gas.     Vital Signs Last 24 Hrs  T(C): 36.9 (26 Aug 2020 04:58), Max: 38.2 (25 Aug 2020 16:00)  T(F): 98.4 (26 Aug 2020 04:58), Max: 100.8 (25 Aug 2020 16:00)  HR: 106 (26 Aug 2020 07:01) (104 - 118)  BP: 140/86 (26 Aug 2020 07:01) (124/78 - 151/88)  BP(mean): 95 (26 Aug 2020 07:01) (80 - 97)  RR: 25 (26 Aug 2020 07:01) (12 - 30)  SpO2: 99% (26 Aug 2020 07:01) (96% - 100%)    Gen: NAD,     Spine:  Dressing clean dry intact  +JETHRO x2 with serosanguinous output  SILT L3-S1  SILT C5-T1  +DP/PT Pulses  +Radial Pulse  Compartments soft  No calf TTP B/L         Motor:            ElbowFlex    WristExt   ElbowExt   FingerFlex   FingerAbd     R            5/5                5/5            5/5             5/5               5/5  L             5/5               5/5             5/5             5/5               5/5              HipFlex   HipExt         KneeFlex   KneeExt   AnkleDorsi   AnklePlantar   HaluxDorsi   HaluxPlantar  R        5/5        5/5            3/5            3/5             2/5                 4/5              3+/5                4/5  L         5/5        5/5            3/5            3/5             2/5                 4/5              3+/5                4/5      Sensory:            C5         C6         C7      C8       T1        (0=absent, 1=impaired, 2=normal, NT=not testable)  R        2          2              2        2         2  L         2          2              2        2         2               L2          L3         L4      L5       S1         (0=absent, 1=impaired, 2=normal, NT=not testable)  R          2          2              2        2         2  L          2          2              2        2         2

## 2020-08-27 LAB
ALBUMIN SERPL ELPH-MCNC: 1.8 G/DL — LOW (ref 3.3–5)
ALP SERPL-CCNC: 90 U/L — SIGNIFICANT CHANGE UP (ref 40–120)
ALT FLD-CCNC: 52 U/L — SIGNIFICANT CHANGE UP (ref 12–78)
ANION GAP SERPL CALC-SCNC: 8 MMOL/L — SIGNIFICANT CHANGE UP (ref 5–17)
AST SERPL-CCNC: 98 U/L — HIGH (ref 15–37)
BASOPHILS # BLD AUTO: 0 K/UL — SIGNIFICANT CHANGE UP (ref 0–0.2)
BASOPHILS NFR BLD AUTO: 0 % — SIGNIFICANT CHANGE UP (ref 0–2)
BILIRUB SERPL-MCNC: 0.5 MG/DL — SIGNIFICANT CHANGE UP (ref 0.2–1.2)
BUN SERPL-MCNC: 8 MG/DL — SIGNIFICANT CHANGE UP (ref 7–23)
CALCIUM SERPL-MCNC: 7.4 MG/DL — LOW (ref 8.5–10.1)
CHLORIDE SERPL-SCNC: 98 MMOL/L — SIGNIFICANT CHANGE UP (ref 96–108)
CO2 SERPL-SCNC: 30 MMOL/L — SIGNIFICANT CHANGE UP (ref 22–31)
CREAT SERPL-MCNC: 0.59 MG/DL — SIGNIFICANT CHANGE UP (ref 0.5–1.3)
EOSINOPHIL # BLD AUTO: 0.07 K/UL — SIGNIFICANT CHANGE UP (ref 0–0.5)
EOSINOPHIL NFR BLD AUTO: 1 % — SIGNIFICANT CHANGE UP (ref 0–6)
GLUCOSE SERPL-MCNC: 123 MG/DL — HIGH (ref 70–99)
HCT VFR BLD CALC: 23.9 % — LOW (ref 39–50)
HGB BLD-MCNC: 8.4 G/DL — LOW (ref 13–17)
LYMPHOCYTES # BLD AUTO: 1.1 K/UL — SIGNIFICANT CHANGE UP (ref 1–3.3)
LYMPHOCYTES # BLD AUTO: 15 % — SIGNIFICANT CHANGE UP (ref 13–44)
MAGNESIUM SERPL-MCNC: 1.6 MG/DL — SIGNIFICANT CHANGE UP (ref 1.6–2.6)
MANUAL SMEAR VERIFICATION: SIGNIFICANT CHANGE UP
MCHC RBC-ENTMCNC: 30.4 PG — SIGNIFICANT CHANGE UP (ref 27–34)
MCHC RBC-ENTMCNC: 35.1 GM/DL — SIGNIFICANT CHANGE UP (ref 32–36)
MCV RBC AUTO: 86.6 FL — SIGNIFICANT CHANGE UP (ref 80–100)
MICROCYTES BLD QL: SLIGHT — SIGNIFICANT CHANGE UP
MONOCYTES # BLD AUTO: 0.51 K/UL — SIGNIFICANT CHANGE UP (ref 0–0.9)
MONOCYTES NFR BLD AUTO: 7 % — SIGNIFICANT CHANGE UP (ref 2–14)
NEUTROPHILS # BLD AUTO: 5.64 K/UL — SIGNIFICANT CHANGE UP (ref 1.8–7.4)
NEUTROPHILS NFR BLD AUTO: 77 % — SIGNIFICANT CHANGE UP (ref 43–77)
NRBC # BLD: 0 /100 — SIGNIFICANT CHANGE UP (ref 0–0)
NRBC # BLD: SIGNIFICANT CHANGE UP /100 WBCS (ref 0–0)
PHOSPHATE SERPL-MCNC: 2 MG/DL — LOW (ref 2.5–4.5)
PLAT MORPH BLD: NORMAL — SIGNIFICANT CHANGE UP
PLATELET # BLD AUTO: 108 K/UL — LOW (ref 150–400)
PLATELET COUNT - ESTIMATE: ABNORMAL
POLYCHROMASIA BLD QL SMEAR: SLIGHT — SIGNIFICANT CHANGE UP
POTASSIUM SERPL-MCNC: 3.2 MMOL/L — LOW (ref 3.5–5.3)
POTASSIUM SERPL-SCNC: 3.2 MMOL/L — LOW (ref 3.5–5.3)
PROT SERPL-MCNC: 5.1 GM/DL — LOW (ref 6–8.3)
RBC # BLD: 2.76 M/UL — LOW (ref 4.2–5.8)
RBC # FLD: 13.4 % — SIGNIFICANT CHANGE UP (ref 10.3–14.5)
RBC BLD AUTO: ABNORMAL
SODIUM SERPL-SCNC: 136 MMOL/L — SIGNIFICANT CHANGE UP (ref 135–145)
WBC # BLD: 7.33 K/UL — SIGNIFICANT CHANGE UP (ref 3.8–10.5)
WBC # FLD AUTO: 7.33 K/UL — SIGNIFICANT CHANGE UP (ref 3.8–10.5)

## 2020-08-27 PROCEDURE — 99232 SBSQ HOSP IP/OBS MODERATE 35: CPT

## 2020-08-27 RX ORDER — POTASSIUM CHLORIDE 20 MEQ
40 PACKET (EA) ORAL EVERY 4 HOURS
Refills: 0 | Status: COMPLETED | OUTPATIENT
Start: 2020-08-27 | End: 2020-08-28

## 2020-08-27 RX ADMIN — Medication 500 MILLIGRAM(S): at 06:22

## 2020-08-27 RX ADMIN — CYCLOBENZAPRINE HYDROCHLORIDE 10 MILLIGRAM(S): 10 TABLET, FILM COATED ORAL at 06:22

## 2020-08-27 RX ADMIN — Medication 650 MILLIGRAM(S): at 18:19

## 2020-08-27 RX ADMIN — HYDROMORPHONE HYDROCHLORIDE 1 MILLIGRAM(S): 2 INJECTION INTRAMUSCULAR; INTRAVENOUS; SUBCUTANEOUS at 09:00

## 2020-08-27 RX ADMIN — OXYCODONE HYDROCHLORIDE 10 MILLIGRAM(S): 5 TABLET ORAL at 17:13

## 2020-08-27 RX ADMIN — CHLORHEXIDINE GLUCONATE 1 APPLICATION(S): 213 SOLUTION TOPICAL at 06:22

## 2020-08-27 RX ADMIN — HYDROMORPHONE HYDROCHLORIDE 1 MILLIGRAM(S): 2 INJECTION INTRAMUSCULAR; INTRAVENOUS; SUBCUTANEOUS at 14:15

## 2020-08-27 RX ADMIN — Medication 650 MILLIGRAM(S): at 16:32

## 2020-08-27 RX ADMIN — Medication 1 MILLIGRAM(S): at 13:46

## 2020-08-27 RX ADMIN — OXYCODONE HYDROCHLORIDE 10 MILLIGRAM(S): 5 TABLET ORAL at 06:23

## 2020-08-27 RX ADMIN — Medication 1 TABLET(S): at 13:46

## 2020-08-27 RX ADMIN — OXYCODONE HYDROCHLORIDE 10 MILLIGRAM(S): 5 TABLET ORAL at 03:00

## 2020-08-27 RX ADMIN — CYCLOBENZAPRINE HYDROCHLORIDE 10 MILLIGRAM(S): 10 TABLET, FILM COATED ORAL at 22:09

## 2020-08-27 RX ADMIN — HYDROMORPHONE HYDROCHLORIDE 1 MILLIGRAM(S): 2 INJECTION INTRAMUSCULAR; INTRAVENOUS; SUBCUTANEOUS at 04:03

## 2020-08-27 RX ADMIN — OXYCODONE HYDROCHLORIDE 10 MILLIGRAM(S): 5 TABLET ORAL at 07:30

## 2020-08-27 RX ADMIN — Medication 40 MILLIEQUIVALENT(S): at 22:09

## 2020-08-27 RX ADMIN — HYDROMORPHONE HYDROCHLORIDE 1 MILLIGRAM(S): 2 INJECTION INTRAMUSCULAR; INTRAVENOUS; SUBCUTANEOUS at 20:10

## 2020-08-27 RX ADMIN — HYDROMORPHONE HYDROCHLORIDE 1 MILLIGRAM(S): 2 INJECTION INTRAMUSCULAR; INTRAVENOUS; SUBCUTANEOUS at 20:40

## 2020-08-27 RX ADMIN — OXYCODONE HYDROCHLORIDE 10 MILLIGRAM(S): 5 TABLET ORAL at 16:13

## 2020-08-27 RX ADMIN — HYDROMORPHONE HYDROCHLORIDE 1 MILLIGRAM(S): 2 INJECTION INTRAMUSCULAR; INTRAVENOUS; SUBCUTANEOUS at 08:17

## 2020-08-27 RX ADMIN — Medication 100 MILLIGRAM(S): at 06:22

## 2020-08-27 RX ADMIN — HYDROMORPHONE HYDROCHLORIDE 1 MILLIGRAM(S): 2 INJECTION INTRAMUSCULAR; INTRAVENOUS; SUBCUTANEOUS at 03:47

## 2020-08-27 RX ADMIN — Medication 3 MILLIGRAM(S): at 22:09

## 2020-08-27 RX ADMIN — Medication 500 MILLIGRAM(S): at 17:14

## 2020-08-27 RX ADMIN — HYDROMORPHONE HYDROCHLORIDE 1 MILLIGRAM(S): 2 INJECTION INTRAMUSCULAR; INTRAVENOUS; SUBCUTANEOUS at 13:46

## 2020-08-27 RX ADMIN — CYCLOBENZAPRINE HYDROCHLORIDE 10 MILLIGRAM(S): 10 TABLET, FILM COATED ORAL at 13:46

## 2020-08-27 RX ADMIN — Medication 100 MILLIGRAM(S): at 13:45

## 2020-08-27 RX ADMIN — OXYCODONE HYDROCHLORIDE 10 MILLIGRAM(S): 5 TABLET ORAL at 10:33

## 2020-08-27 RX ADMIN — Medication 100 MILLIGRAM(S): at 22:10

## 2020-08-27 RX ADMIN — OXYCODONE HYDROCHLORIDE 10 MILLIGRAM(S): 5 TABLET ORAL at 11:30

## 2020-08-27 RX ADMIN — OXYCODONE HYDROCHLORIDE 10 MILLIGRAM(S): 5 TABLET ORAL at 01:55

## 2020-08-27 NOTE — PROGRESS NOTE ADULT - ASSESSMENT
Patient s/p IVC filter placement.    Pt denies any neck/chest pain.  Insertion site is CDI.    Pt s/p spinal surgery and will have upcoming spinal surgeries.  Pt cannot be anticoagulated 2/2 high risk of bleeding.  Pt will need an IVC filter due to long periods of immobility.        -IR will recall patient in 5 months for filter retrieval Patient s/p IVC filter placement via right IJ approach  Pt denies any neck/chest pain.  Insertion site is CDI.    Pt s/p spinal surgery and will have upcoming spinal surgeries.  Pt cannot be anticoagulated 2/2 high risk of bleeding.  Pt will need an IVC filter due to long periods of immobility.        -IR will recall patient in 5 months for filter retrieval

## 2020-08-27 NOTE — PROGRESS NOTE ADULT - SUBJECTIVE AND OBJECTIVE BOX
Patient is a 35y old  Male who presents with a chief complaint of low back pain (27 Aug 2020 10:44)      INTERVAL HPI/OVERNIGHT EVENTS:  pt is doing better  MEDICATIONS  (STANDING):  ascorbic acid 500 milliGRAM(s) Oral two times a day  ceFAZolin   IVPB 2000 milliGRAM(s) IV Intermittent every 8 hours  chlorhexidine 4% Liquid 1 Application(s) Topical <User Schedule>  cyclobenzaprine 10 milliGRAM(s) Oral every 8 hours  folic acid 1 milliGRAM(s) Oral daily  multivitamin 1 Tablet(s) Oral daily  polyethylene glycol 3350 17 Gram(s) Oral daily  senna 2 Tablet(s) Oral at bedtime    MEDICATIONS  (PRN):  acetaminophen   Tablet .. 650 milliGRAM(s) Oral every 6 hours PRN Temp greater or equal to 38C (100.4F)  HYDROmorphone  Injectable 1 milliGRAM(s) IV Push every 3 hours PRN Severe Pain (7 - 10)  melatonin 3 milliGRAM(s) Oral at bedtime PRN Sleep  ondansetron Injectable 4 milliGRAM(s) IV Push every 6 hours PRN Nausea  oxyCODONE    IR 10 milliGRAM(s) Oral every 4 hours PRN Moderate Pain (4 - 6)      Allergies    No Known Allergies    Intolerances        REVIEW OF SYSTEMS:  CONSTITUTIONAL: No fever, weight loss, or fatigue  EYES: No eye pain, visual disturbances, or discharge  ENMT:  No difficulty hearing, tinnitus, vertigo; No sinus or throat pain  NECK: No pain or stiffness  BREASTS: No pain, masses, or nipple discharge  RESPIRATORY: No cough, wheezing, chills or hemoptysis; No shortness of breath  CARDIOVASCULAR: No chest pain, palpitations, dizziness, or leg swelling  GASTROINTESTINAL: No abdominal or epigastric pain. No nausea, vomiting, or hematemesis; No diarrhea or constipation. No melena or hematochezia.  GENITOURINARY: No dysuria, frequency, hematuria, or incontinence  NEUROLOGICAL: No headaches, memory loss, loss of strength, numbness, or tremors  SKIN: No itching, burning, rashes, or lesions   LYMPH NODES: No enlarged glands  ENDOCRINE: No heat or cold intolerance; No hair loss  MUSCULOSKELETAL: No joint pain or swelling; No muscle, back, or extremity pain  PSYCHIATRIC: No depression, anxiety, mood swings, or difficulty sleeping  HEME/LYMPH: No easy bruising, or bleeding gums  ALLERGY AND IMMUNOLOGIC: No hives or eczema    Vital Signs Last 24 Hrs  T(C): 36.7 (27 Aug 2020 06:02), Max: 37.1 (26 Aug 2020 21:08)  T(F): 98.1 (27 Aug 2020 06:02), Max: 98.8 (26 Aug 2020 21:08)  HR: 114 (27 Aug 2020 06:02) (103 - 114)  BP: 113/65 (27 Aug 2020 06:02) (113/65 - 147/72)  BP(mean): 87 (26 Aug 2020 18:30) (81 - 111)  RR: 18 (27 Aug 2020 06:02) (15 - 29)  SpO2: 97% (27 Aug 2020 06:02) (95% - 100%)    PHYSICAL EXAM:  GENERAL: NAD, well-groomed, well-developed  HEAD:  Atraumatic, Normocephalic  EYES: EOMI, PERRLA, conjunctiva and sclera clear  ENMT: No tonsillar erythema, exudates, or enlargement; Moist mucous membranes, Good dentition, No lesions  NECK: Supple, No JVD, Normal thyroid  NERVOUS SYSTEM:  Alert & Oriented X3, Good concentration; Motor Strength 5/5 B/L upper and lower extremities; DTRs 2+ intact and symmetric  CHEST/LUNG: Clear to percussion bilaterally; No rales, rhonchi, wheezing, or rubs  HEART: Regular rate and rhythm; No murmurs, rubs, or gallops  ABDOMEN: Soft, Nontender, Nondistended; Bowel sounds present  EXTREMITIES:  2+ Peripheral Pulses, No clubbing, cyanosis, or edema  LYMPH: No lymphadenopathy noted  SKIN: No rashes or lesions    LABS:                        8.4    7.33  )-----------( 108      ( 27 Aug 2020 07:17 )             23.9     08-27    136  |  98  |  8   ----------------------------<  123<H>  3.2<L>   |  30  |  0.59    Ca    7.4<L>      27 Aug 2020 07:17  Phos  2.0     08-27  Mg     1.6     08-27    TPro  5.1<L>  /  Alb  1.8<L>  /  TBili  0.5  /  DBili  x   /  AST  98<H>  /  ALT  52  /  AlkPhos  90  08-27        CAPILLARY BLOOD GLUCOSE        CULTURES:    HEMOGLOBIN A1C:    CHOLESTEROL:        RADIOLOGY & ADDITIONAL TESTS:

## 2020-08-27 NOTE — PROGRESS NOTE ADULT - SUBJECTIVE AND OBJECTIVE BOX
Patient s/p IVC filter placement.          PHYSICAL EXAM:    Vital Signs Last 24 Hrs  T(C): 36.7 (27 Aug 2020 06:02), Max: 37.1 (26 Aug 2020 21:08)  T(F): 98.1 (27 Aug 2020 06:02), Max: 98.8 (26 Aug 2020 21:08)  HR: 114 (27 Aug 2020 06:02) (102 - 114)  BP: 113/65 (27 Aug 2020 06:02) (113/65 - 147/72)  BP(mean): 87 (26 Aug 2020 18:30) (81 - 111)  RR: 18 (27 Aug 2020 06:02) (15 - 29)  SpO2: 97% (27 Aug 2020 06:02) (95% - 100%)    General:  A & O x3, NAD  Lungs:  CTAB  Cardiovascular:  good S1, S2,   Abdomen:  Soft, non-tender, non-distended, normoactive bowel sounds, no HSM  Extremities:  no calf swelling/tenderness b/l        LABS:                        8.4    7.33  )-----------( 108      ( 27 Aug 2020 07:17 )             23.9     08-27    136  |  98  |  8   ----------------------------<  123<H>  3.2<L>   |  30  |  0.59    Ca    7.4<L>      27 Aug 2020 07:17  Phos  2.0     08-27  Mg     1.6     08-27    TPro  5.1<L>  /  Alb  1.8<L>  /  TBili  0.5  /  DBili  x   /  AST  98<H>  /  ALT  52  /  AlkPhos  90  08-27          RADIOLOGY & ADDITIONAL STUDIES:

## 2020-08-27 NOTE — PROGRESS NOTE ADULT - SUBJECTIVE AND OBJECTIVE BOX
Subjective: c/o 6/10 lower back pain.       MEDICATIONS  (STANDING):  ascorbic acid 500 milliGRAM(s) Oral two times a day  ceFAZolin   IVPB 2000 milliGRAM(s) IV Intermittent every 8 hours  chlorhexidine 4% Liquid 1 Application(s) Topical <User Schedule>  cyclobenzaprine 10 milliGRAM(s) Oral every 8 hours  folic acid 1 milliGRAM(s) Oral daily  multivitamin 1 Tablet(s) Oral daily  polyethylene glycol 3350 17 Gram(s) Oral daily  senna 2 Tablet(s) Oral at bedtime    MEDICATIONS  (PRN):  acetaminophen   Tablet .. 650 milliGRAM(s) Oral every 6 hours PRN Temp greater or equal to 38C (100.4F)  HYDROmorphone  Injectable 1 milliGRAM(s) IV Push every 3 hours PRN Severe Pain (7 - 10)  melatonin 3 milliGRAM(s) Oral at bedtime PRN Sleep  ondansetron Injectable 4 milliGRAM(s) IV Push every 6 hours PRN Nausea  oxyCODONE    IR 10 milliGRAM(s) Oral every 4 hours PRN Moderate Pain (4 - 6)          T(C): 36.7 (08-27-20 @ 06:02), Max: 37.1 (08-26-20 @ 21:08)  HR: 114 (08-27-20 @ 06:02) (102 - 114)  BP: 113/65 (08-27-20 @ 06:02) (113/65 - 147/72)  RR: 18 (08-27-20 @ 06:02) (15 - 29)  SpO2: 97% (08-27-20 @ 06:02) (95% - 100%)  Wt(kg): --        I&O's Detail    26 Aug 2020 07:01  -  27 Aug 2020 07:00  --------------------------------------------------------  IN:    lactated ringers.: 300 mL    Oral Fluid: 800 mL    Packed Red Blood Cells: 304 mL  Total IN: 1404 mL    OUT:    Bulb: 400 mL    Bulb: 495 mL    Indwelling Catheter - Urethral: 1200 mL    Voided: 1200 mL  Total OUT: 3295 mL    Total NET: -1891 mL               PHYSICAL EXAM:    GENERAL: NAD  NECK: Supple, no inc in JVP  CHEST/LUNG: Clear  HEART: S1S2  ABDOMEN: Soft, Nontender, Nondistended; Bowel sounds present  EXTREMITIES:  no edema        LABS:  CBC Full  -  ( 27 Aug 2020 07:17 )  WBC Count : 7.33 K/uL  RBC Count : 2.76 M/uL  Hemoglobin : 8.4 g/dL  Hematocrit : 23.9 %  Platelet Count - Automated : 108 K/uL  Mean Cell Volume : 86.6 fl  Mean Cell Hemoglobin : 30.4 pg  Mean Cell Hemoglobin Concentration : 35.1 gm/dL  Auto Neutrophil # : 5.64 K/uL  Auto Lymphocyte # : 1.10 K/uL  Auto Monocyte # : 0.51 K/uL  Auto Eosinophil # : 0.07 K/uL  Auto Basophil # : 0.00 K/uL  Auto Neutrophil % : 77.0 %  Auto Lymphocyte % : 15.0 %  Auto Monocyte % : 7.0 %  Auto Eosinophil % : 1.0 %  Auto Basophil % : 0.0 %    08-27    136  |  98  |  8   ----------------------------<  123<H>  3.2<L>   |  30  |  0.59    Ca    7.4<L>      27 Aug 2020 07:17  Phos  2.0     08-27  Mg     1.6     08-27    TPro  5.1<L>  /  Alb  1.8<L>  /  TBili  0.5  /  DBili  x   /  AST  98<H>  /  ALT  52  /  AlkPhos  90  08-27          Impression:  CHELO -- multifactorial: pre-renal azotemia, hypotensive ischemic ATN, IV contrast, hemodynamic effect of NSAIDs, pigment nephropathy  Hyperkalemia, spurious with CHELO and PRBC;  Traumatic rhabdo    Recommendations:   Monitor off IVFs  Follow Cr, CPK in 24h

## 2020-08-27 NOTE — PROGRESS NOTE ADULT - SUBJECTIVE AND OBJECTIVE BOX
Pt S/E at bedside, no acute events overnight, pain controlled. Pt stepped down from ICU to medical floor yesterday, received 1 unit pRBCs for low H/H, IVC filter placed.    Vital Signs Last 24 Hrs  T(C): 36.9 (26 Aug 2020 04:58), Max: 38.2 (25 Aug 2020 16:00)  T(F): 98.4 (26 Aug 2020 04:58), Max: 100.8 (25 Aug 2020 16:00)  HR: 106 (26 Aug 2020 07:01) (104 - 118)  BP: 140/86 (26 Aug 2020 07:01) (124/78 - 151/88)  BP(mean): 95 (26 Aug 2020 07:01) (80 - 97)  RR: 25 (26 Aug 2020 07:01) (12 - 30)  SpO2: 99% (26 Aug 2020 07:01) (96% - 100%)    Gen: NAD,     Spine:  Dressing clean dry intact  +JETHRO x2 with serosanguinous output  SILT L3-S1  SILT C5-T1  +DP/PT Pulses  +Radial Pulse  Compartments soft  No calf TTP B/L         Motor:            ElbowFlex    WristExt   ElbowExt   FingerFlex   FingerAbd     R            5/5                5/5            5/5             5/5               5/5  L             5/5               5/5             5/5             5/5               5/5              HipFlex   HipExt         KneeFlex   KneeExt   AnkleDorsi   AnklePlantar   HaluxDorsi  R        5/5        5/5            3/5            3/5             2/5                 4/5              3+/5  L         5/5        5/5            3/5            3/5             2/5                 4/5              3+/5      Sensory:            C5         C6         C7      C8       T1        (0=absent, 1=impaired, 2=normal, NT=not testable)  R        2          2              2        2         2  L         2          2              2        2         2               L2          L3         L4      L5       S1         (0=absent, 1=impaired, 2=normal, NT=not testable)  R          2          2              2        2         2  L          2          2              2        2         2    A/P: 35yMale s/p T11-Pelvis PSF, L2-5 laminectomy POD 4    -FU AM labs  -Pain control  -Monitor Drain output  -PT/OT: WBAT  -DVT ppx- SCDs  -No intraop dural tear  -Appreciate excellent ICU management  -Trend CKs  -Plan for second procedure 9/1    Umer Jones DO  PGY2, Orthopaedic Surgery

## 2020-08-28 DIAGNOSIS — D50.0 IRON DEFICIENCY ANEMIA SECONDARY TO BLOOD LOSS (CHRONIC): ICD-10-CM

## 2020-08-28 LAB
ANION GAP SERPL CALC-SCNC: 5 MMOL/L — SIGNIFICANT CHANGE UP (ref 5–17)
BUN SERPL-MCNC: 7 MG/DL — SIGNIFICANT CHANGE UP (ref 7–23)
CALCIUM SERPL-MCNC: 7.5 MG/DL — LOW (ref 8.5–10.1)
CHLORIDE SERPL-SCNC: 98 MMOL/L — SIGNIFICANT CHANGE UP (ref 96–108)
CK SERPL-CCNC: 2829 U/L — HIGH (ref 26–308)
CO2 SERPL-SCNC: 30 MMOL/L — SIGNIFICANT CHANGE UP (ref 22–31)
CREAT SERPL-MCNC: 0.56 MG/DL — SIGNIFICANT CHANGE UP (ref 0.5–1.3)
GLUCOSE SERPL-MCNC: 118 MG/DL — HIGH (ref 70–99)
HCT VFR BLD CALC: 26.3 % — LOW (ref 39–50)
HGB BLD-MCNC: 8.8 G/DL — LOW (ref 13–17)
MCHC RBC-ENTMCNC: 29.7 PG — SIGNIFICANT CHANGE UP (ref 27–34)
MCHC RBC-ENTMCNC: 33.5 GM/DL — SIGNIFICANT CHANGE UP (ref 32–36)
MCV RBC AUTO: 88.9 FL — SIGNIFICANT CHANGE UP (ref 80–100)
NRBC # BLD: 0 /100 WBCS — SIGNIFICANT CHANGE UP (ref 0–0)
PLATELET # BLD AUTO: 134 K/UL — LOW (ref 150–400)
POTASSIUM SERPL-MCNC: 3.7 MMOL/L — SIGNIFICANT CHANGE UP (ref 3.5–5.3)
POTASSIUM SERPL-SCNC: 3.7 MMOL/L — SIGNIFICANT CHANGE UP (ref 3.5–5.3)
RBC # BLD: 2.96 M/UL — LOW (ref 4.2–5.8)
RBC # FLD: 13.6 % — SIGNIFICANT CHANGE UP (ref 10.3–14.5)
SODIUM SERPL-SCNC: 133 MMOL/L — LOW (ref 135–145)
WBC # BLD: 8.65 K/UL — SIGNIFICANT CHANGE UP (ref 3.8–10.5)
WBC # FLD AUTO: 8.65 K/UL — SIGNIFICANT CHANGE UP (ref 3.8–10.5)

## 2020-08-28 RX ADMIN — Medication 1 MILLIGRAM(S): at 12:53

## 2020-08-28 RX ADMIN — HYDROMORPHONE HYDROCHLORIDE 1 MILLIGRAM(S): 2 INJECTION INTRAMUSCULAR; INTRAVENOUS; SUBCUTANEOUS at 10:55

## 2020-08-28 RX ADMIN — OXYCODONE HYDROCHLORIDE 10 MILLIGRAM(S): 5 TABLET ORAL at 12:57

## 2020-08-28 RX ADMIN — OXYCODONE HYDROCHLORIDE 10 MILLIGRAM(S): 5 TABLET ORAL at 08:50

## 2020-08-28 RX ADMIN — OXYCODONE HYDROCHLORIDE 10 MILLIGRAM(S): 5 TABLET ORAL at 02:56

## 2020-08-28 RX ADMIN — CHLORHEXIDINE GLUCONATE 1 APPLICATION(S): 213 SOLUTION TOPICAL at 08:53

## 2020-08-28 RX ADMIN — HYDROMORPHONE HYDROCHLORIDE 1 MILLIGRAM(S): 2 INJECTION INTRAMUSCULAR; INTRAVENOUS; SUBCUTANEOUS at 20:32

## 2020-08-28 RX ADMIN — OXYCODONE HYDROCHLORIDE 10 MILLIGRAM(S): 5 TABLET ORAL at 17:11

## 2020-08-28 RX ADMIN — OXYCODONE HYDROCHLORIDE 10 MILLIGRAM(S): 5 TABLET ORAL at 02:26

## 2020-08-28 RX ADMIN — CYCLOBENZAPRINE HYDROCHLORIDE 10 MILLIGRAM(S): 10 TABLET, FILM COATED ORAL at 06:24

## 2020-08-28 RX ADMIN — HYDROMORPHONE HYDROCHLORIDE 1 MILLIGRAM(S): 2 INJECTION INTRAMUSCULAR; INTRAVENOUS; SUBCUTANEOUS at 07:13

## 2020-08-28 RX ADMIN — HYDROMORPHONE HYDROCHLORIDE 1 MILLIGRAM(S): 2 INJECTION INTRAMUSCULAR; INTRAVENOUS; SUBCUTANEOUS at 11:10

## 2020-08-28 RX ADMIN — Medication 100 MILLIGRAM(S): at 06:23

## 2020-08-28 RX ADMIN — Medication 100 MILLIGRAM(S): at 14:28

## 2020-08-28 RX ADMIN — Medication 500 MILLIGRAM(S): at 17:05

## 2020-08-28 RX ADMIN — HYDROMORPHONE HYDROCHLORIDE 1 MILLIGRAM(S): 2 INJECTION INTRAMUSCULAR; INTRAVENOUS; SUBCUTANEOUS at 01:05

## 2020-08-28 RX ADMIN — Medication 3 MILLIGRAM(S): at 23:35

## 2020-08-28 RX ADMIN — Medication 500 MILLIGRAM(S): at 06:24

## 2020-08-28 RX ADMIN — Medication 3 MILLIGRAM(S): at 02:30

## 2020-08-28 RX ADMIN — Medication 1 TABLET(S): at 12:53

## 2020-08-28 RX ADMIN — OXYCODONE HYDROCHLORIDE 10 MILLIGRAM(S): 5 TABLET ORAL at 22:25

## 2020-08-28 RX ADMIN — HYDROMORPHONE HYDROCHLORIDE 1 MILLIGRAM(S): 2 INJECTION INTRAMUSCULAR; INTRAVENOUS; SUBCUTANEOUS at 15:40

## 2020-08-28 RX ADMIN — OXYCODONE HYDROCHLORIDE 10 MILLIGRAM(S): 5 TABLET ORAL at 09:50

## 2020-08-28 RX ADMIN — CYCLOBENZAPRINE HYDROCHLORIDE 10 MILLIGRAM(S): 10 TABLET, FILM COATED ORAL at 14:36

## 2020-08-28 RX ADMIN — OXYCODONE HYDROCHLORIDE 10 MILLIGRAM(S): 5 TABLET ORAL at 18:11

## 2020-08-28 RX ADMIN — HYDROMORPHONE HYDROCHLORIDE 1 MILLIGRAM(S): 2 INJECTION INTRAMUSCULAR; INTRAVENOUS; SUBCUTANEOUS at 15:55

## 2020-08-28 RX ADMIN — Medication 40 MILLIEQUIVALENT(S): at 02:26

## 2020-08-28 RX ADMIN — OXYCODONE HYDROCHLORIDE 10 MILLIGRAM(S): 5 TABLET ORAL at 13:57

## 2020-08-28 RX ADMIN — HYDROMORPHONE HYDROCHLORIDE 1 MILLIGRAM(S): 2 INJECTION INTRAMUSCULAR; INTRAVENOUS; SUBCUTANEOUS at 06:43

## 2020-08-28 RX ADMIN — CYCLOBENZAPRINE HYDROCHLORIDE 10 MILLIGRAM(S): 10 TABLET, FILM COATED ORAL at 21:55

## 2020-08-28 RX ADMIN — HYDROMORPHONE HYDROCHLORIDE 1 MILLIGRAM(S): 2 INJECTION INTRAMUSCULAR; INTRAVENOUS; SUBCUTANEOUS at 20:02

## 2020-08-28 RX ADMIN — OXYCODONE HYDROCHLORIDE 10 MILLIGRAM(S): 5 TABLET ORAL at 21:55

## 2020-08-28 RX ADMIN — HYDROMORPHONE HYDROCHLORIDE 1 MILLIGRAM(S): 2 INJECTION INTRAMUSCULAR; INTRAVENOUS; SUBCUTANEOUS at 00:35

## 2020-08-28 RX ADMIN — Medication 100 MILLIGRAM(S): at 21:55

## 2020-08-28 NOTE — PROGRESS NOTE ADULT - SUBJECTIVE AND OBJECTIVE BOX
Patient is a 35y old  Male who presents with a chief complaint of low back pain (28 Aug 2020 06:30)      INTERVAL HPI/OVERNIGHT EVENTS:  pt is feeling better  MEDICATIONS  (STANDING):  ascorbic acid 500 milliGRAM(s) Oral two times a day  ceFAZolin   IVPB 2000 milliGRAM(s) IV Intermittent every 8 hours  chlorhexidine 4% Liquid 1 Application(s) Topical <User Schedule>  cyclobenzaprine 10 milliGRAM(s) Oral every 8 hours  folic acid 1 milliGRAM(s) Oral daily  multivitamin 1 Tablet(s) Oral daily  polyethylene glycol 3350 17 Gram(s) Oral daily  senna 2 Tablet(s) Oral at bedtime    MEDICATIONS  (PRN):  acetaminophen   Tablet .. 650 milliGRAM(s) Oral every 6 hours PRN Temp greater or equal to 38C (100.4F)  HYDROmorphone  Injectable 1 milliGRAM(s) IV Push every 3 hours PRN Severe Pain (7 - 10)  melatonin 3 milliGRAM(s) Oral at bedtime PRN Sleep  ondansetron Injectable 4 milliGRAM(s) IV Push every 6 hours PRN Nausea  oxyCODONE    IR 10 milliGRAM(s) Oral every 4 hours PRN Moderate Pain (4 - 6)      Allergies    No Known Allergies    Intolerances        REVIEW OF SYSTEMS:  CONSTITUTIONAL: No fever, weight loss, or fatigue  EYES: No eye pain, visual disturbances, or discharge  ENMT:  No difficulty hearing, tinnitus, vertigo; No sinus or throat pain  NECK: No pain or stiffness  BREASTS: No pain, masses, or nipple discharge  RESPIRATORY: No cough, wheezing, chills or hemoptysis; No shortness of breath  CARDIOVASCULAR: No chest pain, palpitations, dizziness, or leg swelling  GASTROINTESTINAL: No abdominal or epigastric pain. No nausea, vomiting, or hematemesis; No diarrhea or constipation. No melena or hematochezia.  GENITOURINARY: No dysuria, frequency, hematuria, or incontinence  NEUROLOGICAL: No headaches, memory loss, loss of strength, numbness, or tremors  SKIN: No itching, burning, rashes, or lesions   LYMPH NODES: No enlarged glands  ENDOCRINE: No heat or cold intolerance; No hair loss  MUSCULOSKELETAL: No joint pain or swelling; No muscle, back, or extremity pain  PSYCHIATRIC: No depression, anxiety, mood swings, or difficulty sleeping  HEME/LYMPH: No easy bruising, or bleeding gums  ALLERGY AND IMMUNOLOGIC: No hives or eczema    Vital Signs Last 24 Hrs  T(C): 37.4 (28 Aug 2020 11:12), Max: 38.6 (27 Aug 2020 17:29)  T(F): 99.4 (28 Aug 2020 11:12), Max: 101.4 (27 Aug 2020 17:29)  HR: 111 (28 Aug 2020 11:12) (110 - 114)  BP: 118/80 (28 Aug 2020 11:12) (118/80 - 143/78)  BP(mean): --  RR: 18 (28 Aug 2020 11:12) (17 - 19)  SpO2: 93% (28 Aug 2020 11:12) (93% - 97%)    PHYSICAL EXAM:  GENERAL: NAD, well-groomed, well-developed  HEAD:  Atraumatic, Normocephalic  EYES: EOMI, PERRLA, conjunctiva and sclera clear  ENMT: No tonsillar erythema, exudates, or enlargement; Moist mucous membranes, Good dentition, No lesions  NECK: Supple, No JVD, Normal thyroid  NERVOUS SYSTEM:  Alert & Oriented X3, Good concentration; Motor Strength 5/5 B/L upper and lower extremities; DTRs 2+ intact and symmetric  CHEST/LUNG: Clear to percussion bilaterally; No rales, rhonchi, wheezing, or rubs  HEART: Regular rate and rhythm; No murmurs, rubs, or gallops  ABDOMEN: Soft, Nontender, Nondistended; Bowel sounds present  EXTREMITIES:  2+ Peripheral Pulses, No clubbing, cyanosis, or edema  LYMPH: No lymphadenopathy noted  SKIN: No rashes or lesions    LABS:                        8.8    8.65  )-----------( 134      ( 28 Aug 2020 06:28 )             26.3     08-28    133<L>  |  98  |  7   ----------------------------<  118<H>  3.7   |  30  |  0.56    Ca    7.5<L>      28 Aug 2020 06:28  Phos  2.0     08-27  Mg     1.6     08-27    TPro  5.1<L>  /  Alb  1.8<L>  /  TBili  0.5  /  DBili  x   /  AST  98<H>  /  ALT  52  /  AlkPhos  90  08-27        CAPILLARY BLOOD GLUCOSE        CULTURES:    HEMOGLOBIN A1C:    CHOLESTEROL:        RADIOLOGY & ADDITIONAL TESTS:

## 2020-08-28 NOTE — PROGRESS NOTE ADULT - SUBJECTIVE AND OBJECTIVE BOX
NEPHROLOGY PROGRESS NOTE    CHIEF COMPLAINT:  Rhabdomyolysis    HPI:  Renal function stable and CPK down trending.    ROS:  no SOB    EXAM:  T(F): 100.5 (08-28-20 @ 13:26)  HR: 111 (08-28-20 @ 11:12)  BP: 118/80 (08-28-20 @ 11:12)  RR: 18 (08-28-20 @ 11:12)  SpO2: 93% (08-28-20 @ 11:12)    Conversant, in no apparent distress  Normal respiratory effort, lungs clear bilaterally  Heart RRR with no murmur, no peripheral edema         LABS                             8.8    8.65  )-----------( 134      ( 28 Aug 2020 06:28 )             26.3          08-28    133<L>  |  98  |  7   ----------------------------<  118<H>  3.7   |  30  |  0.56    Ca    7.5<L>      28 Aug 2020 06:28  Phos  2.0     08-27  Mg     1.6     08-27    CPK 2829    TPro  5.1<L>  /  Alb  1.8<L>  /  TBili  0.5  /  DBili  x   /  AST  98<H>  /  ALT  52  /  AlkPhos  90  08-27      Impression:  CHELO -- multifactorial: pre-renal azotemia, hypotensive ischemic ATN, IV contrast, hemodynamic effect of NSAIDs, pigment nephropathy  Traumatic rhabdomyolysis, resolving    Recommendations:   Monitor off IVFs  Follow Cr, CPK in 24h

## 2020-08-28 NOTE — PROGRESS NOTE ADULT - SUBJECTIVE AND OBJECTIVE BOX
Pt S/E at bedside, no acute events overnight, pt reporting pain at the time of exam but says that generally pain is well controlled. Pt says that he is having back pain, he reports that he has not worked with PT yet.    Vital Signs Last 24 Hrs  T(C): 37.5 (28 Aug 2020 06:19), Max: 38.6 (27 Aug 2020 17:29)  T(F): 99.5 (28 Aug 2020 06:19), Max: 101.4 (27 Aug 2020 17:29)  HR: 112 (28 Aug 2020 06:19) (110 - 114)  BP: 135/76 (28 Aug 2020 06:19) (135/76 - 143/78)  BP(mean): --  RR: 18 (28 Aug 2020 06:19) (17 - 19)  SpO2: 95% (28 Aug 2020 06:19) (95% - 97%)    Gen: NAD  Spine:  Dressing clean dry intact  +JETHRO x2 with serosanguinous output  SILT L3-S1  SILT C5-T1  +DP Pulses  +Radial Pulse  Compartments soft  No calf TTP B/L    Motor:            ElbowFlex    WristExt   ElbowExt   FingerFlex   FingerAbd     R            5/5                5/5            5/5             5/5               5/5  L             5/5               5/5             5/5             5/5               5/5              HipFlex   KneeExt   AnkleDorsi   AnklePlantar   HaluxDorsi  R        1/5        5/5            1/5            5/5              3/5                                 L         1/5        5/5            1/5            5/5              3/5                        Sensory:            C5         C6         C7      C8       T1        (0=absent, 1=impaired, 2=normal, NT=not testable)  R        2          2              2        2         2  L         2          2              2        2         2               L2          L3         L4      L5       S1         (0=absent, 1=impaired, 2=normal, NT=not testable)  R          2          2              2        2         2  L          2          2              2        2         2

## 2020-08-28 NOTE — PROGRESS NOTE ADULT - ASSESSMENT
A/P: 35yMale s/p T11-Pelvis PSF, L2-5 laminectomy POD 2    -FU AM labs  -Pain control  -Monitor Drain output  -PT/OT: WBAT  -DVT ppx- SCDs  -No intraop dural tear  -Appreciate excellent ICU management  -Trend CKs  -Plan for second procedure 9/1, Covid on 8/30.

## 2020-08-29 LAB
ANION GAP SERPL CALC-SCNC: 7 MMOL/L — SIGNIFICANT CHANGE UP (ref 5–17)
BUN SERPL-MCNC: 10 MG/DL — SIGNIFICANT CHANGE UP (ref 7–23)
CALCIUM SERPL-MCNC: 8 MG/DL — LOW (ref 8.5–10.1)
CHLORIDE SERPL-SCNC: 97 MMOL/L — SIGNIFICANT CHANGE UP (ref 96–108)
CO2 SERPL-SCNC: 32 MMOL/L — HIGH (ref 22–31)
CREAT SERPL-MCNC: 0.77 MG/DL — SIGNIFICANT CHANGE UP (ref 0.5–1.3)
GLUCOSE SERPL-MCNC: 106 MG/DL — HIGH (ref 70–99)
HCT VFR BLD CALC: 32.5 % — LOW (ref 39–50)
HGB BLD-MCNC: 11.1 G/DL — LOW (ref 13–17)
MCHC RBC-ENTMCNC: 29.6 PG — SIGNIFICANT CHANGE UP (ref 27–34)
MCHC RBC-ENTMCNC: 34.2 GM/DL — SIGNIFICANT CHANGE UP (ref 32–36)
MCV RBC AUTO: 86.7 FL — SIGNIFICANT CHANGE UP (ref 80–100)
NRBC # BLD: 0 /100 WBCS — SIGNIFICANT CHANGE UP (ref 0–0)
PLATELET # BLD AUTO: 151 K/UL — SIGNIFICANT CHANGE UP (ref 150–400)
POTASSIUM SERPL-MCNC: 4 MMOL/L — SIGNIFICANT CHANGE UP (ref 3.5–5.3)
POTASSIUM SERPL-SCNC: 4 MMOL/L — SIGNIFICANT CHANGE UP (ref 3.5–5.3)
RBC # BLD: 3.75 M/UL — LOW (ref 4.2–5.8)
RBC # FLD: 14.6 % — HIGH (ref 10.3–14.5)
SODIUM SERPL-SCNC: 136 MMOL/L — SIGNIFICANT CHANGE UP (ref 135–145)
WBC # BLD: 9.23 K/UL — SIGNIFICANT CHANGE UP (ref 3.8–10.5)
WBC # FLD AUTO: 9.23 K/UL — SIGNIFICANT CHANGE UP (ref 3.8–10.5)

## 2020-08-29 RX ADMIN — HYDROMORPHONE HYDROCHLORIDE 1 MILLIGRAM(S): 2 INJECTION INTRAMUSCULAR; INTRAVENOUS; SUBCUTANEOUS at 23:05

## 2020-08-29 RX ADMIN — HYDROMORPHONE HYDROCHLORIDE 1 MILLIGRAM(S): 2 INJECTION INTRAMUSCULAR; INTRAVENOUS; SUBCUTANEOUS at 23:30

## 2020-08-29 RX ADMIN — HYDROMORPHONE HYDROCHLORIDE 1 MILLIGRAM(S): 2 INJECTION INTRAMUSCULAR; INTRAVENOUS; SUBCUTANEOUS at 20:14

## 2020-08-29 RX ADMIN — OXYCODONE HYDROCHLORIDE 10 MILLIGRAM(S): 5 TABLET ORAL at 15:23

## 2020-08-29 RX ADMIN — Medication 100 MILLIGRAM(S): at 05:23

## 2020-08-29 RX ADMIN — POLYETHYLENE GLYCOL 3350 17 GRAM(S): 17 POWDER, FOR SOLUTION ORAL at 12:46

## 2020-08-29 RX ADMIN — HYDROMORPHONE HYDROCHLORIDE 1 MILLIGRAM(S): 2 INJECTION INTRAMUSCULAR; INTRAVENOUS; SUBCUTANEOUS at 17:03

## 2020-08-29 RX ADMIN — HYDROMORPHONE HYDROCHLORIDE 1 MILLIGRAM(S): 2 INJECTION INTRAMUSCULAR; INTRAVENOUS; SUBCUTANEOUS at 01:05

## 2020-08-29 RX ADMIN — CYCLOBENZAPRINE HYDROCHLORIDE 10 MILLIGRAM(S): 10 TABLET, FILM COATED ORAL at 21:05

## 2020-08-29 RX ADMIN — OXYCODONE HYDROCHLORIDE 10 MILLIGRAM(S): 5 TABLET ORAL at 11:01

## 2020-08-29 RX ADMIN — HYDROMORPHONE HYDROCHLORIDE 1 MILLIGRAM(S): 2 INJECTION INTRAMUSCULAR; INTRAVENOUS; SUBCUTANEOUS at 04:44

## 2020-08-29 RX ADMIN — HYDROMORPHONE HYDROCHLORIDE 1 MILLIGRAM(S): 2 INJECTION INTRAMUSCULAR; INTRAVENOUS; SUBCUTANEOUS at 08:17

## 2020-08-29 RX ADMIN — HYDROMORPHONE HYDROCHLORIDE 1 MILLIGRAM(S): 2 INJECTION INTRAMUSCULAR; INTRAVENOUS; SUBCUTANEOUS at 08:45

## 2020-08-29 RX ADMIN — CYCLOBENZAPRINE HYDROCHLORIDE 10 MILLIGRAM(S): 10 TABLET, FILM COATED ORAL at 05:24

## 2020-08-29 RX ADMIN — Medication 1 MILLIGRAM(S): at 12:46

## 2020-08-29 RX ADMIN — Medication 100 MILLIGRAM(S): at 21:05

## 2020-08-29 RX ADMIN — CYCLOBENZAPRINE HYDROCHLORIDE 10 MILLIGRAM(S): 10 TABLET, FILM COATED ORAL at 13:35

## 2020-08-29 RX ADMIN — HYDROMORPHONE HYDROCHLORIDE 1 MILLIGRAM(S): 2 INJECTION INTRAMUSCULAR; INTRAVENOUS; SUBCUTANEOUS at 19:54

## 2020-08-29 RX ADMIN — Medication 500 MILLIGRAM(S): at 17:02

## 2020-08-29 RX ADMIN — OXYCODONE HYDROCHLORIDE 10 MILLIGRAM(S): 5 TABLET ORAL at 21:05

## 2020-08-29 RX ADMIN — HYDROMORPHONE HYDROCHLORIDE 1 MILLIGRAM(S): 2 INJECTION INTRAMUSCULAR; INTRAVENOUS; SUBCUTANEOUS at 17:33

## 2020-08-29 RX ADMIN — HYDROMORPHONE HYDROCHLORIDE 1 MILLIGRAM(S): 2 INJECTION INTRAMUSCULAR; INTRAVENOUS; SUBCUTANEOUS at 00:35

## 2020-08-29 RX ADMIN — Medication 1 TABLET(S): at 12:46

## 2020-08-29 RX ADMIN — HYDROMORPHONE HYDROCHLORIDE 1 MILLIGRAM(S): 2 INJECTION INTRAMUSCULAR; INTRAVENOUS; SUBCUTANEOUS at 13:15

## 2020-08-29 RX ADMIN — OXYCODONE HYDROCHLORIDE 10 MILLIGRAM(S): 5 TABLET ORAL at 22:30

## 2020-08-29 RX ADMIN — HYDROMORPHONE HYDROCHLORIDE 1 MILLIGRAM(S): 2 INJECTION INTRAMUSCULAR; INTRAVENOUS; SUBCUTANEOUS at 12:45

## 2020-08-29 RX ADMIN — Medication 500 MILLIGRAM(S): at 05:24

## 2020-08-29 RX ADMIN — OXYCODONE HYDROCHLORIDE 10 MILLIGRAM(S): 5 TABLET ORAL at 15:53

## 2020-08-29 RX ADMIN — HYDROMORPHONE HYDROCHLORIDE 1 MILLIGRAM(S): 2 INJECTION INTRAMUSCULAR; INTRAVENOUS; SUBCUTANEOUS at 05:14

## 2020-08-29 RX ADMIN — Medication 100 MILLIGRAM(S): at 13:35

## 2020-08-29 RX ADMIN — OXYCODONE HYDROCHLORIDE 10 MILLIGRAM(S): 5 TABLET ORAL at 10:31

## 2020-08-29 NOTE — PROGRESS NOTE ADULT - SUBJECTIVE AND OBJECTIVE BOX
Patient is a 35y old  Male who presents with a chief complaint of low back pain (29 Aug 2020 09:05)      INTERVAL HPI/OVERNIGHT EVENTS:  pt is doing better  MEDICATIONS  (STANDING):  ascorbic acid 500 milliGRAM(s) Oral two times a day  ceFAZolin   IVPB 2000 milliGRAM(s) IV Intermittent every 8 hours  chlorhexidine 4% Liquid 1 Application(s) Topical <User Schedule>  cyclobenzaprine 10 milliGRAM(s) Oral every 8 hours  folic acid 1 milliGRAM(s) Oral daily  multivitamin 1 Tablet(s) Oral daily  polyethylene glycol 3350 17 Gram(s) Oral daily  senna 2 Tablet(s) Oral at bedtime    MEDICATIONS  (PRN):  acetaminophen   Tablet .. 650 milliGRAM(s) Oral every 6 hours PRN Temp greater or equal to 38C (100.4F)  HYDROmorphone  Injectable 1 milliGRAM(s) IV Push every 3 hours PRN Severe Pain (7 - 10)  melatonin 3 milliGRAM(s) Oral at bedtime PRN Sleep  ondansetron Injectable 4 milliGRAM(s) IV Push every 6 hours PRN Nausea  oxyCODONE    IR 10 milliGRAM(s) Oral every 4 hours PRN Moderate Pain (4 - 6)      Allergies    No Known Allergies    Intolerances        REVIEW OF SYSTEMS:  CONSTITUTIONAL: No fever, weight loss, or fatigue  EYES: No eye pain, visual disturbances, or discharge  ENMT:  No difficulty hearing, tinnitus, vertigo; No sinus or throat pain  NECK: No pain or stiffness  BREASTS: No pain, masses, or nipple discharge  RESPIRATORY: No cough, wheezing, chills or hemoptysis; No shortness of breath  CARDIOVASCULAR: No chest pain, palpitations, dizziness, or leg swelling  GASTROINTESTINAL: No abdominal or epigastric pain. No nausea, vomiting, or hematemesis; No diarrhea or constipation. No melena or hematochezia.  GENITOURINARY: No dysuria, frequency, hematuria, or incontinence  NEUROLOGICAL: No headaches, memory loss, loss of strength, numbness, or tremors  SKIN: No itching, burning, rashes, or lesions   LYMPH NODES: No enlarged glands  ENDOCRINE: No heat or cold intolerance; No hair loss  MUSCULOSKELETAL: No joint pain or swelling; No muscle, back, or extremity pain  PSYCHIATRIC: No depression, anxiety, mood swings, or difficulty sleeping  HEME/LYMPH: No easy bruising, or bleeding gums  ALLERGY AND IMMUNOLOGIC: No hives or eczema    Vital Signs Last 24 Hrs  T(C): 36.8 (29 Aug 2020 13:48), Max: 38.2 (28 Aug 2020 18:20)  T(F): 98.2 (29 Aug 2020 13:48), Max: 100.8 (28 Aug 2020 18:20)  HR: 100 (29 Aug 2020 13:48) (100 - 108)  BP: 128/85 (29 Aug 2020 13:48) (127/85 - 143/81)  BP(mean): --  RR: 17 (29 Aug 2020 13:48) (17 - 18)  SpO2: 95% (29 Aug 2020 13:48) (94% - 99%)    PHYSICAL EXAM:  GENERAL: NAD, well-groomed, well-developed  HEAD:  Atraumatic, Normocephalic  EYES: EOMI, PERRLA, conjunctiva and sclera clear  ENMT: No tonsillar erythema, exudates, or enlargement; Moist mucous membranes, Good dentition, No lesions  NECK: Supple, No JVD, Normal thyroid  NERVOUS SYSTEM:  Alert & Oriented X3, Good concentration; Motor Strength 5/5 B/L upper and lower extremities; DTRs 2+ intact and symmetric  CHEST/LUNG: Clear to percussion bilaterally; No rales, rhonchi, wheezing, or rubs  HEART: Regular rate and rhythm; No murmurs, rubs, or gallops  ABDOMEN: Soft, Nontender, Nondistended; Bowel sounds present  EXTREMITIES:  2+ Peripheral Pulses, No clubbing, cyanosis, or edema  LYMPH: No lymphadenopathy noted  SKIN: No rashes or lesions    LABS:                        11.1   9.23  )-----------( 151      ( 29 Aug 2020 10:14 )             32.5     08-29    136  |  97  |  10  ----------------------------<  106<H>  4.0   |  32<H>  |  0.77    Ca    8.0<L>      29 Aug 2020 10:14          CAPILLARY BLOOD GLUCOSE        CULTURES:    HEMOGLOBIN A1C:    CHOLESTEROL:        RADIOLOGY & ADDITIONAL TESTS:

## 2020-08-29 NOTE — PROGRESS NOTE ADULT - ASSESSMENT
A/P: 35yMale s/p T11-Pelvis PSF, L2-5 laminectomy POD 6    -FU AM labs  -Pain control  -Monitor Drain output  -PT/OT: WBAT  -DVT ppx- SCDs  -No intraop dural tear  -Appreciate excellent ICU management  -No PT per Dr. Rivera  -Trend CKs  -Plan for second procedure 9/1, Covid on 8/30.  -Will advise if plan changes

## 2020-08-29 NOTE — PROGRESS NOTE ADULT - SUBJECTIVE AND OBJECTIVE BOX
Subjective: c/o lower back pain.       MEDICATIONS  (STANDING):  ascorbic acid 500 milliGRAM(s) Oral two times a day  ceFAZolin   IVPB 2000 milliGRAM(s) IV Intermittent every 8 hours  chlorhexidine 4% Liquid 1 Application(s) Topical <User Schedule>  cyclobenzaprine 10 milliGRAM(s) Oral every 8 hours  folic acid 1 milliGRAM(s) Oral daily  multivitamin 1 Tablet(s) Oral daily  polyethylene glycol 3350 17 Gram(s) Oral daily  senna 2 Tablet(s) Oral at bedtime    MEDICATIONS  (PRN):  acetaminophen   Tablet .. 650 milliGRAM(s) Oral every 6 hours PRN Temp greater or equal to 38C (100.4F)  HYDROmorphone  Injectable 1 milliGRAM(s) IV Push every 3 hours PRN Severe Pain (7 - 10)  melatonin 3 milliGRAM(s) Oral at bedtime PRN Sleep  ondansetron Injectable 4 milliGRAM(s) IV Push every 6 hours PRN Nausea  oxyCODONE    IR 10 milliGRAM(s) Oral every 4 hours PRN Moderate Pain (4 - 6)          T(C): 37.2 (08-29-20 @ 05:43), Max: 38.2 (08-28-20 @ 16:38)  HR: 101 (08-29-20 @ 05:43) (101 - 111)  BP: 138/88 (08-29-20 @ 05:43) (118/80 - 148/81)  RR: 18 (08-29-20 @ 05:43) (18 - 18)  SpO2: 94% (08-29-20 @ 05:43) (93% - 99%)  Wt(kg): --        I&O's Detail    28 Aug 2020 07:01  -  29 Aug 2020 07:00  --------------------------------------------------------  IN:    Oral Fluid: 280 mL    Packed Red Blood Cells: 291 mL    Solution: 100 mL  Total IN: 671 mL    OUT:    Bulb: 33 mL    Bulb: 835 mL    Voided: 5650 mL  Total OUT: 6518 mL    Total NET: -5847 mL               PHYSICAL EXAM:    GENERAL: NAD  NECK: Supple, no inc in JVP  CHEST/LUNG: Clear  HEART: S1S2  ABDOMEN: Soft, Nontender, Nondistended; Bowel sounds present  EXTREMITIES:  no edema        LABS:  CBC Full  -  ( 28 Aug 2020 06:28 )  WBC Count : 8.65 K/uL  RBC Count : 2.96 M/uL  Hemoglobin : 8.8 g/dL  Hematocrit : 26.3 %  Platelet Count - Automated : 134 K/uL  Mean Cell Volume : 88.9 fl  Mean Cell Hemoglobin : 29.7 pg  Mean Cell Hemoglobin Concentration : 33.5 gm/dL  Auto Neutrophil # : x  Auto Lymphocyte # : x  Auto Monocyte # : x  Auto Eosinophil # : x  Auto Basophil # : x  Auto Neutrophil % : x  Auto Lymphocyte % : x  Auto Monocyte % : x  Auto Eosinophil % : x  Auto Basophil % : x    08-28    133<L>  |  98  |  7   ----------------------------<  118<H>  3.7   |  30  |  0.56    Ca    7.5<L>      28 Aug 2020 06:28        Impression:  CHELO -- multifactorial: pre-renal azotemia, hypotensive ischemic ATN, IV contrast, hemodynamic effect of NSAIDs, pigment nephropathy. Resolved  Traumatic rhabdo  POD 6 L2-5 laminectomy, T11-pelvis PSF      Recommendations:   Monitor off IVFs  Follow Cr, CPK in 24h

## 2020-08-29 NOTE — PROGRESS NOTE ADULT - SUBJECTIVE AND OBJECTIVE BOX
Pt S/E at bedside, no acute events overnight, pt reporting pain at the time of exam but says that generally pain is well controlled. Received two units PRBC yesterday, no new complaints today      Vital Signs Last 24 Hrs  T(C): 37.2 (29 Aug 2020 05:43), Max: 38.2 (28 Aug 2020 16:38)  T(F): 99 (29 Aug 2020 05:43), Max: 100.8 (28 Aug 2020 16:38)  HR: 101 (29 Aug 2020 05:43) (101 - 111)  BP: 138/88 (29 Aug 2020 05:43) (118/80 - 148/81)  BP(mean): --  RR: 18 (29 Aug 2020 05:43) (18 - 18)  SpO2: 94% (29 Aug 2020 05:43) (93% - 99%)    Gen: NAD  Spine:  Dressing clean dry intact  +JETHRO x2 with serosanguinous output  SILT L3-S1  SILT C5-T1  +DP Pulses  +Radial Pulse  Compartments soft  No calf TTP B/L    Motor:            ElbowFlex    WristExt   ElbowExt   FingerFlex   FingerAbd     R            5/5                5/5            5/5             5/5               5/5  L             5/5               5/5             5/5             5/5               5/5              HipFlex   KneeExt   AnkleDorsi   AnklePlantar   HaluxDorsi  R        1/5        5/5            1/5            5/5              3/5                                 L         1/5        5/5            1/5            5/5              3/5                        Sensory:            C5         C6         C7      C8       T1        (0=absent, 1=impaired, 2=normal, NT=not testable)  R        2          2              2        2         2  L         2          2              2        2         2               L2          L3         L4      L5       S1         (0=absent, 1=impaired, 2=normal, NT=not testable)  R          2          2              2        2         2  L          2          2              2        2         2

## 2020-08-30 LAB
ANION GAP SERPL CALC-SCNC: 5 MMOL/L — SIGNIFICANT CHANGE UP (ref 5–17)
ANION GAP SERPL CALC-SCNC: 8 MMOL/L — SIGNIFICANT CHANGE UP (ref 5–17)
BUN SERPL-MCNC: 11 MG/DL — SIGNIFICANT CHANGE UP (ref 7–23)
BUN SERPL-MCNC: 12 MG/DL — SIGNIFICANT CHANGE UP (ref 7–23)
CALCIUM SERPL-MCNC: 8.1 MG/DL — LOW (ref 8.5–10.1)
CALCIUM SERPL-MCNC: 8.3 MG/DL — LOW (ref 8.5–10.1)
CHLORIDE SERPL-SCNC: 95 MMOL/L — LOW (ref 96–108)
CHLORIDE SERPL-SCNC: 96 MMOL/L — SIGNIFICANT CHANGE UP (ref 96–108)
CK SERPL-CCNC: 1476 U/L — HIGH (ref 26–308)
CO2 SERPL-SCNC: 31 MMOL/L — SIGNIFICANT CHANGE UP (ref 22–31)
CO2 SERPL-SCNC: 31 MMOL/L — SIGNIFICANT CHANGE UP (ref 22–31)
CREAT SERPL-MCNC: 0.7 MG/DL — SIGNIFICANT CHANGE UP (ref 0.5–1.3)
CREAT SERPL-MCNC: 0.74 MG/DL — SIGNIFICANT CHANGE UP (ref 0.5–1.3)
GLUCOSE SERPL-MCNC: 102 MG/DL — HIGH (ref 70–99)
GLUCOSE SERPL-MCNC: 92 MG/DL — SIGNIFICANT CHANGE UP (ref 70–99)
HCT VFR BLD CALC: 35.4 % — LOW (ref 39–50)
HGB BLD-MCNC: 11.6 G/DL — LOW (ref 13–17)
MCHC RBC-ENTMCNC: 29.2 PG — SIGNIFICANT CHANGE UP (ref 27–34)
MCHC RBC-ENTMCNC: 32.8 GM/DL — SIGNIFICANT CHANGE UP (ref 32–36)
MCV RBC AUTO: 89.2 FL — SIGNIFICANT CHANGE UP (ref 80–100)
NRBC # BLD: 0 /100 WBCS — SIGNIFICANT CHANGE UP (ref 0–0)
PLATELET # BLD AUTO: 180 K/UL — SIGNIFICANT CHANGE UP (ref 150–400)
POTASSIUM SERPL-MCNC: 4.2 MMOL/L — SIGNIFICANT CHANGE UP (ref 3.5–5.3)
POTASSIUM SERPL-MCNC: 4.2 MMOL/L — SIGNIFICANT CHANGE UP (ref 3.5–5.3)
POTASSIUM SERPL-SCNC: 4.2 MMOL/L — SIGNIFICANT CHANGE UP (ref 3.5–5.3)
POTASSIUM SERPL-SCNC: 4.2 MMOL/L — SIGNIFICANT CHANGE UP (ref 3.5–5.3)
RBC # BLD: 3.97 M/UL — LOW (ref 4.2–5.8)
RBC # FLD: 14.1 % — SIGNIFICANT CHANGE UP (ref 10.3–14.5)
SARS-COV-2 RNA SPEC QL NAA+PROBE: SIGNIFICANT CHANGE UP
SODIUM SERPL-SCNC: 131 MMOL/L — LOW (ref 135–145)
SODIUM SERPL-SCNC: 135 MMOL/L — SIGNIFICANT CHANGE UP (ref 135–145)
WBC # BLD: 10.33 K/UL — SIGNIFICANT CHANGE UP (ref 3.8–10.5)
WBC # FLD AUTO: 10.33 K/UL — SIGNIFICANT CHANGE UP (ref 3.8–10.5)

## 2020-08-30 RX ADMIN — HYDROMORPHONE HYDROCHLORIDE 1 MILLIGRAM(S): 2 INJECTION INTRAMUSCULAR; INTRAVENOUS; SUBCUTANEOUS at 20:06

## 2020-08-30 RX ADMIN — HYDROMORPHONE HYDROCHLORIDE 1 MILLIGRAM(S): 2 INJECTION INTRAMUSCULAR; INTRAVENOUS; SUBCUTANEOUS at 05:36

## 2020-08-30 RX ADMIN — CHLORHEXIDINE GLUCONATE 1 APPLICATION(S): 213 SOLUTION TOPICAL at 07:12

## 2020-08-30 RX ADMIN — HYDROMORPHONE HYDROCHLORIDE 1 MILLIGRAM(S): 2 INJECTION INTRAMUSCULAR; INTRAVENOUS; SUBCUTANEOUS at 05:56

## 2020-08-30 RX ADMIN — HYDROMORPHONE HYDROCHLORIDE 1 MILLIGRAM(S): 2 INJECTION INTRAMUSCULAR; INTRAVENOUS; SUBCUTANEOUS at 16:06

## 2020-08-30 RX ADMIN — OXYCODONE HYDROCHLORIDE 10 MILLIGRAM(S): 5 TABLET ORAL at 12:30

## 2020-08-30 RX ADMIN — HYDROMORPHONE HYDROCHLORIDE 1 MILLIGRAM(S): 2 INJECTION INTRAMUSCULAR; INTRAVENOUS; SUBCUTANEOUS at 23:19

## 2020-08-30 RX ADMIN — SENNA PLUS 2 TABLET(S): 8.6 TABLET ORAL at 21:30

## 2020-08-30 RX ADMIN — OXYCODONE HYDROCHLORIDE 10 MILLIGRAM(S): 5 TABLET ORAL at 17:14

## 2020-08-30 RX ADMIN — HYDROMORPHONE HYDROCHLORIDE 1 MILLIGRAM(S): 2 INJECTION INTRAMUSCULAR; INTRAVENOUS; SUBCUTANEOUS at 23:34

## 2020-08-30 RX ADMIN — OXYCODONE HYDROCHLORIDE 10 MILLIGRAM(S): 5 TABLET ORAL at 22:30

## 2020-08-30 RX ADMIN — Medication 1 MILLIGRAM(S): at 11:48

## 2020-08-30 RX ADMIN — HYDROMORPHONE HYDROCHLORIDE 1 MILLIGRAM(S): 2 INJECTION INTRAMUSCULAR; INTRAVENOUS; SUBCUTANEOUS at 02:21

## 2020-08-30 RX ADMIN — OXYCODONE HYDROCHLORIDE 10 MILLIGRAM(S): 5 TABLET ORAL at 07:50

## 2020-08-30 RX ADMIN — Medication 100 MILLIGRAM(S): at 21:30

## 2020-08-30 RX ADMIN — Medication 500 MILLIGRAM(S): at 17:11

## 2020-08-30 RX ADMIN — OXYCODONE HYDROCHLORIDE 10 MILLIGRAM(S): 5 TABLET ORAL at 11:47

## 2020-08-30 RX ADMIN — OXYCODONE HYDROCHLORIDE 10 MILLIGRAM(S): 5 TABLET ORAL at 07:11

## 2020-08-30 RX ADMIN — Medication 100 MILLIGRAM(S): at 13:03

## 2020-08-30 RX ADMIN — HYDROMORPHONE HYDROCHLORIDE 1 MILLIGRAM(S): 2 INJECTION INTRAMUSCULAR; INTRAVENOUS; SUBCUTANEOUS at 16:21

## 2020-08-30 RX ADMIN — Medication 500 MILLIGRAM(S): at 05:36

## 2020-08-30 RX ADMIN — Medication 100 MILLIGRAM(S): at 05:37

## 2020-08-30 RX ADMIN — HYDROMORPHONE HYDROCHLORIDE 1 MILLIGRAM(S): 2 INJECTION INTRAMUSCULAR; INTRAVENOUS; SUBCUTANEOUS at 09:06

## 2020-08-30 RX ADMIN — HYDROMORPHONE HYDROCHLORIDE 1 MILLIGRAM(S): 2 INJECTION INTRAMUSCULAR; INTRAVENOUS; SUBCUTANEOUS at 02:01

## 2020-08-30 RX ADMIN — Medication 1 TABLET(S): at 11:48

## 2020-08-30 RX ADMIN — HYDROMORPHONE HYDROCHLORIDE 1 MILLIGRAM(S): 2 INJECTION INTRAMUSCULAR; INTRAVENOUS; SUBCUTANEOUS at 19:51

## 2020-08-30 RX ADMIN — CYCLOBENZAPRINE HYDROCHLORIDE 10 MILLIGRAM(S): 10 TABLET, FILM COATED ORAL at 21:30

## 2020-08-30 RX ADMIN — CYCLOBENZAPRINE HYDROCHLORIDE 10 MILLIGRAM(S): 10 TABLET, FILM COATED ORAL at 13:03

## 2020-08-30 RX ADMIN — CYCLOBENZAPRINE HYDROCHLORIDE 10 MILLIGRAM(S): 10 TABLET, FILM COATED ORAL at 05:36

## 2020-08-30 RX ADMIN — HYDROMORPHONE HYDROCHLORIDE 1 MILLIGRAM(S): 2 INJECTION INTRAMUSCULAR; INTRAVENOUS; SUBCUTANEOUS at 13:14

## 2020-08-30 RX ADMIN — OXYCODONE HYDROCHLORIDE 10 MILLIGRAM(S): 5 TABLET ORAL at 21:30

## 2020-08-30 RX ADMIN — HYDROMORPHONE HYDROCHLORIDE 1 MILLIGRAM(S): 2 INJECTION INTRAMUSCULAR; INTRAVENOUS; SUBCUTANEOUS at 12:59

## 2020-08-30 RX ADMIN — HYDROMORPHONE HYDROCHLORIDE 1 MILLIGRAM(S): 2 INJECTION INTRAMUSCULAR; INTRAVENOUS; SUBCUTANEOUS at 09:26

## 2020-08-30 NOTE — PROGRESS NOTE ADULT - SUBJECTIVE AND OBJECTIVE BOX
Patient seen and examined at bedside. Pain well controlled. Denies nausea/vomiting. Denies headache.     No Known Allergies      Vital Signs Last 24 Hrs  T(C): 36.7 (30 Aug 2020 05:16), Max: 37.3 (29 Aug 2020 17:19)  T(F): 98 (30 Aug 2020 05:16), Max: 99.2 (29 Aug 2020 17:19)  HR: 97 (30 Aug 2020 05:16) (97 - 106)  BP: 140/86 (30 Aug 2020 05:16) (128/85 - 140/86)  BP(mean): --  RR: 18 (30 Aug 2020 05:16) (17 - 18)  SpO2: 97% (30 Aug 2020 05:16) (95% - 97%)    I&O's Detail    29 Aug 2020 07:01  -  30 Aug 2020 07:00  --------------------------------------------------------  IN:    Oral Fluid: 1260 mL  Total IN: 1260 mL    OUT:    Bulb: 615 mL    Voided: 2450 mL  Total OUT: 3065 mL    Total NET: -1805 mL          PE:   Gen: NAD  Spine:   Dressing c/d/i  +JPx1 draining SS fluid  Motor:   Motor:            ElbowFlex    WristExt   ElbowExt   FingerFlex   FingerAbd     R            5/5                5/5            5/5             5/5               5/5  L             5/5               5/5             5/5             5/5               5/5              HipFlex   KneeExt   AnkleDorsi   AnklePlantar   HaluxDorsi  R        4/5        5/5            1/5            5/5              3/5                                 L         4/5        5/5            1/5            5/5              3/5     Sensory:            C5         C6         C7      C8       T1        (0=absent, 1=impaired, 2=normal, NT=not testable)  R         2            2           2        2         2  L          2            2           2        2         2               L2          L3         L4      L5       S1         (0=absent, 1=impaired, 2=normal, NT=not testable)  R         2            2            2        2        2  L          2            2           2        2         2    DP 2+  Compartments soft  No calf ttp    A/P: 35yMale s/p T11-Pelvis PSF, L2-S1 Laminectomy POD 7    -FU AM labs  -Pain control  -Monitor Drain output  -PT/OT: WBAT  -DVT ppx- SCDs  -Plan for RTOR Tuesday  -Appreciate medical clearance Patient seen and examined at bedside. Pain well controlled. Denies nausea/vomiting. Denies headache.     No Known Allergies      Vital Signs Last 24 Hrs  T(C): 36.7 (30 Aug 2020 05:16), Max: 37.3 (29 Aug 2020 17:19)  T(F): 98 (30 Aug 2020 05:16), Max: 99.2 (29 Aug 2020 17:19)  HR: 97 (30 Aug 2020 05:16) (97 - 106)  BP: 140/86 (30 Aug 2020 05:16) (128/85 - 140/86)  BP(mean): --  RR: 18 (30 Aug 2020 05:16) (17 - 18)  SpO2: 97% (30 Aug 2020 05:16) (95% - 97%)    I&O's Detail    29 Aug 2020 07:01  -  30 Aug 2020 07:00  --------------------------------------------------------  IN:    Oral Fluid: 1260 mL  Total IN: 1260 mL    OUT:    Bulb: 615 mL    Voided: 2450 mL  Total OUT: 3065 mL    Total NET: -1805 mL          PE:   Gen: NAD  Spine:   Dressing c/d/i  +JPx1 draining SS fluid  Motor:   Motor:            ElbowFlex    WristExt   ElbowExt   FingerFlex   FingerAbd     R            5/5                5/5            5/5             5/5               5/5  L             5/5               5/5             5/5             5/5               5/5              HipFlex   KneeExt   AnkleDorsi   AnklePlantar   HaluxDorsi  R        4/5        5/5            1/5            5/5              3/5                                 L         4/5        5/5            1/5            5/5              3/5     Sensory:            C5         C6         C7      C8       T1        (0=absent, 1=impaired, 2=normal, NT=not testable)  R         2            2           2        2         2  L          2            2           2        2         2               L2          L3         L4      L5       S1         (0=absent, 1=impaired, 2=normal, NT=not testable)  R         2            2            2        2        2  L          2            2           2        2         2    DP 2+  Compartments soft  No calf ttp    A/P: 35yMale s/p T11-Pelvis PSF, L2-S1 Laminectomy POD 7    -FU AM labs  -Pain control  -Monitor Drain output  -PT/OT: No PT until after OR  -DVT ppx- SCDs  -Plan for RTOR Tuesday  -Appreciate medical clearance

## 2020-08-30 NOTE — PROGRESS NOTE ADULT - SUBJECTIVE AND OBJECTIVE BOX
Patient is a 35y old  Male who presents with a chief complaint of low back pain (30 Aug 2020 08:10)      INTERVAL HPI/OVERNIGHT EVENTS:  pt is sleeping comfortably  MEDICATIONS  (STANDING):  ascorbic acid 500 milliGRAM(s) Oral two times a day  ceFAZolin   IVPB 2000 milliGRAM(s) IV Intermittent every 8 hours  chlorhexidine 4% Liquid 1 Application(s) Topical <User Schedule>  cyclobenzaprine 10 milliGRAM(s) Oral every 8 hours  folic acid 1 milliGRAM(s) Oral daily  multivitamin 1 Tablet(s) Oral daily  polyethylene glycol 3350 17 Gram(s) Oral daily  senna 2 Tablet(s) Oral at bedtime    MEDICATIONS  (PRN):  acetaminophen   Tablet .. 650 milliGRAM(s) Oral every 6 hours PRN Temp greater or equal to 38C (100.4F)  HYDROmorphone  Injectable 1 milliGRAM(s) IV Push every 3 hours PRN Severe Pain (7 - 10)  melatonin 3 milliGRAM(s) Oral at bedtime PRN Sleep  ondansetron Injectable 4 milliGRAM(s) IV Push every 6 hours PRN Nausea  oxyCODONE    IR 10 milliGRAM(s) Oral every 4 hours PRN Moderate Pain (4 - 6)      Allergies    No Known Allergies    Intolerances        REVIEW OF SYSTEMS:  CONSTITUTIONAL: No fever, weight loss, or fatigue  EYES: No eye pain, visual disturbances, or discharge  ENMT:  No difficulty hearing, tinnitus, vertigo; No sinus or throat pain  NECK: No pain or stiffness  BREASTS: No pain, masses, or nipple discharge  RESPIRATORY: No cough, wheezing, chills or hemoptysis; No shortness of breath  CARDIOVASCULAR: No chest pain, palpitations, dizziness, or leg swelling  GASTROINTESTINAL: No abdominal or epigastric pain. No nausea, vomiting, or hematemesis; No diarrhea or constipation. No melena or hematochezia.  GENITOURINARY: No dysuria, frequency, hematuria, or incontinence  NEUROLOGICAL: No headaches, memory loss, loss of strength, numbness, or tremors  SKIN: No itching, burning, rashes, or lesions   LYMPH NODES: No enlarged glands  ENDOCRINE: No heat or cold intolerance; No hair loss  MUSCULOSKELETAL: No joint pain or swelling; No muscle, back, or extremity pain  PSYCHIATRIC: No depression, anxiety, mood swings, or difficulty sleeping  HEME/LYMPH: No easy bruising, or bleeding gums  ALLERGY AND IMMUNOLOGIC: No hives or eczema    Vital Signs Last 24 Hrs  T(C): 36.7 (30 Aug 2020 05:16), Max: 37.3 (29 Aug 2020 17:19)  T(F): 98 (30 Aug 2020 05:16), Max: 99.2 (29 Aug 2020 17:19)  HR: 97 (30 Aug 2020 05:16) (97 - 106)  BP: 140/86 (30 Aug 2020 05:16) (128/85 - 140/86)  BP(mean): --  RR: 18 (30 Aug 2020 05:16) (17 - 18)  SpO2: 97% (30 Aug 2020 05:16) (95% - 97%)    PHYSICAL EXAM:  GENERAL: NAD, well-groomed, well-developed  HEAD:  Atraumatic, Normocephalic  EYES: EOMI, PERRLA, conjunctiva and sclera clear  ENMT: No tonsillar erythema, exudates, or enlargement; Moist mucous membranes, Good dentition, No lesions  NECK: Supple, No JVD, Normal thyroid  NERVOUS SYSTEM:  Alert & Oriented X3, Good concentration; Motor Strength 5/5 B/L upper and lower extremities; DTRs 2+ intact and symmetric  CHEST/LUNG: Clear to percussion bilaterally; No rales, rhonchi, wheezing, or rubs  HEART: Regular rate and rhythm; No murmurs, rubs, or gallops  ABDOMEN: Soft, Nontender, Nondistended; Bowel sounds present  EXTREMITIES:  2+ Peripheral Pulses, No clubbing, cyanosis, or edema  LYMPH: No lymphadenopathy noted  SKIN: No rashes or lesions    LABS:                        11.6   10.33 )-----------( 180      ( 30 Aug 2020 09:03 )             35.4     08-30    131<L>  |  95<L>  |  11  ----------------------------<  102<H>  4.2   |  31  |  0.70    Ca    8.3<L>      30 Aug 2020 09:03          CAPILLARY BLOOD GLUCOSE        CULTURES:    HEMOGLOBIN A1C:    CHOLESTEROL:        RADIOLOGY & ADDITIONAL TESTS:

## 2020-08-30 NOTE — PROGRESS NOTE ADULT - PROBLEM SELECTOR PROBLEM 3
CHELO (acute kidney injury)

## 2020-08-30 NOTE — PROGRESS NOTE ADULT - PROBLEM SELECTOR PLAN 3
renal follow up
renal follow up
renal consult called
renal follow up

## 2020-08-30 NOTE — PROGRESS NOTE ADULT - SUBJECTIVE AND OBJECTIVE BOX
Subjective: pain control improved.       MEDICATIONS  (STANDING):  ascorbic acid 500 milliGRAM(s) Oral two times a day  ceFAZolin   IVPB 2000 milliGRAM(s) IV Intermittent every 8 hours  chlorhexidine 4% Liquid 1 Application(s) Topical <User Schedule>  cyclobenzaprine 10 milliGRAM(s) Oral every 8 hours  folic acid 1 milliGRAM(s) Oral daily  multivitamin 1 Tablet(s) Oral daily  polyethylene glycol 3350 17 Gram(s) Oral daily  senna 2 Tablet(s) Oral at bedtime    MEDICATIONS  (PRN):  acetaminophen   Tablet .. 650 milliGRAM(s) Oral every 6 hours PRN Temp greater or equal to 38C (100.4F)  HYDROmorphone  Injectable 1 milliGRAM(s) IV Push every 3 hours PRN Severe Pain (7 - 10)  melatonin 3 milliGRAM(s) Oral at bedtime PRN Sleep  ondansetron Injectable 4 milliGRAM(s) IV Push every 6 hours PRN Nausea  oxyCODONE    IR 10 milliGRAM(s) Oral every 4 hours PRN Moderate Pain (4 - 6)          T(C): 36.7 (08-30-20 @ 05:16), Max: 37.3 (08-29-20 @ 17:19)  HR: 97 (08-30-20 @ 05:16) (97 - 106)  BP: 140/86 (08-30-20 @ 05:16) (128/85 - 140/86)  RR: 18 (08-30-20 @ 05:16) (17 - 18)  SpO2: 97% (08-30-20 @ 05:16) (95% - 97%)  Wt(kg): --        I&O's Detail    29 Aug 2020 07:01  -  30 Aug 2020 07:00  --------------------------------------------------------  IN:    Oral Fluid: 1260 mL  Total IN: 1260 mL    OUT:    Bulb: 715 mL    Voided: 2450 mL  Total OUT: 3165 mL    Total NET: -1905 mL               PHYSICAL EXAM:    GENERAL: NAD  NECK: Supple, no inc in JVP  CHEST/LUNG: Clear  HEART: S1S2  ABDOMEN: Soft, Nontender, Nondistended; Bowel sounds present  EXTREMITIES:  no edema        LABS:  CBC Full  -  ( 29 Aug 2020 10:14 )  WBC Count : 9.23 K/uL  RBC Count : 3.75 M/uL  Hemoglobin : 11.1 g/dL  Hematocrit : 32.5 %  Platelet Count - Automated : 151 K/uL  Mean Cell Volume : 86.7 fl  Mean Cell Hemoglobin : 29.6 pg  Mean Cell Hemoglobin Concentration : 34.2 gm/dL  Auto Neutrophil # : x  Auto Lymphocyte # : x  Auto Monocyte # : x  Auto Eosinophil # : x  Auto Basophil # : x  Auto Neutrophil % : x  Auto Lymphocyte % : x  Auto Monocyte % : x  Auto Eosinophil % : x  Auto Basophil % : x    08-30    135  |  96  |  12  ----------------------------<  92  4.2   |  31  |  0.74    Ca    8.1<L>      30 Aug 2020 06:56        Impression:  CHELO -- multifactorial: pre-renal azotemia, hypotensive ischemic ATN, IV contrast, hemodynamic effect of NSAIDs, pigment nephropathy. Resolved  Traumatic rhabdo. Resolving  POD7 L2-5 laminectomy, T11-pelvis PSF      Recommendations:   Monitor off IVFs  Follow Cr, CPK in 24h

## 2020-08-31 ENCOUNTER — TRANSCRIPTION ENCOUNTER (OUTPATIENT)
Age: 35
End: 2020-08-31

## 2020-08-31 LAB
ALBUMIN SERPL ELPH-MCNC: 1.8 G/DL — LOW (ref 3.3–5)
ALBUMIN SERPL ELPH-MCNC: 2 G/DL — LOW (ref 3.3–5)
ALP SERPL-CCNC: 159 U/L — HIGH (ref 40–120)
ALT FLD-CCNC: 85 U/L — HIGH (ref 12–78)
ANION GAP SERPL CALC-SCNC: 7 MMOL/L — SIGNIFICANT CHANGE UP (ref 5–17)
AST SERPL-CCNC: 59 U/L — HIGH (ref 15–37)
BILIRUB SERPL-MCNC: 0.6 MG/DL — SIGNIFICANT CHANGE UP (ref 0.2–1.2)
BUN SERPL-MCNC: 16 MG/DL — SIGNIFICANT CHANGE UP (ref 7–23)
CALCIUM SERPL-MCNC: 8.1 MG/DL — LOW (ref 8.5–10.1)
CHLORIDE SERPL-SCNC: 96 MMOL/L — SIGNIFICANT CHANGE UP (ref 96–108)
CK SERPL-CCNC: 874 U/L — HIGH (ref 26–308)
CO2 SERPL-SCNC: 29 MMOL/L — SIGNIFICANT CHANGE UP (ref 22–31)
CREAT SERPL-MCNC: 0.68 MG/DL — SIGNIFICANT CHANGE UP (ref 0.5–1.3)
GLUCOSE SERPL-MCNC: 100 MG/DL — HIGH (ref 70–99)
HCT VFR BLD CALC: 33.6 % — LOW (ref 39–50)
HGB BLD-MCNC: 11.1 G/DL — LOW (ref 13–17)
MCHC RBC-ENTMCNC: 29 PG — SIGNIFICANT CHANGE UP (ref 27–34)
MCHC RBC-ENTMCNC: 33 GM/DL — SIGNIFICANT CHANGE UP (ref 32–36)
MCV RBC AUTO: 87.7 FL — SIGNIFICANT CHANGE UP (ref 80–100)
NRBC # BLD: 0 /100 WBCS — SIGNIFICANT CHANGE UP (ref 0–0)
PLATELET # BLD AUTO: 215 K/UL — SIGNIFICANT CHANGE UP (ref 150–400)
POTASSIUM SERPL-MCNC: 3.8 MMOL/L — SIGNIFICANT CHANGE UP (ref 3.5–5.3)
POTASSIUM SERPL-SCNC: 3.8 MMOL/L — SIGNIFICANT CHANGE UP (ref 3.5–5.3)
PROT SERPL-MCNC: 6.1 GM/DL — SIGNIFICANT CHANGE UP (ref 6–8.3)
RBC # BLD: 3.83 M/UL — LOW (ref 4.2–5.8)
RBC # FLD: 13.9 % — SIGNIFICANT CHANGE UP (ref 10.3–14.5)
SODIUM SERPL-SCNC: 132 MMOL/L — LOW (ref 135–145)
WBC # BLD: 9.19 K/UL — SIGNIFICANT CHANGE UP (ref 3.8–10.5)
WBC # FLD AUTO: 9.19 K/UL — SIGNIFICANT CHANGE UP (ref 3.8–10.5)

## 2020-08-31 RX ORDER — KETOROLAC TROMETHAMINE 30 MG/ML
15 SYRINGE (ML) INJECTION ONCE
Refills: 0 | Status: DISCONTINUED | OUTPATIENT
Start: 2020-08-31 | End: 2020-08-31

## 2020-08-31 RX ORDER — SODIUM CHLORIDE 9 MG/ML
1000 INJECTION, SOLUTION INTRAVENOUS
Refills: 0 | Status: DISCONTINUED | OUTPATIENT
Start: 2020-08-31 | End: 2020-08-31

## 2020-08-31 RX ORDER — SODIUM CHLORIDE 9 MG/ML
1000 INJECTION INTRAMUSCULAR; INTRAVENOUS; SUBCUTANEOUS
Refills: 0 | Status: DISCONTINUED | OUTPATIENT
Start: 2020-08-31 | End: 2020-09-01

## 2020-08-31 RX ADMIN — Medication 15 MILLIGRAM(S): at 01:13

## 2020-08-31 RX ADMIN — CYCLOBENZAPRINE HYDROCHLORIDE 10 MILLIGRAM(S): 10 TABLET, FILM COATED ORAL at 22:03

## 2020-08-31 RX ADMIN — OXYCODONE HYDROCHLORIDE 10 MILLIGRAM(S): 5 TABLET ORAL at 20:02

## 2020-08-31 RX ADMIN — HYDROMORPHONE HYDROCHLORIDE 1 MILLIGRAM(S): 2 INJECTION INTRAMUSCULAR; INTRAVENOUS; SUBCUTANEOUS at 02:44

## 2020-08-31 RX ADMIN — Medication 1 TABLET(S): at 12:48

## 2020-08-31 RX ADMIN — HYDROMORPHONE HYDROCHLORIDE 1 MILLIGRAM(S): 2 INJECTION INTRAMUSCULAR; INTRAVENOUS; SUBCUTANEOUS at 06:51

## 2020-08-31 RX ADMIN — HYDROMORPHONE HYDROCHLORIDE 1 MILLIGRAM(S): 2 INJECTION INTRAMUSCULAR; INTRAVENOUS; SUBCUTANEOUS at 02:29

## 2020-08-31 RX ADMIN — Medication 500 MILLIGRAM(S): at 18:42

## 2020-08-31 RX ADMIN — HYDROMORPHONE HYDROCHLORIDE 1 MILLIGRAM(S): 2 INJECTION INTRAMUSCULAR; INTRAVENOUS; SUBCUTANEOUS at 06:36

## 2020-08-31 RX ADMIN — Medication 1 MILLIGRAM(S): at 12:48

## 2020-08-31 RX ADMIN — HYDROMORPHONE HYDROCHLORIDE 1 MILLIGRAM(S): 2 INJECTION INTRAMUSCULAR; INTRAVENOUS; SUBCUTANEOUS at 22:31

## 2020-08-31 RX ADMIN — Medication 100 MILLIGRAM(S): at 22:04

## 2020-08-31 RX ADMIN — SENNA PLUS 2 TABLET(S): 8.6 TABLET ORAL at 22:04

## 2020-08-31 RX ADMIN — OXYCODONE HYDROCHLORIDE 10 MILLIGRAM(S): 5 TABLET ORAL at 05:54

## 2020-08-31 RX ADMIN — Medication 100 MILLIGRAM(S): at 06:00

## 2020-08-31 RX ADMIN — OXYCODONE HYDROCHLORIDE 10 MILLIGRAM(S): 5 TABLET ORAL at 10:42

## 2020-08-31 RX ADMIN — OXYCODONE HYDROCHLORIDE 10 MILLIGRAM(S): 5 TABLET ORAL at 11:42

## 2020-08-31 RX ADMIN — HYDROMORPHONE HYDROCHLORIDE 1 MILLIGRAM(S): 2 INJECTION INTRAMUSCULAR; INTRAVENOUS; SUBCUTANEOUS at 13:20

## 2020-08-31 RX ADMIN — OXYCODONE HYDROCHLORIDE 10 MILLIGRAM(S): 5 TABLET ORAL at 21:00

## 2020-08-31 RX ADMIN — POLYETHYLENE GLYCOL 3350 17 GRAM(S): 17 POWDER, FOR SOLUTION ORAL at 12:48

## 2020-08-31 RX ADMIN — OXYCODONE HYDROCHLORIDE 10 MILLIGRAM(S): 5 TABLET ORAL at 15:43

## 2020-08-31 RX ADMIN — Medication 15 MILLIGRAM(S): at 00:58

## 2020-08-31 RX ADMIN — OXYCODONE HYDROCHLORIDE 10 MILLIGRAM(S): 5 TABLET ORAL at 01:32

## 2020-08-31 RX ADMIN — CYCLOBENZAPRINE HYDROCHLORIDE 10 MILLIGRAM(S): 10 TABLET, FILM COATED ORAL at 14:27

## 2020-08-31 RX ADMIN — OXYCODONE HYDROCHLORIDE 10 MILLIGRAM(S): 5 TABLET ORAL at 16:43

## 2020-08-31 RX ADMIN — HYDROMORPHONE HYDROCHLORIDE 1 MILLIGRAM(S): 2 INJECTION INTRAMUSCULAR; INTRAVENOUS; SUBCUTANEOUS at 13:07

## 2020-08-31 RX ADMIN — HYDROMORPHONE HYDROCHLORIDE 1 MILLIGRAM(S): 2 INJECTION INTRAMUSCULAR; INTRAVENOUS; SUBCUTANEOUS at 22:01

## 2020-08-31 RX ADMIN — CHLORHEXIDINE GLUCONATE 1 APPLICATION(S): 213 SOLUTION TOPICAL at 08:15

## 2020-08-31 RX ADMIN — HYDROMORPHONE HYDROCHLORIDE 1 MILLIGRAM(S): 2 INJECTION INTRAMUSCULAR; INTRAVENOUS; SUBCUTANEOUS at 09:28

## 2020-08-31 RX ADMIN — Medication 500 MILLIGRAM(S): at 05:54

## 2020-08-31 RX ADMIN — HYDROMORPHONE HYDROCHLORIDE 1 MILLIGRAM(S): 2 INJECTION INTRAMUSCULAR; INTRAVENOUS; SUBCUTANEOUS at 09:42

## 2020-08-31 RX ADMIN — HYDROMORPHONE HYDROCHLORIDE 1 MILLIGRAM(S): 2 INJECTION INTRAMUSCULAR; INTRAVENOUS; SUBCUTANEOUS at 18:43

## 2020-08-31 RX ADMIN — CYCLOBENZAPRINE HYDROCHLORIDE 10 MILLIGRAM(S): 10 TABLET, FILM COATED ORAL at 05:54

## 2020-08-31 RX ADMIN — OXYCODONE HYDROCHLORIDE 10 MILLIGRAM(S): 5 TABLET ORAL at 02:32

## 2020-08-31 RX ADMIN — Medication 100 MILLIGRAM(S): at 14:27

## 2020-08-31 RX ADMIN — SODIUM CHLORIDE 75 MILLILITER(S): 9 INJECTION INTRAMUSCULAR; INTRAVENOUS; SUBCUTANEOUS at 14:34

## 2020-08-31 RX ADMIN — HYDROMORPHONE HYDROCHLORIDE 1 MILLIGRAM(S): 2 INJECTION INTRAMUSCULAR; INTRAVENOUS; SUBCUTANEOUS at 18:55

## 2020-08-31 RX ADMIN — OXYCODONE HYDROCHLORIDE 10 MILLIGRAM(S): 5 TABLET ORAL at 06:54

## 2020-08-31 NOTE — PROGRESS NOTE ADULT - SUBJECTIVE AND OBJECTIVE BOX
[facility] NEPHROLOGY SERVICES, [facility]  NEPHROLOGY AND HYPERTENSION  [address]  [address]  [place] [address]  [phone]    MD DARRYL RICH MD ANDREY GONCHARUK, MD MADHU KORRAPATI, MD YELENA ROSENBERG, MD BINNY KOSHY, MD CHRISTOPHER CAPUTO, MD SIGRID MCINTYRE MD          Patient events noted    MEDICATIONS  (STANDING):  ascorbic acid 500 milliGRAM(s) Oral two times a day  ceFAZolin   IVPB 2000 milliGRAM(s) IV Intermittent every 8 hours  chlorhexidine 4% Liquid 1 Application(s) Topical <User Schedule>  cyclobenzaprine 10 milliGRAM(s) Oral every 8 hours  folic acid 1 milliGRAM(s) Oral daily  multivitamin 1 Tablet(s) Oral daily  polyethylene glycol 3350 17 Gram(s) Oral daily  senna 2 Tablet(s) Oral at bedtime  sodium chloride 0.9%. 1000 milliLiter(s) (75 mL/Hr) IV Continuous <Continuous>    MEDICATIONS  (PRN):  acetaminophen   Tablet .. 650 milliGRAM(s) Oral every 6 hours PRN Temp greater or equal to 38C (100.4F)  HYDROmorphone  Injectable 1 milliGRAM(s) IV Push every 3 hours PRN Severe Pain (7 - 10)  melatonin 3 milliGRAM(s) Oral at bedtime PRN Sleep  ondansetron Injectable 4 milliGRAM(s) IV Push every 6 hours PRN Nausea  oxyCODONE    IR 10 milliGRAM(s) Oral every 4 hours PRN Moderate Pain (4 - 6)      08-30-20 @ 07:01  -  08-31-20 @ 07:00  --------------------------------------------------------  IN: 250 mL / OUT: 1575 mL / NET: -1325 mL    08-31-20 @ 07:01  -  08-31-20 @ 19:44  --------------------------------------------------------  IN: 0 mL / OUT: 1655 mL / NET: -1655 mL      PHYSICAL EXAM:      T(C): 36.7 (08-31-20 @ 17:10), Max: 36.7 (08-30-20 @ 23:55)  HR: 104 (08-31-20 @ 17:10) (94 - 107)  BP: 139/80 (08-31-20 @ 17:10) (112/58 - 139/80)  RR: 18 (08-31-20 @ 17:10) (17 - 18)  SpO2: 100% (08-31-20 @ 17:10) (96% - 100%)  Wt(kg): --  Lungs clear  Heart S1S2  Abd soft NT ND  Extremities:   tr edema                                    11.1   9.19  )-----------( 215      ( 31 Aug 2020 06:21 )             33.6     08-31    132<L>  |  96  |  16  ----------------------------<  100<H>  3.8   |  29  |  0.68    Ca    8.1<L>      31 Aug 2020 06:21    TPro  6.1  /  Alb  1.8<L>  /  TBili  0.6  /  DBili  x   /  AST  59<H>  /  ALT  85<H>  /  AlkPhos  159<H>  08-31      LIVER FUNCTIONS - ( 31 Aug 2020 06:21 )  Alb: 1.8 g/dL / Pro: 6.1 gm/dL / ALK PHOS: 159 U/L / ALT: 85 U/L / AST: 59 U/L / GGT: x           Creatinine Trend: 0.68<--, 0.70<--, 0.74<--, 0.77<--, 0.56<--, 0.59<--  Creatine Kinase, Serum in AM (08.31.20 @ 06:21)    Creatine Kinase, Serum: 874: TYPE:(C=Critical, N=Notification, A=Abnormal) N  TESTS: CPK  DATE/TIME CALLED: 08/31/20 07:12  CALLED TO: DOROTHY GERMAIN RN  READ BACK (2 Patient Identifiers)(Y/N): Y  READ BACK VALUES (Y/N): Y  CALLED BY: CC U/L      Impression:  CHELO -- multifactorial: pre-renal azotemia, hypotensive ischemic ATN, IV contrast, hemodynamic effect of NSAIDs, pigment nephropathy. Resolved  Traumatic rhabdo. Resolving  POD7 L2-5 laminectomy, T11-pelvis PSF      Recommendations:   IVF to NS for 24 hrs;           Beny Peters MD

## 2020-08-31 NOTE — PROGRESS NOTE ADULT - SUBJECTIVE AND OBJECTIVE BOX
Patient is a 35y old  Male who presents with a chief complaint of low back pain (31 Aug 2020 05:45)      INTERVAL HPI/OVERNIGHT EVENTS:  pt with no new complaint  MEDICATIONS  (STANDING):  ascorbic acid 500 milliGRAM(s) Oral two times a day  ceFAZolin   IVPB 2000 milliGRAM(s) IV Intermittent every 8 hours  chlorhexidine 4% Liquid 1 Application(s) Topical <User Schedule>  cyclobenzaprine 10 milliGRAM(s) Oral every 8 hours  folic acid 1 milliGRAM(s) Oral daily  lactated ringers. 1000 milliLiter(s) (75 mL/Hr) IV Continuous <Continuous>  multivitamin 1 Tablet(s) Oral daily  polyethylene glycol 3350 17 Gram(s) Oral daily  senna 2 Tablet(s) Oral at bedtime    MEDICATIONS  (PRN):  acetaminophen   Tablet .. 650 milliGRAM(s) Oral every 6 hours PRN Temp greater or equal to 38C (100.4F)  HYDROmorphone  Injectable 1 milliGRAM(s) IV Push every 3 hours PRN Severe Pain (7 - 10)  melatonin 3 milliGRAM(s) Oral at bedtime PRN Sleep  ondansetron Injectable 4 milliGRAM(s) IV Push every 6 hours PRN Nausea  oxyCODONE    IR 10 milliGRAM(s) Oral every 4 hours PRN Moderate Pain (4 - 6)      Allergies    No Known Allergies    Intolerances        REVIEW OF SYSTEMS:  CONSTITUTIONAL: No fever, weight loss, or fatigue  EYES: No eye pain, visual disturbances, or discharge  ENMT:  No difficulty hearing, tinnitus, vertigo; No sinus or throat pain  NECK: No pain or stiffness  BREASTS: No pain, masses, or nipple discharge  RESPIRATORY: No cough, wheezing, chills or hemoptysis; No shortness of breath  CARDIOVASCULAR: No chest pain, palpitations, dizziness, or leg swelling  GASTROINTESTINAL: No abdominal or epigastric pain. No nausea, vomiting, or hematemesis; No diarrhea or constipation. No melena or hematochezia.  GENITOURINARY: No dysuria, frequency, hematuria, or incontinence  NEUROLOGICAL: No headaches, memory loss, loss of strength, numbness, or tremors  SKIN: No itching, burning, rashes, or lesions   LYMPH NODES: No enlarged glands  ENDOCRINE: No heat or cold intolerance; No hair loss  MUSCULOSKELETAL: No joint pain or swelling; No muscle, back, or extremity pain  PSYCHIATRIC: No depression, anxiety, mood swings, or difficulty sleeping  HEME/LYMPH: No easy bruising, or bleeding gums  ALLERGY AND IMMUNOLOGIC: No hives or eczema    Vital Signs Last 24 Hrs  T(C): 36.6 (31 Aug 2020 04:55), Max: 37.3 (30 Aug 2020 17:56)  T(F): 97.9 (31 Aug 2020 04:55), Max: 99.1 (30 Aug 2020 17:56)  HR: 94 (31 Aug 2020 04:55) (94 - 107)  BP: 123/77 (31 Aug 2020 04:55) (112/58 - 138/84)  BP(mean): --  RR: 18 (31 Aug 2020 04:55) (16 - 18)  SpO2: 96% (31 Aug 2020 04:55) (95% - 100%)    PHYSICAL EXAM:  GENERAL: NAD, well-groomed, well-developed  HEAD:  Atraumatic, Normocephalic  EYES: EOMI, PERRLA, conjunctiva and sclera clear  ENMT: No tonsillar erythema, exudates, or enlargement; Moist mucous membranes, Good dentition, No lesions  NECK: Supple, No JVD, Normal thyroid  NERVOUS SYSTEM:  Alert & Oriented X3, Good concentration; Motor Strength 5/5 B/L upper and lower extremities; DTRs 2+ intact and symmetric  CHEST/LUNG: Clear to percussion bilaterally; No rales, rhonchi, wheezing, or rubs  HEART: Regular rate and rhythm; No murmurs, rubs, or gallops  ABDOMEN: Soft, Nontender, Nondistended; Bowel sounds present  EXTREMITIES:  2+ Peripheral Pulses, No clubbing, cyanosis, or edema  LYMPH: No lymphadenopathy noted  SKIN: No rashes or lesions    LABS:                        11.1   9.19  )-----------( 215      ( 31 Aug 2020 06:21 )             33.6     08-31    132<L>  |  96  |  16  ----------------------------<  100<H>  3.8   |  29  |  0.68    Ca    8.1<L>      31 Aug 2020 06:21    TPro  6.1  /  Alb  1.8<L>  /  TBili  0.6  /  DBili  x   /  AST  59<H>  /  ALT  85<H>  /  AlkPhos  159<H>  08-31        CAPILLARY BLOOD GLUCOSE        CULTURES:    HEMOGLOBIN A1C:    CHOLESTEROL:        RADIOLOGY & ADDITIONAL TESTS:

## 2020-08-31 NOTE — CHART NOTE - NSCHARTNOTEFT_GEN_A_CORE
Nutrition follow-up note-    Nutrition Consult received for assessment (for nutrition optimization 2/2 pt going for back surgery 9/1). Met w/pt at bedside, pt reports consistent very good appetite and PO intake. Per recall pt usually consumes ~100% meals. Pt denies N/V/D/C or difficulty chewing/swallowing. PTA pt lives w/mother who does food-shopping and cooking. Per chart D/C plan is to SNF- short term. Discussed importance of ongoing good PO intake to maintain optimal nutritional status. Discussed importance of protein intake w/all meals/snacks. Pt receptive to offer of PO supplement. Diet education on healthy eating habits/portion control for gradual weight loss- deferred to until after surgery. Pt made aware RDN remains available.     Factors impacting intake: [x ] none [ ] nausea  [ ] vomiting [ ] diarrhea [ ] constipation  [ ]chewing problems [ ] swallowing issues  [ ] other:     Diet Prescription: Diet, Regular (08-24-20 @ 10:54)  Diet, NPO after Midnight:      NPO Start Date: 31-Aug-2020,   NPO Start Time: 23:59  Except Medications (08-31-20 @ 05:55)    Intake: 100%.    Current Weight: (8/31) 127.5 kg, (8/24) 128.2 kg, (8/22 admission) 108.9 kg? UBW reported previously- 240# (109 kg).  % Weight Change: 0.7 kg (0.5%) loss x 1 week (since 8/24).     Physical appearance: No edema documented. BMI= 35.1 (Obese Class II).     Pertinent Medications: MEDICATIONS  (STANDING):  ascorbic acid 500 milliGRAM(s) Oral two times a day  ceFAZolin   IVPB 2000 milliGRAM(s) IV Intermittent every 8 hours  chlorhexidine 4% Liquid 1 Application(s) Topical <User Schedule>  cyclobenzaprine 10 milliGRAM(s) Oral every 8 hours  folic acid 1 milliGRAM(s) Oral daily  lactated ringers. 1000 milliLiter(s) (75 mL/Hr) IV Continuous <Continuous>  multivitamin 1 Tablet(s) Oral daily  polyethylene glycol 3350 17 Gram(s) Oral daily  senna 2 Tablet(s) Oral at bedtime    MEDICATIONS  (PRN):  acetaminophen   Tablet .. 650 milliGRAM(s) Oral every 6 hours PRN Temp greater or equal to 38C (100.4F)  HYDROmorphone  Injectable 1 milliGRAM(s) IV Push every 3 hours PRN Severe Pain (7 - 10)  melatonin 3 milliGRAM(s) Oral at bedtime PRN Sleep  ondansetron Injectable 4 milliGRAM(s) IV Push every 6 hours PRN Nausea  oxyCODONE    IR 10 milliGRAM(s) Oral every 4 hours PRN Moderate Pain (4 - 6)    Pertinent Labs: 08-31 Na132 mmol/L<L> Glu 100 mg/dL<H> K+ 3.8 mmol/L Cr  0.68 mg/dL BUN 16 mg/dL 08-27 Phos 2.0 mg/dL<L> 08-31 Alb 1.8 g/dL<L>      Skin: intact.    Estimated Needs:   [x ] no change since previous assessment (08/24)  [ ] recalculated:     Previous Nutrition Diagnosis:   Nutrition diagnosis no... No active nutrition diagnosis identified at this time. Patient meeting estimated nutrient needs.    Nutrition Diagnosis is [ ] ongoing  [ ] resolved [x ] not applicable     New Nutrition Diagnosis: (08/31) Overweight/Obesity  Related to excessive energy intake and lack of physical activity  Signs/Symptoms BMI= 35.1 (Obesity class II).  Goals Pt will verbalize need for diet/lifestyle changes to attain healthy weight loss of 1-2#/week (post D/C from SNF).      Interventions:   Recommend:  [x] Continue current diet.  [ ] Change Diet To:  [x ] Nutrition Supplement- Ensure Enlive x 1/day (provides 350 kcal, 20 g protein).  [ ] Nutrition Support  [ ] Other:     Monitoring and Evaluation:   [x ] PO intake [ x ] Tolerance to diet prescription [ x ] weights [ x ] labs[ x ] follow up per protocol  [ ] other:

## 2020-08-31 NOTE — PROGRESS NOTE ADULT - SUBJECTIVE AND OBJECTIVE BOX
Patient seen and examined at bedside. Pain well controlled. Denies nausea/vomiting. Denies headache.     No Known Allergies      Vital Signs Last 24 Hrs  T(C): 36.6 (31 Aug 2020 04:55), Max: 37.3 (30 Aug 2020 17:56)  T(F): 97.9 (31 Aug 2020 04:55), Max: 99.1 (30 Aug 2020 17:56)  HR: 94 (31 Aug 2020 04:55) (94 - 107)  BP: 123/77 (31 Aug 2020 04:55) (112/58 - 138/84)  BP(mean): --  RR: 18 (31 Aug 2020 04:55) (16 - 18)  SpO2: 96% (31 Aug 2020 04:55) (95% - 100%)    PE:   Gen: NAD  Spine:   Dressing c/d/i  +JPx1 draining SS fluid  Motor:   Motor:            ElbowFlex    WristExt   ElbowExt   FingerFlex   FingerAbd     R            5/5                5/5            5/5             5/5               5/5  L             5/5               5/5             5/5             5/5               5/5              HipFlex   KneeExt   AnkleDorsi   AnklePlantar   HaluxDorsi  R        4/5        5/5            2/5            5/5              3/5                                 L         4/5        5/5            2/5            5/5              3/5     Sensory:            C5         C6         C7      C8       T1        (0=absent, 1=impaired, 2=normal, NT=not testable)  R         2            2           2        2         2  L          2            2           2        2         2               L2          L3         L4      L5       S1         (0=absent, 1=impaired, 2=normal, NT=not testable)  R         2            2            2        2        2  L          2            2           2        2         2    DP 2+  Compartments soft  No calf ttp    A/P: 35yMale s/p T11-Pelvis PSF, L2-S1 Laminectomy POD 8    -Dressing changed today  -FU AM labs  -Pain control  -Monitor Drain output  -PT/OT: No PT until after OR  -DVT ppx- SCDs  -Plan for XLIF L2-4 tomorrow  -NPO after midnight with IVF  -Medically cleared for OR

## 2020-09-01 ENCOUNTER — TRANSCRIPTION ENCOUNTER (OUTPATIENT)
Age: 35
End: 2020-09-01

## 2020-09-01 LAB
ANION GAP SERPL CALC-SCNC: 6 MMOL/L — SIGNIFICANT CHANGE UP (ref 5–17)
ANION GAP SERPL CALC-SCNC: 7 MMOL/L — SIGNIFICANT CHANGE UP (ref 5–17)
APTT BLD: 29.2 SEC — SIGNIFICANT CHANGE UP (ref 27.5–35.5)
BUN SERPL-MCNC: 12 MG/DL — SIGNIFICANT CHANGE UP (ref 7–23)
BUN SERPL-MCNC: 15 MG/DL — SIGNIFICANT CHANGE UP (ref 7–23)
CALCIUM SERPL-MCNC: 8 MG/DL — LOW (ref 8.5–10.1)
CALCIUM SERPL-MCNC: 8.1 MG/DL — LOW (ref 8.5–10.1)
CHLORIDE SERPL-SCNC: 101 MMOL/L — SIGNIFICANT CHANGE UP (ref 96–108)
CHLORIDE SERPL-SCNC: 98 MMOL/L — SIGNIFICANT CHANGE UP (ref 96–108)
CO2 SERPL-SCNC: 25 MMOL/L — SIGNIFICANT CHANGE UP (ref 22–31)
CO2 SERPL-SCNC: 26 MMOL/L — SIGNIFICANT CHANGE UP (ref 22–31)
CREAT SERPL-MCNC: 0.63 MG/DL — SIGNIFICANT CHANGE UP (ref 0.5–1.3)
CREAT SERPL-MCNC: 0.69 MG/DL — SIGNIFICANT CHANGE UP (ref 0.5–1.3)
GLUCOSE SERPL-MCNC: 127 MG/DL — HIGH (ref 70–99)
GLUCOSE SERPL-MCNC: 140 MG/DL — HIGH (ref 70–99)
HCT VFR BLD CALC: 33.4 % — LOW (ref 39–50)
HGB BLD-MCNC: 11 G/DL — LOW (ref 13–17)
INR BLD: 1.25 RATIO — HIGH (ref 0.88–1.16)
MCHC RBC-ENTMCNC: 29.1 PG — SIGNIFICANT CHANGE UP (ref 27–34)
MCHC RBC-ENTMCNC: 32.9 GM/DL — SIGNIFICANT CHANGE UP (ref 32–36)
MCV RBC AUTO: 88.4 FL — SIGNIFICANT CHANGE UP (ref 80–100)
NRBC # BLD: 0 /100 WBCS — SIGNIFICANT CHANGE UP (ref 0–0)
PCP SPEC-MCNC: SIGNIFICANT CHANGE UP
PLATELET # BLD AUTO: 261 K/UL — SIGNIFICANT CHANGE UP (ref 150–400)
POTASSIUM SERPL-MCNC: 4.1 MMOL/L — SIGNIFICANT CHANGE UP (ref 3.5–5.3)
POTASSIUM SERPL-MCNC: 4.9 MMOL/L — SIGNIFICANT CHANGE UP (ref 3.5–5.3)
POTASSIUM SERPL-SCNC: 4.1 MMOL/L — SIGNIFICANT CHANGE UP (ref 3.5–5.3)
POTASSIUM SERPL-SCNC: 4.9 MMOL/L — SIGNIFICANT CHANGE UP (ref 3.5–5.3)
PROTHROM AB SERPL-ACNC: 14.3 SEC — HIGH (ref 10.6–13.6)
RBC # BLD: 3.78 M/UL — LOW (ref 4.2–5.8)
RBC # FLD: 13.9 % — SIGNIFICANT CHANGE UP (ref 10.3–14.5)
SODIUM SERPL-SCNC: 130 MMOL/L — LOW (ref 135–145)
SODIUM SERPL-SCNC: 133 MMOL/L — LOW (ref 135–145)
WBC # BLD: 11.02 K/UL — HIGH (ref 3.8–10.5)
WBC # FLD AUTO: 11.02 K/UL — HIGH (ref 3.8–10.5)

## 2020-09-01 RX ORDER — OXYCODONE HYDROCHLORIDE 5 MG/1
5 TABLET ORAL EVERY 4 HOURS
Refills: 0 | Status: DISCONTINUED | OUTPATIENT
Start: 2020-09-01 | End: 2020-09-05

## 2020-09-01 RX ORDER — FAMOTIDINE 10 MG/ML
20 INJECTION INTRAVENOUS EVERY 12 HOURS
Refills: 0 | Status: DISCONTINUED | OUTPATIENT
Start: 2020-09-01 | End: 2020-09-05

## 2020-09-01 RX ORDER — ACETAMINOPHEN 500 MG
650 TABLET ORAL EVERY 6 HOURS
Refills: 0 | Status: DISCONTINUED | OUTPATIENT
Start: 2020-09-01 | End: 2020-09-05

## 2020-09-01 RX ORDER — HYDROMORPHONE HYDROCHLORIDE 2 MG/ML
1 INJECTION INTRAMUSCULAR; INTRAVENOUS; SUBCUTANEOUS
Refills: 0 | Status: DISCONTINUED | OUTPATIENT
Start: 2020-09-01 | End: 2020-09-01

## 2020-09-01 RX ORDER — ONDANSETRON 8 MG/1
4 TABLET, FILM COATED ORAL ONCE
Refills: 0 | Status: DISCONTINUED | OUTPATIENT
Start: 2020-09-01 | End: 2020-09-01

## 2020-09-01 RX ORDER — OXYCODONE HYDROCHLORIDE 5 MG/1
10 TABLET ORAL EVERY 4 HOURS
Refills: 0 | Status: DISCONTINUED | OUTPATIENT
Start: 2020-09-01 | End: 2020-09-05

## 2020-09-01 RX ORDER — ONDANSETRON 8 MG/1
4 TABLET, FILM COATED ORAL EVERY 6 HOURS
Refills: 0 | Status: DISCONTINUED | OUTPATIENT
Start: 2020-09-01 | End: 2020-09-05

## 2020-09-01 RX ORDER — CEFAZOLIN SODIUM 1 G
2000 VIAL (EA) INJECTION EVERY 8 HOURS
Refills: 0 | Status: DISCONTINUED | OUTPATIENT
Start: 2020-09-01 | End: 2020-09-05

## 2020-09-01 RX ORDER — SODIUM CHLORIDE 9 MG/ML
1000 INJECTION, SOLUTION INTRAVENOUS
Refills: 0 | Status: DISCONTINUED | OUTPATIENT
Start: 2020-09-01 | End: 2020-09-02

## 2020-09-01 RX ORDER — ACETAMINOPHEN 500 MG
1000 TABLET ORAL EVERY 8 HOURS
Refills: 0 | Status: COMPLETED | OUTPATIENT
Start: 2020-09-01 | End: 2020-09-01

## 2020-09-01 RX ORDER — HYDROMORPHONE HYDROCHLORIDE 2 MG/ML
0.5 INJECTION INTRAMUSCULAR; INTRAVENOUS; SUBCUTANEOUS EVERY 4 HOURS
Refills: 0 | Status: DISCONTINUED | OUTPATIENT
Start: 2020-09-01 | End: 2020-09-03

## 2020-09-01 RX ORDER — SODIUM CHLORIDE 9 MG/ML
1000 INJECTION, SOLUTION INTRAVENOUS
Refills: 0 | Status: DISCONTINUED | OUTPATIENT
Start: 2020-09-01 | End: 2020-09-01

## 2020-09-01 RX ORDER — DIAZEPAM 5 MG
5 TABLET ORAL EVERY 8 HOURS
Refills: 0 | Status: DISCONTINUED | OUTPATIENT
Start: 2020-09-01 | End: 2020-09-05

## 2020-09-01 RX ORDER — ACETAMINOPHEN 500 MG
1000 TABLET ORAL EVERY 8 HOURS
Refills: 0 | Status: DISCONTINUED | OUTPATIENT
Start: 2020-09-01 | End: 2020-09-05

## 2020-09-01 RX ORDER — MAGNESIUM HYDROXIDE 400 MG/1
30 TABLET, CHEWABLE ORAL EVERY 12 HOURS
Refills: 0 | Status: DISCONTINUED | OUTPATIENT
Start: 2020-09-01 | End: 2020-09-05

## 2020-09-01 RX ORDER — SENNA PLUS 8.6 MG/1
2 TABLET ORAL AT BEDTIME
Refills: 0 | Status: DISCONTINUED | OUTPATIENT
Start: 2020-09-01 | End: 2020-09-05

## 2020-09-01 RX ORDER — HYDROMORPHONE HYDROCHLORIDE 2 MG/ML
0.5 INJECTION INTRAMUSCULAR; INTRAVENOUS; SUBCUTANEOUS
Refills: 0 | Status: DISCONTINUED | OUTPATIENT
Start: 2020-09-01 | End: 2020-09-01

## 2020-09-01 RX ADMIN — OXYCODONE HYDROCHLORIDE 10 MILLIGRAM(S): 5 TABLET ORAL at 17:50

## 2020-09-01 RX ADMIN — Medication 5 MILLIGRAM(S): at 20:24

## 2020-09-01 RX ADMIN — HYDROMORPHONE HYDROCHLORIDE 0.5 MILLIGRAM(S): 2 INJECTION INTRAMUSCULAR; INTRAVENOUS; SUBCUTANEOUS at 19:01

## 2020-09-01 RX ADMIN — Medication 1000 MILLIGRAM(S): at 23:06

## 2020-09-01 RX ADMIN — OXYCODONE HYDROCHLORIDE 10 MILLIGRAM(S): 5 TABLET ORAL at 21:26

## 2020-09-01 RX ADMIN — OXYCODONE HYDROCHLORIDE 10 MILLIGRAM(S): 5 TABLET ORAL at 20:26

## 2020-09-01 RX ADMIN — OXYCODONE HYDROCHLORIDE 10 MILLIGRAM(S): 5 TABLET ORAL at 16:52

## 2020-09-01 RX ADMIN — HYDROMORPHONE HYDROCHLORIDE 1 MILLIGRAM(S): 2 INJECTION INTRAMUSCULAR; INTRAVENOUS; SUBCUTANEOUS at 03:36

## 2020-09-01 RX ADMIN — Medication 400 MILLIGRAM(S): at 22:51

## 2020-09-01 RX ADMIN — HYDROMORPHONE HYDROCHLORIDE 0.5 MILLIGRAM(S): 2 INJECTION INTRAMUSCULAR; INTRAVENOUS; SUBCUTANEOUS at 18:42

## 2020-09-01 RX ADMIN — SODIUM CHLORIDE 75 MILLILITER(S): 9 INJECTION, SOLUTION INTRAVENOUS at 18:42

## 2020-09-01 RX ADMIN — OXYCODONE HYDROCHLORIDE 10 MILLIGRAM(S): 5 TABLET ORAL at 01:33

## 2020-09-01 RX ADMIN — HYDROMORPHONE HYDROCHLORIDE 1 MILLIGRAM(S): 2 INJECTION INTRAMUSCULAR; INTRAVENOUS; SUBCUTANEOUS at 03:06

## 2020-09-01 RX ADMIN — OXYCODONE HYDROCHLORIDE 10 MILLIGRAM(S): 5 TABLET ORAL at 00:33

## 2020-09-01 RX ADMIN — CHLORHEXIDINE GLUCONATE 1 APPLICATION(S): 213 SOLUTION TOPICAL at 08:19

## 2020-09-01 RX ADMIN — HYDROMORPHONE HYDROCHLORIDE 1 MILLIGRAM(S): 2 INJECTION INTRAMUSCULAR; INTRAVENOUS; SUBCUTANEOUS at 07:18

## 2020-09-01 RX ADMIN — HYDROMORPHONE HYDROCHLORIDE 1 MILLIGRAM(S): 2 INJECTION INTRAMUSCULAR; INTRAVENOUS; SUBCUTANEOUS at 14:10

## 2020-09-01 RX ADMIN — Medication 100 MILLIGRAM(S): at 20:23

## 2020-09-01 RX ADMIN — SENNA PLUS 2 TABLET(S): 8.6 TABLET ORAL at 20:24

## 2020-09-01 RX ADMIN — HYDROMORPHONE HYDROCHLORIDE 1 MILLIGRAM(S): 2 INJECTION INTRAMUSCULAR; INTRAVENOUS; SUBCUTANEOUS at 06:48

## 2020-09-01 NOTE — DISCHARGE NOTE PROVIDER - CARE PROVIDER_API CALL
Gabrielle Villegas  ORTHOPAEDIC SURGERY  [address], [address]  [place] [address]  Phone: [phone]  Fax: [phone]  Follow Up Time:     Dave Rivera  ORTHOPAEDIC SURGERY  [address] [place] [address]  Phone: [phone]  Fax: [phone]  Follow Up Time: Gabrielle Villegas  ORTHOPAEDIC SURGERY  [address], [address]  [place] [address]  Phone: [phone]  Fax: [phone]  Follow Up Time:     Dave Rivera  ORTHOPAEDIC SURGERY  [address] [place] [address]  Phone: [phone]  Fax: [phone]  Follow Up Time:     Kristin Alicia  [facility] - RADIOLOGY  [address] [place] [address]  Phone: [phone]  Fax: [phone]  Follow Up Time:

## 2020-09-01 NOTE — DISCHARGE NOTE PROVIDER - CARE PROVIDERS DIRECT ADDRESSES
,DirectAddress_Unknown,DirectAddress_Unknown ,DirectAddress_Unknown,DirectAddress_Unknown,[email or web address]

## 2020-09-01 NOTE — DISCHARGE NOTE PROVIDER - NSDCCPCAREPLAN_GEN_ALL_CORE_FT
PRINCIPAL DISCHARGE DIAGNOSIS  Diagnosis: Closed fracture of lumbar vertebra, unspecified fracture morphology, unspecified lumbar vertebral level, initial encounter  Assessment and Plan of Treatment: 2 surgeries performed for fractured back after fall down stairs.  - Walk plenty with TLSO  - No lifting over 5 lbs  - Use TLSO brace as needed for comfort AND while ambulating  - Keep bandage on, clean and dry. Change to a new one if gets wet or dirty. If you had surgery, sponge bathe until cleared by your surgeon to shower.  - Leave drain in until Monday 9/7/2020  - Keflex 500mg TID until the drain is pulled on Monday   - No driving on pain meds  - See your surgeon in about 10 days in the office. Call to schedule.      SECONDARY DISCHARGE DIAGNOSES  Diagnosis: Back pain  Assessment and Plan of Treatment: As above.    Diagnosis: Leukocytosis  Assessment and Plan of Treatment: Stable.  Continue to monitor for any fevers.    Diagnosis: CHELO (acute kidney injury)  Assessment and Plan of Treatment: CHELO -- multifactorial: pre-renal azotemia, hypotensive ischemic ATN, IV contrast, hemodynamic effect of NSAIDs, pigment nephropathy.   Now resolved.  Stay hydrated. PRINCIPAL DISCHARGE DIAGNOSIS  Diagnosis: Closed fracture of lumbar vertebra, unspecified fracture morphology, unspecified lumbar vertebral level, initial encounter  Assessment and Plan of Treatment: 2 surgeries performed for fractured back after fall down stairs.  IVC filter placed 8/26/2020 for PE prophylaxis - will need to follow up with interventional radiology (Mayur) for eventual removal.  - Walk plenty with TLSO  - No lifting over 5 lbs  - Use TLSO brace as needed for comfort AND while ambulating  - Keep bandage on, clean and dry. Change to a new one if gets wet or dirty. If you had surgery, sponge bathe until cleared by your surgeon to shower.  - Leave drain in until Monday 9/7/2020  - Keflex 500mg TID until the drain is pulled on Monday   - No driving on pain meds  - See your surgeon in about 10 days in the office. Call to schedule.      SECONDARY DISCHARGE DIAGNOSES  Diagnosis: Back pain  Assessment and Plan of Treatment: As above.    Diagnosis: Leukocytosis  Assessment and Plan of Treatment: Stable.  Continue to monitor for any fevers.    Diagnosis: CHELO (acute kidney injury)  Assessment and Plan of Treatment: CHELO -- multifactorial: pre-renal azotemia, hypotensive ischemic ATN, IV contrast, hemodynamic effect of NSAIDs, pigment nephropathy.   Now resolved.  Stay hydrated.

## 2020-09-01 NOTE — DISCHARGE NOTE PROVIDER - NSDCFUADDINST_GEN_ALL_CORE_FT
1.	Pain Control  2.	Walking with full weight bearing as tolerated, with assistive devices (walker/Cane as Needed)  3.	PT as needed  4.	Follow up with Dr. Villegas/Miguel  as outpatient in 7-10 days after discharge from the hospital or rehab. Call office for appointment.  5.	Keep dressing clean and dry. Remove on Post Op Day three.  6.	No baths/hot tubs or soaks. 1.	Pain Control  2.	Walking with full weight bearing as tolerated, with assistive devices (walker/Cane as Needed)  3.	PT as needed  4.	Follow up with Dr. Villegas/Miguel  as outpatient in 7-10 days after discharge from the hospital or rehab. Call office for appointment.  5.	Keep dressing clean and dry. Remove on Post Op Day three.  6.	No baths/hot tubs or soaks.  7.         Please take Keflex 500mg 3 times a day until the drain is pulled on Monday     Drain Management  - Empty the JETHRO drain when full or at the end of the day   - Drain can be pulled on Monday 9/7/20. The drain is sewn in, so place cut the one suture that is holding the drain in place. Once the suture is cut, open the drain cap so that the drain is open. After both previous steps are done then the drain can be pulled. Pull lightly until drain is completely out. Place a 4x4 with Tegaderm over the drain wound.

## 2020-09-01 NOTE — DISCHARGE NOTE PROVIDER - PROVIDER TOKENS
PROVIDER:[TOKEN:[[id number]]],PROVIDER:[TOKEN:[[id number]]] PROVIDER:[TOKEN:[[id number]]],PROVIDER:[TOKEN:[[id number]]],PROVIDER:[TOKEN:[[id number]]]

## 2020-09-01 NOTE — PROGRESS NOTE ADULT - ASSESSMENT
A/P: 35yMale s/p L5-S1 ALIF, L3-4 Lateral interbody fusion POD 0    -FU labs  -Pain control  -Monitor Drain output  -PT/OT: WBAT  -DVT ppx- SCDs  -No intraop dural tear  -Appreciate medical management  -PT/OT tomorrow  -Plan for Acute rehab   -Will advise if plan changes

## 2020-09-01 NOTE — PROGRESS NOTE ADULT - SUBJECTIVE AND OBJECTIVE BOX
Post operative check. POD 0. Pt seen and examined at bedside. Pt doing well, no acute complaints at this time. Pt states he is still having weakness following surgery. Denies fever, chills, NV.    Vital Signs Last 24 Hrs  T(C): 36.7 (01 Sep 2020 05:10), Max: 37.1 (01 Sep 2020 00:20)  T(F): 98.1 (01 Sep 2020 05:10), Max: 98.8 (01 Sep 2020 00:20)  HR: 108 (01 Sep 2020 05:10) (104 - 108)  BP: 134/87 (01 Sep 2020 05:10) (130/79 - 139/80)  BP(mean): --  RR: 18 (01 Sep 2020 05:10) (18 - 20)  SpO2: 97% (01 Sep 2020 05:10) (97% - 100%)    Gen: NAD  Spine:  Dressing clean dry intact  +JETHRO  SILT L3-S1  SILT C5-T1  +DP Pulses  +Radial Pulse  Compartments soft  No calf TTP B/L    Motor:            ElbowFlex    WristExt   ElbowExt   FingerFlex   FingerAbd     R            5/5                5/5            5/5             5/5               5/5  L             5/5               5/5             5/5             5/5               5/5              HipFlex   KneeExt   AnkleDorsi   AnklePlantar   HaluxDorsi  R        4/5        5/5            1/5            5/5              3-/5                                 L         4/5        5/5            1/5            5/5              3-/5                        Sensory:            C5         C6         C7      C8       T1        (0=absent, 1=impaired, 2=normal, NT=not testable)  R        2          2              2        2         2  L         2          2              2        2         2               L2          L3         L4      L5       S1         (0=absent, 1=impaired, 2=normal, NT=not testable)  R          2          2              2        2         2  L          2          2              2        2         2

## 2020-09-01 NOTE — PROGRESS NOTE ADULT - SUBJECTIVE AND OBJECTIVE BOX
Patient seen and examined at bedside. Pain well controlled. Denies nausea/vomiting. Denies headache.     No Known Allergies      Vital Signs Last 24 Hrs  T(C): 36.7 (01 Sep 2020 05:10), Max: 37.1 (01 Sep 2020 00:20)  T(F): 98.1 (01 Sep 2020 05:10), Max: 98.8 (01 Sep 2020 00:20)  HR: 108 (01 Sep 2020 05:10) (104 - 108)  BP: 134/87 (01 Sep 2020 05:10) (122/76 - 139/80)  BP(mean): --  RR: 18 (01 Sep 2020 05:10) (17 - 20)  SpO2: 97% (01 Sep 2020 05:10) (97% - 100%)    PE:   Gen: NAD  Spine:   Dressing c/d/i  +JPx1 draining SS fluid  Motor:   Motor:            ElbowFlex    WristExt   ElbowExt   FingerFlex   FingerAbd     R            5/5                5/5            5/5             5/5               5/5  L             5/5               5/5             5/5             5/5               5/5              HipFlex   KneeExt   AnkleDorsi   AnklePlantar   HaluxDorsi  R        5/5        5/5            2/5            5/5              2/5                                 L         5/5        5/5            2/5            5/5              2/5     Sensory:            C5         C6         C7      C8       T1        (0=absent, 1=impaired, 2=normal, NT=not testable)  R         2            2           2        2         2  L          2            2           2        2         2               L2          L3         L4      L5       S1         (0=absent, 1=impaired, 2=normal, NT=not testable)  R         2            2            2        2        2  L          2            2           2        2         2    DP 2+  Compartments soft  No calf ttp    A/P: 35yMale s/p T11-Pelvis PSF, L2-S1 Laminectomy POD 9 going to OR today for L2-4 XLIF    -FU AM labs  -Pain control  -Monitor Drain output  -PT/OT: No PT until after OR  -DVT ppx- SCDs/IVCF  -Plan for XLIF L2-4 today  -NPO/IVF  -Medically cleared for OR

## 2020-09-01 NOTE — PROGRESS NOTE ADULT - SUBJECTIVE AND OBJECTIVE BOX
Patient is a 35y old  Male who presents with a chief complaint of low back pain (01 Sep 2020 05:57)      INTERVAL HPI/OVERNIGHT EVENTS:  pt has surgery this morning  MEDICATIONS  (STANDING):    MEDICATIONS  (PRN):      Allergies    No Known Allergies    Intolerances        REVIEW OF SYSTEMS:  CONSTITUTIONAL: No fever, weight loss, or fatigue  EYES: No eye pain, visual disturbances, or discharge  ENMT:  No difficulty hearing, tinnitus, vertigo; No sinus or throat pain  NECK: No pain or stiffness  BREASTS: No pain, masses, or nipple discharge  RESPIRATORY: No cough, wheezing, chills or hemoptysis; No shortness of breath  CARDIOVASCULAR: No chest pain, palpitations, dizziness, or leg swelling  GASTROINTESTINAL: No abdominal or epigastric pain. No nausea, vomiting, or hematemesis; No diarrhea or constipation. No melena or hematochezia.  GENITOURINARY: No dysuria, frequency, hematuria, or incontinence  NEUROLOGICAL: No headaches, memory loss, loss of strength, numbness, or tremors  SKIN: No itching, burning, rashes, or lesions   LYMPH NODES: No enlarged glands  ENDOCRINE: No heat or cold intolerance; No hair loss  MUSCULOSKELETAL: No joint pain or swelling; No muscle, back, or extremity pain  PSYCHIATRIC: No depression, anxiety, mood swings, or difficulty sleeping  HEME/LYMPH: No easy bruising, or bleeding gums  ALLERGY AND IMMUNOLOGIC: No hives or eczema    Vital Signs Last 24 Hrs  T(C): 36.7 (01 Sep 2020 05:10), Max: 37.1 (01 Sep 2020 00:20)  T(F): 98.1 (01 Sep 2020 05:10), Max: 98.8 (01 Sep 2020 00:20)  HR: 108 (01 Sep 2020 05:10) (104 - 108)  BP: 134/87 (01 Sep 2020 05:10) (122/76 - 139/80)  BP(mean): --  RR: 18 (01 Sep 2020 05:10) (17 - 20)  SpO2: 97% (01 Sep 2020 05:10) (97% - 100%)    PHYSICAL EXAM:  GENERAL: NAD, well-groomed, well-developed  HEAD:  Atraumatic, Normocephalic  EYES: EOMI, PERRLA, conjunctiva and sclera clear  ENMT: No tonsillar erythema, exudates, or enlargement; Moist mucous membranes, Good dentition, No lesions  NECK: Supple, No JVD, Normal thyroid  NERVOUS SYSTEM:  Alert & Oriented X3, Good concentration; Motor Strength 5/5 B/L upper and lower extremities; DTRs 2+ intact and symmetric  CHEST/LUNG: Clear to percussion bilaterally; No rales, rhonchi, wheezing, or rubs  HEART: Regular rate and rhythm; No murmurs, rubs, or gallops  ABDOMEN: Soft, Nontender, Nondistended; Bowel sounds present  EXTREMITIES:  2+ Peripheral Pulses, No clubbing, cyanosis, or edema  LYMPH: No lymphadenopathy noted  SKIN: No rashes or lesions    LABS:                        11.0   11.02 )-----------( 261      ( 01 Sep 2020 04:29 )             33.4     09-01    130<L>  |  98  |  15  ----------------------------<  127<H>  4.1   |  26  |  0.69    Ca    8.0<L>      01 Sep 2020 04:29    TPro  6.1  /  Alb  1.8<L>  /  TBili  0.6  /  DBili  x   /  AST  59<H>  /  ALT  85<H>  /  AlkPhos  159<H>  08-31    PT/INR - ( 01 Sep 2020 04:29 )   PT: 14.3 sec;   INR: 1.25 ratio         PTT - ( 01 Sep 2020 04:29 )  PTT:29.2 sec    CAPILLARY BLOOD GLUCOSE        CULTURES:    HEMOGLOBIN A1C:    CHOLESTEROL:        RADIOLOGY & ADDITIONAL TESTS:

## 2020-09-01 NOTE — DISCHARGE NOTE PROVIDER - NSDCMRMEDTOKEN_GEN_ALL_CORE_FT
Bilateral Ankle-Foot Orthoses: Difficulty ambulating   R26.2  TLSO Brace: Spine surgery and difficulty walking    R26.2  M43.26 Bilateral Ankle-Foot Orthoses: Difficulty ambulating   R26.2  diazePAM 5 mg oral tablet: 1 tab(s) orally every 8 hours  famotidine 20 mg oral tablet: 1 tab(s) orally every 12 hours, As needed, Dyspepsia  HYDROmorphone 2 mg oral tablet: 1 tab(s) orally every 4 hours, As Needed  Keflex 500 mg oral capsule: 1 cap(s) orally 3 times a day     Please take until the drain is out.  magnesium hydroxide 8% oral suspension: 30 milliliter(s) orally every 12 hours, As needed, Constipation  oxyCODONE 10 mg oral tablet, extended release: 1 tab(s) orally every 12 hours  senna oral tablet: 2 tab(s) orally once a day (at bedtime)  TLSO Brace: Spine surgery and difficulty walking    R26.2  M43.26

## 2020-09-01 NOTE — BRIEF OPERATIVE NOTE - OPERATION/FINDINGS
L5-S1 anterior lumbar interbody fusion  L3-4 lateral interbody fusion
Traumatic spondyloptosis of L5-S1  Bilateral pedicle fractures at L1-L5 with anterior displacement of entire lumbar spine  Epidural hematoma

## 2020-09-01 NOTE — BRIEF OPERATIVE NOTE - NSICDXBRIEFPREOP_GEN_ALL_CORE_FT
PRE-OP DIAGNOSIS:  Spondylolisthesis of lumbosacral region 24-Aug-2020 02:07:59 Traumatic, L5-S1 spondyloptosis Umer Jones
PRE-OP DIAGNOSIS:  Spondylolisthesis of lumbosacral region 24-Aug-2020 02:07:59 Traumatic, L5-S1 spondyloptosis Umer Jones

## 2020-09-01 NOTE — BRIEF OPERATIVE NOTE - NSICDXBRIEFPROCEDURE_GEN_ALL_CORE_FT
PROCEDURES:  DONNA, 1 level 01-Sep-2020 13:26:02  Dave Morales
PROCEDURES:  Fusion of 7 to 12 spinal segments by posterior approach 24-Aug-2020 02:06:56 [id number] Umer Jones  Lumbar laminectomy 24-Aug-2020 02:06:15 L2-S1 Umer Jones

## 2020-09-01 NOTE — DISCHARGE NOTE PROVIDER - INSTRUCTIONS
no alcohol with pain medication  encourage fluids daily Diet as tolerated  no alcohol with pain medication  encourage fluids daily

## 2020-09-01 NOTE — DISCHARGE NOTE PROVIDER - HOSPITAL COURSE
35 year old male describes fall down stairs and with injury to lower back pain 24 hours later. Xray shows CT scan revealed closed lumbar fracture with OR pending in am. Patient denies numbness and tingling in lower extremities, voiding .    Noted to have unstable lumbosacral fractures, complete lumbosacral dissociation requiring urgent OR on 8/24/2020.    	OP NOTE: Traumatic spondyloptosis of L5-S1    	Bilateral pedicle fractures at L1-L5 with anterior displacement of entire lumbar spine    	Epidural hematoma    Noted to also have CHELO, traumatic rhabdomyolysis - improving/resolved.    CHELO - renal consulted, s/p IVF with resolution.    IVC filter was placed by IR on 8/26/2020 for PE prophylaxis.    Pt then returned to OR on 9/1/2020.    	OP NOTE: L5-S1 anterior lumbar interbody fusion    	L3-4 lateral interbody fusion    PT recs acute rehab.    Pain control.    TOV was performed on 9/4/2020.    Cleared by ortho and attending [place]ong on 9/4/2020.

## 2020-09-02 LAB
ANION GAP SERPL CALC-SCNC: 7 MMOL/L — SIGNIFICANT CHANGE UP (ref 5–17)
BUN SERPL-MCNC: 11 MG/DL — SIGNIFICANT CHANGE UP (ref 7–23)
CALCIUM SERPL-MCNC: 7.9 MG/DL — LOW (ref 8.5–10.1)
CHLORIDE SERPL-SCNC: 101 MMOL/L — SIGNIFICANT CHANGE UP (ref 96–108)
CO2 SERPL-SCNC: 26 MMOL/L — SIGNIFICANT CHANGE UP (ref 22–31)
CREAT SERPL-MCNC: 0.54 MG/DL — SIGNIFICANT CHANGE UP (ref 0.5–1.3)
GLUCOSE SERPL-MCNC: 121 MG/DL — HIGH (ref 70–99)
HCT VFR BLD CALC: 30.6 % — LOW (ref 39–50)
HGB BLD-MCNC: 9.9 G/DL — LOW (ref 13–17)
MCHC RBC-ENTMCNC: 28.7 PG — SIGNIFICANT CHANGE UP (ref 27–34)
MCHC RBC-ENTMCNC: 32.4 GM/DL — SIGNIFICANT CHANGE UP (ref 32–36)
MCV RBC AUTO: 88.7 FL — SIGNIFICANT CHANGE UP (ref 80–100)
NRBC # BLD: 0 /100 WBCS — SIGNIFICANT CHANGE UP (ref 0–0)
PLATELET # BLD AUTO: 323 K/UL — SIGNIFICANT CHANGE UP (ref 150–400)
POTASSIUM SERPL-MCNC: 4.2 MMOL/L — SIGNIFICANT CHANGE UP (ref 3.5–5.3)
POTASSIUM SERPL-SCNC: 4.2 MMOL/L — SIGNIFICANT CHANGE UP (ref 3.5–5.3)
RBC # BLD: 3.45 M/UL — LOW (ref 4.2–5.8)
RBC # FLD: 13.5 % — SIGNIFICANT CHANGE UP (ref 10.3–14.5)
SODIUM SERPL-SCNC: 134 MMOL/L — LOW (ref 135–145)
WBC # BLD: 15.36 K/UL — HIGH (ref 3.8–10.5)
WBC # FLD AUTO: 15.36 K/UL — HIGH (ref 3.8–10.5)

## 2020-09-02 RX ORDER — OXYCODONE HYDROCHLORIDE 5 MG/1
10 TABLET ORAL EVERY 12 HOURS
Refills: 0 | Status: DISCONTINUED | OUTPATIENT
Start: 2020-09-02 | End: 2020-09-05

## 2020-09-02 RX ORDER — SODIUM CHLORIDE 9 MG/ML
1000 INJECTION, SOLUTION INTRAVENOUS
Refills: 0 | Status: DISCONTINUED | OUTPATIENT
Start: 2020-09-02 | End: 2020-09-04

## 2020-09-02 RX ORDER — SODIUM CHLORIDE 9 MG/ML
500 INJECTION, SOLUTION INTRAVENOUS ONCE
Refills: 0 | Status: COMPLETED | OUTPATIENT
Start: 2020-09-02 | End: 2020-09-02

## 2020-09-02 RX ADMIN — OXYCODONE HYDROCHLORIDE 10 MILLIGRAM(S): 5 TABLET ORAL at 11:54

## 2020-09-02 RX ADMIN — Medication 100 MILLIGRAM(S): at 12:02

## 2020-09-02 RX ADMIN — HYDROMORPHONE HYDROCHLORIDE 0.5 MILLIGRAM(S): 2 INJECTION INTRAMUSCULAR; INTRAVENOUS; SUBCUTANEOUS at 03:13

## 2020-09-02 RX ADMIN — OXYCODONE HYDROCHLORIDE 10 MILLIGRAM(S): 5 TABLET ORAL at 21:20

## 2020-09-02 RX ADMIN — Medication 5 MILLIGRAM(S): at 21:51

## 2020-09-02 RX ADMIN — HYDROMORPHONE HYDROCHLORIDE 0.5 MILLIGRAM(S): 2 INJECTION INTRAMUSCULAR; INTRAVENOUS; SUBCUTANEOUS at 12:49

## 2020-09-02 RX ADMIN — HYDROMORPHONE HYDROCHLORIDE 0.5 MILLIGRAM(S): 2 INJECTION INTRAMUSCULAR; INTRAVENOUS; SUBCUTANEOUS at 08:26

## 2020-09-02 RX ADMIN — Medication 5 MILLIGRAM(S): at 14:33

## 2020-09-02 RX ADMIN — SENNA PLUS 2 TABLET(S): 8.6 TABLET ORAL at 21:52

## 2020-09-02 RX ADMIN — HYDROMORPHONE HYDROCHLORIDE 0.5 MILLIGRAM(S): 2 INJECTION INTRAMUSCULAR; INTRAVENOUS; SUBCUTANEOUS at 16:15

## 2020-09-02 RX ADMIN — OXYCODONE HYDROCHLORIDE 10 MILLIGRAM(S): 5 TABLET ORAL at 18:05

## 2020-09-02 RX ADMIN — OXYCODONE HYDROCHLORIDE 10 MILLIGRAM(S): 5 TABLET ORAL at 21:52

## 2020-09-02 RX ADMIN — OXYCODONE HYDROCHLORIDE 10 MILLIGRAM(S): 5 TABLET ORAL at 06:08

## 2020-09-02 RX ADMIN — SODIUM CHLORIDE 75 MILLILITER(S): 9 INJECTION, SOLUTION INTRAVENOUS at 06:08

## 2020-09-02 RX ADMIN — OXYCODONE HYDROCHLORIDE 10 MILLIGRAM(S): 5 TABLET ORAL at 19:05

## 2020-09-02 RX ADMIN — Medication 100 MILLIGRAM(S): at 20:26

## 2020-09-02 RX ADMIN — HYDROMORPHONE HYDROCHLORIDE 0.5 MILLIGRAM(S): 2 INJECTION INTRAMUSCULAR; INTRAVENOUS; SUBCUTANEOUS at 08:41

## 2020-09-02 RX ADMIN — SODIUM CHLORIDE 1000 MILLILITER(S): 9 INJECTION, SOLUTION INTRAVENOUS at 06:08

## 2020-09-02 RX ADMIN — HYDROMORPHONE HYDROCHLORIDE 0.5 MILLIGRAM(S): 2 INJECTION INTRAMUSCULAR; INTRAVENOUS; SUBCUTANEOUS at 20:03

## 2020-09-02 RX ADMIN — HYDROMORPHONE HYDROCHLORIDE 0.5 MILLIGRAM(S): 2 INJECTION INTRAMUSCULAR; INTRAVENOUS; SUBCUTANEOUS at 03:28

## 2020-09-02 RX ADMIN — OXYCODONE HYDROCHLORIDE 10 MILLIGRAM(S): 5 TABLET ORAL at 07:08

## 2020-09-02 RX ADMIN — HYDROMORPHONE HYDROCHLORIDE 0.5 MILLIGRAM(S): 2 INJECTION INTRAMUSCULAR; INTRAVENOUS; SUBCUTANEOUS at 16:00

## 2020-09-02 RX ADMIN — SODIUM CHLORIDE 75 MILLILITER(S): 9 INJECTION, SOLUTION INTRAVENOUS at 20:28

## 2020-09-02 RX ADMIN — HYDROMORPHONE HYDROCHLORIDE 0.5 MILLIGRAM(S): 2 INJECTION INTRAMUSCULAR; INTRAVENOUS; SUBCUTANEOUS at 12:34

## 2020-09-02 RX ADMIN — Medication 5 MILLIGRAM(S): at 06:08

## 2020-09-02 RX ADMIN — HYDROMORPHONE HYDROCHLORIDE 0.5 MILLIGRAM(S): 2 INJECTION INTRAMUSCULAR; INTRAVENOUS; SUBCUTANEOUS at 20:18

## 2020-09-02 RX ADMIN — Medication 100 MILLIGRAM(S): at 03:13

## 2020-09-02 RX ADMIN — OXYCODONE HYDROCHLORIDE 10 MILLIGRAM(S): 5 TABLET ORAL at 10:54

## 2020-09-02 NOTE — OCCUPATIONAL THERAPY INITIAL EVALUATION ADULT - TRANSFER SAFETY CONCERNS NOTED: BED/CHAIR, REHAB EVAL
decreased step length/squat pivot/stepping too close to front of assistive device/decreased weight-shifting ability/stand pivot

## 2020-09-02 NOTE — OCCUPATIONAL THERAPY INITIAL EVALUATION ADULT - PRECAUTIONS/LIMITATIONS, REHAB EVAL
fall precautions/No bending , twisting or lifting , TLSO and bilateral AFO needs to bed  donned during  transfers. Pt has no control of BLE due to poor muscle strength./spinal precautions

## 2020-09-02 NOTE — OCCUPATIONAL THERAPY INITIAL EVALUATION ADULT - ADDITIONAL COMMENTS
Prior to admission, pt was functioning in her roles, self sufficient & ambulating independently without any assistive devices. Presently pt needs assistance with  functional mobility upper, / lower  body self care tasks due to pain, weakness, stiffness , decreased ROM, coordination and balance. Pt is right hand dominant and wears glasses for reading. Pt is unable to sit without support due to poor trunk control. Prior to admission, pt was functioning in her roles, self sufficient & ambulating independently without any assistive devices. Presently, pt needs assistance with  functional mobility upper / lower body self care tasks due to pain, weakness, stiffness, decreased ROM, coordination and balance. Pt is right hand dominant and wears glasses for reading. Pt is unable to sit without support due to poor trunk control.

## 2020-09-02 NOTE — OCCUPATIONAL THERAPY INITIAL EVALUATION ADULT - PLANNED THERAPY INTERVENTIONS, OT EVAL
ADL retraining/bed mobility training/stretching/transfer training/energy conservation techniques/neuromuscular re-education/motor coordination training/IADL retraining/fine motor coordination training/parent/caregiver training.../strengthening

## 2020-09-02 NOTE — PROGRESS NOTE ADULT - SUBJECTIVE AND OBJECTIVE BOX
Pt seen and examined at bedside. Pt states he has some pain but it has been undercontroll. Otherwise pt is doing well, no acute complaints at this time. Pt says he has had some al. Pt states he is still having weakness following surgery of foot dorseiflexion. Denies fever, chills, NV.    Vital Signs Last 24 Hrs  T(C): 37 (02 Sep 2020 06:20), Max: 37.4 (01 Sep 2020 22:13)  T(F): 98.6 (02 Sep 2020 06:20), Max: 99.4 (01 Sep 2020 22:13)  HR: 106 (02 Sep 2020 06:20) (97 - 120)  BP: 141/82 (02 Sep 2020 06:20) (132/84 - 148/86)  BP(mean): --  RR: 16 (02 Sep 2020 06:20) (14 - 20)  SpO2: 95% (02 Sep 2020 06:20) (95% - 100%)    Gen: NAD  Spine:  Dressing clean dry intact  +JETHRO  SILT L3-S1  SILT C5-T1  +DP Pulses  +Radial Pulse  Compartments soft  No calf TTP B/L    Motor:            ElbowFlex    WristExt   ElbowExt   FingerFlex   FingerAbd     R            5/5                5/5            5/5             5/5               5/5  L             5/5               5/5             5/5             5/5               5/5              HipFlex   KneeExt   AnkleDorsi   AnklePlantar   HaluxDorsi  R        4/5        5/5            1/5            5/5              3-/5                                 L         4/5        5/5            1/5            5/5              3-/5                        Sensory:            C5         C6         C7      C8       T1        (0=absent, 1=impaired, 2=normal, NT=not testable)  R        2          2              2        2         2  L         2          2              2        2         2               L2          L3         L4      L5       S1         (0=absent, 1=impaired, 2=normal, NT=not testable)  R          2          2              2        2         2  L          2          2              2        2         2

## 2020-09-02 NOTE — OCCUPATIONAL THERAPY INITIAL EVALUATION ADULT - IMPAIRED TRANSFERS: BED/CHAIR, REHAB EVAL
impaired sensory feedback/pain/decreased strength/impaired postural control/impaired balance/decreased flexibility/impaired motor control/abnormal muscle tone/narrow base of support/impaired coordination/decreased ROM

## 2020-09-02 NOTE — PROGRESS NOTE ADULT - ASSESSMENT
A/P: 35yMale s/p L5-S1 ALIF, L3-4 Lateral interbody fusion POD 1    -FU labs  -Pain control  -Monitor Drain output  -PT/OT: WBAT  -DVT ppx- SCDs  -No intraop dural tear  -Appreciate medical management  - Will discuss with attending regarding advancement of diet from clear liquids to regular diet  -PT/OT   -Plan for Acute rehab   -Will advise if plan changes

## 2020-09-02 NOTE — PROGRESS NOTE ADULT - SUBJECTIVE AND OBJECTIVE BOX
Patient is a 35y old  Male who presents with a chief complaint of low back pain (02 Sep 2020 06:48)      INTERVAL HPI/OVERNIGHT EVENTS:  pt is doing better, sitting in a chair  MEDICATIONS  (STANDING):  acetaminophen  IVPB .. 1000 milliGRAM(s) IV Intermittent every 8 hours  ceFAZolin   IVPB 2000 milliGRAM(s) IV Intermittent every 8 hours  diazepam    Tablet 5 milliGRAM(s) Oral every 8 hours  lactated ringers. 1000 milliLiter(s) (75 mL/Hr) IV Continuous <Continuous>  lactated ringers. 1000 milliLiter(s) (75 mL/Hr) IV Continuous <Continuous>  oxyCODONE  ER Tablet 10 milliGRAM(s) Oral every 12 hours  senna 2 Tablet(s) Oral at bedtime    MEDICATIONS  (PRN):  acetaminophen   Tablet .. 650 milliGRAM(s) Oral every 6 hours PRN Temp greater or equal to 38C (100.4F), Mild Pain (1 - 3)  famotidine    Tablet 20 milliGRAM(s) Oral every 12 hours PRN Dyspepsia  HYDROmorphone  Injectable 0.5 milliGRAM(s) IV Push every 4 hours PRN Severe Pain (7 - 10)  magnesium hydroxide Suspension 30 milliLiter(s) Oral every 12 hours PRN Constipation  ondansetron Injectable 4 milliGRAM(s) IV Push every 6 hours PRN Nausea  oxyCODONE    IR 5 milliGRAM(s) Oral every 4 hours PRN Moderate Pain (4 - 6)  oxyCODONE    IR 10 milliGRAM(s) Oral every 4 hours PRN Severe Pain (7 - 10)      Allergies    No Known Allergies    Intolerances        REVIEW OF SYSTEMS:  CONSTITUTIONAL: No fever, weight loss, or fatigue  EYES: No eye pain, visual disturbances, or discharge  ENMT:  No difficulty hearing, tinnitus, vertigo; No sinus or throat pain  NECK: No pain or stiffness  BREASTS: No pain, masses, or nipple discharge  RESPIRATORY: No cough, wheezing, chills or hemoptysis; No shortness of breath  CARDIOVASCULAR: No chest pain, palpitations, dizziness, or leg swelling  GASTROINTESTINAL: No abdominal or epigastric pain. No nausea, vomiting, or hematemesis; No diarrhea or constipation. No melena or hematochezia.  GENITOURINARY: No dysuria, frequency, hematuria, or incontinence  NEUROLOGICAL: No headaches, memory loss, loss of strength, numbness, or tremors  SKIN: No itching, burning, rashes, or lesions   LYMPH NODES: No enlarged glands  ENDOCRINE: No heat or cold intolerance; No hair loss  MUSCULOSKELETAL: No joint pain or swelling; No muscle, back, or extremity pain  PSYCHIATRIC: No depression, anxiety, mood swings, or difficulty sleeping  HEME/LYMPH: No easy bruising, or bleeding gums  ALLERGY AND IMMUNOLOGIC: No hives or eczema    Vital Signs Last 24 Hrs  T(C): 36.9 (02 Sep 2020 11:41), Max: 37.4 (01 Sep 2020 22:13)  T(F): 98.4 (02 Sep 2020 11:41), Max: 99.4 (01 Sep 2020 22:13)  HR: 119 (02 Sep 2020 11:41) (97 - 120)  BP: 137/86 (02 Sep 2020 11:41) (115/72 - 148/86)  BP(mean): --  RR: 17 (02 Sep 2020 11:39) (14 - 20)  SpO2: 95% (02 Sep 2020 11:39) (95% - 100%)    PHYSICAL EXAM:  GENERAL: NAD, well-groomed, well-developed  HEAD:  Atraumatic, Normocephalic  EYES: EOMI, PERRLA, conjunctiva and sclera clear  ENMT: No tonsillar erythema, exudates, or enlargement; Moist mucous membranes, Good dentition, No lesions  NECK: Supple, No JVD, Normal thyroid  NERVOUS SYSTEM:  Alert & Oriented X3, Good concentration; Motor Strength 5/5 B/L upper and lower extremities; DTRs 2+ intact and symmetric  CHEST/LUNG: Clear to percussion bilaterally; No rales, rhonchi, wheezing, or rubs  HEART: Regular rate and rhythm; No murmurs, rubs, or gallops  ABDOMEN: Soft, Nontender, Nondistended; Bowel sounds present  EXTREMITIES:  2+ Peripheral Pulses, No clubbing, cyanosis, or edema  LYMPH: No lymphadenopathy noted  SKIN: No rashes or lesions    LABS:                        9.9    15.36 )-----------( 323      ( 02 Sep 2020 06:27 )             30.6     09-02    134<L>  |  101  |  11  ----------------------------<  121<H>  4.2   |  26  |  0.54    Ca    7.9<L>      02 Sep 2020 06:27      PT/INR - ( 01 Sep 2020 04:29 )   PT: 14.3 sec;   INR: 1.25 ratio         PTT - ( 01 Sep 2020 04:29 )  PTT:29.2 sec    CAPILLARY BLOOD GLUCOSE        CULTURES:    HEMOGLOBIN A1C:    CHOLESTEROL:        RADIOLOGY & ADDITIONAL TESTS:

## 2020-09-02 NOTE — OCCUPATIONAL THERAPY INITIAL EVALUATION ADULT - RANGE OF MOTION EXAMINATION, LOWER EXTREMITY
ROM is significantly limited in BLE/bilateral LE Active Assistive ROM was WFL  (within functional limits)

## 2020-09-02 NOTE — OCCUPATIONAL THERAPY INITIAL EVALUATION ADULT - PERTINENT HX OF CURRENT PROBLEM, REHAB EVAL
Pt is a 35 year old admitted f with DX of intractable back pain due to s/p fall 2 weeks ago down the steps . Pt sustained injury to his bed and has since underwent 3 surgeries. Pt had abd L5 -S1, L3-L4 lateral fusion and most recent L5-S1 , L3-L4 interbody fusion Pt is a 35 year old admitted  with DX of intractable back pain due to s/p fall 2 weeks ago down 1/2 flight of steps . Pt sustained injury to his bed and has since underwent 2 surgeries and is slated for another surgery. Pt had abd L5 -S1, L3-L4 lateral fusion and most recent L5-S1 , L3-L4 interbody fusion

## 2020-09-02 NOTE — PHYSICAL THERAPY INITIAL EVALUATION ADULT - ADDITIONAL COMMENTS
Pt lives in a private house with mom no stairs to enter. Bedroom is upstairs however can stay on main level. Pt prior was I with ADLs and ambulates I

## 2020-09-02 NOTE — OCCUPATIONAL THERAPY INITIAL EVALUATION ADULT - IMPAIRMENTS CONTRIBUTING IMPAIRED BED MOBILITY, REHAB EVAL
narrow base of support/impaired postural control/abnormal muscle tone/pain/impaired sensory feedback/decreased strength/decreased flexibility/impaired balance/cognition/impaired coordination/decreased ROM/ataxic

## 2020-09-02 NOTE — OCCUPATIONAL THERAPY INITIAL EVALUATION ADULT - LIVES WITH, PROFILE
his mother in a private house with no steps to enter. Once inside, pt has to negotiate a flight of stairs  with 12 steps, no hand rail ,to access the bedroom and bathroom. The bathroom has a tub/shower combination, fixed shower head and standard toilet.

## 2020-09-02 NOTE — OCCUPATIONAL THERAPY INITIAL EVALUATION ADULT - STRENGTHENING, PT EVAL
Rx sent to wrong pharmacy.    Pt will increased BUE/LE muscle strength by 1 full grade to maximize independence with functional mobility and self care tasks within 12 weeks

## 2020-09-02 NOTE — OCCUPATIONAL THERAPY INITIAL EVALUATION ADULT - PROPRIOCEPTION, RUE, OT EVAL
Anesthetic History   No history of anesthetic complications            Review of Systems / Medical History  Patient summary reviewed, nursing notes reviewed and pertinent labs reviewed    Pulmonary          Smoker         Neuro/Psych   Within defined limits           Cardiovascular  Within defined limits  Hypertension: well controlled              Exercise tolerance: >4 METS     GI/Hepatic/Renal  Within defined limits              Endo/Other        Obesity and arthritis     Other Findings              Physical Exam    Airway  Mallampati: IV    Neck ROM: decreased range of motion   Mouth opening: Diminished (comment)     Cardiovascular    Rhythm: regular  Rate: normal         Dental  No notable dental hx       Pulmonary  Breath sounds clear to auscultation               Abdominal         Other Findings            Anesthetic Plan    ASA: 3  Anesthesia type: general          Induction: Intravenous  Anesthetic plan and risks discussed with: Patient and Spouse      Informed consent obtained. within normal limits

## 2020-09-02 NOTE — OCCUPATIONAL THERAPY INITIAL EVALUATION ADULT - GENERAL OBSERVATIONS, REHAB EVAL
Pt was seen for initial OT consult, encountered in bed on cardiac monitoring; pt was AA&Ox4, cooperative & followed commands. Pt c/o low back pain due to s/p L5-S1 Alif L3-L2. Spinal precautions were reviewed and maintained. Pt has poor muscle strength in BLE. This limits pt's activity tolerance ,balance, ADL management and functional mobility.

## 2020-09-03 LAB
ANION GAP SERPL CALC-SCNC: 5 MMOL/L — SIGNIFICANT CHANGE UP (ref 5–17)
APTT BLD: 40.6 SEC — HIGH (ref 27.5–35.5)
BUN SERPL-MCNC: 10 MG/DL — SIGNIFICANT CHANGE UP (ref 7–23)
CALCIUM SERPL-MCNC: 7.9 MG/DL — LOW (ref 8.5–10.1)
CHLORIDE SERPL-SCNC: 100 MMOL/L — SIGNIFICANT CHANGE UP (ref 96–108)
CO2 SERPL-SCNC: 29 MMOL/L — SIGNIFICANT CHANGE UP (ref 22–31)
CREAT SERPL-MCNC: 0.58 MG/DL — SIGNIFICANT CHANGE UP (ref 0.5–1.3)
GLUCOSE SERPL-MCNC: 109 MG/DL — HIGH (ref 70–99)
HCT VFR BLD CALC: 29.7 % — LOW (ref 39–50)
HGB BLD-MCNC: 9.8 G/DL — LOW (ref 13–17)
INR BLD: 1.36 RATIO — HIGH (ref 0.88–1.16)
MCHC RBC-ENTMCNC: 28.9 PG — SIGNIFICANT CHANGE UP (ref 27–34)
MCHC RBC-ENTMCNC: 33 GM/DL — SIGNIFICANT CHANGE UP (ref 32–36)
MCV RBC AUTO: 87.6 FL — SIGNIFICANT CHANGE UP (ref 80–100)
NRBC # BLD: 0 /100 WBCS — SIGNIFICANT CHANGE UP (ref 0–0)
PLATELET # BLD AUTO: 341 K/UL — SIGNIFICANT CHANGE UP (ref 150–400)
POTASSIUM SERPL-MCNC: 4.1 MMOL/L — SIGNIFICANT CHANGE UP (ref 3.5–5.3)
POTASSIUM SERPL-SCNC: 4.1 MMOL/L — SIGNIFICANT CHANGE UP (ref 3.5–5.3)
PROTHROM AB SERPL-ACNC: 15.5 SEC — HIGH (ref 10.6–13.6)
RBC # BLD: 3.39 M/UL — LOW (ref 4.2–5.8)
RBC # FLD: 13.9 % — SIGNIFICANT CHANGE UP (ref 10.3–14.5)
SARS-COV-2 RNA SPEC QL NAA+PROBE: SIGNIFICANT CHANGE UP
SODIUM SERPL-SCNC: 134 MMOL/L — LOW (ref 135–145)
WBC # BLD: 12.8 K/UL — HIGH (ref 3.8–10.5)
WBC # FLD AUTO: 12.8 K/UL — HIGH (ref 3.8–10.5)

## 2020-09-03 RX ORDER — HYDROMORPHONE HYDROCHLORIDE 2 MG/ML
2 INJECTION INTRAMUSCULAR; INTRAVENOUS; SUBCUTANEOUS EVERY 4 HOURS
Refills: 0 | Status: DISCONTINUED | OUTPATIENT
Start: 2020-09-03 | End: 2020-09-05

## 2020-09-03 RX ORDER — ACETAMINOPHEN 500 MG
1000 TABLET ORAL EVERY 8 HOURS
Refills: 0 | Status: DISCONTINUED | OUTPATIENT
Start: 2020-09-03 | End: 2020-09-03

## 2020-09-03 RX ORDER — ACETAMINOPHEN 500 MG
1000 TABLET ORAL EVERY 8 HOURS
Refills: 0 | Status: COMPLETED | OUTPATIENT
Start: 2020-09-03 | End: 2020-09-03

## 2020-09-03 RX ADMIN — HYDROMORPHONE HYDROCHLORIDE 0.5 MILLIGRAM(S): 2 INJECTION INTRAMUSCULAR; INTRAVENOUS; SUBCUTANEOUS at 08:23

## 2020-09-03 RX ADMIN — OXYCODONE HYDROCHLORIDE 10 MILLIGRAM(S): 5 TABLET ORAL at 02:50

## 2020-09-03 RX ADMIN — OXYCODONE HYDROCHLORIDE 10 MILLIGRAM(S): 5 TABLET ORAL at 01:56

## 2020-09-03 RX ADMIN — Medication 5 MILLIGRAM(S): at 22:14

## 2020-09-03 RX ADMIN — OXYCODONE HYDROCHLORIDE 10 MILLIGRAM(S): 5 TABLET ORAL at 10:08

## 2020-09-03 RX ADMIN — Medication 400 MILLIGRAM(S): at 13:14

## 2020-09-03 RX ADMIN — OXYCODONE HYDROCHLORIDE 10 MILLIGRAM(S): 5 TABLET ORAL at 06:00

## 2020-09-03 RX ADMIN — Medication 1000 MILLIGRAM(S): at 13:40

## 2020-09-03 RX ADMIN — HYDROMORPHONE HYDROCHLORIDE 0.5 MILLIGRAM(S): 2 INJECTION INTRAMUSCULAR; INTRAVENOUS; SUBCUTANEOUS at 00:00

## 2020-09-03 RX ADMIN — OXYCODONE HYDROCHLORIDE 10 MILLIGRAM(S): 5 TABLET ORAL at 11:00

## 2020-09-03 RX ADMIN — OXYCODONE HYDROCHLORIDE 10 MILLIGRAM(S): 5 TABLET ORAL at 21:10

## 2020-09-03 RX ADMIN — HYDROMORPHONE HYDROCHLORIDE 0.5 MILLIGRAM(S): 2 INJECTION INTRAMUSCULAR; INTRAVENOUS; SUBCUTANEOUS at 04:00

## 2020-09-03 RX ADMIN — OXYCODONE HYDROCHLORIDE 10 MILLIGRAM(S): 5 TABLET ORAL at 18:07

## 2020-09-03 RX ADMIN — OXYCODONE HYDROCHLORIDE 10 MILLIGRAM(S): 5 TABLET ORAL at 07:00

## 2020-09-03 RX ADMIN — Medication 5 MILLIGRAM(S): at 13:13

## 2020-09-03 RX ADMIN — OXYCODONE HYDROCHLORIDE 10 MILLIGRAM(S): 5 TABLET ORAL at 17:31

## 2020-09-03 RX ADMIN — SODIUM CHLORIDE 75 MILLILITER(S): 9 INJECTION, SOLUTION INTRAVENOUS at 10:08

## 2020-09-03 RX ADMIN — Medication 100 MILLIGRAM(S): at 04:00

## 2020-09-03 RX ADMIN — OXYCODONE HYDROCHLORIDE 10 MILLIGRAM(S): 5 TABLET ORAL at 16:45

## 2020-09-03 RX ADMIN — HYDROMORPHONE HYDROCHLORIDE 0.5 MILLIGRAM(S): 2 INJECTION INTRAMUSCULAR; INTRAVENOUS; SUBCUTANEOUS at 00:15

## 2020-09-03 RX ADMIN — HYDROMORPHONE HYDROCHLORIDE 0.5 MILLIGRAM(S): 2 INJECTION INTRAMUSCULAR; INTRAVENOUS; SUBCUTANEOUS at 04:15

## 2020-09-03 RX ADMIN — OXYCODONE HYDROCHLORIDE 10 MILLIGRAM(S): 5 TABLET ORAL at 15:55

## 2020-09-03 RX ADMIN — Medication 100 MILLIGRAM(S): at 20:08

## 2020-09-03 RX ADMIN — HYDROMORPHONE HYDROCHLORIDE 0.5 MILLIGRAM(S): 2 INJECTION INTRAMUSCULAR; INTRAVENOUS; SUBCUTANEOUS at 08:08

## 2020-09-03 RX ADMIN — SENNA PLUS 2 TABLET(S): 8.6 TABLET ORAL at 22:14

## 2020-09-03 RX ADMIN — Medication 5 MILLIGRAM(S): at 06:00

## 2020-09-03 RX ADMIN — OXYCODONE HYDROCHLORIDE 10 MILLIGRAM(S): 5 TABLET ORAL at 20:12

## 2020-09-03 RX ADMIN — Medication 100 MILLIGRAM(S): at 12:44

## 2020-09-03 NOTE — PROGRESS NOTE ADULT - ASSESSMENT
A/P: 35yMale s/p L5-S1 ALIF, L3-4 Lateral interbody fusion POD 2    -FU AM labs  -Pain control  -Monitor Drain output  -PT/OT: WBAT  -DVT ppx- SCDs  -No intraop dural tear  -Appreciate medical management  -Will discuss with attending regarding advancement of diet from clear liquids to regular diet  -PT/OT   -Plan for Acute rehab   -Will advise if plan changes

## 2020-09-03 NOTE — PROGRESS NOTE ADULT - SUBJECTIVE AND OBJECTIVE BOX
Patient is a 35y old  Male who presents with a chief complaint of low back pain (03 Sep 2020 06:40)      INTERVAL HPI/OVERNIGHT EVENTS:  pt is doing better  MEDICATIONS  (STANDING):  acetaminophen  IVPB .. 1000 milliGRAM(s) IV Intermittent every 8 hours  ceFAZolin   IVPB 2000 milliGRAM(s) IV Intermittent every 8 hours  diazepam    Tablet 5 milliGRAM(s) Oral every 8 hours  lactated ringers. 1000 milliLiter(s) (75 mL/Hr) IV Continuous <Continuous>  oxyCODONE  ER Tablet 10 milliGRAM(s) Oral every 12 hours  senna 2 Tablet(s) Oral at bedtime    MEDICATIONS  (PRN):  acetaminophen   Tablet .. 650 milliGRAM(s) Oral every 6 hours PRN Temp greater or equal to 38C (100.4F), Mild Pain (1 - 3)  famotidine    Tablet 20 milliGRAM(s) Oral every 12 hours PRN Dyspepsia  magnesium hydroxide Suspension 30 milliLiter(s) Oral every 12 hours PRN Constipation  ondansetron Injectable 4 milliGRAM(s) IV Push every 6 hours PRN Nausea  oxyCODONE    IR 5 milliGRAM(s) Oral every 4 hours PRN Moderate Pain (4 - 6)  oxyCODONE    IR 10 milliGRAM(s) Oral every 4 hours PRN Severe Pain (7 - 10)      Allergies    No Known Allergies    Intolerances        REVIEW OF SYSTEMS:  CONSTITUTIONAL: No fever, weight loss, or fatigue  EYES: No eye pain, visual disturbances, or discharge  ENMT:  No difficulty hearing, tinnitus, vertigo; No sinus or throat pain  NECK: No pain or stiffness  BREASTS: No pain, masses, or nipple discharge  RESPIRATORY: No cough, wheezing, chills or hemoptysis; No shortness of breath  CARDIOVASCULAR: No chest pain, palpitations, dizziness, or leg swelling  GASTROINTESTINAL: No abdominal or epigastric pain. No nausea, vomiting, or hematemesis; No diarrhea or constipation. No melena or hematochezia.  GENITOURINARY: No dysuria, frequency, hematuria, or incontinence  NEUROLOGICAL: No headaches, memory loss, loss of strength, numbness, or tremors  SKIN: No itching, burning, rashes, or lesions   LYMPH NODES: No enlarged glands  ENDOCRINE: No heat or cold intolerance; No hair loss  MUSCULOSKELETAL: No joint pain or swelling; No muscle, back, or extremity pain  PSYCHIATRIC: No depression, anxiety, mood swings, or difficulty sleeping  HEME/LYMPH: No easy bruising, or bleeding gums  ALLERGY AND IMMUNOLOGIC: No hives or eczema    Vital Signs Last 24 Hrs  T(C): 36.7 (03 Sep 2020 04:30), Max: 37.1 (03 Sep 2020 00:44)  T(F): 98.1 (03 Sep 2020 04:30), Max: 98.8 (03 Sep 2020 00:44)  HR: 112 (03 Sep 2020 08:07) (100 - 112)  BP: 127/76 (03 Sep 2020 08:07) (127/76 - 151/89)  BP(mean): --  RR: 17 (03 Sep 2020 04:30) (16 - 18)  SpO2: 94% (03 Sep 2020 04:30) (94% - 98%)    PHYSICAL EXAM:  GENERAL: NAD, well-groomed, well-developed  HEAD:  Atraumatic, Normocephalic  EYES: EOMI, PERRLA, conjunctiva and sclera clear  ENMT: No tonsillar erythema, exudates, or enlargement; Moist mucous membranes, Good dentition, No lesions  NECK: Supple, No JVD, Normal thyroid  NERVOUS SYSTEM:  Alert & Oriented X3, Good concentration; Motor Strength 5/5 B/L upper and lower extremities; DTRs 2+ intact and symmetric  CHEST/LUNG: Clear to percussion bilaterally; No rales, rhonchi, wheezing, or rubs  HEART: Regular rate and rhythm; No murmurs, rubs, or gallops  ABDOMEN: Soft, Nontender, Nondistended; Bowel sounds present  EXTREMITIES:  2+ Peripheral Pulses, No clubbing, cyanosis, or edema  LYMPH: No lymphadenopathy noted  SKIN: No rashes or lesions    LABS:                        9.8    12.80 )-----------( 341      ( 03 Sep 2020 04:55 )             29.7     09-03    134<L>  |  100  |  10  ----------------------------<  109<H>  4.1   |  29  |  0.58    Ca    7.9<L>      03 Sep 2020 04:55      PT/INR - ( 03 Sep 2020 04:55 )   PT: 15.5 sec;   INR: 1.36 ratio         PTT - ( 03 Sep 2020 04:55 )  PTT:40.6 sec    CAPILLARY BLOOD GLUCOSE        CULTURES:    HEMOGLOBIN A1C:    CHOLESTEROL:        RADIOLOGY & ADDITIONAL TESTS:

## 2020-09-03 NOTE — CHART NOTE - NSCHARTNOTEFT_GEN_A_CORE
Mr Zendejas is admitted to [facility] on 8/22 till now. pt underwent back surgery and still recovering in the hospital. I will update you upon discharge.

## 2020-09-03 NOTE — PROGRESS NOTE ADULT - SUBJECTIVE AND OBJECTIVE BOX
Pt seen and examined at bedside. Pt states he has some pain but it has been under control. Pt having difficulty sleeping, otherwise pt is doing well, no acute complaints at this time. Pt states he is still having weakness following surgery of foot dorsiflexion Denies fever, chills, NV.    Vital Signs Last 24 Hrs  T(C): 37 (02 Sep 2020 06:20), Max: 37.4 (01 Sep 2020 22:13)  T(F): 98.6 (02 Sep 2020 06:20), Max: 99.4 (01 Sep 2020 22:13)  HR: 106 (02 Sep 2020 06:20) (97 - 120)  BP: 141/82 (02 Sep 2020 06:20) (132/84 - 148/86)  BP(mean): --  RR: 16 (02 Sep 2020 06:20) (14 - 20)  SpO2: 95% (02 Sep 2020 06:20) (95% - 100%)    Gen: NAD  Spine:  Dressing clean dry intact  +JETHRO  SILT L3-S1  SILT C5-T1  +DP Pulses  +Radial Pulse  Compartments soft  No calf TTP B/L    Motor:            ElbowFlex    WristExt   ElbowExt   FingerFlex   FingerAbd     R            5/5                5/5            5/5             5/5               5/5  L             5/5               5/5             5/5             5/5               5/5              HipFlex   KneeExt   AnkleDorsi   AnklePlantar   HaluxDorsi  R        4/5        5/5            1/5            5/5              3-/5                                 L         4/5        5/5            1/5            5/5              3-/5                        Sensory:            C5         C6         C7      C8       T1        (0=absent, 1=impaired, 2=normal, NT=not testable)  R        2          2              2        2         2  L         2          2              2        2         2               L2          L3         L4      L5       S1         (0=absent, 1=impaired, 2=normal, NT=not testable)  R          2          2              2        2         2  L          2          2              2        2         2

## 2020-09-04 ENCOUNTER — TRANSCRIPTION ENCOUNTER (OUTPATIENT)
Age: 35
End: 2020-09-04

## 2020-09-04 LAB
ANION GAP SERPL CALC-SCNC: 5 MMOL/L — SIGNIFICANT CHANGE UP (ref 5–17)
BUN SERPL-MCNC: 10 MG/DL — SIGNIFICANT CHANGE UP (ref 7–23)
CALCIUM SERPL-MCNC: 8.5 MG/DL — SIGNIFICANT CHANGE UP (ref 8.5–10.1)
CHLORIDE SERPL-SCNC: 98 MMOL/L — SIGNIFICANT CHANGE UP (ref 96–108)
CO2 SERPL-SCNC: 30 MMOL/L — SIGNIFICANT CHANGE UP (ref 22–31)
CREAT SERPL-MCNC: 0.58 MG/DL — SIGNIFICANT CHANGE UP (ref 0.5–1.3)
GLUCOSE SERPL-MCNC: 101 MG/DL — HIGH (ref 70–99)
HCT VFR BLD CALC: 32.7 % — LOW (ref 39–50)
HGB BLD-MCNC: 10.4 G/DL — LOW (ref 13–17)
MCHC RBC-ENTMCNC: 29 PG — SIGNIFICANT CHANGE UP (ref 27–34)
MCHC RBC-ENTMCNC: 31.8 GM/DL — LOW (ref 32–36)
MCV RBC AUTO: 91.1 FL — SIGNIFICANT CHANGE UP (ref 80–100)
NRBC # BLD: 0 /100 WBCS — SIGNIFICANT CHANGE UP (ref 0–0)
PLATELET # BLD AUTO: 402 K/UL — HIGH (ref 150–400)
POTASSIUM SERPL-MCNC: 3.9 MMOL/L — SIGNIFICANT CHANGE UP (ref 3.5–5.3)
POTASSIUM SERPL-SCNC: 3.9 MMOL/L — SIGNIFICANT CHANGE UP (ref 3.5–5.3)
RBC # BLD: 3.59 M/UL — LOW (ref 4.2–5.8)
RBC # FLD: 13.6 % — SIGNIFICANT CHANGE UP (ref 10.3–14.5)
SODIUM SERPL-SCNC: 133 MMOL/L — LOW (ref 135–145)
WBC # BLD: 12.4 K/UL — HIGH (ref 3.8–10.5)
WBC # FLD AUTO: 12.4 K/UL — HIGH (ref 3.8–10.5)

## 2020-09-04 PROCEDURE — 72100 X-RAY EXAM L-S SPINE 2/3 VWS: CPT | Mod: 26

## 2020-09-04 RX ORDER — DIAZEPAM 5 MG
1 TABLET ORAL
Qty: 0 | Refills: 0 | DISCHARGE
Start: 2020-09-04

## 2020-09-04 RX ORDER — OXYCODONE HYDROCHLORIDE 5 MG/1
1 TABLET ORAL
Qty: 0 | Refills: 0 | DISCHARGE
Start: 2020-09-04

## 2020-09-04 RX ORDER — MAGNESIUM HYDROXIDE 400 MG/1
30 TABLET, CHEWABLE ORAL EVERY 12 HOURS
Refills: 0 | Status: DISCONTINUED | OUTPATIENT
Start: 2020-09-05 | End: 2020-10-12

## 2020-09-04 RX ORDER — HYDROMORPHONE HYDROCHLORIDE 2 MG/ML
2 INJECTION INTRAMUSCULAR; INTRAVENOUS; SUBCUTANEOUS EVERY 4 HOURS
Refills: 0 | Status: DISCONTINUED | OUTPATIENT
Start: 2020-09-05 | End: 2020-09-08

## 2020-09-04 RX ORDER — LACTOBACILLUS ACIDOPHILUS 100MM CELL
1 CAPSULE ORAL
Refills: 0 | Status: DISCONTINUED | OUTPATIENT
Start: 2020-09-05 | End: 2020-10-12

## 2020-09-04 RX ORDER — SENNA PLUS 8.6 MG/1
2 TABLET ORAL AT BEDTIME
Refills: 0 | Status: DISCONTINUED | OUTPATIENT
Start: 2020-09-05 | End: 2020-10-12

## 2020-09-04 RX ORDER — ACETAMINOPHEN 500 MG
1000 TABLET ORAL ONCE
Refills: 0 | Status: COMPLETED | OUTPATIENT
Start: 2020-09-04 | End: 2020-09-04

## 2020-09-04 RX ORDER — OXYCODONE HYDROCHLORIDE 5 MG/1
5 TABLET ORAL EVERY 4 HOURS
Refills: 0 | Status: DISCONTINUED | OUTPATIENT
Start: 2020-09-05 | End: 2020-09-08

## 2020-09-04 RX ORDER — FAMOTIDINE 10 MG/ML
1 INJECTION INTRAVENOUS
Qty: 0 | Refills: 0 | DISCHARGE
Start: 2020-09-04

## 2020-09-04 RX ORDER — SENNA PLUS 8.6 MG/1
2 TABLET ORAL
Qty: 0 | Refills: 0 | DISCHARGE
Start: 2020-09-04

## 2020-09-04 RX ORDER — OXYCODONE HYDROCHLORIDE 5 MG/1
10 TABLET ORAL EVERY 4 HOURS
Refills: 0 | Status: DISCONTINUED | OUTPATIENT
Start: 2020-09-05 | End: 2020-09-07

## 2020-09-04 RX ORDER — DIAZEPAM 5 MG
5 TABLET ORAL EVERY 8 HOURS
Refills: 0 | Status: DISCONTINUED | OUTPATIENT
Start: 2020-09-05 | End: 2020-09-08

## 2020-09-04 RX ORDER — OXYCODONE HYDROCHLORIDE 5 MG/1
10 TABLET ORAL EVERY 12 HOURS
Refills: 0 | Status: DISCONTINUED | OUTPATIENT
Start: 2020-09-05 | End: 2020-09-08

## 2020-09-04 RX ORDER — MAGNESIUM HYDROXIDE 400 MG/1
30 TABLET, CHEWABLE ORAL
Qty: 0 | Refills: 0 | DISCHARGE
Start: 2020-09-04

## 2020-09-04 RX ORDER — CEPHALEXIN 500 MG
1 CAPSULE ORAL
Qty: 15 | Refills: 0
Start: 2020-09-04 | End: 2020-09-08

## 2020-09-04 RX ORDER — HYDROMORPHONE HYDROCHLORIDE 2 MG/ML
1 INJECTION INTRAMUSCULAR; INTRAVENOUS; SUBCUTANEOUS
Qty: 0 | Refills: 0 | DISCHARGE
Start: 2020-09-04

## 2020-09-04 RX ORDER — ACETAMINOPHEN 500 MG
650 TABLET ORAL EVERY 6 HOURS
Refills: 0 | Status: DISCONTINUED | OUTPATIENT
Start: 2020-09-05 | End: 2020-10-08

## 2020-09-04 RX ADMIN — Medication 1000 MILLIGRAM(S): at 12:47

## 2020-09-04 RX ADMIN — HYDROMORPHONE HYDROCHLORIDE 2 MILLIGRAM(S): 2 INJECTION INTRAMUSCULAR; INTRAVENOUS; SUBCUTANEOUS at 03:20

## 2020-09-04 RX ADMIN — HYDROMORPHONE HYDROCHLORIDE 2 MILLIGRAM(S): 2 INJECTION INTRAMUSCULAR; INTRAVENOUS; SUBCUTANEOUS at 18:03

## 2020-09-04 RX ADMIN — HYDROMORPHONE HYDROCHLORIDE 2 MILLIGRAM(S): 2 INJECTION INTRAMUSCULAR; INTRAVENOUS; SUBCUTANEOUS at 22:31

## 2020-09-04 RX ADMIN — OXYCODONE HYDROCHLORIDE 10 MILLIGRAM(S): 5 TABLET ORAL at 08:00

## 2020-09-04 RX ADMIN — OXYCODONE HYDROCHLORIDE 10 MILLIGRAM(S): 5 TABLET ORAL at 12:25

## 2020-09-04 RX ADMIN — Medication 100 MILLIGRAM(S): at 20:15

## 2020-09-04 RX ADMIN — Medication 5 MILLIGRAM(S): at 14:32

## 2020-09-04 RX ADMIN — HYDROMORPHONE HYDROCHLORIDE 2 MILLIGRAM(S): 2 INJECTION INTRAMUSCULAR; INTRAVENOUS; SUBCUTANEOUS at 21:30

## 2020-09-04 RX ADMIN — HYDROMORPHONE HYDROCHLORIDE 2 MILLIGRAM(S): 2 INJECTION INTRAMUSCULAR; INTRAVENOUS; SUBCUTANEOUS at 10:27

## 2020-09-04 RX ADMIN — HYDROMORPHONE HYDROCHLORIDE 2 MILLIGRAM(S): 2 INJECTION INTRAMUSCULAR; INTRAVENOUS; SUBCUTANEOUS at 07:04

## 2020-09-04 RX ADMIN — OXYCODONE HYDROCHLORIDE 10 MILLIGRAM(S): 5 TABLET ORAL at 17:21

## 2020-09-04 RX ADMIN — SENNA PLUS 2 TABLET(S): 8.6 TABLET ORAL at 21:31

## 2020-09-04 RX ADMIN — HYDROMORPHONE HYDROCHLORIDE 2 MILLIGRAM(S): 2 INJECTION INTRAMUSCULAR; INTRAVENOUS; SUBCUTANEOUS at 02:21

## 2020-09-04 RX ADMIN — OXYCODONE HYDROCHLORIDE 10 MILLIGRAM(S): 5 TABLET ORAL at 18:03

## 2020-09-04 RX ADMIN — Medication 5 MILLIGRAM(S): at 21:30

## 2020-09-04 RX ADMIN — OXYCODONE HYDROCHLORIDE 10 MILLIGRAM(S): 5 TABLET ORAL at 13:13

## 2020-09-04 RX ADMIN — HYDROMORPHONE HYDROCHLORIDE 2 MILLIGRAM(S): 2 INJECTION INTRAMUSCULAR; INTRAVENOUS; SUBCUTANEOUS at 06:17

## 2020-09-04 RX ADMIN — OXYCODONE HYDROCHLORIDE 10 MILLIGRAM(S): 5 TABLET ORAL at 07:04

## 2020-09-04 RX ADMIN — Medication 5 MILLIGRAM(S): at 07:04

## 2020-09-04 RX ADMIN — OXYCODONE HYDROCHLORIDE 10 MILLIGRAM(S): 5 TABLET ORAL at 00:28

## 2020-09-04 RX ADMIN — HYDROMORPHONE HYDROCHLORIDE 2 MILLIGRAM(S): 2 INJECTION INTRAMUSCULAR; INTRAVENOUS; SUBCUTANEOUS at 17:21

## 2020-09-04 RX ADMIN — OXYCODONE HYDROCHLORIDE 10 MILLIGRAM(S): 5 TABLET ORAL at 22:30

## 2020-09-04 RX ADMIN — OXYCODONE HYDROCHLORIDE 10 MILLIGRAM(S): 5 TABLET ORAL at 22:35

## 2020-09-04 RX ADMIN — Medication 100 MILLIGRAM(S): at 12:25

## 2020-09-04 RX ADMIN — Medication 400 MILLIGRAM(S): at 12:25

## 2020-09-04 RX ADMIN — OXYCODONE HYDROCHLORIDE 10 MILLIGRAM(S): 5 TABLET ORAL at 01:30

## 2020-09-04 RX ADMIN — Medication 100 MILLIGRAM(S): at 04:06

## 2020-09-04 RX ADMIN — HYDROMORPHONE HYDROCHLORIDE 2 MILLIGRAM(S): 2 INJECTION INTRAMUSCULAR; INTRAVENOUS; SUBCUTANEOUS at 15:30

## 2020-09-04 RX ADMIN — HYDROMORPHONE HYDROCHLORIDE 2 MILLIGRAM(S): 2 INJECTION INTRAMUSCULAR; INTRAVENOUS; SUBCUTANEOUS at 14:32

## 2020-09-04 RX ADMIN — HYDROMORPHONE HYDROCHLORIDE 2 MILLIGRAM(S): 2 INJECTION INTRAMUSCULAR; INTRAVENOUS; SUBCUTANEOUS at 11:15

## 2020-09-04 NOTE — CHART NOTE - NSCHARTNOTEFT_GEN_A_CORE
Notified by ortho and RN on 2W at 1730 that pt is being discharged and pickup time is 1800.  Pt has been under care of orthopaedics for fractured spine s/p OR on 2 occasions during hospital stay.  As per Dr. Haile, pt is cleared for discharge from his medical standpoint.  BRITTNEE RN who states that pt still has JETHRO in (to leave in until 9/7/2020 as per ortho notes) and that francis was removed for TOV today at 1600.  Given that patient has not passed TOV at this present time, will hold discharge until patient is able to void, given recent spinal surgery.  Ortho resident Polo Villarreal aware.  Discharge updated at the request of ortho and medical attending.  BRITTNEE ACP supervisor. Notified by ortho and RN on 2W at 1730 that pt is being discharged and pickup time is 1800.  Pt has been under care of orthopaedics for fractured spine s/p OR on 2 occasions during hospital stay.  As per Dr. Haile, pt is cleared for discharge from his medical standpoint.  BRITTNEE RN who states that pt still has JETHRO in (to leave in until 9/7/2020 as per ortho notes) and that francis was removed for TOV today at 1600.  Given that patient has not passed TOV at this present time, will hold discharge until patient is able to void, given recent spinal surgery.  Ortho resident Polo Villarreal aware.  Discharge updated at the request of ortho and medical attending.  BRITTNEE ACP supervisor.  SW is aware of discharge postponement until pt is able to void on his own.

## 2020-09-04 NOTE — H&P ADULT - NSHPPHYSICALEXAM_GEN_ALL_CORE
PHYSICAL EXAM  VITALS  T(C): 37 (09-04-20 @ 06:26), Max: 37.4 (09-04-20 @ 00:10)  HR: 110 (09-04-20 @ 06:26) (110 - 118)  BP: 128/78 (09-04-20 @ 06:26) (127/71 - 134/75)  RR: 16 (09-04-20 @ 06:26) (16 - 18)  SpO2: 97% (09-04-20 @ 06:26) (95% - 97%)    Gen - NAD, Comfortable  HEENT - NCAT, EOMI, MMM  Neck - Supple, No limited ROM  Pulm - CTAB, No wheeze, No rhonchi, No crackles  Cardiovascular - RRR, S1S2, No murmurs  Abdomen - Soft, NT/ND, +BS  Extremities - No C/C/E, No calf tenderness  Neuro-     Cognitive - AAOx3     Communication - Fluent, No dysarthria     Attention: Intact      Judgement: Good evidence of judgement     Memory: Recall 3 objects immediate and 3 min later         Cranial Nerves - CN 2-12 intact     Motor -                     LEFT    UE - ShAB 5/5, EF 5/5, EE 5/5, WE 5/5,  5/5                    RIGHT UE - ShAB 5/5, EF 5/5, EE 5/5, WE 5/5,  5/5                    LEFT    LE - HF 5/5, KE 5/5, DF 5/5, PF 5/5                    RIGHT LE - HF 5/5, KE 5/5, DF 5/5, PF 5/5        Sensory - Intact to LT     Reflexes - DTR Intact, No primitive reflexive     Coordination - FTN intact     Tone - normal  Psychiatric - Mood stable, Affect WNL  Skin:  all skin intact PHYSICAL EXAM  VITALS  T(C): 37 (09-04-20 @ 06:26), Max: 37.4 (09-04-20 @ 00:10)  HR: 110 (09-04-20 @ 06:26) (110 - 118)  BP: 128/78 (09-04-20 @ 06:26) (127/71 - 134/75)  RR: 16 (09-04-20 @ 06:26) (16 - 18)  SpO2: 97% (09-04-20 @ 06:26) (95% - 97%)    Gen - NAD, uncomfortable  HEENT - NCAT, EOMI  Neck - Supple, + limited ROM  Pulm - CTAB, No wheeze, No rhonchi, No crackles  Cardiovascular - RRR, S1S2, No murmurs  Abdomen - Soft, NT/ND, +BS, +incision left of midline with dressing c/d/i  Extremities - No C/C/E, No calf tenderness  Neuro-     Cognitive - AAOx3     Communication - Fluent, No dysarthria     Attention: Intact      Judgement: Good evidence of judgement     Memory: Recall 3 objects immediate and 3 min later         Cranial Nerves - CN 2-12 intact     Motor -                     LEFT    UE - ShAB 5/5, EF 5/5, EE 5/5, WE 5/5,  5/5                    RIGHT UE - ShAB 5/5, EF 5/5, EE 5/5, WE 5/5,  5/5                    LEFT    LE - HF 1/5 (limited by pain), KE 3/5, DF 0/5, PF 3/5                    RIGHT LE - HF 1/5 (limited by pain), KE 3/5, DF 1/5, PF 3/5        Sensory - Intact to LT     Reflexes - DTR Intact, No primitive reflexive     Coordination - FTN intact     Tone - normal  Psychiatric - Mood stable, Affect WNL  Skin:  deferred per patient refusal PHYSICAL EXAM  VITALS  T(C): 37 (09-04-20 @ 06:26), Max: 37.4 (09-04-20 @ 00:10)  HR: 110 (09-04-20 @ 06:26) (110 - 118)  BP: 128/78 (09-04-20 @ 06:26) (127/71 - 134/75)  RR: 16 (09-04-20 @ 06:26) (16 - 18)  SpO2: 97% (09-04-20 @ 06:26) (95% - 97%)    Gen - NAD, uncomfortable  HEENT - NCAT, EOMI  Neck - Supple, + limited ROM  Pulm - CTAB, No wheeze, No rhonchi, No crackles  Cardiovascular - RRR, S1S2, No murmurs  Abdomen - Soft, NT/ND, +BS, +incision left of midline with dressing c/d/i  Extremities - No C/C/E, No calf tenderness  Neuro-     Cognitive - AAOx3     Communication - Fluent, No dysarthria     Attention: Intact      Judgement: Good evidence of judgement     Memory: Recall 3 objects immediate and 3 min later         Cranial Nerves - CN 2-12 intact     Motor -                     LEFT    UE - ShAB 5/5, EF 5/5, EE 5/5, WE 5/5,  5/5                    RIGHT UE - ShAB 5/5, EF 5/5, EE 5/5, WE 5/5,  5/5                    LEFT    LE - HF 1/5 (limited by pain), KE 3/5, DF 0/5, PF 3/5                    RIGHT LE - HF 1/5 (limited by pain), KE 3/5, DF 1/5, PF 3/5        Sensory - Intact to LT     Reflexes - DTR Intact, No primitive reflexive     Coordination - FTN intact     Tone - normal  Psychiatric - Mood stable, Affect WNL  Skin:  abdominal incision as above, midline incision on back from T11 to S2 with staples, mid back w/ JETHRO drain (full, serosanguinous drainage), lacy pink rash under bandages which were soaked through with serosanguinous drainage

## 2020-09-04 NOTE — DISCHARGE NOTE NURSING/CASE MANAGEMENT/SOCIAL WORK - PATIENT PORTAL LINK FT
You can access the FollowMyHealth Patient Portal offered by [facility] by registering at the following website: [email or web address]. By joining [employer]’s FollowMyHealth portal, you will also be able to view your health information using other applications (apps) compatible with our system.

## 2020-09-04 NOTE — H&P ADULT - ATTENDING COMMENTS
Patient seen and discussed with resident. Agree with above assessment and plan.   start inpatient rehab program    MEDICATIONS  (STANDING):  ceFAZolin   IVPB 2000 milliGRAM(s) IV Intermittent every 8 hours  diazepam    Tablet 5 milliGRAM(s) Oral every 8 hours  HYDROmorphone   Tablet 2 milliGRAM(s) Oral every 4 hours  influenza   Vaccine 0.5 milliLiter(s) IntraMuscular once  lactobacillus acidophilus 1 Tablet(s) Oral two times a day  oxyCODONE  ER Tablet 10 milliGRAM(s) Oral every 12 hours  senna 2 Tablet(s) Oral at bedtime    MEDICATIONS  (PRN):  acetaminophen   Tablet .. 650 milliGRAM(s) Oral every 6 hours PRN Temp greater or equal to 38C (100.4F), Mild Pain (1 - 3)  famotidine    Tablet 20 milliGRAM(s) Oral every 12 hours PRN Dyspepsia  magnesium hydroxide Suspension 30 milliLiter(s) Oral every 12 hours PRN Constipation  oxyCODONE    IR 5 milliGRAM(s) Oral every 4 hours PRN Moderate Pain (4 - 6)  oxyCODONE    IR 10 milliGRAM(s) Oral every 4 hours PRN Severe Pain (7 - 10)

## 2020-09-04 NOTE — H&P ADULT - NSHPREVIEWOFSYSTEMS_GEN_ALL_CORE
REVIEW OF SYSTEMS  Constitutional - Denies fevers, chills  HEENT - Denies changes in vision or hearing  Respiratory - Denies cough, dyspnea  Cardiovascular - Denies chest pain, palpitations  Gastrointestinal - Denies n/v, constipation, bowel incontinence  Genitourinary - Denies dysuria, urinary incontinence  Neurological - Denies weakness, numbness, headaches  Skin - Denies rashes  Musculoskeletal - Denies arthralgia, myalgias, back pain  Psychiatric - Denies depressed mood, anxiety REVIEW OF SYSTEMS  Constitutional - Denies fevers, chills  HEENT - Denies changes in vision or hearing  Respiratory - Denies cough, dyspnea  Cardiovascular - Denies chest pain, palpitations  Gastrointestinal - Denies n/v, constipation, bowel incontinence  Genitourinary - Denies dysuria, urinary incontinence  Neurological - ++ weakness. Denies numbness, headaches  Skin - Denies rashes  Musculoskeletal - ++ low back pain, +right lateral hip pain, +muscle spasms. Denies joint pain  Psychiatric - Denies depressed mood, anxiety

## 2020-09-04 NOTE — CHART NOTE - NSCHARTNOTEFT_GEN_A_CORE
Custom AFO.s fitted to patient . They will require some modifications that cannot be done on site.  AFO's will be returned as soon as possible. He will continue using pre radha devices.            Marshall Katz CO [facility]berg p&o  [phone]

## 2020-09-04 NOTE — CHART NOTE - NSCHARTNOTEFT_GEN_A_CORE
Discussed patient case and images with attending. Pt clear from an orthopedic standpoint for acute rehab.  -Leave drain in until Monday  -Recommend Keflex 500mg TID until the drain is pulled on Monday   -Please see DC instructions for care and removal while at rehab  -Orthopaedically stable for discharge  -Follow up w/ Dr. Villegas 1-2 weeks after discharge. Call office to schedule appointment.  -Discharge planning  -All patient's questions answered. Patient understands and agrees w/ above plan.  -Will discuss w/ attending and advise if plan changes.

## 2020-09-04 NOTE — H&P ADULT - NSHPSOCIALHISTORY_GEN_ALL_CORE
SOCIAL HISTORY  Smoking - Denied  EtOH - Denied   Drugs - Denied    FUNCTIONAL HISTORY  lives with his mother in a private house with no steps to enter. Once inside, pt has to negotiate a flight of stairs  with 12 steps, no hand rail ,to access the bedroom and bathroom. The bathroom has a tub/shower combination, fixed shower head and standard toilet.  Independent PTA.  Working in construction.     CURRENT FUNCTIONAL STATUS   - Bed Mobility: max assist  - Transfers: max assist   - Gait: not yet assessed  - ADLs: not yet assessed SOCIAL HISTORY  Smoking - Denied  EtOH - Rare, twice per month  Drugs - Daily Cannabis    FUNCTIONAL HISTORY  Lives with his mother in a private house with no steps to enter. Once inside, pt has to negotiate a flight of stairs  with 12 steps, no hand rail, to access the bedroom and bathroom. The bathroom has a tub/shower combination, fixed shower head and standard toilet.  Independent PTA.  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    CURRENT FUNCTIONAL STATUS   - Bed Mobility: max assist  - Transfers: max assist   - Gait: not yet assessed  - ADLs: not yet assessed

## 2020-09-04 NOTE — PROGRESS NOTE ADULT - ASSESSMENT
35M s/p L5-S1 ALIF, L3-4 Lateral interbody fusion POD 3    FU AM labs  Pain control  Monitor Drain output  PT/OT: WBAT  DVT ppx- SCDs  No intraop dural tear  Appreciate medical management  Will discuss with attending regarding advancement of diet from clear liquids to regular diet  PT/OT   Plan for Acute rehab   Will advise if plan changes

## 2020-09-04 NOTE — DISCHARGE NOTE NURSING/CASE MANAGEMENT/SOCIAL WORK - NSDCPNDISPN_GEN_ALL_CORE
Side effects of pain management treatment/Activities of daily living, including home environment that might     exacerbate pain or reduce effectiveness of the pain management plan of care as well as strategies to address these issues/Education provided on the pain management plan of care/Safe use, storage and disposal of opioids when prescribed Education provided on the pain management plan of care/Activities of daily living, including home environment that might     exacerbate pain or reduce effectiveness of the pain management plan of care as well as strategies to address these issues/Side effects of pain management treatment

## 2020-09-04 NOTE — H&P ADULT - NSHPLABSRESULTS_GEN_ALL_CORE
RECENT LABS/IMAGING                        10.4   12.40 )-----------( 402      ( 04 Sep 2020 07:34 )             32.7     09-04    133<L>  |  98  |  10  ----------------------------<  101<H>  3.9   |  30  |  0.58    Ca    8.5      04 Sep 2020 07:34            MR LS:   IMPRESSION:  1. There are fractures of the L1 through L5 pedicles bilaterally leading  widening of the spinal canal with anterolisthesis of L5 on S1 with the entire  L5 vertebral body width, disruption of the anterior and posterior longitudinal  ligament at L5-S1 level.  2. There is a probable epidural hemorrhage and hemorrhage within the foramina  at L1-L5 levels.  3. There is enlargement of the psoas muscles bilaterally, with surrounding  edema and fluid.    CT LS: IMPRESSION:  1. There are fractures of the L1 through L5 pedicles bilaterally leading  widening of the spinal canal with anterolisthesis of L5 on S1 with the entire  L5 vertebral body width, disruption of the anterior and posterior longitudinal  ligaments at L5-S1 level.  2. Additionally, fractures of the L5 transverse processes.  3. There is epidural hemorrhage and foraminal hemorrhage at L1-L5 levels.  4. There is enlargement of the psoas muscles bilaterally, with surrounding  edema of fluid.  5. There is retroperitoneal fluid, probably retroperitoneal hemorrhage a  surrounding the psoas muscle and paraspinal tissues bilaterally. RECENT LABS/IMAGING    09-05    133<L>  |  97  |  11  ----------------------------<  109<H>  3.9   |  29  |  0.72    09-04    133<L>  |  98  |  10  ----------------------------<  101<H>  3.9   |  30  |  0.58    09-03    134<L>  |  100  |  10  ----------------------------<  109<H>  4.1   |  29  |  0.58    Ca    8.3<L>      05 Sep 2020 06:20  Ca    8.5      04 Sep 2020 07:34  Ca    7.9<L>      03 Sep 2020 04:55                        10.9   13.73 )-----------( 435      ( 05 Sep 2020 06:20 )             34.0                         10.4   12.40 )-----------( 402      ( 04 Sep 2020 07:34 )             32.7                         9.8    12.80 )-----------( 341      ( 03 Sep 2020 04:55 )             29.7     CAPILLARY BLOOD GLUCOSE      MR Lumbar Spine 8/22/20:   IMPRESSION:  1. There are fractures of the L1 through L5 pedicles bilaterally leading  widening of the spinal canal with anterolisthesis of L5 on S1 with the entire  L5 vertebral body width, disruption of the anterior and posterior longitudinal  ligament at L5-S1 level.  2. There is a probable epidural hemorrhage and hemorrhage within the foramina  at L1-L5 levels.  3. There is enlargement of the psoas muscles bilaterally, with surrounding  edema and fluid.    CT Lumbar Spine w/wo contrast 8/22/20:  IMPRESSION:  1. There are fractures of the L1 through L5 pedicles bilaterally leading  widening of the spinal canal with anterolisthesis of L5 on S1 with the entire  L5 vertebral body width, disruption of the anterior and posterior longitudinal  ligaments at L5-S1 level.  2. Additionally, fractures of the L5 transverse processes.  3. There is epidural hemorrhage and foraminal hemorrhage at L1-L5 levels.  4. There is enlargement of the psoas muscles bilaterally, with surrounding  edema of fluid.  5. There is retroperitoneal fluid, probably retroperitoneal hemorrhage a  surrounding the psoas muscle and paraspinal tissues bilaterally.    CT Thoracic Spine No Cont 8/22/20:  No acute fracture or malalignment of the thoracic spine.  Fractures of the L2 pedicles bilaterally, correlate with CT L spine  findings.  Diffuse sclerosis of the bones, a nonspecific finding.      CT Abdomen and Pelvis No Cont 8/23/20:  Redemonstration of L1-L5 pedicle fractures and anterolisthesis of L5 on S1 as seen on lumbosacral spine CT and MRI, associated with retroperitoneal hematoma.    Xray Lumbosacral Spine 4 View 9/4/20:  The patient is status post spinal fusion and discectomy/spacer placement. Hardware appears to be in place. There are midline skin staples. The heights of the vertebral bodies are preserved. No lytic or blastic lesions are identified. RECENT LABS/IMAGING    09-05    133<L>  |  97  |  11  ----------------------------<  109<H>  3.9   |  29  |  0.72    09-04    133<L>  |  98  |  10  ----------------------------<  101<H>  3.9   |  30  |  0.58    09-03    134<L>  |  100  |  10  ----------------------------<  109<H>  4.1   |  29  |  0.58    Ca    8.3<L>      05 Sep 2020 06:20  Ca    8.5      04 Sep 2020 07:34  Ca    7.9<L>      03 Sep 2020 04:55                        10.9   13.73 )-----------( 435      ( 05 Sep 2020 06:20 )             34.0                         10.4   12.40 )-----------( 402      ( 04 Sep 2020 07:34 )             32.7                         9.8    12.80 )-----------( 341      ( 03 Sep 2020 04:55 )             29.7       MR Lumbar Spine 8/22/20:   IMPRESSION:  1. There are fractures of the L1 through L5 pedicles bilaterally leading  widening of the spinal canal with anterolisthesis of L5 on S1 with the entire  L5 vertebral body width, disruption of the anterior and posterior longitudinal  ligament at L5-S1 level.  2. There is a probable epidural hemorrhage and hemorrhage within the foramina  at L1-L5 levels.  3. There is enlargement of the psoas muscles bilaterally, with surrounding  edema and fluid.    CT Lumbar Spine w/wo contrast 8/22/20:  IMPRESSION:  1. There are fractures of the L1 through L5 pedicles bilaterally leading  widening of the spinal canal with anterolisthesis of L5 on S1 with the entire  L5 vertebral body width, disruption of the anterior and posterior longitudinal  ligaments at L5-S1 level.  2. Additionally, fractures of the L5 transverse processes.  3. There is epidural hemorrhage and foraminal hemorrhage at L1-L5 levels.  4. There is enlargement of the psoas muscles bilaterally, with surrounding  edema of fluid.  5. There is retroperitoneal fluid, probably retroperitoneal hemorrhage a  surrounding the psoas muscle and paraspinal tissues bilaterally.    CT Thoracic Spine No Cont 8/22/20:  No acute fracture or malalignment of the thoracic spine.  Fractures of the L2 pedicles bilaterally, correlate with CT L spine  findings.  Diffuse sclerosis of the bones, a nonspecific finding.      CT Abdomen and Pelvis No Cont 8/23/20:  Redemonstration of L1-L5 pedicle fractures and anterolisthesis of L5 on S1 as seen on lumbosacral spine CT and MRI, associated with retroperitoneal hematoma.    Xray Lumbosacral Spine 4 View 9/4/20:  The patient is status post spinal fusion and discectomy/spacer placement. Hardware appears to be in place. There are midline skin staples. The heights of the vertebral bodies are preserved. No lytic or blastic lesions are identified.

## 2020-09-04 NOTE — PROGRESS NOTE ADULT - SUBJECTIVE AND OBJECTIVE BOX
Pt S/E at bedside, no acute events overnight, pain controlled. Pt continues to have LE weakness. Denies any other complaints.     Vital Signs Last 24 Hrs  T(C): 37.4 (04 Sep 2020 00:10), Max: 37.4 (04 Sep 2020 00:10)  T(F): 99.3 (04 Sep 2020 00:10), Max: 99.3 (04 Sep 2020 00:10)  HR: 116 (04 Sep 2020 00:10) (111 - 118)  BP: 131/79 (04 Sep 2020 00:10) (127/71 - 134/75)  BP(mean): --  RR: 17 (04 Sep 2020 00:10) (17 - 18)  SpO2: 97% (04 Sep 2020 00:10) (95% - 99%)    Gen: NAD  Spine:  Dressing clean dry intact  +JETHRO  SILT L3-S1  SILT C5-T1  +DP Pulses  +Radial Pulse  Compartments soft  No calf TTP B/L    Motor:            ElbowFlex    WristExt   ElbowExt   FingerFlex   FingerAbd     R            5/5                5/5            5/5             5/5               5/5  L             5/5               5/5             5/5             5/5               5/5              HipFlex   KneeExt   AnkleDorsi   AnklePlantar   HaluxDorsi  R        4/5        5/5            1/5            5/5              3-/5                                 L         4/5        5/5            1/5            5/5              3-/5                        Sensory:            C5         C6         C7      C8       T1        (0=absent, 1=impaired, 2=normal, NT=not testable)  R        2          2              2        2         2  L         2          2              2        2         2               L2          L3         L4      L5       S1         (0=absent, 1=impaired, 2=normal, NT=not testable)  R          2          2              2        2         2  L          2          2              2        2         2

## 2020-09-04 NOTE — PROGRESS NOTE ADULT - SUBJECTIVE AND OBJECTIVE BOX
Patient is a 35y old  Male who presents with a chief complaint of low back pain (04 Sep 2020 06:41)      INTERVAL HPI/OVERNIGHT EVENTS:  no new episode  MEDICATIONS  (STANDING):  acetaminophen  IVPB .. 1000 milliGRAM(s) IV Intermittent every 8 hours  ceFAZolin   IVPB 2000 milliGRAM(s) IV Intermittent every 8 hours  diazepam    Tablet 5 milliGRAM(s) Oral every 8 hours  HYDROmorphone   Tablet 2 milliGRAM(s) Oral every 4 hours  oxyCODONE  ER Tablet 10 milliGRAM(s) Oral every 12 hours  senna 2 Tablet(s) Oral at bedtime    MEDICATIONS  (PRN):  acetaminophen   Tablet .. 650 milliGRAM(s) Oral every 6 hours PRN Temp greater or equal to 38C (100.4F), Mild Pain (1 - 3)  famotidine    Tablet 20 milliGRAM(s) Oral every 12 hours PRN Dyspepsia  magnesium hydroxide Suspension 30 milliLiter(s) Oral every 12 hours PRN Constipation  ondansetron Injectable 4 milliGRAM(s) IV Push every 6 hours PRN Nausea  oxyCODONE    IR 5 milliGRAM(s) Oral every 4 hours PRN Moderate Pain (4 - 6)  oxyCODONE    IR 10 milliGRAM(s) Oral every 4 hours PRN Severe Pain (7 - 10)      Allergies    No Known Allergies    Intolerances        REVIEW OF SYSTEMS:  CONSTITUTIONAL: No fever, weight loss, or fatigue  EYES: No eye pain, visual disturbances, or discharge  ENMT:  No difficulty hearing, tinnitus, vertigo; No sinus or throat pain  NECK: No pain or stiffness  BREASTS: No pain, masses, or nipple discharge  RESPIRATORY: No cough, wheezing, chills or hemoptysis; No shortness of breath  CARDIOVASCULAR: No chest pain, palpitations, dizziness, or leg swelling  GASTROINTESTINAL: No abdominal or epigastric pain. No nausea, vomiting, or hematemesis; No diarrhea or constipation. No melena or hematochezia.  GENITOURINARY: No dysuria, frequency, hematuria, or incontinence  NEUROLOGICAL: No headaches, memory loss, loss of strength, numbness, or tremors  SKIN: No itching, burning, rashes, or lesions   LYMPH NODES: No enlarged glands  ENDOCRINE: No heat or cold intolerance; No hair loss  MUSCULOSKELETAL: No joint pain or swelling; No muscle, back, or extremity pain  PSYCHIATRIC: No depression, anxiety, mood swings, or difficulty sleeping  HEME/LYMPH: No easy bruising, or bleeding gums  ALLERGY AND IMMUNOLOGIC: No hives or eczema    Vital Signs Last 24 Hrs  T(C): 37 (04 Sep 2020 06:26), Max: 37.4 (04 Sep 2020 00:10)  T(F): 98.6 (04 Sep 2020 06:26), Max: 99.3 (04 Sep 2020 00:10)  HR: 110 (04 Sep 2020 06:26) (110 - 118)  BP: 128/78 (04 Sep 2020 06:26) (127/71 - 134/75)  BP(mean): --  RR: 16 (04 Sep 2020 06:26) (16 - 18)  SpO2: 97% (04 Sep 2020 06:26) (95% - 99%)    PHYSICAL EXAM:  GENERAL: NAD, well-groomed, well-developed  HEAD:  Atraumatic, Normocephalic  EYES: EOMI, PERRLA, conjunctiva and sclera clear  ENMT: No tonsillar erythema, exudates, or enlargement; Moist mucous membranes, Good dentition, No lesions  NECK: Supple, No JVD, Normal thyroid  NERVOUS SYSTEM:  Alert & Oriented X3, Good concentration; Motor Strength 5/5 B/L upper and lower extremities; DTRs 2+ intact and symmetric  CHEST/LUNG: Clear to percussion bilaterally; No rales, rhonchi, wheezing, or rubs  HEART: Regular rate and rhythm; No murmurs, rubs, or gallops  ABDOMEN: Soft, Nontender, Nondistended; Bowel sounds present  EXTREMITIES:  2+ Peripheral Pulses, No clubbing, cyanosis, or edema  LYMPH: No lymphadenopathy noted  SKIN: No rashes or lesions    LABS:                        10.4   12.40 )-----------( 402      ( 04 Sep 2020 07:34 )             32.7     09-04    133<L>  |  98  |  10  ----------------------------<  101<H>  3.9   |  30  |  0.58    Ca    8.5      04 Sep 2020 07:34      PT/INR - ( 03 Sep 2020 04:55 )   PT: 15.5 sec;   INR: 1.36 ratio         PTT - ( 03 Sep 2020 04:55 )  PTT:40.6 sec    CAPILLARY BLOOD GLUCOSE        CULTURES:    HEMOGLOBIN A1C:    CHOLESTEROL:        RADIOLOGY & ADDITIONAL TESTS:

## 2020-09-04 NOTE — H&P ADULT - ASSESSMENT
Assessment/Plan:  BRUNO CHO is a 35y 35M admitted to [facility] on 8/22/20 with back pain, found to have fractures of L1-L5 pedicles bilaterally, leading to widening of the spinal canal with anterolisthesis of L5 on S1 with the entire L5 vertebral body width, disruption of the ALL and PLL at L5-S1 level, probable epidural hemorrhage and hemorrhage within the foramina at L1-L5, and enlargement/edema of bilateral psoas muscle.  Patient underwent evacuation of epidural hematoma, T11-pelvis posterior spinal fusion, and L2-S1 Laminectomy on 8/23/20 and subsequent L5-S1 anterior interbody fusion and L3-4 lateral interbody fusion on 9/1.  Hospital course c/b acute blood loss anemia requiring multiple transfusions, traumatic rhabodmyolysis, and pain.  Patient now admitted for a multidisciplinary rehab program. 09-04-20 @ 14:37    Comprehensive Multidisciplinary Rehab Program:  - Start comprehensive rehab program of PT/OT - 3 hours a day, 5 days a week  - P&O as needed     -------------  MEDICAL MANAGEMENT     #Traumatic Spinal Cord Injury  - pt with complete anterolisthesis of L5 on S1, multiple pedicular fractures, and epidural hemorrhage   - s/p T11-pelvis posterior spinal fusion, and L2-S1 Laminectomy on 8/23/20 and subsequent L5-S1 anterior interbody fusion and L3-4 lateral interbody fusion on 9/1  - pain control as below  - daily PT/OT  - spinal & Fall precautions  - TLSO/AFOs when OOB  - monitor bladder/bowel function out of concern for neurogenic bowel  - monitor JETHRO drain outpt; c/w cefazolin while drains in place   - will need to discuss drain removal with ortho when output decreases     #Rhabdomyolysis  - 2/2 trauma   - largely resolved: <900 on 8/31  - encourage PO fluids    #Pain Control  - c/w dilaudid, oxycontin, oxycodone  - valium for muscle spasms     #Bladder Management  - francis removed 9/4 am  - monitor bladder scans, PVR PRN as pt at risk for neurogenic bladder     #IVC filter  - placed 8/26  - will need removed in 5-6 months       DVT prophylaxis:   - IVC filnter  - SCDs      Outpatient Follow-up:    Gabrielle Villegas  ORTHOPAEDIC SURGERY  [address] [facility], [address] [place] [address]  Phone: [phone]  Fax: [phone]  Follow Up Time:     Dave Rivera  ORTHOPAEDIC SURGERY  [address] [place] [address]  Phone: [phone]  Fax: [phone]    ---------------    Goals: Safe discharge to Home  Estimated Length of Stay: 10-14 days  Rehab Potential: Good  Medical Prognosis: Good  Estimated Disposition: Home with home care    PRESCREEN COMPARISON:  I have reviewed the prescreen information and I have found no relevant changes between the preadmission screening and my post admission evaluation.    RATIONALE FOR INPATIENT ADMISSION: Patient demonstrates the following:  [X] Medically appropriate for rehabilitation admission  [X] Has attainable rehab goals with an appropriate initial discharge plan  [X]Has rehabilitation potential (expected to make a significant improvement within a reasonable period of time)  [X] Requires close medical management by a rehab physician, rehab nursing care, Hospitalist and comprehensive interdisciplinary team (including PT, OT and/or SLP, Prosthetics and Orthotics) Assessment/Plan:  BRUNO CHO is a 35y 35M admitted to [facility] on 8/22/20 with back pain, found to have fractures of L1-L5 pedicles bilaterally, leading to widening of the spinal canal with anterolisthesis of L5 on S1 with the entire L5 vertebral body width, disruption of the ALL and PLL at L5-S1 level, probable epidural hemorrhage and hemorrhage within the foramina at L1-L5, and enlargement/edema of bilateral psoas muscle.  Patient underwent evacuation of epidural hematoma, T11-pelvis posterior spinal fusion, and L2-S1 Laminectomy on 8/23/20 and subsequent L5-S1 anterior interbody fusion and L3-4 lateral interbody fusion on 9/1.  Hospital course c/b acute blood loss anemia requiring multiple transfusions, traumatic rhabodmyolysis, and pain.  Patient now admitted for a multidisciplinary rehab program. 09-04-20 @ 14:37    Comprehensive Multidisciplinary Rehab Program:  - Start comprehensive rehab program of PT/OT - 3 hours a day, 5 days a week  - P&O as needed     -------------  MEDICAL MANAGEMENT     #Traumatic Spinal Cord Injury  - pt with complete anterolisthesis of L5 on S1, multiple pedicular fractures, and epidural hemorrhage   - s/p T11-pelvis posterior spinal fusion, and L2-S1 Laminectomy on 8/23/20 and subsequent L5-S1 anterior interbody fusion and L3-4 lateral interbody fusion on 9/1  - pain control as below  - daily PT/OT  - spinal & Fall precautions  - TLSO/AFOs when OOB  - monitor bladder/bowel function out of concern for neurogenic bowel  - monitor JETHRO drain outpt; c/w cefazolin while drains in place   - will need to discuss drain removal with ortho when output decreases     #Rhabdomyolysis  - 2/2 trauma   - largely resolved: <900 on 8/31  - encourage PO fluids    #Pain Control  - c/w dilaudid, oxycontin, oxycodone  - valium for muscle spasms     #Bladder Management  - francis removed 9/4 am  - monitor bladder scans, PVR PRN as pt at risk for neurogenic bladder     #IVC filter  - placed 8/26  - will need removed in 5-6 months       DVT prophylaxis:   - IVC filnter  - SCDs      Outpatient Follow-up:    Gabrielle Villegas  ORTHOPAEDIC SURGERY  [address] [facility], [address] [place] [address]  Phone: [phone]  Fax: [phone]  Follow Up Time:     Dave Rivera  ORTHOPAEDIC SURGERY  [address] [place] [address]  Phone: [phone]  Fax: [phone]    ---------------    Goals: Safe discharge to Home  Estimated Length of Stay: 10-14 days  Rehab Potential: Good  Medical Prognosis: Good  Estimated Disposition: Home with home care    PRESCREEN COMPARISON:  I have reviewed the prescreen information and I have found no relevant changes between the preadmission screening and my post admission evaluation.    RATIONALE FOR INPATIENT ADMISSION: Patient demonstrates the following:  [X] Medically appropriate for rehabilitation admission  [X] Has attainable rehab goals with an appropriate initial discharge plan  [X]Has rehabilitation potential (expected to make a significant improvement within a reasonable period of time)  [X] Requires close medical management by a rehab physician, rehab nursing care, Hospitalist and comprehensive interdisciplinary team (including PT, OT and/or SLP, Prosthetics and Orthotics) Assessment/Plan:  BRUNO CHO is a 35y 35M admitted to [facility] on 8/22/20 with back pain, found to have fractures of L1-L5 pedicles bilaterally, leading to widening of the spinal canal with anterolisthesis of L5 on S1 with the entire L5 vertebral body width, disruption of the ALL and PLL at L5-S1 level, probable epidural hemorrhage and hemorrhage within the foramina at L1-L5, and enlargement/edema of bilateral psoas muscle.  Patient underwent evacuation of epidural hematoma, T11-pelvis posterior spinal fusion, and L2-S1 Laminectomy on 8/23/20 and subsequent L5-S1 anterior interbody fusion and L3-4 lateral interbody fusion on 9/1.  Hospital course c/b acute blood loss anemia requiring multiple transfusions, traumatic rhabodmyolysis, and intractable pain.  Patient now admitted for a multidisciplinary rehab program. 09-04-20 @ 14:37    Comprehensive Multidisciplinary Rehab Program:  - Start comprehensive rehab program of PT/OT - 3 hours a day, 5 days a week  - P&O as needed    -------------  MEDICAL MANAGEMENT     #Traumatic Spinal Cord Injury  - pt with complete anterolisthesis of L5 on S1, multiple pedicular fractures, and epidural hemorrhage   - s/p T11-pelvis posterior spinal fusion, and L2-S1 Laminectomy on 8/23/20 and subsequent L5-S1 anterior interbody fusion and L3-4 lateral interbody fusion on 9/1  - pain control as below  - PT/OT  - spinal & Fall precautions  - TLSO/AFOs when OOB  - monitor bladder/bowel function out of concern for neurogenic bowel  - monitor JETHRO drain outpt; Keflex 500mg Q8h while drain is in place, currently ordered through 9/8 in case drain discontinuation is postponed  - Drain dc instructions (d/w ortho when output decreases) Per DC note: can be pulled on Monday 9/7/20. The drain is sewn in, so place cut the one suture that is holding the drain in place. Once the suture is cut, open the drain cap so that the drain is open. After both previous steps are done then the drain can be pulled. Pull lightly until drain is completely out. Place a 4x4 with Tegaderm over the drain wound.    #Rhabdomyolysis  - 2/2 trauma   - largely resolved: <900 on 8/31  - encourage PO fluids    #Pain Control  - c/w dilaudid, oxycontin scheduled. oxycodone prn  - valium for muscle spasms     #Bladder Management  - francis removed 9/4 am  - monitor bladder scans, PVR PRN as pt at risk for neurogenic bladder     #IVC filter  - placed 8/26  - will need removed in 5-6 months       DVT prophylaxis:   - IVC filter  - SCDs      Outpatient Follow-up:    Gabrielle Villegas  ORTHOPAEDIC SURGERY  [address] [facility], [address]  [place] [address]  Phone: [phone]  Fax: [phone]  Follow Up Time:     Dave Rivera  ORTHOPAEDIC SURGERY  [address] [place] [address]  Phone: [phone]  Fax: [phone]    ---------------    Goals: Safe discharge to Home  Estimated Length of Stay: 10-14 days  Rehab Potential: Good  Medical Prognosis: Good  Estimated Disposition: Home with home care    PRESCREEN COMPARISON:  I have reviewed the prescreen information and I have found no relevant changes between the preadmission screening and my post admission evaluation.    RATIONALE FOR INPATIENT ADMISSION: Patient demonstrates the following:  [X] Medically appropriate for rehabilitation admission  [X] Has attainable rehab goals with an appropriate initial discharge plan  [X]Has rehabilitation potential (expected to make a significant improvement within a reasonable period of time)  [X] Requires close medical management by a rehab physician, rehab nursing care, Hospitalist and comprehensive interdisciplinary team (including PT, OT and/or SLP, Prosthetics and Orthotics) need for outpatient follow-up/lab results/return to ED if symptoms worsen, persist or questions arise

## 2020-09-05 ENCOUNTER — INPATIENT (INPATIENT)
Facility: HOSPITAL | Age: 35
LOS: 36 days | Discharge: HOME HEALTH | DRG: 949 | End: 2020-10-12
Attending: PHYSICAL MEDICINE & REHABILITATION | Admitting: PHYSICAL MEDICINE & REHABILITATION
Payer: MEDICAID

## 2020-09-05 VITALS
TEMPERATURE: 98 F | SYSTOLIC BLOOD PRESSURE: 128 MMHG | HEART RATE: 102 BPM | WEIGHT: 240.3 LBS | RESPIRATION RATE: 16 BRPM | HEIGHT: 75 IN | OXYGEN SATURATION: 98 % | DIASTOLIC BLOOD PRESSURE: 83 MMHG

## 2020-09-05 VITALS
SYSTOLIC BLOOD PRESSURE: 125 MMHG | OXYGEN SATURATION: 98 % | RESPIRATION RATE: 20 BRPM | HEART RATE: 114 BPM | DIASTOLIC BLOOD PRESSURE: 62 MMHG

## 2020-09-05 DIAGNOSIS — S34.121D: ICD-10-CM

## 2020-09-05 DIAGNOSIS — I82.403 ACUTE EMBOLISM AND THROMBOSIS OF UNSPECIFIED DEEP VEINS OF LOWER EXTREMITY, BILATERAL: ICD-10-CM

## 2020-09-05 DIAGNOSIS — K21.9 GASTRO-ESOPHAGEAL REFLUX DISEASE WITHOUT ESOPHAGITIS: ICD-10-CM

## 2020-09-05 DIAGNOSIS — Z79.01 LONG TERM (CURRENT) USE OF ANTICOAGULANTS: ICD-10-CM

## 2020-09-05 DIAGNOSIS — R26.9 UNSPECIFIED ABNORMALITIES OF GAIT AND MOBILITY: ICD-10-CM

## 2020-09-05 DIAGNOSIS — M79.2 NEURALGIA AND NEURITIS, UNSPECIFIED: ICD-10-CM

## 2020-09-05 DIAGNOSIS — D72.829 ELEVATED WHITE BLOOD CELL COUNT, UNSPECIFIED: ICD-10-CM

## 2020-09-05 DIAGNOSIS — S32.029D UNSPECIFIED FRACTURE OF SECOND LUMBAR VERTEBRA, SUBSEQUENT ENCOUNTER FOR FRACTURE WITH ROUTINE HEALING: ICD-10-CM

## 2020-09-05 DIAGNOSIS — M43.15 SPONDYLOLISTHESIS, THORACOLUMBAR REGION: ICD-10-CM

## 2020-09-05 DIAGNOSIS — S34.121A: ICD-10-CM

## 2020-09-05 DIAGNOSIS — S32.049D UNSPECIFIED FRACTURE OF FOURTH LUMBAR VERTEBRA, SUBSEQUENT ENCOUNTER FOR FRACTURE WITH ROUTINE HEALING: ICD-10-CM

## 2020-09-05 DIAGNOSIS — R33.9 RETENTION OF URINE, UNSPECIFIED: ICD-10-CM

## 2020-09-05 DIAGNOSIS — G47.00 INSOMNIA, UNSPECIFIED: ICD-10-CM

## 2020-09-05 DIAGNOSIS — Z98.1 ARTHRODESIS STATUS: ICD-10-CM

## 2020-09-05 DIAGNOSIS — S32.019D UNSPECIFIED FRACTURE OF FIRST LUMBAR VERTEBRA, SUBSEQUENT ENCOUNTER FOR FRACTURE WITH ROUTINE HEALING: ICD-10-CM

## 2020-09-05 DIAGNOSIS — N31.9 NEUROMUSCULAR DYSFUNCTION OF BLADDER, UNSPECIFIED: ICD-10-CM

## 2020-09-05 DIAGNOSIS — M62.838 OTHER MUSCLE SPASM: ICD-10-CM

## 2020-09-05 DIAGNOSIS — R25.2 CRAMP AND SPASM: ICD-10-CM

## 2020-09-05 DIAGNOSIS — E87.1 HYPO-OSMOLALITY AND HYPONATREMIA: ICD-10-CM

## 2020-09-05 DIAGNOSIS — D69.6 THROMBOCYTOPENIA, UNSPECIFIED: ICD-10-CM

## 2020-09-05 DIAGNOSIS — F12.90 CANNABIS USE, UNSPECIFIED, UNCOMPLICATED: ICD-10-CM

## 2020-09-05 DIAGNOSIS — R60.0 LOCALIZED EDEMA: ICD-10-CM

## 2020-09-05 DIAGNOSIS — Z79.2 LONG TERM (CURRENT) USE OF ANTIBIOTICS: ICD-10-CM

## 2020-09-05 DIAGNOSIS — M21.371 FOOT DROP, RIGHT FOOT: ICD-10-CM

## 2020-09-05 DIAGNOSIS — R00.0 TACHYCARDIA, UNSPECIFIED: ICD-10-CM

## 2020-09-05 DIAGNOSIS — Z95.818 PRESENCE OF OTHER CARDIAC IMPLANTS AND GRAFTS: ICD-10-CM

## 2020-09-05 DIAGNOSIS — T79.6XXD TRAUMATIC ISCHEMIA OF MUSCLE, SUBSEQUENT ENCOUNTER: ICD-10-CM

## 2020-09-05 DIAGNOSIS — G82.22 PARAPLEGIA, INCOMPLETE: ICD-10-CM

## 2020-09-05 DIAGNOSIS — S32.039D UNSPECIFIED FRACTURE OF THIRD LUMBAR VERTEBRA, SUBSEQUENT ENCOUNTER FOR FRACTURE WITH ROUTINE HEALING: ICD-10-CM

## 2020-09-05 DIAGNOSIS — Z51.89 ENCOUNTER FOR OTHER SPECIFIED AFTERCARE: ICD-10-CM

## 2020-09-05 DIAGNOSIS — R50.9 FEVER, UNSPECIFIED: ICD-10-CM

## 2020-09-05 DIAGNOSIS — Z79.891 LONG TERM (CURRENT) USE OF OPIATE ANALGESIC: ICD-10-CM

## 2020-09-05 DIAGNOSIS — Z91.81 HISTORY OF FALLING: ICD-10-CM

## 2020-09-05 DIAGNOSIS — S32.059D UNSPECIFIED FRACTURE OF FIFTH LUMBAR VERTEBRA, SUBSEQUENT ENCOUNTER FOR FRACTURE WITH ROUTINE HEALING: ICD-10-CM

## 2020-09-05 DIAGNOSIS — D62 ACUTE POSTHEMORRHAGIC ANEMIA: ICD-10-CM

## 2020-09-05 DIAGNOSIS — F43.23 ADJUSTMENT DISORDER WITH MIXED ANXIETY AND DEPRESSED MOOD: ICD-10-CM

## 2020-09-05 DIAGNOSIS — M21.372 FOOT DROP, LEFT FOOT: ICD-10-CM

## 2020-09-05 LAB
ANION GAP SERPL CALC-SCNC: 7 MMOL/L — SIGNIFICANT CHANGE UP (ref 5–17)
BUN SERPL-MCNC: 11 MG/DL — SIGNIFICANT CHANGE UP (ref 7–23)
CALCIUM SERPL-MCNC: 8.3 MG/DL — LOW (ref 8.5–10.1)
CHLORIDE SERPL-SCNC: 97 MMOL/L — SIGNIFICANT CHANGE UP (ref 96–108)
CO2 SERPL-SCNC: 29 MMOL/L — SIGNIFICANT CHANGE UP (ref 22–31)
CREAT SERPL-MCNC: 0.72 MG/DL — SIGNIFICANT CHANGE UP (ref 0.5–1.3)
GLUCOSE SERPL-MCNC: 109 MG/DL — HIGH (ref 70–99)
HCT VFR BLD CALC: 34 % — LOW (ref 39–50)
HGB BLD-MCNC: 10.9 G/DL — LOW (ref 13–17)
MCHC RBC-ENTMCNC: 28.8 PG — SIGNIFICANT CHANGE UP (ref 27–34)
MCHC RBC-ENTMCNC: 32.1 GM/DL — SIGNIFICANT CHANGE UP (ref 32–36)
MCV RBC AUTO: 89.7 FL — SIGNIFICANT CHANGE UP (ref 80–100)
NRBC # BLD: 0 /100 WBCS — SIGNIFICANT CHANGE UP (ref 0–0)
PLATELET # BLD AUTO: 435 K/UL — HIGH (ref 150–400)
POTASSIUM SERPL-MCNC: 3.9 MMOL/L — SIGNIFICANT CHANGE UP (ref 3.5–5.3)
POTASSIUM SERPL-SCNC: 3.9 MMOL/L — SIGNIFICANT CHANGE UP (ref 3.5–5.3)
RBC # BLD: 3.79 M/UL — LOW (ref 4.2–5.8)
RBC # FLD: 13.6 % — SIGNIFICANT CHANGE UP (ref 10.3–14.5)
SODIUM SERPL-SCNC: 133 MMOL/L — LOW (ref 135–145)
WBC # BLD: 13.73 K/UL — HIGH (ref 3.8–10.5)
WBC # FLD AUTO: 13.73 K/UL — HIGH (ref 3.8–10.5)

## 2020-09-05 PROCEDURE — 99223 1ST HOSP IP/OBS HIGH 75: CPT

## 2020-09-05 RX ORDER — FAMOTIDINE 10 MG/ML
20 INJECTION INTRAVENOUS EVERY 12 HOURS
Refills: 0 | Status: ACTIVE | OUTPATIENT
Start: 2020-09-05 | End: 2021-08-03

## 2020-09-05 RX ORDER — CEPHALEXIN 500 MG
500 CAPSULE ORAL EVERY 8 HOURS
Refills: 0 | Status: DISCONTINUED | OUTPATIENT
Start: 2020-09-05 | End: 2020-09-08

## 2020-09-05 RX ORDER — INFLUENZA VIRUS VACCINE 15; 15; 15; 15 UG/.5ML; UG/.5ML; UG/.5ML; UG/.5ML
0.5 SUSPENSION INTRAMUSCULAR ONCE
Refills: 0 | Status: COMPLETED | OUTPATIENT
Start: 2020-09-05 | End: 2020-09-05

## 2020-09-05 RX ORDER — CEFAZOLIN SODIUM 1 G
2000 VIAL (EA) INJECTION EVERY 8 HOURS
Refills: 0 | Status: DISCONTINUED | OUTPATIENT
Start: 2020-09-05 | End: 2020-09-05

## 2020-09-05 RX ORDER — CEPHALEXIN 500 MG
500 CAPSULE ORAL EVERY 8 HOURS
Refills: 0 | Status: DISCONTINUED | OUTPATIENT
Start: 2020-09-05 | End: 2020-09-05

## 2020-09-05 RX ADMIN — OXYCODONE HYDROCHLORIDE 5 MILLIGRAM(S): 5 TABLET ORAL at 22:30

## 2020-09-05 RX ADMIN — Medication 100 MILLIGRAM(S): at 11:54

## 2020-09-05 RX ADMIN — HYDROMORPHONE HYDROCHLORIDE 2 MILLIGRAM(S): 2 INJECTION INTRAMUSCULAR; INTRAVENOUS; SUBCUTANEOUS at 21:11

## 2020-09-05 RX ADMIN — OXYCODONE HYDROCHLORIDE 10 MILLIGRAM(S): 5 TABLET ORAL at 18:03

## 2020-09-05 RX ADMIN — OXYCODONE HYDROCHLORIDE 5 MILLIGRAM(S): 5 TABLET ORAL at 17:00

## 2020-09-05 RX ADMIN — Medication 500 MILLIGRAM(S): at 21:11

## 2020-09-05 RX ADMIN — Medication 650 MILLIGRAM(S): at 08:30

## 2020-09-05 RX ADMIN — Medication 5 MILLIGRAM(S): at 05:19

## 2020-09-05 RX ADMIN — OXYCODONE HYDROCHLORIDE 10 MILLIGRAM(S): 5 TABLET ORAL at 07:41

## 2020-09-05 RX ADMIN — HYDROMORPHONE HYDROCHLORIDE 2 MILLIGRAM(S): 2 INJECTION INTRAMUSCULAR; INTRAVENOUS; SUBCUTANEOUS at 09:27

## 2020-09-05 RX ADMIN — HYDROMORPHONE HYDROCHLORIDE 2 MILLIGRAM(S): 2 INJECTION INTRAMUSCULAR; INTRAVENOUS; SUBCUTANEOUS at 05:20

## 2020-09-05 RX ADMIN — Medication 5 MILLIGRAM(S): at 23:26

## 2020-09-05 RX ADMIN — OXYCODONE HYDROCHLORIDE 10 MILLIGRAM(S): 5 TABLET ORAL at 08:30

## 2020-09-05 RX ADMIN — SENNA PLUS 2 TABLET(S): 8.6 TABLET ORAL at 21:12

## 2020-09-05 RX ADMIN — HYDROMORPHONE HYDROCHLORIDE 2 MILLIGRAM(S): 2 INJECTION INTRAMUSCULAR; INTRAVENOUS; SUBCUTANEOUS at 22:00

## 2020-09-05 RX ADMIN — Medication 5 MILLIGRAM(S): at 16:24

## 2020-09-05 RX ADMIN — HYDROMORPHONE HYDROCHLORIDE 2 MILLIGRAM(S): 2 INJECTION INTRAMUSCULAR; INTRAVENOUS; SUBCUTANEOUS at 18:04

## 2020-09-05 RX ADMIN — Medication 1 TABLET(S): at 18:02

## 2020-09-05 RX ADMIN — Medication 650 MILLIGRAM(S): at 07:41

## 2020-09-05 RX ADMIN — OXYCODONE HYDROCHLORIDE 10 MILLIGRAM(S): 5 TABLET ORAL at 11:44

## 2020-09-05 RX ADMIN — OXYCODONE HYDROCHLORIDE 10 MILLIGRAM(S): 5 TABLET ORAL at 06:29

## 2020-09-05 RX ADMIN — Medication 100 MILLIGRAM(S): at 04:08

## 2020-09-05 RX ADMIN — HYDROMORPHONE HYDROCHLORIDE 2 MILLIGRAM(S): 2 INJECTION INTRAMUSCULAR; INTRAVENOUS; SUBCUTANEOUS at 14:39

## 2020-09-05 RX ADMIN — OXYCODONE HYDROCHLORIDE 10 MILLIGRAM(S): 5 TABLET ORAL at 05:19

## 2020-09-05 RX ADMIN — OXYCODONE HYDROCHLORIDE 5 MILLIGRAM(S): 5 TABLET ORAL at 02:41

## 2020-09-05 RX ADMIN — HYDROMORPHONE HYDROCHLORIDE 2 MILLIGRAM(S): 2 INJECTION INTRAMUSCULAR; INTRAVENOUS; SUBCUTANEOUS at 03:36

## 2020-09-05 RX ADMIN — OXYCODONE HYDROCHLORIDE 5 MILLIGRAM(S): 5 TABLET ORAL at 16:24

## 2020-09-05 RX ADMIN — HYDROMORPHONE HYDROCHLORIDE 2 MILLIGRAM(S): 2 INJECTION INTRAMUSCULAR; INTRAVENOUS; SUBCUTANEOUS at 15:15

## 2020-09-05 RX ADMIN — OXYCODONE HYDROCHLORIDE 5 MILLIGRAM(S): 5 TABLET ORAL at 01:03

## 2020-09-05 RX ADMIN — HYDROMORPHONE HYDROCHLORIDE 2 MILLIGRAM(S): 2 INJECTION INTRAMUSCULAR; INTRAVENOUS; SUBCUTANEOUS at 06:28

## 2020-09-05 RX ADMIN — OXYCODONE HYDROCHLORIDE 5 MILLIGRAM(S): 5 TABLET ORAL at 20:48

## 2020-09-05 RX ADMIN — HYDROMORPHONE HYDROCHLORIDE 2 MILLIGRAM(S): 2 INJECTION INTRAMUSCULAR; INTRAVENOUS; SUBCUTANEOUS at 10:30

## 2020-09-05 RX ADMIN — HYDROMORPHONE HYDROCHLORIDE 2 MILLIGRAM(S): 2 INJECTION INTRAMUSCULAR; INTRAVENOUS; SUBCUTANEOUS at 02:23

## 2020-09-05 RX ADMIN — HYDROMORPHONE HYDROCHLORIDE 2 MILLIGRAM(S): 2 INJECTION INTRAMUSCULAR; INTRAVENOUS; SUBCUTANEOUS at 18:45

## 2020-09-05 RX ADMIN — OXYCODONE HYDROCHLORIDE 10 MILLIGRAM(S): 5 TABLET ORAL at 18:45

## 2020-09-05 NOTE — PROGRESS NOTE ADULT - REASON FOR ADMISSION
low back pain

## 2020-09-05 NOTE — PROGRESS NOTE ADULT - SUBJECTIVE AND OBJECTIVE BOX
Patient is a 35y old  Male who presents with a chief complaint of low back pain (05 Sep 2020 08:01)      INTERVAL HPI/OVERNIGHT EVENTS:  pt is doing better  MEDICATIONS  (STANDING):  acetaminophen  IVPB .. 1000 milliGRAM(s) IV Intermittent every 8 hours  ceFAZolin   IVPB 2000 milliGRAM(s) IV Intermittent every 8 hours  diazepam    Tablet 5 milliGRAM(s) Oral every 8 hours  HYDROmorphone   Tablet 2 milliGRAM(s) Oral every 4 hours  oxyCODONE  ER Tablet 10 milliGRAM(s) Oral every 12 hours  senna 2 Tablet(s) Oral at bedtime    MEDICATIONS  (PRN):  acetaminophen   Tablet .. 650 milliGRAM(s) Oral every 6 hours PRN Temp greater or equal to 38C (100.4F), Mild Pain (1 - 3)  famotidine    Tablet 20 milliGRAM(s) Oral every 12 hours PRN Dyspepsia  magnesium hydroxide Suspension 30 milliLiter(s) Oral every 12 hours PRN Constipation  ondansetron Injectable 4 milliGRAM(s) IV Push every 6 hours PRN Nausea  oxyCODONE    IR 5 milliGRAM(s) Oral every 4 hours PRN Moderate Pain (4 - 6)  oxyCODONE    IR 10 milliGRAM(s) Oral every 4 hours PRN Severe Pain (7 - 10)      Allergies    No Known Allergies    Intolerances        REVIEW OF SYSTEMS:  CONSTITUTIONAL: No fever, weight loss, or fatigue  EYES: No eye pain, visual disturbances, or discharge  ENMT:  No difficulty hearing, tinnitus, vertigo; No sinus or throat pain  NECK: No pain or stiffness  BREASTS: No pain, masses, or nipple discharge  RESPIRATORY: No cough, wheezing, chills or hemoptysis; No shortness of breath  CARDIOVASCULAR: No chest pain, palpitations, dizziness, or leg swelling  GASTROINTESTINAL: No abdominal or epigastric pain. No nausea, vomiting, or hematemesis; No diarrhea or constipation. No melena or hematochezia.  GENITOURINARY: No dysuria, frequency, hematuria, or incontinence  NEUROLOGICAL: No headaches, memory loss, loss of strength, numbness, or tremors  SKIN: No itching, burning, rashes, or lesions   LYMPH NODES: No enlarged glands  ENDOCRINE: No heat or cold intolerance; No hair loss  MUSCULOSKELETAL: No joint pain or swelling; No muscle, back, or extremity pain  PSYCHIATRIC: No depression, anxiety, mood swings, or difficulty sleeping  HEME/LYMPH: No easy bruising, or bleeding gums  ALLERGY AND IMMUNOLOGIC: No hives or eczema    Vital Signs Last 24 Hrs  T(C): 36.8 (05 Sep 2020 05:21), Max: 36.8 (04 Sep 2020 17:00)  T(F): 98.2 (05 Sep 2020 05:21), Max: 98.3 (04 Sep 2020 17:00)  HR: 116 (05 Sep 2020 05:21) (102 - 118)  BP: 137/92 (05 Sep 2020 05:21) (120/78 - 137/92)  BP(mean): --  RR: 18 (05 Sep 2020 05:21) (17 - 18)  SpO2: 97% (05 Sep 2020 05:21) (97% - 98%)    PHYSICAL EXAM:  GENERAL: NAD, well-groomed, well-developed  HEAD:  Atraumatic, Normocephalic  EYES: EOMI, PERRLA, conjunctiva and sclera clear  ENMT: No tonsillar erythema, exudates, or enlargement; Moist mucous membranes, Good dentition, No lesions  NECK: Supple, No JVD, Normal thyroid  NERVOUS SYSTEM:  Alert & Oriented X3, Good concentration; Motor Strength 5/5 B/L upper and lower extremities; DTRs 2+ intact and symmetric  CHEST/LUNG: Clear to percussion bilaterally; No rales, rhonchi, wheezing, or rubs  HEART: Regular rate and rhythm; No murmurs, rubs, or gallops  ABDOMEN: Soft, Nontender, Nondistended; Bowel sounds present  EXTREMITIES:  2+ Peripheral Pulses, No clubbing, cyanosis, or edema  LYMPH: No lymphadenopathy noted  SKIN: No rashes or lesions    LABS:                        10.9   13.73 )-----------( 435      ( 05 Sep 2020 06:20 )             34.0     09-05    133<L>  |  97  |  11  ----------------------------<  109<H>  3.9   |  29  |  0.72    Ca    8.3<L>      05 Sep 2020 06:20          CAPILLARY BLOOD GLUCOSE        CULTURES:    HEMOGLOBIN A1C:    CHOLESTEROL:        RADIOLOGY & ADDITIONAL TESTS:

## 2020-09-05 NOTE — PROGRESS NOTE ADULT - PROBLEM SELECTOR PLAN 2
pain control

## 2020-09-05 NOTE — PROGRESS NOTE ADULT - SUBJECTIVE AND OBJECTIVE BOX
Pt S/E at bedside, no acute events overnight, pain controlled. Pt continues to have LE weakness. Denies any other complaints. Agrees to DC to acute rehab.    Vital Signs Last 24 Hrs  T(C): 37.4 (04 Sep 2020 00:10), Max: 37.4 (04 Sep 2020 00:10)  T(F): 99.3 (04 Sep 2020 00:10), Max: 99.3 (04 Sep 2020 00:10)  HR: 116 (04 Sep 2020 00:10) (111 - 118)  BP: 131/79 (04 Sep 2020 00:10) (127/71 - 134/75)  BP(mean): --  RR: 17 (04 Sep 2020 00:10) (17 - 18)  SpO2: 97% (04 Sep 2020 00:10) (95% - 99%)    Gen: NAD  Spine:  Dressing clean dry intact  +JETHRO  SILT L3-S1  SILT C5-T1  +DP Pulses  +Radial Pulse  Compartments soft  No calf TTP B/L    Motor:            ElbowFlex    WristExt   ElbowExt   FingerFlex   FingerAbd     R            5/5                5/5            5/5             5/5               5/5  L             5/5               5/5             5/5             5/5               5/5              HipFlex   KneeExt   AnkleDorsi   AnklePlantar   HaluxDorsi  R        4/5        5/5            1/5            5/5              3/5                                 L         4/5        5/5            1/5            5/5              3/5            No improvement from the previous days exam. But pt seems to have improvement moving b/l toes.               Sensory:            C5         C6         C7      C8       T1        (0=absent, 1=impaired, 2=normal, NT=not testable)  R        2          2              2        2         2  L         2          2              2        2         2               L2          L3         L4      L5       S1         (0=absent, 1=impaired, 2=normal, NT=not testable)  R          2          2              2        2         2  L          2          2              2        2         2

## 2020-09-05 NOTE — PROGRESS NOTE ADULT - ASSESSMENT
35M s/p L5-S1 ALIF, L3-4 Lateral interbody fusion POD 4    FU AM labs  Pain control  Monitor Drain output  PT/OT: WBAT  DVT ppx- SCDs  No intraop dural tear  Appreciate medical management  PT/OT   Plan for Acute rehab today  Will advise if plan changes

## 2020-09-05 NOTE — PROGRESS NOTE ADULT - PROBLEM SELECTOR PROBLEM 2
Intractable pain

## 2020-09-05 NOTE — PROGRESS NOTE ADULT - PROBLEM SELECTOR PLAN 1
pt is medically optimzed for OR
cont pain med
cont pain med
pt for OR today
pt for OR today
pt is medically optimized for OR
s/p Laminectomy, Ortho follow up
s/p Laminectomy, dc to rehab
s/p surgery
surgery follow up noted
surgery follow up, spoke to father
pt is medically optimized for OR

## 2020-09-05 NOTE — PROGRESS NOTE ADULT - PROVIDER SPECIALTY LIST ADULT
Critical Care
Critical Care
Internal Medicine
Intervent Radiology
Nephrology
Orthopedics
Critical Care
Orthopedics
Orthopedics
Internal Medicine

## 2020-09-06 LAB
ALBUMIN SERPL ELPH-MCNC: 2.1 G/DL — LOW (ref 3.3–5)
ALP SERPL-CCNC: 171 U/L — HIGH (ref 40–120)
ALT FLD-CCNC: 42 U/L — SIGNIFICANT CHANGE UP (ref 10–45)
ANION GAP SERPL CALC-SCNC: 10 MMOL/L — SIGNIFICANT CHANGE UP (ref 5–17)
AST SERPL-CCNC: 25 U/L — SIGNIFICANT CHANGE UP (ref 10–40)
BASOPHILS # BLD AUTO: 0.03 K/UL — SIGNIFICANT CHANGE UP (ref 0–0.2)
BASOPHILS NFR BLD AUTO: 0.3 % — SIGNIFICANT CHANGE UP (ref 0–2)
BILIRUB SERPL-MCNC: 0.5 MG/DL — SIGNIFICANT CHANGE UP (ref 0.2–1.2)
BUN SERPL-MCNC: 10 MG/DL — SIGNIFICANT CHANGE UP (ref 7–23)
CALCIUM SERPL-MCNC: 8.6 MG/DL — SIGNIFICANT CHANGE UP (ref 8.4–10.5)
CHLORIDE SERPL-SCNC: 99 MMOL/L — SIGNIFICANT CHANGE UP (ref 96–108)
CK SERPL-CCNC: 293 U/L — HIGH (ref 30–200)
CO2 SERPL-SCNC: 26 MMOL/L — SIGNIFICANT CHANGE UP (ref 22–31)
CREAT SERPL-MCNC: 0.78 MG/DL — SIGNIFICANT CHANGE UP (ref 0.5–1.3)
EOSINOPHIL # BLD AUTO: 0.17 K/UL — SIGNIFICANT CHANGE UP (ref 0–0.5)
EOSINOPHIL NFR BLD AUTO: 1.6 % — SIGNIFICANT CHANGE UP (ref 0–6)
GLUCOSE SERPL-MCNC: 100 MG/DL — HIGH (ref 70–99)
HCT VFR BLD CALC: 34.2 % — LOW (ref 39–50)
HGB BLD-MCNC: 10.8 G/DL — LOW (ref 13–17)
IMM GRANULOCYTES NFR BLD AUTO: 1 % — SIGNIFICANT CHANGE UP (ref 0–1.5)
LYMPHOCYTES # BLD AUTO: 1.48 K/UL — SIGNIFICANT CHANGE UP (ref 1–3.3)
LYMPHOCYTES # BLD AUTO: 13.8 % — SIGNIFICANT CHANGE UP (ref 13–44)
MCHC RBC-ENTMCNC: 28.3 PG — SIGNIFICANT CHANGE UP (ref 27–34)
MCHC RBC-ENTMCNC: 31.6 GM/DL — LOW (ref 32–36)
MCV RBC AUTO: 89.8 FL — SIGNIFICANT CHANGE UP (ref 80–100)
MONOCYTES # BLD AUTO: 0.74 K/UL — SIGNIFICANT CHANGE UP (ref 0–0.9)
MONOCYTES NFR BLD AUTO: 6.9 % — SIGNIFICANT CHANGE UP (ref 2–14)
NEUTROPHILS # BLD AUTO: 8.18 K/UL — HIGH (ref 1.8–7.4)
NEUTROPHILS NFR BLD AUTO: 76.4 % — SIGNIFICANT CHANGE UP (ref 43–77)
NRBC # BLD: 0 /100 WBCS — SIGNIFICANT CHANGE UP (ref 0–0)
PLATELET # BLD AUTO: 286 K/UL — SIGNIFICANT CHANGE UP (ref 150–400)
POTASSIUM SERPL-MCNC: 4.2 MMOL/L — SIGNIFICANT CHANGE UP (ref 3.5–5.3)
POTASSIUM SERPL-SCNC: 4.2 MMOL/L — SIGNIFICANT CHANGE UP (ref 3.5–5.3)
PROT SERPL-MCNC: 6.9 G/DL — SIGNIFICANT CHANGE UP (ref 6–8.3)
RBC # BLD: 3.81 M/UL — LOW (ref 4.2–5.8)
RBC # FLD: 13.6 % — SIGNIFICANT CHANGE UP (ref 10.3–14.5)
SODIUM SERPL-SCNC: 135 MMOL/L — SIGNIFICANT CHANGE UP (ref 135–145)
WBC # BLD: 10.71 K/UL — HIGH (ref 3.8–10.5)
WBC # FLD AUTO: 10.71 K/UL — HIGH (ref 3.8–10.5)

## 2020-09-06 PROCEDURE — 71275 CT ANGIOGRAPHY CHEST: CPT | Mod: 26

## 2020-09-06 PROCEDURE — 99223 1ST HOSP IP/OBS HIGH 75: CPT

## 2020-09-06 PROCEDURE — 99233 SBSQ HOSP IP/OBS HIGH 50: CPT

## 2020-09-06 PROCEDURE — 93970 EXTREMITY STUDY: CPT | Mod: 26

## 2020-09-06 RX ORDER — OXYCODONE HYDROCHLORIDE 5 MG/1
10 TABLET ORAL ONCE
Refills: 0 | Status: DISCONTINUED | OUTPATIENT
Start: 2020-09-06 | End: 2020-09-06

## 2020-09-06 RX ORDER — LANOLIN ALCOHOL/MO/W.PET/CERES
3 CREAM (GRAM) TOPICAL AT BEDTIME
Refills: 0 | Status: DISCONTINUED | OUTPATIENT
Start: 2020-09-06 | End: 2020-10-12

## 2020-09-06 RX ORDER — LIDOCAINE 4 G/100G
1 CREAM TOPICAL
Refills: 0 | Status: DISCONTINUED | OUTPATIENT
Start: 2020-09-06 | End: 2020-10-12

## 2020-09-06 RX ADMIN — Medication 650 MILLIGRAM(S): at 01:00

## 2020-09-06 RX ADMIN — Medication 650 MILLIGRAM(S): at 08:38

## 2020-09-06 RX ADMIN — HYDROMORPHONE HYDROCHLORIDE 2 MILLIGRAM(S): 2 INJECTION INTRAMUSCULAR; INTRAVENOUS; SUBCUTANEOUS at 21:19

## 2020-09-06 RX ADMIN — Medication 5 MILLIGRAM(S): at 05:38

## 2020-09-06 RX ADMIN — Medication 5 MILLIGRAM(S): at 13:33

## 2020-09-06 RX ADMIN — HYDROMORPHONE HYDROCHLORIDE 2 MILLIGRAM(S): 2 INJECTION INTRAMUSCULAR; INTRAVENOUS; SUBCUTANEOUS at 13:33

## 2020-09-06 RX ADMIN — Medication 500 MILLIGRAM(S): at 13:33

## 2020-09-06 RX ADMIN — OXYCODONE HYDROCHLORIDE 10 MILLIGRAM(S): 5 TABLET ORAL at 04:04

## 2020-09-06 RX ADMIN — Medication 5 MILLIGRAM(S): at 21:19

## 2020-09-06 RX ADMIN — SENNA PLUS 2 TABLET(S): 8.6 TABLET ORAL at 21:19

## 2020-09-06 RX ADMIN — OXYCODONE HYDROCHLORIDE 10 MILLIGRAM(S): 5 TABLET ORAL at 17:57

## 2020-09-06 RX ADMIN — OXYCODONE HYDROCHLORIDE 10 MILLIGRAM(S): 5 TABLET ORAL at 16:37

## 2020-09-06 RX ADMIN — OXYCODONE HYDROCHLORIDE 10 MILLIGRAM(S): 5 TABLET ORAL at 07:30

## 2020-09-06 RX ADMIN — HYDROMORPHONE HYDROCHLORIDE 2 MILLIGRAM(S): 2 INJECTION INTRAMUSCULAR; INTRAVENOUS; SUBCUTANEOUS at 02:30

## 2020-09-06 RX ADMIN — HYDROMORPHONE HYDROCHLORIDE 2 MILLIGRAM(S): 2 INJECTION INTRAMUSCULAR; INTRAVENOUS; SUBCUTANEOUS at 07:30

## 2020-09-06 RX ADMIN — OXYCODONE HYDROCHLORIDE 10 MILLIGRAM(S): 5 TABLET ORAL at 18:45

## 2020-09-06 RX ADMIN — Medication 500 MILLIGRAM(S): at 05:38

## 2020-09-06 RX ADMIN — Medication 1 TABLET(S): at 05:38

## 2020-09-06 RX ADMIN — Medication 3 MILLIGRAM(S): at 21:19

## 2020-09-06 RX ADMIN — OXYCODONE HYDROCHLORIDE 10 MILLIGRAM(S): 5 TABLET ORAL at 00:34

## 2020-09-06 RX ADMIN — HYDROMORPHONE HYDROCHLORIDE 2 MILLIGRAM(S): 2 INJECTION INTRAMUSCULAR; INTRAVENOUS; SUBCUTANEOUS at 22:30

## 2020-09-06 RX ADMIN — OXYCODONE HYDROCHLORIDE 10 MILLIGRAM(S): 5 TABLET ORAL at 12:21

## 2020-09-06 RX ADMIN — HYDROMORPHONE HYDROCHLORIDE 2 MILLIGRAM(S): 2 INJECTION INTRAMUSCULAR; INTRAVENOUS; SUBCUTANEOUS at 18:45

## 2020-09-06 RX ADMIN — Medication 650 MILLIGRAM(S): at 00:32

## 2020-09-06 RX ADMIN — Medication 500 MILLIGRAM(S): at 21:19

## 2020-09-06 RX ADMIN — HYDROMORPHONE HYDROCHLORIDE 2 MILLIGRAM(S): 2 INJECTION INTRAMUSCULAR; INTRAVENOUS; SUBCUTANEOUS at 14:15

## 2020-09-06 RX ADMIN — Medication 1 TABLET(S): at 17:58

## 2020-09-06 RX ADMIN — OXYCODONE HYDROCHLORIDE 10 MILLIGRAM(S): 5 TABLET ORAL at 05:34

## 2020-09-06 RX ADMIN — OXYCODONE HYDROCHLORIDE 10 MILLIGRAM(S): 5 TABLET ORAL at 05:38

## 2020-09-06 RX ADMIN — HYDROMORPHONE HYDROCHLORIDE 2 MILLIGRAM(S): 2 INJECTION INTRAMUSCULAR; INTRAVENOUS; SUBCUTANEOUS at 17:58

## 2020-09-06 RX ADMIN — HYDROMORPHONE HYDROCHLORIDE 2 MILLIGRAM(S): 2 INJECTION INTRAMUSCULAR; INTRAVENOUS; SUBCUTANEOUS at 10:11

## 2020-09-06 RX ADMIN — HYDROMORPHONE HYDROCHLORIDE 2 MILLIGRAM(S): 2 INJECTION INTRAMUSCULAR; INTRAVENOUS; SUBCUTANEOUS at 01:46

## 2020-09-06 RX ADMIN — OXYCODONE HYDROCHLORIDE 10 MILLIGRAM(S): 5 TABLET ORAL at 13:00

## 2020-09-06 RX ADMIN — OXYCODONE HYDROCHLORIDE 10 MILLIGRAM(S): 5 TABLET ORAL at 01:00

## 2020-09-06 RX ADMIN — OXYCODONE HYDROCHLORIDE 10 MILLIGRAM(S): 5 TABLET ORAL at 17:15

## 2020-09-06 RX ADMIN — Medication 650 MILLIGRAM(S): at 09:15

## 2020-09-06 RX ADMIN — HYDROMORPHONE HYDROCHLORIDE 2 MILLIGRAM(S): 2 INJECTION INTRAMUSCULAR; INTRAVENOUS; SUBCUTANEOUS at 05:38

## 2020-09-06 RX ADMIN — OXYCODONE HYDROCHLORIDE 10 MILLIGRAM(S): 5 TABLET ORAL at 23:02

## 2020-09-06 RX ADMIN — HYDROMORPHONE HYDROCHLORIDE 2 MILLIGRAM(S): 2 INJECTION INTRAMUSCULAR; INTRAVENOUS; SUBCUTANEOUS at 10:45

## 2020-09-06 NOTE — CONSULT NOTE ADULT - ASSESSMENT
35M admitted to [facility] on 8/22/20 with back pain, found to have fractures of L1-L5 pedicles bilaterally, leading to widening of the spinal canal with anterolisthesis of L5 on S1 with the entire L5 vertebral body width, disruption of the ALL and PLL at L5-S1 level, probable epidural hemorrhage and hemorrhage within the foramina at L1-L5, and enlargement/edema of bilateral psoas muscle. s/p epidural hematoma, T11-pelvis posterior spinal fusion, and L2-S1 Laminectomy on 8/23/20 and subsequent L5-S1 anterior interbody fusion and L3-4 lateral interbody fusion on 9/1.  Hospital course c/b acute blood loss anemia requiring multiple transfusions, traumatic rhabodmyolysis, and intractable pain and IVC filter placemenet.  Patient admitted to [facility] for functional and gait instability     Rehab: Functional and gait instability:  - C/w PT/OT per primary team    Ortho: Traumatic Spinal Cord Injury s/p multiple procedures   - s/p T11-pelvis posterior spinal fusion, and L2-S1 Laminectomy on 8/23/20 and subsequent L5-S1 anterior interbody fusion and L3-4 lateral interbody fusion on 9/1  - pain control per primary team  - monitor JETHRO drain outpt; Keflex 500mg Q8h   - Tachycardia due to pain    MSK: Rhabdomyolysis  - 2/2 trauma   - largely resolved: <900 on 8/31  - encourage PO fluids    : Bladder Management  - francis removed 9/4 am  - monitor bladder scans, PVR PRN as pt at risk for neurogenic bladder     ONC: IVC filter placed on 8/26   - will need removed in 5-6 months     DVT prophylaxis:   - IVC filter  - SCDs

## 2020-09-06 NOTE — CONSULT NOTE ADULT - SUBJECTIVE AND OBJECTIVE BOX
Patient is a 35y old  Male who presents with a chief complaint of Traumatic Spinal Cord Disfunction: 04.211 incomplete paraplegia (06 Sep 2020 12:14)    HPI:  Patient is a 35y old  Male who presents with a chief complaint of spinal cord injury    HPI: 34 y/o man with no significant PMHx admitted to [facility] on 8/22/20 with back pain after falling down six steps at home during night. On admission BAL<10. Utox done after receiving pain meds and Valium in ED positive only for benzos and opiates. Imaging revealed fractures of L1-L5 pedicles bilaterally, leading to widening of the spinal canal with anterolisthesis of L5 on S1 with the entire L5 vertebral body width, disruption of the ALL and PLL at L5-S1 level, probable epidural hemorrhage and hemorrhage within the foramina at L1-L5, and enlargement/edema of bilateral psoas muscle.  Ortho consulted. Patient is s/p evacuation of epidural hematoma, T11-pelvis posterior spinal fusion, and L2-S1 Laminectomy on 8/23/20 with Dr. Villegas. Intraoperatively, pt received 4U PRBCs, 1U platelets, and 1U FFP due to 2200mL EBL. Post-operatively, he was admitted to CCU for pressure support and required additional blood products due to continued bleeding into JETHRO drains.  He was febrile after transfusions but remained without other signs of infection.  IVC filter placed 8/26 as patient unable to be pharmacologically anticoagulated due to extensive surgical requirements.  IVC filter placed by Dr. Merchant on 8/26, with plan to remove in 5-6 months.  He was seen by nephro fo CHELO (multifactorial) and elevated CPK, and started on IVF.  He is also s/p t L5-S1 anterior interbody fusion and L3-4 lateral interbody fusion on 9/1/20. TLSO and AFOs provided at [facility]. Man removed 9/4/20. Patient voiding without need for SC. Patient cleared for discharge to acute inpatient rehab at [facility].    Patient seen and examined upon arrival. He refused full skin exam initially stating he was in too much pain - requested again after receiving Dilaudid, still too uncomfortable to turn to remove sliding bags placed by EMS. He complains of bilateral hamstring tightness and states it hurts too much to bend his knees for motor exam. Low back pain became worse yesterday after Xray when he states he was forced to sit up upright for imaging.       PAST MEDICAL & SURGICAL HISTORY:  No pertinent past medical history  No significant past surgical history    Allergies    No Known Allergies    Intolerances      cephalexin 500 milliGRAM(s) Oral every 8 hours  influenza   Vaccine 0.5 milliLiter(s) IntraMuscular once  lidocaine 2% Gel 1 Application(s) Topical four times a day PRN  melatonin 3 milliGRAM(s) Oral at bedtime      REVIEW OF SYSTEMS:  CONSTITUTIONAL: No fever, weight loss, or fatigue  EYES: No eye pain, visual disturbances, or discharge  ENMT:  No difficulty hearing, tinnitus, vertigo; No sinus or throat pain  NECK: No pain or stiffness  BREASTS: No pain, masses, or nipple discharge  RESPIRATORY: No cough, wheezing, chills or hemoptysis; No shortness of breath  CARDIOVASCULAR: No chest pain, palpitations, dizziness, or leg swelling  GASTROINTESTINAL: No abdominal or epigastric pain. No nausea, vomiting, or hematemesis; No diarrhea or constipation. No melena or hematochezia.  GENITOURINARY: No dysuria, frequency, hematuria, or incontinence  NEUROLOGICAL: No headaches, memory loss, loss of strength, numbness, or tremors  SKIN: No itching, burning, rashes, or lesions   LYMPH NODES: No enlarged glands  ENDOCRINE: No heat or cold intolerance; No hair loss  MUSCULOSKELETAL: No joint pain or swelling; No muscle, back, or extremity pain  PSYCHIATRIC: No depression, anxiety, mood swings, or difficulty sleeping  HEME/LYMPH: No easy bruising, or bleeding gums  ALLERY AND IMMUNOLOGIC: No hives or eczema    ALL ROS REVIEWED AND NORMAL EXCEPT AS STATED ABOVE    T(C): 37.1 (09-06-20 @ 09:08), Max: 37.1 (09-06-20 @ 09:08)  HR: 108 (09-06-20 @ 09:08) (102 - 108)  BP: 139/89 (09-06-20 @ 09:08) (118/80 - 139/89)  RR: 16 (09-06-20 @ 09:08) (16 - 16)  SpO2: 99% (09-06-20 @ 09:08) (98% - 99%)  Wt(kg): --Vital Signs Last 24 Hrs  T(C): 37.1 (06 Sep 2020 09:08), Max: 37.1 (06 Sep 2020 09:08)  T(F): 98.7 (06 Sep 2020 09:08), Max: 98.7 (06 Sep 2020 09:08)  HR: 108 (06 Sep 2020 09:08) (102 - 108)  BP: 139/89 (06 Sep 2020 09:08) (118/80 - 139/89)  BP(mean): --  RR: 16 (06 Sep 2020 09:08) (16 - 16)  SpO2: 99% (06 Sep 2020 09:08) (98% - 99%)    PHYSICAL EXAM:  Gen - NAD, uncomfortable  HEENT - NCAT, EOMI  Neck - Supple, + limited ROM  Pulm - CTAB, No wheeze, No rhonchi, No crackles  Cardiovascular - RRR, S1S2, No murmurs  Abdomen - Soft, NT/ND, +BS, +incision left of midline with dressing c/d/i  Extremities - No C/C/E, No calf tenderness    LABS:                        10.8   10.71 )-----------( 286      ( 06 Sep 2020 06:00 )             34.2     09-06    135  |  99  |  10  ----------------------------<  100<H>  4.2   |  26  |  0.78    Ca    8.6      06 Sep 2020 06:00    TPro  6.9  /  Alb  2.1<L>  /  TBili  0.5  /  DBili  x   /  AST  25  /  ALT  42  /  AlkPhos  171<H>  09-06    RADIOLOGY & ADDITIONAL TESTS:    Consultant(s) Notes Reviewed:  [x ] YES  [ ] NO  Care Discussed with Consultants/Other Providers [ x] YES  [ ] NO  Imaging Personally Reviewed:  [ ] YES  [ ] NO

## 2020-09-06 NOTE — PROGRESS NOTE ADULT - SUBJECTIVE AND OBJECTIVE BOX
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 On admission BAL<10. Utox done after receiving pain meds and Valium in ED positive only for benzos and opiates. Imaging revealed fractures of L1-L5 pedicles bilaterally, leading to widening of the spinal canal with anterolisthesis of L5 on S1 with the entire L5 vertebral body width, disruption of the ALL and PLL at L5-S1 level, probable epidural hemorrhage and hemorrhage within the foramina at L1-L5, and enlargement/edema of bilateral psoas muscle.  Ortho consulted. Patient is s/p evacuation of epidural hematoma, T11-pelvis posterior spinal fusion, and L2-S1 Laminectomy on 8/23/20 with Dr. Villegas. Intraoperatively, pt received 4U PRBCs, 1U platelets, and 1U FFP due to 2200mL EBL. Post-operatively, he was admitted to CCU for pressure support and required additional blood products due to continued bleeding into JETHRO drains.  He was febrile after transfusions but remained without other signs of infection.  IVC filter placed 8/26 as patient unable to be pharmacologically anticoagulated due to extensive surgical requirements.  IVC filter placed by Dr. Merchant on 8/26, with plan to remove in 5-6 months.  He was seen by nephro fo CHELO (multifactorial) and elevated CPK, and started on IVF.  He is also s/p t L5-S1 anterior interbody fusion and L3-4 lateral interbody fusion on 9/1/20. TLSO and AFOs provided at [facility]. Francis removed 9/4/20. Patient voiding without need for SC. Patient cleared for discharge to acute inpatient rehab at [facility].    Patient seen and examined upon arrival. He refused full skin exam initially stating he was in too much pain - requested again after receiving Dilaudid, still too uncomfortable to turn to remove sliding bags placed by EMS. He complains of bilateral hamstring tightness and states it hurts too much to bend his knees for motor exam. Low back pain became worse yesterday after Xray when he states he was forced to sit up upright for imaging. Patient agreeable to skin exam after Valium, required max assist. (04 Sep 2020 14:36)      TODAY'S SUBJECTIVE & REVIEW OF SYMPTOMS: Patient seen and evaluated this morning. No acute events overnight. Participating well in therapy. Pain is well controlled. Denies chest pain, SOB, nausea, vomiting, constipation or diarrhea. Slept well last night. Tachycardia for last 1-2 weeks, not on DVT ppx.       [X] Constiutional WNL        [X] Cardio WNL              [X] Resp WNL  [X] GI WNL                            [X]  WNL                    [X] Heme WNL  [X] Endo WNL                       [X] Skin WNL                  [X] MSK WNL  [X] Neuro WNL                     [X] Cognitive WNL         [X] Psych WNL      PHYSICAL EXAM    Vital Signs Last 24 Hrs  T(C): 37.1 (06 Sep 2020 09:08), Max: 37.1 (06 Sep 2020 09:08)  T(F): 98.7 (06 Sep 2020 09:08), Max: 98.7 (06 Sep 2020 09:08)  HR: 108 (06 Sep 2020 09:08) (102 - 114)  BP: 139/89 (06 Sep 2020 09:08) (118/80 - 139/89)  BP(mean): --  RR: 16 (06 Sep 2020 09:08) (16 - 20)  SpO2: 99% (06 Sep 2020 09:08) (98% - 99%)     Gen - NAD, uncomfortable  	HEENT - NCAT, EOMI  	Neck - Supple, + limited ROM  	Pulm - CTAB, No wheeze, No rhonchi, No crackles  	Cardiovascular - RRR, S1S2, No murmurs  	Abdomen - Soft, NT/ND, +BS, +incision left of midline with dressing c/d/i  	Extremities - No C/C/E, No calf tenderness  	Neuro-  	   Cognitive - AAOx3  	   Communication - Fluent, No dysarthria  	   Attention: Intact   	   Judgement: Good evidence of judgement  	   Memory: Recall 3 objects immediate and 3 min later      	   Cranial Nerves - CN 2-12 intact  	   Motor -   	                  LEFT    UE - ShAB 5/5, EF 5/5, EE 5/5, WE 5/5,  5/5  	                  RIGHT UE - ShAB 5/5, EF 5/5, EE 5/5, WE 5/5,  5/5  	                  LEFT    LE - HF 1/5 (limited by pain), KE 3/5, DF 0/5, PF 3/5  	                  RIGHT LE - HF 1/5 (limited by pain), KE 3/5, DF 1/5, PF 3/5     	   Sensory - Intact to LT  	   Reflexes - DTR Intact, No primitive reflexive  	   Coordination - FTN intact  	   Tone - normal  	Psychiatric - Mood stable, Affect WNL  Skin:  abdominal incision as above, midline incision on back from T11 to S2 with staples, mid back w/ JETHRO drain (full, serosanguinous drainage), lacy pink rash under bandages which were soaked through with serosanguinous drainag    RECENT LABS/IMAGING                        10.8   10.71 )-----------( 286      ( 06 Sep 2020 06:00 )             34.2     09-06    135  |  99  |  10  ----------------------------<  100<H>  4.2   |  26  |  0.78    Ca    8.6      06 Sep 2020 06:00    TPro  6.9  /  Alb  2.1<L>  /  TBili  0.5  /  DBili  x   /  AST  25  /  ALT  42  /  AlkPhos  171<H>  09-06       BRUNO CHO is a 35y 35M admitted to [facility] on 8/22/20 with back pain, found to have fractures of L1-L5 pedicles bilaterally, leading to widening of the spinal canal with anterolisthesis of L5 on S1 with the entire L5 vertebral body width, disruption of the ALL and PLL at L5-S1 level, probable epidural hemorrhage and hemorrhage within the foramina at L1-L5, and enlargement/edema of bilateral psoas muscle.  Patient underwent evacuation of epidural hematoma, T11-pelvis posterior spinal fusion, and L2-S1 Laminectomy on 8/23/20 and subsequent L5-S1 anterior interbody fusion and L3-4 lateral interbody fusion on 9/1.  Hospital course c/b acute blood loss anemia requiring multiple transfusions, traumatic rhabodmyolysis, and intractable pain.  Patient now admitted for a multidisciplinary rehab program. 09-04-20 @ 14:37    Comprehensive Multidisciplinary Rehab Program:  - Start comprehensive rehab program of PT/OT - 3 hours a day, 5 days a week  - P&O as needed    -------------  MEDICAL MANAGEMENT     #Traumatic Spinal Cord Injury  - pt with complete anterolisthesis of L5 on S1, multiple pedicular fractures, and epidural hemorrhage   - s/p T11-pelvis posterior spinal fusion, and L2-S1 Laminectomy on 8/23/20 and subsequent L5-S1 anterior interbody fusion and L3-4 lateral interbody fusion on 9/1  - pain control as below  - PT/OT  - spinal & Fall precautions  - TLSO/AFOs when OOB  - monitor bladder/bowel function out of concern for neurogenic bowel  - monitor JETHRO drain outpt; Keflex 500mg Q8h while drain is in place, currently ordered through 9/8 in case drain discontinuation is postponed  - Drain dc instructions (d/w ortho when output decreases) Per DC note: can be pulled on Monday 9/7/20. The drain is sewn in, so place cut the one suture that is holding the drain in place. Once the suture is cut, open the drain cap so that the drain is open. After both previous steps are done then the drain can be pulled. Pull lightly until drain is completely out. Place a 4x4 with Tegaderm over the drain wound.    #Rhabdomyolysis  - 2/2 trauma   - largely resolved: <900 on 8/31  - encourage PO fluids    #Pain Control  - c/w dilaudid, oxycontin scheduled. oxycodone prn  - valium for muscle spasms     #Bladder Management  - francis removed 9/4 am  - monitor bladder scans, PVR PRN as pt at risk for neurogenic bladder     #IVC filter  - placed 8/26  - will need removed in 5-6 months       DVT prophylaxis:   - IVC filter  - SCDs  - tachycardia: Doppler and cTA chest to eval for DVT and PE    Outpatient Follow-up:    Gabrielle Villegas  ORTHOPAEDIC SURGERY  [address], [address]  [place] [address]  Phone: [phone]  Fax: [phone]  Follow Up Time:     Dave Rivera  ORTHOPAEDIC SURGERY  [address] [place] [address]  Phone: [phone]  Fax: [phone]

## 2020-09-07 LAB
ANION GAP SERPL CALC-SCNC: 10 MMOL/L — SIGNIFICANT CHANGE UP (ref 5–17)
BASOPHILS # BLD AUTO: 0.04 K/UL — SIGNIFICANT CHANGE UP (ref 0–0.2)
BASOPHILS NFR BLD AUTO: 0.3 % — SIGNIFICANT CHANGE UP (ref 0–2)
BUN SERPL-MCNC: 13 MG/DL — SIGNIFICANT CHANGE UP (ref 7–23)
CALCIUM SERPL-MCNC: 8.5 MG/DL — SIGNIFICANT CHANGE UP (ref 8.4–10.5)
CHLORIDE SERPL-SCNC: 93 MMOL/L — LOW (ref 96–108)
CO2 SERPL-SCNC: 29 MMOL/L — SIGNIFICANT CHANGE UP (ref 22–31)
CREAT SERPL-MCNC: 0.9 MG/DL — SIGNIFICANT CHANGE UP (ref 0.5–1.3)
EOSINOPHIL # BLD AUTO: 0.16 K/UL — SIGNIFICANT CHANGE UP (ref 0–0.5)
EOSINOPHIL NFR BLD AUTO: 1.2 % — SIGNIFICANT CHANGE UP (ref 0–6)
GLUCOSE SERPL-MCNC: 113 MG/DL — HIGH (ref 70–99)
HCT VFR BLD CALC: 35 % — LOW (ref 39–50)
HGB BLD-MCNC: 11.5 G/DL — LOW (ref 13–17)
IMM GRANULOCYTES NFR BLD AUTO: 1 % — SIGNIFICANT CHANGE UP (ref 0–1.5)
LYMPHOCYTES # BLD AUTO: 1.06 K/UL — SIGNIFICANT CHANGE UP (ref 1–3.3)
LYMPHOCYTES # BLD AUTO: 7.8 % — LOW (ref 13–44)
MCHC RBC-ENTMCNC: 29.2 PG — SIGNIFICANT CHANGE UP (ref 27–34)
MCHC RBC-ENTMCNC: 32.9 GM/DL — SIGNIFICANT CHANGE UP (ref 32–36)
MCV RBC AUTO: 88.8 FL — SIGNIFICANT CHANGE UP (ref 80–100)
MONOCYTES # BLD AUTO: 0.79 K/UL — SIGNIFICANT CHANGE UP (ref 0–0.9)
MONOCYTES NFR BLD AUTO: 5.8 % — SIGNIFICANT CHANGE UP (ref 2–14)
NEUTROPHILS # BLD AUTO: 11.47 K/UL — HIGH (ref 1.8–7.4)
NEUTROPHILS NFR BLD AUTO: 83.9 % — HIGH (ref 43–77)
NRBC # BLD: 0 /100 WBCS — SIGNIFICANT CHANGE UP (ref 0–0)
PLATELET # BLD AUTO: 377 K/UL — SIGNIFICANT CHANGE UP (ref 150–400)
POTASSIUM SERPL-MCNC: 4.3 MMOL/L — SIGNIFICANT CHANGE UP (ref 3.5–5.3)
POTASSIUM SERPL-SCNC: 4.3 MMOL/L — SIGNIFICANT CHANGE UP (ref 3.5–5.3)
RBC # BLD: 3.94 M/UL — LOW (ref 4.2–5.8)
RBC # FLD: 13.4 % — SIGNIFICANT CHANGE UP (ref 10.3–14.5)
SODIUM SERPL-SCNC: 132 MMOL/L — LOW (ref 135–145)
WBC # BLD: 13.66 K/UL — HIGH (ref 3.8–10.5)
WBC # FLD AUTO: 13.66 K/UL — HIGH (ref 3.8–10.5)

## 2020-09-07 PROCEDURE — 99233 SBSQ HOSP IP/OBS HIGH 50: CPT

## 2020-09-07 PROCEDURE — 99232 SBSQ HOSP IP/OBS MODERATE 35: CPT

## 2020-09-07 PROCEDURE — 93010 ELECTROCARDIOGRAM REPORT: CPT

## 2020-09-07 RX ORDER — GABAPENTIN 400 MG/1
300 CAPSULE ORAL
Refills: 0 | Status: DISCONTINUED | OUTPATIENT
Start: 2020-09-07 | End: 2020-09-10

## 2020-09-07 RX ORDER — LIDOCAINE 4 G/100G
1 CREAM TOPICAL EVERY 24 HOURS
Refills: 0 | Status: DISCONTINUED | OUTPATIENT
Start: 2020-09-07 | End: 2020-09-28

## 2020-09-07 RX ORDER — OXYCODONE HYDROCHLORIDE 5 MG/1
15 TABLET ORAL EVERY 4 HOURS
Refills: 0 | Status: DISCONTINUED | OUTPATIENT
Start: 2020-09-07 | End: 2020-09-08

## 2020-09-07 RX ORDER — OXYCODONE HYDROCHLORIDE 5 MG/1
5 TABLET ORAL ONCE
Refills: 0 | Status: DISCONTINUED | OUTPATIENT
Start: 2020-09-07 | End: 2020-09-07

## 2020-09-07 RX ADMIN — HYDROMORPHONE HYDROCHLORIDE 2 MILLIGRAM(S): 2 INJECTION INTRAMUSCULAR; INTRAVENOUS; SUBCUTANEOUS at 13:20

## 2020-09-07 RX ADMIN — LIDOCAINE 1 PATCH: 4 CREAM TOPICAL at 13:23

## 2020-09-07 RX ADMIN — HYDROMORPHONE HYDROCHLORIDE 2 MILLIGRAM(S): 2 INJECTION INTRAMUSCULAR; INTRAVENOUS; SUBCUTANEOUS at 21:29

## 2020-09-07 RX ADMIN — OXYCODONE HYDROCHLORIDE 10 MILLIGRAM(S): 5 TABLET ORAL at 17:49

## 2020-09-07 RX ADMIN — LIDOCAINE 1 PATCH: 4 CREAM TOPICAL at 19:23

## 2020-09-07 RX ADMIN — OXYCODONE HYDROCHLORIDE 15 MILLIGRAM(S): 5 TABLET ORAL at 16:41

## 2020-09-07 RX ADMIN — Medication 1 TABLET(S): at 05:21

## 2020-09-07 RX ADMIN — Medication 5 MILLIGRAM(S): at 13:20

## 2020-09-07 RX ADMIN — HYDROMORPHONE HYDROCHLORIDE 2 MILLIGRAM(S): 2 INJECTION INTRAMUSCULAR; INTRAVENOUS; SUBCUTANEOUS at 05:21

## 2020-09-07 RX ADMIN — Medication 500 MILLIGRAM(S): at 21:28

## 2020-09-07 RX ADMIN — OXYCODONE HYDROCHLORIDE 10 MILLIGRAM(S): 5 TABLET ORAL at 03:42

## 2020-09-07 RX ADMIN — Medication 500 MILLIGRAM(S): at 13:17

## 2020-09-07 RX ADMIN — OXYCODONE HYDROCHLORIDE 10 MILLIGRAM(S): 5 TABLET ORAL at 05:21

## 2020-09-07 RX ADMIN — HYDROMORPHONE HYDROCHLORIDE 2 MILLIGRAM(S): 2 INJECTION INTRAMUSCULAR; INTRAVENOUS; SUBCUTANEOUS at 22:30

## 2020-09-07 RX ADMIN — HYDROMORPHONE HYDROCHLORIDE 2 MILLIGRAM(S): 2 INJECTION INTRAMUSCULAR; INTRAVENOUS; SUBCUTANEOUS at 17:48

## 2020-09-07 RX ADMIN — HYDROMORPHONE HYDROCHLORIDE 2 MILLIGRAM(S): 2 INJECTION INTRAMUSCULAR; INTRAVENOUS; SUBCUTANEOUS at 14:00

## 2020-09-07 RX ADMIN — Medication 5 MILLIGRAM(S): at 05:20

## 2020-09-07 RX ADMIN — OXYCODONE HYDROCHLORIDE 10 MILLIGRAM(S): 5 TABLET ORAL at 08:38

## 2020-09-07 RX ADMIN — HYDROMORPHONE HYDROCHLORIDE 2 MILLIGRAM(S): 2 INJECTION INTRAMUSCULAR; INTRAVENOUS; SUBCUTANEOUS at 03:30

## 2020-09-07 RX ADMIN — HYDROMORPHONE HYDROCHLORIDE 2 MILLIGRAM(S): 2 INJECTION INTRAMUSCULAR; INTRAVENOUS; SUBCUTANEOUS at 09:30

## 2020-09-07 RX ADMIN — OXYCODONE HYDROCHLORIDE 15 MILLIGRAM(S): 5 TABLET ORAL at 11:52

## 2020-09-07 RX ADMIN — Medication 1 TABLET(S): at 17:49

## 2020-09-07 RX ADMIN — OXYCODONE HYDROCHLORIDE 15 MILLIGRAM(S): 5 TABLET ORAL at 17:15

## 2020-09-07 RX ADMIN — OXYCODONE HYDROCHLORIDE 10 MILLIGRAM(S): 5 TABLET ORAL at 09:10

## 2020-09-07 RX ADMIN — HYDROMORPHONE HYDROCHLORIDE 2 MILLIGRAM(S): 2 INJECTION INTRAMUSCULAR; INTRAVENOUS; SUBCUTANEOUS at 01:00

## 2020-09-07 RX ADMIN — OXYCODONE HYDROCHLORIDE 10 MILLIGRAM(S): 5 TABLET ORAL at 06:35

## 2020-09-07 RX ADMIN — Medication 3 MILLIGRAM(S): at 21:30

## 2020-09-07 RX ADMIN — HYDROMORPHONE HYDROCHLORIDE 2 MILLIGRAM(S): 2 INJECTION INTRAMUSCULAR; INTRAVENOUS; SUBCUTANEOUS at 18:23

## 2020-09-07 RX ADMIN — GABAPENTIN 300 MILLIGRAM(S): 400 CAPSULE ORAL at 13:15

## 2020-09-07 RX ADMIN — HYDROMORPHONE HYDROCHLORIDE 2 MILLIGRAM(S): 2 INJECTION INTRAMUSCULAR; INTRAVENOUS; SUBCUTANEOUS at 06:35

## 2020-09-07 RX ADMIN — Medication 5 MILLIGRAM(S): at 21:28

## 2020-09-07 RX ADMIN — OXYCODONE HYDROCHLORIDE 15 MILLIGRAM(S): 5 TABLET ORAL at 12:30

## 2020-09-07 RX ADMIN — HYDROMORPHONE HYDROCHLORIDE 2 MILLIGRAM(S): 2 INJECTION INTRAMUSCULAR; INTRAVENOUS; SUBCUTANEOUS at 02:05

## 2020-09-07 RX ADMIN — SENNA PLUS 2 TABLET(S): 8.6 TABLET ORAL at 21:30

## 2020-09-07 RX ADMIN — OXYCODONE HYDROCHLORIDE 10 MILLIGRAM(S): 5 TABLET ORAL at 00:25

## 2020-09-07 RX ADMIN — GABAPENTIN 300 MILLIGRAM(S): 400 CAPSULE ORAL at 17:47

## 2020-09-07 RX ADMIN — Medication 500 MILLIGRAM(S): at 05:20

## 2020-09-07 RX ADMIN — OXYCODONE HYDROCHLORIDE 10 MILLIGRAM(S): 5 TABLET ORAL at 18:24

## 2020-09-07 NOTE — DIETITIAN INITIAL EVALUATION ADULT. - ENERGY NEEDS
Height: 75Inches   Weight: 240lb   Body Mass Index (BMI): 30.0kg/m2   Ideal Body Weight Range: 196lb (+/-10%)   Percent Ideal Body Weight ~122%

## 2020-09-07 NOTE — PROGRESS NOTE ADULT - ASSESSMENT
[age]M admitted to [facility] on 8/22/20 with back pain, found to have fractures of L1-L5 pedicles bilaterally, leading to widening of the spinal canal with anterolisthesis of L5 on S1 with the entire L5 vertebral body width, disruption of the ALL and PLL at L5-S1 level, probable epidural hemorrhage and hemorrhage within the foramina at L1-L5, and enlargement/edema of bilateral psoas muscle.  Patient underwent evacuation of epidural hematoma, T11-pelvis posterior spinal fusion, and L2-S1 Laminectomy on 8/23/20 and subsequent L5-S1 anterior interbody fusion and L3-4 lateral interbody fusion on 9/1.  Hospital course c/b acute blood loss anemia requiring multiple transfusions, traumatic rhabodmyolysis, and intractable pain.  Patient now admitted for a multidisciplinary rehab program- pt/ot/dvt ppx, pain meds    #Rhabdomyolysis- 2/2 trauma , resolved, encourage PO fluids    #Pain Control- c/w Dilaudid, Oxycontin, valium    #Bladder Management  - Man removed 9/4 am  - bladder scans, PVR PRN as pt at risk for neurogenic bladder     #IVC filter  - placed 8/26      b/l DVT  - IVC filter in place, c/w SCDs for now    will follow

## 2020-09-07 NOTE — DIETITIAN INITIAL EVALUATION ADULT. - FLUIDS
This note was copied from the mother's chart.     02/09/17 1230   Maternal Infant Assessment   Breast Density Bilateral:;soft   Nipple(s) Bilateral:;everted   Maternal Infant Feeding   Time Spent (min) 15-30 min   Breastfeeding History   Currently Breastfeeding yes   Lactation Referrals   Lactation Consult Follow up;Pump teaching   Lactation Interventions   Breastfeeding Assistance milk expression/pumping;support offered   Lactation note: pt states baby is unable to latch to breast. She has been giving formula. Discussed with pt breastmilk production and breast stimulation. initiation of breastpump for breast stimulation. Pt to pump after every feeding for 15 minutes using preemie plus setting. Education given on use of pump, storage of breastmilk and cleaning of equipment. Support and encouragement given.   [phone]

## 2020-09-07 NOTE — DIETITIAN INITIAL EVALUATION ADULT. - OTHER INFO
35yr Old Male - Dx: Traumatic Spinal Cord Disfunction - Initial Assessment - Diet - Regular Diet w/ Thin Liquids, Denies Food Allergy/Intolerance, Tolerates Diet Well, No Chewing/Swallowing Complications (Per Patient), Consumes 75% of First Meals (as Per Documentation), Surgical Incision on Mid Back, Lower Back, Left Flank, Left Abdomen and No Pressure Ulcers (as Per Nursing Flow Sheets), No Edema Noted (as Per Nursing Flow Sheets), No Recent Nausea/Vomiting/Diarrhea/Constipation (as Per Patient)

## 2020-09-07 NOTE — DIETITIAN INITIAL EVALUATION ADULT. - ADD RECOMMEND
1) Monitor Weights, Intake, Tolerance, Skin & Labwork   2) Education Provided on Proper Nutrition 3) Multivitamin Daily 4) Continue Nutrition Plan of Care

## 2020-09-07 NOTE — PROGRESS NOTE ADULT - SUBJECTIVE AND OBJECTIVE BOX
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 On admission BAL<10. Utox done after receiving pain meds and Valium in ED positive only for benzos and opiates. Imaging revealed fractures of L1-L5 pedicles bilaterally, leading to widening of the spinal canal with anterolisthesis of L5 on S1 with the entire L5 vertebral body width, disruption of the ALL and PLL at L5-S1 level, probable epidural hemorrhage and hemorrhage within the foramina at L1-L5, and enlargement/edema of bilateral psoas muscle.  Ortho consulted. Patient is s/p evacuation of epidural hematoma, T11-pelvis posterior spinal fusion, and L2-S1 Laminectomy on 8/23/20 with Dr. Villegas. Intraoperatively, pt received 4U PRBCs, 1U platelets, and 1U FFP due to 2200mL EBL. Post-operatively, he was admitted to CCU for pressure support and required additional blood products due to continued bleeding into JETHRO drains.  He was febrile after transfusions but remained without other signs of infection.  IVC filter placed 8/26 as patient unable to be pharmacologically anticoagulated due to extensive surgical requirements.  IVC filter placed by Dr. Merchant on 8/26, with plan to remove in 5-6 months.  He was seen by nephro fo CHELO (multifactorial) and elevated CPK, and started on IVF.  He is also s/p t L5-S1 anterior interbody fusion and L3-4 lateral interbody fusion on 9/1/20. TLSO and AFOs provided at [facility]. Francis removed 9/4/20. Patient voiding without need for SC. Patient cleared for discharge to acute inpatient rehab at [facility].    Patient seen and examined upon arrival. He refused full skin exam initially stating he was in too much pain - requested again after receiving Dilaudid, still too uncomfortable to turn to remove sliding bags placed by EMS. He complains of bilateral hamstring tightness and states it hurts too much to bend his knees for motor exam. Low back pain became worse yesterday after Xray when he states he was forced to sit up upright for imaging. Patient agreeable to skin exam after Valium, required max assist. (04 Sep 2020 14:36)      TODAY'S SUBJECTIVE & REVIEW OF SYMPTOMS: Patient seen and evaluated this morning. No acute events overnight. Participating well in therapy.  . Denies chest pain, SOB, nausea, vomiting, constipation or diarrhea. Doppler and CTA results discussed. Continues to complain of pain and it interferes with therapy participation      [X] Constiutional WNL        [X] Cardio WNL              [X] Resp WNL  [X] GI WNL                            [X]  WNL                    [X] Heme WNL  [X] Endo WNL                       [X] Skin WNL                  [X] MSK WNL  [X] Neuro WNL                     [X] Cognitive WNL         [X] Psych WNL      PHYSICAL EXAM  Vital Signs Last 24 Hrs  T(C): 37.2 (07 Sep 2020 09:35), Max: 37.8 (06 Sep 2020 20:31)  T(F): 99 (07 Sep 2020 09:35), Max: 100 (06 Sep 2020 20:31)  HR: 116 (07 Sep 2020 09:35) (116 - 116)  BP: 128/85 (07 Sep 2020 09:35) (128/85 - 138/87)  BP(mean): --  RR: 17 (07 Sep 2020 09:35) (16 - 17)  SpO2: 95% (07 Sep 2020 09:35) (95% - 95%)     Gen - NAD, uncomfortable  	HEENT - NCAT, EOMI  	Neck - Supple, + limited ROM  	Pulm - CTAB, No wheeze, No rhonchi, No crackles  	Cardiovascular - RRR, S1S2, No murmurs  	Abdomen - Soft, NT/ND, +BS, +incision left of midline with dressing c/d/i  	Extremities - No C/C/E, No calf tenderness  	Neuro-  	   Cognitive - AAOx3  	   Communication - Fluent, No dysarthria  	   Attention: Intact   	   Judgement: Good evidence of judgement  	   Memory: Recall 3 objects immediate and 3 min later      	   Cranial Nerves - CN 2-12 intact  	   Motor -   	                  LEFT    UE - ShAB 5/5, EF 5/5, EE 5/5, WE 5/5,  5/5  	                  RIGHT UE - ShAB 5/5, EF 5/5, EE 5/5, WE 5/5,  5/5  	                  LEFT    LE - HF 1/5 (limited by pain), KE 3/5, DF 0/5, PF 3/5  	                  RIGHT LE - HF 1/5 (limited by pain), KE 3/5, DF 1/5, PF 3/5     	   Sensory - Intact to LT  	   Reflexes - DTR Intact, No primitive reflexive  	   Coordination - FTN intact  	   Tone - normal  	Psychiatric - Mood stable, Affect WNL  Skin:  abdominal incision as above, midline incision on back from T11 to S2 with staples, mid back w/ JETHRO drain (full, serosanguinous drainage), lacy pink rash under bandages which were soaked through with serosanguinous drainag    RECENT LABS/IMAGING                        10.8   10.71 )-----------( 286      ( 06 Sep 2020 06:00 )             34.2     09-06    135  |  99  |  10  ----------------------------<  100<H>  4.2   |  26  |  0.78    Ca    8.6      06 Sep 2020 06:00    TPro  6.9  /  Alb  2.1<L>  /  TBili  0.5  /  DBili  x   /  AST  25  /  ALT  42  /  AlkPhos  171<H>  09-06       BRUNO CHO is a 35y 35M admitted to [facility] on 8/22/20 with back pain, found to have fractures of L1-L5 pedicles bilaterally, leading to widening of the spinal canal with anterolisthesis of L5 on S1 with the entire L5 vertebral body width, disruption of the ALL and PLL at L5-S1 level, probable epidural hemorrhage and hemorrhage within the foramina at L1-L5, and enlargement/edema of bilateral psoas muscle.  Patient underwent evacuation of epidural hematoma, T11-pelvis posterior spinal fusion, and L2-S1 Laminectomy on 8/23/20 and subsequent L5-S1 anterior interbody fusion and L3-4 lateral interbody fusion on 9/1.  Hospital course c/b acute blood loss anemia requiring multiple transfusions, traumatic rhabodmyolysis, and intractable pain.  Patient now admitted for a multidisciplinary rehab program. 09-04-20 @ 14:37    Comprehensive Multidisciplinary Rehab Program:  - Start comprehensive rehab program of PT/OT - 3 hours a day, 5 days a week  - P&O as needed    -------------  MEDICAL MANAGEMENT     #Traumatic Spinal Cord Injury  - pt with complete anterolisthesis of L5 on S1, multiple pedicular fractures, and epidural hemorrhage   - s/p T11-pelvis posterior spinal fusion, and L2-S1 Laminectomy on 8/23/20 and subsequent L5-S1 anterior interbody fusion and L3-4 lateral interbody fusion on 9/1  - pain control as below  - PT/OT  - spinal & Fall precautions  - TLSO/AFOs when OOB  - monitor bladder/bowel function out of concern for neurogenic bowel  - monitor JETHRO drain outpt; Keflex 500mg Q8h while drain is in place, currently ordered through 9/8 in case drain discontinuation is postponed  - Drain dc instructions (d/w ortho when output decreases) Per DC note: can be pulled on Monday 9/7/20. The drain is sewn in, so place cut the one suture that is holding the drain in place. Once the suture is cut, open the drain cap so that the drain is open. After both previous steps are done then the drain can be pulled. Pull lightly until drain is completely out. Place a 4x4 with Tegaderm over the drain wound.    #Rhabdomyolysis  - 2/2 trauma   - largely resolved: <900 on 8/31  - encourage PO fluids    #Pain Control  - c/w dilaudid, oxycontin scheduled. oxycodone prn, Increased oxycodone IR to 15 mg, strated gabapentin.   - valium for muscle spasms     #Bladder Management  - francis removed 9/4 am  - monitor bladder scans, PVR PRN as pt at risk for neurogenic bladder     #IVC filter  - placed 8/26  - will need removed in 5-6 months       DVT prophylaxis:   - IVC filter  - SCDs  - tachycardia: Doppler and cTA chest to eval for DVT and PE    Outpatient Follow-up:    Gabrielle Villegas  ORTHOPAEDIC SURGERY  [address], [address]  [place] [address]  Phone: [phone]  Fax: [phone]  Follow Up Time:     Dave Rivera  ORTHOPAEDIC SURGERY  [address] [place] [address]  Phone: [phone]  Fax: [phone]

## 2020-09-07 NOTE — DIETITIAN INITIAL EVALUATION ADULT. - PERTINENT MEDS FT
Valium. Pepcid, Dilaudid, Oxycodone, Melatonin, Lactobacillus Acidophilus     Recommend Initiate Multivitamin

## 2020-09-07 NOTE — CHART NOTE - NSCHARTNOTEFT_GEN_A_CORE
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       PAST MEDICAL & SURGICAL HISTORY:  No pertinent past medical history  No significant past surgical history      Based upon consideration of the patient's impairments, functional status, complicating conditions and any other contributing factors and after information garnered from the assessments of all therapy disciplines involved in treating the patient and other pertinent clinicians:    INTERDISCIPLINARY REHABILITATION INTERVENTIONS:    Transfer Training  Bed Mobility  Therapeutic Exercise  Balance/Coordination Exercises  Locomotion retraining  Stairs  Functional Transfer Training  Bowel/Bladder program  Pain Management  Skin/Wound Care  Visual/Perceptual Training  Therapeutic Recreation Activities  Neuromuscular Re-education  Activities of Daily Living  Speech Exercise  Swallowing Exercises  Vital Stim  Dietary Supplements  Calorie Count  Cognitive Exercises  Cognitive/Linguistic Treatment  Behavior Program  Neuropsych Therapy  Patient/Family Counseling  Family Training  Community Re-entry  Orthotic Evaluation  Prosthetic Eval/Training    MEDICAL PROGNOSIS:  Good    REHAB POTENTIAL:  Good  EXPECTED DAILY THERAPY:         PT: 1 hr       OT: 1 hr       ST: 1 hr       S&O:      EXPECTED FREQUENCY OF PROGRAM:  3 hours per day  and 5 days a week    ESTIMATED LOS:  10-14 days    ESTIMATED DISPOSITION:  home with home care    INTERDISCIPLINARY FUNCTIONAL OUTCOMES/GOALS:         Gait/Mobility: Mod I       Transfers: Supervision       ADLs: Supervision       Functional Transfers: Min Assist       Medication Management: Independent       Communication: Supervision       Cognitive: Min Assist       Dysphagia: Supervision       Bladder: Supervision       Bowel: Supervision

## 2020-09-07 NOTE — DIETITIAN INITIAL EVALUATION ADULT. - DIET TYPE
on Regular Diet w/ Thin Liquids   Education Provided on Proper Nutrition   Patient Does Not Follow Diet But Tries to Restricted Calories (Unsure of Amount) @Home, Consumes 3Meals a Day & Doesn't Take Vitamin/Supplements @Home

## 2020-09-07 NOTE — PROGRESS NOTE ADULT - SUBJECTIVE AND OBJECTIVE BOX
35y old  Male who presents with a chief complaint of Traumatic Spinal Cord Disfunction  seen at the bedside, c/o pain in the left lower back and hip 8/10, non radiating pain, relieved with pain meds, no n/v, no sob.      Vital Signs Last 24 Hrs  T(C): 37.2 (07 Sep 2020 09:35), Max: 37.8 (06 Sep 2020 20:31)  T(F): 99 (07 Sep 2020 09:35), Max: 100 (06 Sep 2020 20:31)  HR: 116 (07 Sep 2020 09:35) (111 - 116)  BP: 128/85 (07 Sep 2020 09:35) (128/85 - 138/87)  BP(mean): --  RR: 17 (07 Sep 2020 09:35) (16 - 17)  SpO2: 95% (07 Sep 2020 09:35) (95% - 97%)    GENERAL- NAD  EAR/NOSE/MOUTH/THROAT - no pharyngeal exudates, no oral leisions,  MMM  EYES- PATRICIA, conjunctiva and Sclera clear  NECK- supple  RESPIRATORY-  clear to auscultation bilaterally  CARDIOVASCULAR - SIS2, RRR  GI - soft NT BS present, +incision left of midline with dressing clean  EXTREMITIES- no pedal edema  NEUROLOGY- B/L LE weakness  SKIN-  midline incision on back with staples, mid back w/ JETHRO drain (full, serosanguinous drainage)  PSYCHIATRY- AAO X 3  MUSCULOSKELETAL- ROM restricted to b/l LE                11.5                 132  | 29   | 13           13.66 >-----------< 377     ------------------------< 113                   35.0                 4.3  | 93   | 0.90                                         Ca 8.5   Mg x     Ph x          MEDICATIONS  (STANDING):  cephalexin 500 milliGRAM(s) Oral every 8 hours  diazepam    Tablet 5 milliGRAM(s) Oral every 8 hours  gabapentin 300 milliGRAM(s) Oral two times a day  HYDROmorphone   Tablet 2 milliGRAM(s) Oral every 4 hours  influenza   Vaccine 0.5 milliLiter(s) IntraMuscular once  lactobacillus acidophilus 1 Tablet(s) Oral two times a day  melatonin 3 milliGRAM(s) Oral at bedtime  oxyCODONE    IR 5 milliGRAM(s) Oral once  oxyCODONE  ER Tablet 10 milliGRAM(s) Oral every 12 hours  senna 2 Tablet(s) Oral at bedtime    MEDICATIONS  (PRN):  acetaminophen   Tablet .. 650 milliGRAM(s) Oral every 6 hours PRN Temp greater or equal to 38C (100.4F), Mild Pain (1 - 3)  famotidine    Tablet 20 milliGRAM(s) Oral every 12 hours PRN Dyspepsia  lidocaine   Patch 1 Patch Transdermal every 24 hours PRN Severe pain  lidocaine 2% Gel 1 Application(s) Topical four times a day PRN pain  magnesium hydroxide Suspension 30 milliLiter(s) Oral every 12 hours PRN Constipation  oxyCODONE    IR 5 milliGRAM(s) Oral every 4 hours PRN Moderate Pain (4 - 6)  oxyCODONE    IR 15 milliGRAM(s) Oral every 4 hours PRN Severe Pain (7 - 10)

## 2020-09-08 PROCEDURE — 99233 SBSQ HOSP IP/OBS HIGH 50: CPT

## 2020-09-08 PROCEDURE — 99232 SBSQ HOSP IP/OBS MODERATE 35: CPT | Mod: GC

## 2020-09-08 RX ORDER — DIAZEPAM 5 MG
5 TABLET ORAL EVERY 8 HOURS
Refills: 0 | Status: DISCONTINUED | OUTPATIENT
Start: 2020-09-08 | End: 2020-09-10

## 2020-09-08 RX ORDER — OXYCODONE HYDROCHLORIDE 5 MG/1
10 TABLET ORAL EVERY 12 HOURS
Refills: 0 | Status: DISCONTINUED | OUTPATIENT
Start: 2020-09-08 | End: 2020-09-10

## 2020-09-08 RX ORDER — OXYCODONE HYDROCHLORIDE 5 MG/1
5 TABLET ORAL EVERY 4 HOURS
Refills: 0 | Status: DISCONTINUED | OUTPATIENT
Start: 2020-09-08 | End: 2020-09-10

## 2020-09-08 RX ORDER — HYDROMORPHONE HYDROCHLORIDE 2 MG/ML
2 INJECTION INTRAMUSCULAR; INTRAVENOUS; SUBCUTANEOUS EVERY 4 HOURS
Refills: 0 | Status: DISCONTINUED | OUTPATIENT
Start: 2020-09-08 | End: 2020-09-10

## 2020-09-08 RX ORDER — OXYCODONE HYDROCHLORIDE 5 MG/1
15 TABLET ORAL EVERY 4 HOURS
Refills: 0 | Status: DISCONTINUED | OUTPATIENT
Start: 2020-09-08 | End: 2020-09-10

## 2020-09-08 RX ADMIN — HYDROMORPHONE HYDROCHLORIDE 2 MILLIGRAM(S): 2 INJECTION INTRAMUSCULAR; INTRAVENOUS; SUBCUTANEOUS at 02:20

## 2020-09-08 RX ADMIN — HYDROMORPHONE HYDROCHLORIDE 2 MILLIGRAM(S): 2 INJECTION INTRAMUSCULAR; INTRAVENOUS; SUBCUTANEOUS at 14:00

## 2020-09-08 RX ADMIN — Medication 5 MILLIGRAM(S): at 13:26

## 2020-09-08 RX ADMIN — HYDROMORPHONE HYDROCHLORIDE 2 MILLIGRAM(S): 2 INJECTION INTRAMUSCULAR; INTRAVENOUS; SUBCUTANEOUS at 21:23

## 2020-09-08 RX ADMIN — HYDROMORPHONE HYDROCHLORIDE 2 MILLIGRAM(S): 2 INJECTION INTRAMUSCULAR; INTRAVENOUS; SUBCUTANEOUS at 17:11

## 2020-09-08 RX ADMIN — HYDROMORPHONE HYDROCHLORIDE 2 MILLIGRAM(S): 2 INJECTION INTRAMUSCULAR; INTRAVENOUS; SUBCUTANEOUS at 06:45

## 2020-09-08 RX ADMIN — HYDROMORPHONE HYDROCHLORIDE 2 MILLIGRAM(S): 2 INJECTION INTRAMUSCULAR; INTRAVENOUS; SUBCUTANEOUS at 01:45

## 2020-09-08 RX ADMIN — OXYCODONE HYDROCHLORIDE 15 MILLIGRAM(S): 5 TABLET ORAL at 15:45

## 2020-09-08 RX ADMIN — Medication 3 MILLIGRAM(S): at 21:23

## 2020-09-08 RX ADMIN — OXYCODONE HYDROCHLORIDE 15 MILLIGRAM(S): 5 TABLET ORAL at 15:02

## 2020-09-08 RX ADMIN — HYDROMORPHONE HYDROCHLORIDE 2 MILLIGRAM(S): 2 INJECTION INTRAMUSCULAR; INTRAVENOUS; SUBCUTANEOUS at 09:14

## 2020-09-08 RX ADMIN — OXYCODONE HYDROCHLORIDE 15 MILLIGRAM(S): 5 TABLET ORAL at 11:40

## 2020-09-08 RX ADMIN — Medication 500 MILLIGRAM(S): at 05:45

## 2020-09-08 RX ADMIN — OXYCODONE HYDROCHLORIDE 15 MILLIGRAM(S): 5 TABLET ORAL at 01:31

## 2020-09-08 RX ADMIN — GABAPENTIN 300 MILLIGRAM(S): 400 CAPSULE ORAL at 17:12

## 2020-09-08 RX ADMIN — OXYCODONE HYDROCHLORIDE 10 MILLIGRAM(S): 5 TABLET ORAL at 19:06

## 2020-09-08 RX ADMIN — GABAPENTIN 300 MILLIGRAM(S): 400 CAPSULE ORAL at 05:45

## 2020-09-08 RX ADMIN — Medication 1 TABLET(S): at 17:12

## 2020-09-08 RX ADMIN — HYDROMORPHONE HYDROCHLORIDE 2 MILLIGRAM(S): 2 INJECTION INTRAMUSCULAR; INTRAVENOUS; SUBCUTANEOUS at 18:00

## 2020-09-08 RX ADMIN — OXYCODONE HYDROCHLORIDE 15 MILLIGRAM(S): 5 TABLET ORAL at 08:21

## 2020-09-08 RX ADMIN — HYDROMORPHONE HYDROCHLORIDE 2 MILLIGRAM(S): 2 INJECTION INTRAMUSCULAR; INTRAVENOUS; SUBCUTANEOUS at 22:23

## 2020-09-08 RX ADMIN — OXYCODONE HYDROCHLORIDE 10 MILLIGRAM(S): 5 TABLET ORAL at 06:45

## 2020-09-08 RX ADMIN — LIDOCAINE 1 PATCH: 4 CREAM TOPICAL at 01:00

## 2020-09-08 RX ADMIN — OXYCODONE HYDROCHLORIDE 15 MILLIGRAM(S): 5 TABLET ORAL at 04:18

## 2020-09-08 RX ADMIN — Medication 5 MILLIGRAM(S): at 21:23

## 2020-09-08 RX ADMIN — OXYCODONE HYDROCHLORIDE 15 MILLIGRAM(S): 5 TABLET ORAL at 00:04

## 2020-09-08 RX ADMIN — Medication 5 MILLIGRAM(S): at 05:45

## 2020-09-08 RX ADMIN — HYDROMORPHONE HYDROCHLORIDE 2 MILLIGRAM(S): 2 INJECTION INTRAMUSCULAR; INTRAVENOUS; SUBCUTANEOUS at 13:26

## 2020-09-08 RX ADMIN — OXYCODONE HYDROCHLORIDE 15 MILLIGRAM(S): 5 TABLET ORAL at 06:46

## 2020-09-08 RX ADMIN — OXYCODONE HYDROCHLORIDE 15 MILLIGRAM(S): 5 TABLET ORAL at 20:52

## 2020-09-08 RX ADMIN — OXYCODONE HYDROCHLORIDE 15 MILLIGRAM(S): 5 TABLET ORAL at 19:52

## 2020-09-08 RX ADMIN — Medication 1 TABLET(S): at 05:46

## 2020-09-08 RX ADMIN — OXYCODONE HYDROCHLORIDE 10 MILLIGRAM(S): 5 TABLET ORAL at 17:11

## 2020-09-08 RX ADMIN — OXYCODONE HYDROCHLORIDE 15 MILLIGRAM(S): 5 TABLET ORAL at 09:00

## 2020-09-08 RX ADMIN — OXYCODONE HYDROCHLORIDE 15 MILLIGRAM(S): 5 TABLET ORAL at 12:30

## 2020-09-08 RX ADMIN — HYDROMORPHONE HYDROCHLORIDE 2 MILLIGRAM(S): 2 INJECTION INTRAMUSCULAR; INTRAVENOUS; SUBCUTANEOUS at 05:45

## 2020-09-08 RX ADMIN — SENNA PLUS 2 TABLET(S): 8.6 TABLET ORAL at 21:23

## 2020-09-08 RX ADMIN — HYDROMORPHONE HYDROCHLORIDE 2 MILLIGRAM(S): 2 INJECTION INTRAMUSCULAR; INTRAVENOUS; SUBCUTANEOUS at 09:45

## 2020-09-08 RX ADMIN — OXYCODONE HYDROCHLORIDE 10 MILLIGRAM(S): 5 TABLET ORAL at 05:46

## 2020-09-08 NOTE — PROGRESS NOTE ADULT - ASSESSMENT
ASSESSMENT/PLAN  BRUNO CHO is a 35M admitted to [facility] on 8/22/20 with back pain, found to have fractures of L1-L5 pedicles bilaterally, leading to widening of the spinal canal with anterolisthesis of L5 on S1 with the entire L5 vertebral body width, disruption of the ALL and PLL at L5-S1 level, probable epidural hemorrhage and hemorrhage within the foramina at L1-L5, and enlargement/edema of bilateral psoas muscle.  Patient underwent evacuation of epidural hematoma, T11-pelvis posterior spinal fusion, and L2-S1 Laminectomy on 8/23/20 and subsequent L5-S1 anterior interbody fusion and L3-4 lateral interbody fusion on 9/1.  Hospital course c/b acute blood loss anemia requiring multiple transfusions, traumatic rhabodmyolysis, and intractable pain.  Patient now admitted for a multidisciplinary rehab program. 09-04-20 @ 14:37    Comprehensive Multidisciplinary Rehab Program:  - Start comprehensive rehab program of PT/OT - 3 hours a day, 5 days a week  - P&O as needed    -------------  MEDICAL MANAGEMENT     #Traumatic Spinal Cord Injury  - pt with complete anterolisthesis of L5 on S1, multiple pedicular fractures, and epidural hemorrhage   - s/p T11-pelvis posterior spinal fusion, and L2-S1 Laminectomy on 8/23/20 and subsequent L5-S1 anterior interbody fusion and L3-4 lateral interbody fusion on 9/1  - pain control as below  - PT/OT  - spinal & Fall precautions  - TLSO/AFOs when OOB  - monitor bladder/bowel function out of concern for neurogenic bowel  - monitor JETHRO drain outpt; Keflex 500mg Q8h while drain is in place  - Drain dc instructions: Per DC note can be pulled on Monday 9/7/20. The drain is sewn in, so please cut the one suture that is holding the drain in place. Once the suture is cut, open the drain cap so that the drain is open. After both previous steps are done then the drain can be pulled. Pull lightly until drain is completely out. Place a 4x4 with Tegaderm over the drain wound  - Drain output still >300mL/day. Will defer drain removal until output decreases.     #Rhabdomyolysis  - 2/2 trauma   - largely resolved: <900 on 8/31  - encourage PO fluids    #Tachycardia    #Pain Control  - c/w dilaudid, oxycontin scheduled. oxycodone prn, Increased oxycodone IR to 15 mg, started gabapentin.   - valium for muscle spasms     #Bladder Management  - francis removed 9/4 am  - monitor bladder scans, PVR PRN as pt at risk for neurogenic bladder       #IVC filter  - placed 8/26  - will need removed in 5-6 months       DVT prophylaxis:   - IVC filter  - SCDs  - tachycardia: Doppler and cTA chest to eval for DVT and PE. however per chart review pt appears to have been tachycardic for some time prior to arrival in rehab.    Outpatient Follow-up:    Gabrielle Villegas  ORTHOPAEDIC SURGERY  [address], [address]  [place] [address]  Phone: [phone]  Fax: [phone]  Follow Up Time:     Dave Rivera  ORTHOPAEDIC SURGERY  [address] [place] [address]  Phone: [phone]  Fax: [phone] ASSESSMENT/PLAN  BRUNO CHO is a 35M admitted to [facility] on 8/22/20 with back pain, found to have fractures of L1-L5 pedicles bilaterally, leading to widening of the spinal canal with anterolisthesis of L5 on S1 with the entire L5 vertebral body width, disruption of the ALL and PLL at L5-S1 level, probable epidural hemorrhage and hemorrhage within the foramina at L1-L5, and enlargement/edema of bilateral psoas muscle.  Patient underwent evacuation of epidural hematoma, T11-pelvis posterior spinal fusion, and L2-S1 Laminectomy on 8/23/20 and subsequent L5-S1 anterior interbody fusion and L3-4 lateral interbody fusion on 9/1.  Hospital course c/b acute blood loss anemia requiring multiple transfusions, traumatic rhabodmyolysis, and intractable pain.  Patient now admitted for a multidisciplinary rehab program. 09-04-20 @ 14:37    Comprehensive Multidisciplinary Rehab Program:  - Start comprehensive rehab program of PT/OT - 3 hours a day, 5 days a week  - P&O as needed    -------------  MEDICAL MANAGEMENT     #Traumatic Spinal Cord Injury  - pt with complete anterolisthesis of L5 on S1, multiple pedicular fractures, and epidural hemorrhage   - s/p T11-pelvis posterior spinal fusion, and L2-S1 Laminectomy on 8/23/20 and subsequent L5-S1 anterior interbody fusion and L3-4 lateral interbody fusion on 9/1  - pain control as below  - PT/OT  - spinal & Fall precautions  - TLSO/AFOs when OOB  - monitor bladder/bowel function out of concern for neurogenic bowel  - monitor JETHRO drain outpt; Keflex 500mg Q8h while drain is in place  - Drain dc instructions: Per DC note can be pulled on Monday 9/7/20. The drain is sewn in, so please cut the one suture that is holding the drain in place. Once the suture is cut, open the drain cap so that the drain is open. After both previous steps are done then the drain can be pulled. Pull lightly until drain is completely out. Place a 4x4 with Tegaderm over the drain wound  - Drain output still >300mL/day. Will defer drain removal until output decreases.     #Rhabdomyolysis  - 2/2 trauma   - largely resolved: <900 on 8/31  - encourage PO fluids    #Tachycardia  - per chart review, pt tachycardic prior to transfer to [facility], but no dopplers/CTA obtained  - CTA 9/6 inconclusive re: PE  - Doppler 96/: +B/L DVT   - Vascular (Dr. Nice) consulted; call placed to ortho (Dr. Rivera) to discuss   - TTE ordered to assess for right heart strain    #Pain Control  - c/w dilaudid, oxycontin scheduled. oxycodone prn, Increased oxycodone IR to 15 mg, started gabapentin.   - valium for muscle spasms     #Bladder Management  - francis removed 9/4 am  - monitor bladder scans, PVR PRN as pt at risk for neurogenic bladder       #IVC filter  - placed 8/26  - will need removed in 5-6 months       DVT prophylaxis:   - IVC filter  - SCDs  - tachycardia: Doppler and cTA chest to eval for DVT and PE. however per chart review pt appears to have been tachycardic for some time prior to arrival in rehab.    Outpatient Follow-up:    Gabrielle Villegas  ORTHOPAEDIC SURGERY  [address], [address]  [place] [address]  Phone: [phone]  Fax: [phone]  Follow Up Time:     Dave Rivera  ORTHOPAEDIC SURGERY  [address] [place] [address]  Phone: [phone]  Fax: [phone]

## 2020-09-08 NOTE — PROGRESS NOTE ADULT - SUBJECTIVE AND OBJECTIVE BOX
35y old  Male who presents with a chief complaint of Traumatic Spinal Cord Disfunction  seen at the bedside, lying in bed, wanting to get out of bed, c/o pain in the left lower back and hip 7/10, non radiating pain, relieved with pain meds, no n/v, no sob.        Vital Signs Last 24 Hrs  T(C): 37.2 (08 Sep 2020 07:34), Max: 37.2 (07 Sep 2020 20:05)  T(F): 99 (08 Sep 2020 07:34), Max: 99 (07 Sep 2020 20:05)  HR: 107 (08 Sep 2020 07:34) (107 - 114)  BP: 123/81 (08 Sep 2020 07:34) (123/81 - 136/80)  BP(mean): --  RR: 17 (08 Sep 2020 07:34) (16 - 17)  SpO2: 95% (08 Sep 2020 07:34) (95% - 96%)    GENERAL- NAD  EAR/NOSE/MOUTH/THROAT - no pharyngeal exudates, no oral lesions  MMM  EYES- PATRICIA, conjunctiva and Sclera clear  NECK- supple  RESPIRATORY-  clear to auscultation bilaterally  CARDIOVASCULAR - SIS2, RRR  GI - soft NT BS present, +incision left of midline with dressing clean  EXTREMITIES- no pedal edema  NEUROLOGY- B/L LE weakness  SKIN-  midline incision on back with staples, mid back w/ JETHRO drain (full, serosanguinous drainage)  PSYCHIATRY- AAO X 3  MUSCULOSKELETAL- ROM restricted to b/l LE                  11.5                 132  | 29   | 13           13.66 >-----------< 377     ------------------------< 113                   35.0                 4.3  | 93   | 0.90                                         Ca 8.5   Mg x     Ph x          MEDICATIONS  (STANDING):  diazepam    Tablet 5 milliGRAM(s) Oral every 8 hours  gabapentin 300 milliGRAM(s) Oral two times a day  HYDROmorphone   Tablet 2 milliGRAM(s) Oral every 4 hours  influenza   Vaccine 0.5 milliLiter(s) IntraMuscular once  lactobacillus acidophilus 1 Tablet(s) Oral two times a day  melatonin 3 milliGRAM(s) Oral at bedtime  oxyCODONE  ER Tablet 10 milliGRAM(s) Oral every 12 hours  senna 2 Tablet(s) Oral at bedtime    MEDICATIONS  (PRN):  acetaminophen   Tablet .. 650 milliGRAM(s) Oral every 6 hours PRN Temp greater or equal to 38C (100.4F), Mild Pain (1 - 3)  famotidine    Tablet 20 milliGRAM(s) Oral every 12 hours PRN Dyspepsia  lidocaine   Patch 1 Patch Transdermal every 24 hours PRN Severe pain  lidocaine 2% Gel 1 Application(s) Topical four times a day PRN pain  magnesium hydroxide Suspension 30 milliLiter(s) Oral every 12 hours PRN Constipation  oxyCODONE    IR 5 milliGRAM(s) Oral every 4 hours PRN Moderate Pain (4 - 6)  oxyCODONE    IR 15 milliGRAM(s) Oral every 4 hours PRN Severe Pain (7 - 10)

## 2020-09-08 NOTE — PROGRESS NOTE ADULT - SUBJECTIVE AND OBJECTIVE BOX
Patient is a 35y old  Male who presents with a chief complaint of Traumatic Spinal Cord Disfunction: 04.211 incomplete paraplegia (04 Sep 2020 14:36)      HPI:  TODAY'S SUBJECTIVE & REVIEW OF SYMPTOMS: Patient seen and evaluated this morning. No acute events overnight.  Sleeping/eating well.  Pain is better controlled today. Pt reports he is voiding; PVRs<150mL x24hrs.      [X] Constitutional WNL        [X] Cardio WNL              [X] Resp WNL  [X] GI WNL                            []  +retention                    [X] Heme WNL  [X] Endo WNL                       [X] Skin WNL                  [] MSK +pain  [] Neuro +weakness                     [X] Cognitive WNL         [X] Psych WNL      PHYSICAL EXAM  Vital Signs Last 24 Hrs  T(C): 37.2 (08 Sep 2020 07:34), Max: 37.2 (07 Sep 2020 20:05)  T(F): 99 (08 Sep 2020 07:34), Max: 99 (07 Sep 2020 20:05)  HR: 107 (08 Sep 2020 07:34) (107 - 114)  BP: 123/81 (08 Sep 2020 07:34) (123/81 - 136/80)  BP(mean): --  RR: 17 (08 Sep 2020 07:34) (16 - 17)  SpO2: 95% (08 Sep 2020 07:34) (95% - 96%)     Gen - NAD, uncomfortable  	HEENT - NCAT, EOMI  	Neck - Supple, + limited ROM  	Pulm - CTAB, No wheeze, No rhonchi, No crackles  	Cardiovascular - RRR, S1S2, No murmurs  	Abdomen - Soft, NT/ND, +BS, +incision left of midline with dressing c/d/i  	Extremities - No C/C/E, No calf tenderness  	Neuro-  	   Cognitive - AAOx3  	   Communication - Fluent, No dysarthria  	   Attention: Intact   	   Judgement: Good evidence of judgement  	   Memory: Recall 3 objects immediate and 3 min later      	   Cranial Nerves - CN 2-12 intact  	   Motor -   	                  LEFT    UE - ShAB 5/5, EF 5/5, EE 5/5, WE 5/5,  5/5  	                  RIGHT UE - ShAB 5/5, EF 5/5, EE 5/5, WE 5/5,  5/5  	                  LEFT    LE - HF 1/5 (limited by pain), KE 3/5, DF 0/5, PF 3/5  	                  RIGHT LE - HF 1/5 (limited by pain), KE 3/5, DF 1/5, PF 3/5     	   Sensory - Intact to LT  	   Reflexes - DTR Intact, No primitive reflexive  	   Coordination - FTN intact  	   Tone - normal  	Psychiatric - Mood stable, Affect WNL  Skin:  abdominal incision as above, midline incision on back from T11 to S2 with staples, mid back w/ JETHRO drain, lacy pink rash under bandages which were soaked through with serosanguinous drainage      LABS:      Ca    8.5        07 Sep 2020 06:55

## 2020-09-08 NOTE — PROGRESS NOTE ADULT - ASSESSMENT
35M admitted to [facility] on 8/22/20 with back pain, found to have fractures of L1-L5 pedicles bilaterally, leading to widening of the spinal canal with anterolisthesis of L5 on S1 with the entire L5 vertebral body width, disruption of the ALL and PLL at L5-S1 level, probable epidural hemorrhage and hemorrhage within the foramina at L1-L5, and enlargement/edema of bilateral psoas muscle.  Patient underwent evacuation of epidural hematoma, T11-pelvis posterior spinal fusion, and L2-S1 Laminectomy on 8/23/20 and subsequent L5-S1 anterior interbody fusion and L3-4 lateral interbody fusion on 9/1.  Hospital course c/b acute blood loss anemia requiring multiple transfusions, traumatic rhabodmyolysis, and intractable pain.  Patient now admitted for a multidisciplinary rehab program- pt/ot/dvt ppx, pain meds    #Rhabdomyolysis- 2/2 trauma , resolved, encourage PO fluids    #Pain Control- c/w Dilaudid, Oxycontin, valium    #Bladder Management  - Man removed 9/4 am  - bladder scans, PVR PRN as pt at risk for neurogenic bladder     #IVC filter  - placed 8/26      b/l DVT  - IVC filter in place, c/w SCDs for now    will follow  d/w rehab team

## 2020-09-09 PROCEDURE — 99233 SBSQ HOSP IP/OBS HIGH 50: CPT

## 2020-09-09 PROCEDURE — 99232 SBSQ HOSP IP/OBS MODERATE 35: CPT

## 2020-09-09 PROCEDURE — 93306 TTE W/DOPPLER COMPLETE: CPT | Mod: 26

## 2020-09-09 RX ADMIN — HYDROMORPHONE HYDROCHLORIDE 2 MILLIGRAM(S): 2 INJECTION INTRAMUSCULAR; INTRAVENOUS; SUBCUTANEOUS at 06:29

## 2020-09-09 RX ADMIN — HYDROMORPHONE HYDROCHLORIDE 2 MILLIGRAM(S): 2 INJECTION INTRAMUSCULAR; INTRAVENOUS; SUBCUTANEOUS at 02:31

## 2020-09-09 RX ADMIN — HYDROMORPHONE HYDROCHLORIDE 2 MILLIGRAM(S): 2 INJECTION INTRAMUSCULAR; INTRAVENOUS; SUBCUTANEOUS at 09:49

## 2020-09-09 RX ADMIN — OXYCODONE HYDROCHLORIDE 10 MILLIGRAM(S): 5 TABLET ORAL at 17:41

## 2020-09-09 RX ADMIN — OXYCODONE HYDROCHLORIDE 10 MILLIGRAM(S): 5 TABLET ORAL at 05:28

## 2020-09-09 RX ADMIN — Medication 5 MILLIGRAM(S): at 22:19

## 2020-09-09 RX ADMIN — Medication 5 MILLIGRAM(S): at 05:28

## 2020-09-09 RX ADMIN — OXYCODONE HYDROCHLORIDE 15 MILLIGRAM(S): 5 TABLET ORAL at 16:44

## 2020-09-09 RX ADMIN — OXYCODONE HYDROCHLORIDE 15 MILLIGRAM(S): 5 TABLET ORAL at 20:49

## 2020-09-09 RX ADMIN — HYDROMORPHONE HYDROCHLORIDE 2 MILLIGRAM(S): 2 INJECTION INTRAMUSCULAR; INTRAVENOUS; SUBCUTANEOUS at 05:29

## 2020-09-09 RX ADMIN — HYDROMORPHONE HYDROCHLORIDE 2 MILLIGRAM(S): 2 INJECTION INTRAMUSCULAR; INTRAVENOUS; SUBCUTANEOUS at 14:06

## 2020-09-09 RX ADMIN — OXYCODONE HYDROCHLORIDE 15 MILLIGRAM(S): 5 TABLET ORAL at 05:19

## 2020-09-09 RX ADMIN — HYDROMORPHONE HYDROCHLORIDE 2 MILLIGRAM(S): 2 INJECTION INTRAMUSCULAR; INTRAVENOUS; SUBCUTANEOUS at 10:30

## 2020-09-09 RX ADMIN — OXYCODONE HYDROCHLORIDE 15 MILLIGRAM(S): 5 TABLET ORAL at 09:00

## 2020-09-09 RX ADMIN — GABAPENTIN 300 MILLIGRAM(S): 400 CAPSULE ORAL at 17:41

## 2020-09-09 RX ADMIN — LIDOCAINE 1 PATCH: 4 CREAM TOPICAL at 20:54

## 2020-09-09 RX ADMIN — LIDOCAINE 1 PATCH: 4 CREAM TOPICAL at 09:24

## 2020-09-09 RX ADMIN — OXYCODONE HYDROCHLORIDE 10 MILLIGRAM(S): 5 TABLET ORAL at 06:28

## 2020-09-09 RX ADMIN — HYDROMORPHONE HYDROCHLORIDE 2 MILLIGRAM(S): 2 INJECTION INTRAMUSCULAR; INTRAVENOUS; SUBCUTANEOUS at 18:15

## 2020-09-09 RX ADMIN — SENNA PLUS 2 TABLET(S): 8.6 TABLET ORAL at 22:19

## 2020-09-09 RX ADMIN — OXYCODONE HYDROCHLORIDE 15 MILLIGRAM(S): 5 TABLET ORAL at 00:10

## 2020-09-09 RX ADMIN — Medication 1 TABLET(S): at 17:41

## 2020-09-09 RX ADMIN — Medication 1 TABLET(S): at 05:28

## 2020-09-09 RX ADMIN — HYDROMORPHONE HYDROCHLORIDE 2 MILLIGRAM(S): 2 INJECTION INTRAMUSCULAR; INTRAVENOUS; SUBCUTANEOUS at 17:43

## 2020-09-09 RX ADMIN — OXYCODONE HYDROCHLORIDE 15 MILLIGRAM(S): 5 TABLET ORAL at 13:30

## 2020-09-09 RX ADMIN — OXYCODONE HYDROCHLORIDE 10 MILLIGRAM(S): 5 TABLET ORAL at 18:15

## 2020-09-09 RX ADMIN — Medication 3 MILLIGRAM(S): at 22:19

## 2020-09-09 RX ADMIN — Medication 5 MILLIGRAM(S): at 14:06

## 2020-09-09 RX ADMIN — HYDROMORPHONE HYDROCHLORIDE 2 MILLIGRAM(S): 2 INJECTION INTRAMUSCULAR; INTRAVENOUS; SUBCUTANEOUS at 14:45

## 2020-09-09 RX ADMIN — LIDOCAINE 1 PATCH: 4 CREAM TOPICAL at 19:39

## 2020-09-09 RX ADMIN — GABAPENTIN 300 MILLIGRAM(S): 400 CAPSULE ORAL at 05:28

## 2020-09-09 RX ADMIN — HYDROMORPHONE HYDROCHLORIDE 2 MILLIGRAM(S): 2 INJECTION INTRAMUSCULAR; INTRAVENOUS; SUBCUTANEOUS at 23:15

## 2020-09-09 RX ADMIN — HYDROMORPHONE HYDROCHLORIDE 2 MILLIGRAM(S): 2 INJECTION INTRAMUSCULAR; INTRAVENOUS; SUBCUTANEOUS at 22:19

## 2020-09-09 RX ADMIN — OXYCODONE HYDROCHLORIDE 15 MILLIGRAM(S): 5 TABLET ORAL at 08:13

## 2020-09-09 RX ADMIN — HYDROMORPHONE HYDROCHLORIDE 2 MILLIGRAM(S): 2 INJECTION INTRAMUSCULAR; INTRAVENOUS; SUBCUTANEOUS at 03:31

## 2020-09-09 RX ADMIN — OXYCODONE HYDROCHLORIDE 15 MILLIGRAM(S): 5 TABLET ORAL at 04:19

## 2020-09-09 RX ADMIN — OXYCODONE HYDROCHLORIDE 15 MILLIGRAM(S): 5 TABLET ORAL at 12:41

## 2020-09-09 RX ADMIN — OXYCODONE HYDROCHLORIDE 15 MILLIGRAM(S): 5 TABLET ORAL at 17:30

## 2020-09-09 RX ADMIN — OXYCODONE HYDROCHLORIDE 15 MILLIGRAM(S): 5 TABLET ORAL at 21:45

## 2020-09-09 RX ADMIN — OXYCODONE HYDROCHLORIDE 15 MILLIGRAM(S): 5 TABLET ORAL at 01:10

## 2020-09-09 NOTE — PROGRESS NOTE ADULT - SUBJECTIVE AND OBJECTIVE BOX
35 y old  Male who presents with a chief complaint of Traumatic Spinal Cord Disfunction  seen at the bedside, sitting at the side of bed with PT, c/o pain in the left lower back and hip 10/10,  no n/v, no sob.      Vital Signs Last 24 Hrs  T(C): 36.9 (09 Sep 2020 08:59), Max: 36.9 (09 Sep 2020 08:59)  T(F): 98.4 (09 Sep 2020 08:59), Max: 98.4 (09 Sep 2020 08:59)  HR: 104 (09 Sep 2020 08:59) (104 - 110)  BP: 139/89 (09 Sep 2020 08:59) (121/85 - 139/89)  BP(mean): --  RR: 18 (09 Sep 2020 08:59) (17 - 18)  SpO2: 100% (09 Sep 2020 08:59) (98% - 100%)    GENERAL- NAD  EAR/NOSE/MOUTH/THROAT - no pharyngeal exudates, no oral lesions  MMM  EYES- PATRICIA, conjunctiva and Sclera clear  NECK- supple  RESPIRATORY-  clear to auscultation bilaterally  CARDIOVASCULAR - SIS2, RRR  GI - soft NT BS present, +incision left of midline with dressing clean  EXTREMITIES- no pedal edema  NEUROLOGY- B/L LE weakness  SKIN-  midline incision on back with Aquacel dressing,  mid back w/ JETHRO drain ( serosanguinous drainage)  PSYCHIATRY- AAO X 3  MUSCULOSKELETAL- ROM restricted to b/l LE    MEDICATIONS  (STANDING):  diazepam    Tablet 5 milliGRAM(s) Oral every 8 hours  gabapentin 300 milliGRAM(s) Oral two times a day  HYDROmorphone   Tablet 2 milliGRAM(s) Oral every 4 hours  influenza   Vaccine 0.5 milliLiter(s) IntraMuscular once  lactobacillus acidophilus 1 Tablet(s) Oral two times a day  melatonin 3 milliGRAM(s) Oral at bedtime  oxyCODONE  ER Tablet 10 milliGRAM(s) Oral every 12 hours  senna 2 Tablet(s) Oral at bedtime    MEDICATIONS  (PRN):  acetaminophen   Tablet .. 650 milliGRAM(s) Oral every 6 hours PRN Temp greater or equal to 38C (100.4F), Mild Pain (1 - 3)  famotidine    Tablet 20 milliGRAM(s) Oral every 12 hours PRN Dyspepsia  lidocaine   Patch 1 Patch Transdermal every 24 hours PRN Severe pain  lidocaine 2% Gel 1 Application(s) Topical four times a day PRN pain  magnesium hydroxide Suspension 30 milliLiter(s) Oral every 12 hours PRN Constipation  oxyCODONE    IR 5 milliGRAM(s) Oral every 4 hours PRN Moderate Pain (4 - 6)  oxyCODONE    IR 15 milliGRAM(s) Oral every 4 hours PRN Severe Pain (7 - 10)

## 2020-09-09 NOTE — PROGRESS NOTE ADULT - ASSESSMENT
ASSESSMENT/PLAN  BRUNO CHO is a 35M admitted to [facility] on 8/22/20 with back pain, found to have fractures of L1-L5 pedicles bilaterally, leading to widening of the spinal canal with anterolisthesis of L5 on S1 with the entire L5 vertebral body width, disruption of the ALL and PLL at L5-S1 level, probable epidural hemorrhage and hemorrhage within the foramina at L1-L5, and enlargement/edema of bilateral psoas muscle.  Patient underwent evacuation of epidural hematoma, T11-pelvis posterior spinal fusion, and L2-S1 Laminectomy on 8/23/20 and subsequent L5-S1 anterior interbody fusion and L3-4 lateral interbody fusion on 9/1.  Hospital course c/b acute blood loss anemia requiring multiple transfusions, traumatic rhabodmyolysis, and intractable pain.  Patient now admitted for a multidisciplinary rehab program. 09-04-20 @ 14:37    Comprehensive Multidisciplinary Rehab Program:  - Start comprehensive rehab program of PT/OT - 3 hours a day, 5 days a week  - P&O as needed    -------------  MEDICAL MANAGEMENT     #Traumatic Spinal Cord Injury  - pt with complete anterolisthesis of L5 on S1, multiple pedicular fractures, and epidural hemorrhage   - s/p T11-pelvis posterior spinal fusion, and L2-S1 Laminectomy on 8/23/20 and subsequent L5-S1 anterior interbody fusion and L3-4 lateral interbody fusion on 9/1  - pain control as below  - PT/OT  - spinal & Fall precautions  - TLSO/AFOs when OOB  - monitor bladder/bowel function out of concern for neurogenic bowel  - monitor JETHRO drain outpt; Keflex 500mg Q8h while drain is in place  - Drain dc instructions: Per DC note can be pulled on Monday 9/7/20. The drain is sewn in, so please cut the one suture that is holding the drain in place. Once the suture is cut, open the drain cap so that the drain is open. After both previous steps are done then the drain can be pulled. Pull lightly until drain is completely out. Place a 4x4 with Tegaderm over the drain wound  - Drain output still >300mL/day. Will defer drain removal until output decreases.     #Rhabdomyolysis  - 2/2 trauma   - largely resolved: <900 on 8/31  - encourage PO fluids    #Tachycardia  - per chart review, pt tachycardic prior to transfer to [facility], but no dopplers/CTA obtained  - CTA 9/6 inconclusive re: PE  - Doppler 96/: +B/L DVT   - Vascular (Dr. Nice) consulted; call placed to ortho (Dr. Rivera) to discuss   - TTE ordered to assess for right heart strain    #Pain Control  - c/w dilaudid, oxycontin scheduled. oxycodone prn, Increased oxycodone IR to 15 mg, started gabapentin.   - valium for muscle spasms     #Bladder Management  - francis removed 9/4 am  - monitor bladder scans, PVR PRN as pt at risk for neurogenic bladder       #IVC filter  - placed 8/26  - will need removed in 5-6 months       DVT prophylaxis:   - IVC filter  - SCDs  - tachycardia: Doppler and cTA chest to eval for DVT and PE. however per chart review pt appears to have been tachycardic for some time prior to arrival in rehab.    Outpatient Follow-up:    Gabrielle Villegas  ORTHOPAEDIC SURGERY  [address], [address]  [place] [address]  Phone: [phone]  Fax: [phone]  Follow Up Time:     Dave Rivera  ORTHOPAEDIC SURGERY  [address] [place] [address]  Phone: [phone]  Fax: [phone]

## 2020-09-09 NOTE — CONSULT NOTE ADULT - SUBJECTIVE AND OBJECTIVE BOX
History of Present Illness:  35y Male  s/p 2 major spinal surgeries for traumatic spinal cord dysfunction developed  tachycardia and had venous doppler that showed bilateral DVT. CTA was not conclusive for PE. The patient had a IVC filter inserted after his 1st. spine surgery. No prior hx of DVT or PE. I was requested to evaluate h is LE  circulation.    PAST MEDICAL & SURGICAL HISTORY:  No pertinent past medical history  No significant past surgical history      Allergies    No Known Allergies    Intolerances        MEDICATIONS  (STANDING):  diazepam    Tablet 5 milliGRAM(s) Oral every 8 hours  gabapentin 300 milliGRAM(s) Oral two times a day  HYDROmorphone   Tablet 2 milliGRAM(s) Oral every 4 hours  influenza   Vaccine 0.5 milliLiter(s) IntraMuscular once  lactobacillus acidophilus 1 Tablet(s) Oral two times a day  melatonin 3 milliGRAM(s) Oral at bedtime  oxyCODONE  ER Tablet 10 milliGRAM(s) Oral every 12 hours  senna 2 Tablet(s) Oral at bedtime    MEDICATIONS  (PRN):  acetaminophen   Tablet .. 650 milliGRAM(s) Oral every 6 hours PRN Temp greater or equal to 38C (100.4F), Mild Pain (1 - 3)  famotidine    Tablet 20 milliGRAM(s) Oral every 12 hours PRN Dyspepsia  lidocaine   Patch 1 Patch Transdermal every 24 hours PRN Severe pain  lidocaine 2% Gel 1 Application(s) Topical four times a day PRN pain  magnesium hydroxide Suspension 30 milliLiter(s) Oral every 12 hours PRN Constipation  oxyCODONE    IR 5 milliGRAM(s) Oral every 4 hours PRN Moderate Pain (4 - 6)  oxyCODONE    IR 15 milliGRAM(s) Oral every 4 hours PRN Severe Pain (7 - 10)      Social History:  Smoking History: denies  Alcohol Use: denies    REVIEW OF SYSTEMS:  CONSTITUTIONAL: No  fevers or chills  EYES/ENT: No visual changes;  No vertigo or throat pain   NECK: No pain or stiffness  RESPIRATORY: No cough, wheezing, hemoptysis; No shortness of breath  CARDIOVASCULAR: No chest pain or palpitations  GASTROINTESTINAL: No abdominal or epigastric pain. No nausea, vomiting, or hematemesis; No diarrhea or constipation. No melena or hematochezia.  GENITOURINARY: No dysuria, frequency or hematuria  NEUROLOGICAL: +++ bilateral right > left foot drops with weakness in both legs.  SKIN: No itching, burning, rashes, or lesions   Vascular:  No lower extremity claudication, pedal rest pain or digital ulcers  All other review of systems is negative unless indicated above.    PHYSICAL EXAM:  General:  On exam, the patient is alert nontoxic appearing Male in no acute distress.  Vital Signs Last 24 Hrs  T(C): 36.9 (09 Sep 2020 08:59), Max: 36.9 (09 Sep 2020 08:59)  T(F): 98.4 (09 Sep 2020 08:59), Max: 98.4 (09 Sep 2020 08:59)  HR: 104 (09 Sep 2020 08:59) (104 - 110)  BP: 139/89 (09 Sep 2020 08:59) (121/85 - 139/89)  BP(mean): --  RR: 18 (09 Sep 2020 08:59) (17 - 18)  SpO2: 100% (09 Sep 2020 08:59) (98% - 100%)     Neck:  4+/4+ bilateral carotid pulses; no carotid bruit, no palpable cervical masses.  Heart:  Regular, no murmurs, rubs or gallops.    Lungs:  Clear to auscultation.    Chest:  No chest wall deformities.  Symmetrical chest expansion.   Abdomen: Soft and nontender.  No palpable masses.  No rebound, guarding or rigidity.  Extremities: Both  Feet are warm, pink with normal capillary refill times.  There are no digital ulcers or heel decubiti.  The calf and thigh muscles are soft and nontender.  There are no palpable cords or limb cellulitis.  Shanice's sign  is negative bilaterally.  There is no lower extremity edema, cyanosis, or rubor.  On examination of the peripheral pulses:  Left leg femoral pulse is 4/4   , popliteal pulse is 4/4   ,PT Pulse is 4/4   , DP Pulse is 4/4   Right leg femoral pulse is 4/4   ,popliteal pulse is 4/4   , PT Pulse is 4/4  , DP Pulse is 4/4   Neurological:   ++ bilateral foot drops with bilateral LE weakness                    Radiology:

## 2020-09-09 NOTE — PROGRESS NOTE ADULT - SUBJECTIVE AND OBJECTIVE BOX
Patient is a 35y old  Male who presents with a chief complaint of Traumatic Spinal Cord Disfunction: 04.211 incomplete paraplegia (04 Sep 2020 14:36)      HPI:  TODAY'S SUBJECTIVE & REVIEW OF SYMPTOMS: Patient seen and evaluated this morning. No acute events overnight.     . Pt reports he is voiding; PVRs<150mL x24hrs.  pain is better controlled, not feeling drowsiness. sleep is okay, doesn't usually sleep great at baseline.     [X] Constitutional WNL        [X] Cardio WNL              [X] Resp WNL  [X] GI WNL                            []  +retention                    [X] Heme WNL  [X] Endo WNL                       [X] Skin WNL                  [] MSK +pain  [] Neuro +weakness                     [X] Cognitive WNL         [X] Psych WNL      PHYSICAL EXAM  Vital Signs Last 24 Hrs   Vital Signs Last 24 Hrs  T(C): 36.9 (09 Sep 2020 08:59), Max: 36.9 (09 Sep 2020 08:59)  T(F): 98.4 (09 Sep 2020 08:59), Max: 98.4 (09 Sep 2020 08:59)  HR: 104 (09 Sep 2020 08:59) (104 - 110)  BP: 139/89 (09 Sep 2020 08:59) (121/85 - 139/89)  BP(mean): --  RR: 18 (09 Sep 2020 08:59) (17 - 18)  SpO2: 100% (09 Sep 2020 08:59) (98% - 100%)     Gen - NAD, uncomfortable  	HEENT - NCAT, EOMI  	Neck - Supple, + limited ROM  	Pulm - CTAB, No wheeze, No rhonchi, No crackles  	Cardiovascular - RRR, S1S2, No murmurs  	Abdomen - Soft, NT/ND, +BS, +incision left of midline with dressing c/d/i  	Extremities - No C/C/E, No calf tenderness  	Neuro-  	   Cognitive - AAOx3  	   Communication - Fluent, No dysarthria  	   Attention: Intact   	   Judgement: Good evidence of judgement  	   Memory: Recall 3 objects immediate and 3 min later      	   Cranial Nerves - CN 2-12 intact  	   Motor -   	                  LEFT    UE - ShAB 5/5, EF 5/5, EE 5/5, WE 5/5,  5/5  	                  RIGHT UE - ShAB 5/5, EF 5/5, EE 5/5, WE 5/5,  5/5  	                  LEFT    LE - HF 1/5 (limited by pain), KE 3/5, DF 0/5, PF 3/5  	                  RIGHT LE - HF 1/5 (limited by pain), KE 3/5, DF 1/5, PF 3/5     	   Sensory - Intact to LT  	   Reflexes - DTR Intact, No primitive reflexive  	   Coordination - FTN intact  	   Tone - normal  	Psychiatric - Mood stable, Affect WNL  Skin:  abdominal incision as above, midline incision on back from T11 to S2 with staples, mid back w/ JETHRO drain, lacy pink rash under bandages which were soaked through with serosanguinous drainage      LABS:

## 2020-09-09 NOTE — CONSULT NOTE ADULT - PROVIDER SPECIALTY LIST ADULT
Pt showing signs of confusion. Attempted to reorient without success. Pt continues to pull at [facility] and Midline when attempting to remove restraints.  April Linker Vascular Surgery

## 2020-09-09 NOTE — CHART NOTE - NSCHARTNOTEFT_GEN_A_CORE
Pt seen at bedside to fit and deliver custom fabricated bilateral Solid AFOs with foam liner to protect skin from breakdown or ulceration. Pt had been given prefab devices while in [facility] and casted . He was using prefabs until custom devices were ready.  Pt instructed to don and doff.   Follow up if needed.  Please call if any problems arise.    Sonu Ordonez CO  [phone]

## 2020-09-09 NOTE — PROGRESS NOTE ADULT - ASSESSMENT
35M admitted to [facility] on 8/22/20 with back pain, found to have fractures of L1-L5 pedicles bilaterally, leading to widening of the spinal canal with anterolisthesis of L5 on S1 with the entire L5 vertebral body width, disruption of the ALL and PLL at L5-S1 level, probable epidural hemorrhage and hemorrhage within the foramina at L1-L5, and enlargement/edema of bilateral psoas muscle.  Patient underwent evacuation of epidural hematoma, T11-pelvis posterior spinal fusion, and L2-S1 Laminectomy on 8/23/20 and subsequent L5-S1 anterior interbody fusion and L3-4 lateral interbody fusion on 9/1.  Hospital course c/b acute blood loss anemia requiring multiple transfusions, traumatic rhabdomyolysis and intractable pain.  Patient now admitted for a multidisciplinary rehab program- pt/ot/dvt ppx, pain meds    # tachycardia- likely multifactorial secondary to  debility/ DVT b/l LE ( ivc filter +), monitor labs    #Rhabdomyolysis- 2/2 trauma , resolved, encourage PO fluids    #Pain Control- c/w Dilaudid, Oxycontin, valium    #Bladder Management  - Man removed 9/4 am, voiding  - bladder scans, PVR PRN     #IVC filter  - placed 8/26      b/l DVT  - IVC filter in place, c/w SCDs for now    will follow  d/w dr. arnett

## 2020-09-09 NOTE — CONSULT NOTE ADULT - ASSESSMENT
35 y.o. male with incomplete paraplegia and bilateral foot drops after 2 spine surgeries has bilateral DVT. He has a ivc filter. He is not a good candidate for AC therapy at this time. 35 y.o. male with incomplete paraplegia and bilateral foot drops after 2 spine surgeries has bilateral DVT. He has a ivc filter. He is not a good candidate for AC therapy at this time. The patient was given informed consent. He is clinically stable to continue PT therapy

## 2020-09-10 LAB
ANION GAP SERPL CALC-SCNC: 10 MMOL/L — SIGNIFICANT CHANGE UP (ref 5–17)
BASOPHILS # BLD AUTO: 0.04 K/UL — SIGNIFICANT CHANGE UP (ref 0–0.2)
BASOPHILS NFR BLD AUTO: 0.5 % — SIGNIFICANT CHANGE UP (ref 0–2)
BUN SERPL-MCNC: 13 MG/DL — SIGNIFICANT CHANGE UP (ref 7–23)
CALCIUM SERPL-MCNC: 8.5 MG/DL — SIGNIFICANT CHANGE UP (ref 8.4–10.5)
CHLORIDE SERPL-SCNC: 97 MMOL/L — SIGNIFICANT CHANGE UP (ref 96–108)
CO2 SERPL-SCNC: 28 MMOL/L — SIGNIFICANT CHANGE UP (ref 22–31)
CREAT SERPL-MCNC: 0.76 MG/DL — SIGNIFICANT CHANGE UP (ref 0.5–1.3)
EOSINOPHIL # BLD AUTO: 0.11 K/UL — SIGNIFICANT CHANGE UP (ref 0–0.5)
EOSINOPHIL NFR BLD AUTO: 1.2 % — SIGNIFICANT CHANGE UP (ref 0–6)
GLUCOSE SERPL-MCNC: 120 MG/DL — HIGH (ref 70–99)
HCT VFR BLD CALC: 30.3 % — LOW (ref 39–50)
HGB BLD-MCNC: 9.6 G/DL — LOW (ref 13–17)
IMM GRANULOCYTES NFR BLD AUTO: 1.2 % — SIGNIFICANT CHANGE UP (ref 0–1.5)
LYMPHOCYTES # BLD AUTO: 1.23 K/UL — SIGNIFICANT CHANGE UP (ref 1–3.3)
LYMPHOCYTES # BLD AUTO: 13.9 % — SIGNIFICANT CHANGE UP (ref 13–44)
MCHC RBC-ENTMCNC: 28.3 PG — SIGNIFICANT CHANGE UP (ref 27–34)
MCHC RBC-ENTMCNC: 31.7 GM/DL — LOW (ref 32–36)
MCV RBC AUTO: 89.4 FL — SIGNIFICANT CHANGE UP (ref 80–100)
MONOCYTES # BLD AUTO: 0.69 K/UL — SIGNIFICANT CHANGE UP (ref 0–0.9)
MONOCYTES NFR BLD AUTO: 7.8 % — SIGNIFICANT CHANGE UP (ref 2–14)
NEUTROPHILS # BLD AUTO: 6.66 K/UL — SIGNIFICANT CHANGE UP (ref 1.8–7.4)
NEUTROPHILS NFR BLD AUTO: 75.4 % — SIGNIFICANT CHANGE UP (ref 43–77)
NRBC # BLD: 0 /100 WBCS — SIGNIFICANT CHANGE UP (ref 0–0)
PLATELET # BLD AUTO: 245 K/UL — SIGNIFICANT CHANGE UP (ref 150–400)
POTASSIUM SERPL-MCNC: 3.6 MMOL/L — SIGNIFICANT CHANGE UP (ref 3.5–5.3)
POTASSIUM SERPL-SCNC: 3.6 MMOL/L — SIGNIFICANT CHANGE UP (ref 3.5–5.3)
RBC # BLD: 3.39 M/UL — LOW (ref 4.2–5.8)
RBC # FLD: 13.5 % — SIGNIFICANT CHANGE UP (ref 10.3–14.5)
SODIUM SERPL-SCNC: 135 MMOL/L — SIGNIFICANT CHANGE UP (ref 135–145)
WBC # BLD: 8.84 K/UL — SIGNIFICANT CHANGE UP (ref 3.8–10.5)
WBC # FLD AUTO: 8.84 K/UL — SIGNIFICANT CHANGE UP (ref 3.8–10.5)

## 2020-09-10 PROCEDURE — 96116 NUBHVL XM PHYS/QHP 1ST HR: CPT

## 2020-09-10 PROCEDURE — 99233 SBSQ HOSP IP/OBS HIGH 50: CPT

## 2020-09-10 RX ORDER — OXYCODONE HYDROCHLORIDE 5 MG/1
10 TABLET ORAL EVERY 12 HOURS
Refills: 0 | Status: DISCONTINUED | OUTPATIENT
Start: 2020-09-10 | End: 2020-09-10

## 2020-09-10 RX ORDER — HYDROMORPHONE HYDROCHLORIDE 2 MG/ML
2 INJECTION INTRAMUSCULAR; INTRAVENOUS; SUBCUTANEOUS EVERY 4 HOURS
Refills: 0 | Status: DISCONTINUED | OUTPATIENT
Start: 2020-09-10 | End: 2020-09-14

## 2020-09-10 RX ORDER — CEPHALEXIN 500 MG
500 CAPSULE ORAL EVERY 8 HOURS
Refills: 0 | Status: DISCONTINUED | OUTPATIENT
Start: 2020-09-10 | End: 2020-09-16

## 2020-09-10 RX ORDER — OXYCODONE HYDROCHLORIDE 5 MG/1
20 TABLET ORAL EVERY 12 HOURS
Refills: 0 | Status: DISCONTINUED | OUTPATIENT
Start: 2020-09-10 | End: 2020-09-15

## 2020-09-10 RX ORDER — OXYCODONE HYDROCHLORIDE 5 MG/1
15 TABLET ORAL EVERY 4 HOURS
Refills: 0 | Status: DISCONTINUED | OUTPATIENT
Start: 2020-09-10 | End: 2020-09-10

## 2020-09-10 RX ORDER — OXYCODONE HYDROCHLORIDE 5 MG/1
5 TABLET ORAL EVERY 4 HOURS
Refills: 0 | Status: DISCONTINUED | OUTPATIENT
Start: 2020-09-10 | End: 2020-09-14

## 2020-09-10 RX ORDER — GABAPENTIN 400 MG/1
300 CAPSULE ORAL THREE TIMES A DAY
Refills: 0 | Status: DISCONTINUED | OUTPATIENT
Start: 2020-09-10 | End: 2020-09-14

## 2020-09-10 RX ORDER — HYDROMORPHONE HYDROCHLORIDE 2 MG/ML
2 INJECTION INTRAMUSCULAR; INTRAVENOUS; SUBCUTANEOUS ONCE
Refills: 0 | Status: DISCONTINUED | OUTPATIENT
Start: 2020-09-10 | End: 2020-09-10

## 2020-09-10 RX ORDER — OXYCODONE HYDROCHLORIDE 5 MG/1
10 TABLET ORAL EVERY 4 HOURS
Refills: 0 | Status: DISCONTINUED | OUTPATIENT
Start: 2020-09-10 | End: 2020-09-14

## 2020-09-10 RX ORDER — DIAZEPAM 5 MG
5 TABLET ORAL EVERY 8 HOURS
Refills: 0 | Status: DISCONTINUED | OUTPATIENT
Start: 2020-09-10 | End: 2020-09-14

## 2020-09-10 RX ADMIN — OXYCODONE HYDROCHLORIDE 10 MILLIGRAM(S): 5 TABLET ORAL at 18:00

## 2020-09-10 RX ADMIN — OXYCODONE HYDROCHLORIDE 15 MILLIGRAM(S): 5 TABLET ORAL at 09:30

## 2020-09-10 RX ADMIN — Medication 5 MILLIGRAM(S): at 06:05

## 2020-09-10 RX ADMIN — SENNA PLUS 2 TABLET(S): 8.6 TABLET ORAL at 21:22

## 2020-09-10 RX ADMIN — OXYCODONE HYDROCHLORIDE 15 MILLIGRAM(S): 5 TABLET ORAL at 08:51

## 2020-09-10 RX ADMIN — GABAPENTIN 300 MILLIGRAM(S): 400 CAPSULE ORAL at 06:05

## 2020-09-10 RX ADMIN — OXYCODONE HYDROCHLORIDE 10 MILLIGRAM(S): 5 TABLET ORAL at 14:00

## 2020-09-10 RX ADMIN — GABAPENTIN 300 MILLIGRAM(S): 400 CAPSULE ORAL at 21:22

## 2020-09-10 RX ADMIN — OXYCODONE HYDROCHLORIDE 15 MILLIGRAM(S): 5 TABLET ORAL at 00:50

## 2020-09-10 RX ADMIN — Medication 500 MILLIGRAM(S): at 13:16

## 2020-09-10 RX ADMIN — HYDROMORPHONE HYDROCHLORIDE 2 MILLIGRAM(S): 2 INJECTION INTRAMUSCULAR; INTRAVENOUS; SUBCUTANEOUS at 06:05

## 2020-09-10 RX ADMIN — OXYCODONE HYDROCHLORIDE 15 MILLIGRAM(S): 5 TABLET ORAL at 01:50

## 2020-09-10 RX ADMIN — OXYCODONE HYDROCHLORIDE 10 MILLIGRAM(S): 5 TABLET ORAL at 06:05

## 2020-09-10 RX ADMIN — OXYCODONE HYDROCHLORIDE 10 MILLIGRAM(S): 5 TABLET ORAL at 21:21

## 2020-09-10 RX ADMIN — OXYCODONE HYDROCHLORIDE 20 MILLIGRAM(S): 5 TABLET ORAL at 18:35

## 2020-09-10 RX ADMIN — HYDROMORPHONE HYDROCHLORIDE 2 MILLIGRAM(S): 2 INJECTION INTRAMUSCULAR; INTRAVENOUS; SUBCUTANEOUS at 19:00

## 2020-09-10 RX ADMIN — HYDROMORPHONE HYDROCHLORIDE 2 MILLIGRAM(S): 2 INJECTION INTRAMUSCULAR; INTRAVENOUS; SUBCUTANEOUS at 10:55

## 2020-09-10 RX ADMIN — Medication 3 MILLIGRAM(S): at 21:59

## 2020-09-10 RX ADMIN — Medication 5 MILLIGRAM(S): at 21:21

## 2020-09-10 RX ADMIN — Medication 500 MILLIGRAM(S): at 21:22

## 2020-09-10 RX ADMIN — Medication 5 MILLIGRAM(S): at 13:16

## 2020-09-10 RX ADMIN — Medication 1 TABLET(S): at 06:05

## 2020-09-10 RX ADMIN — HYDROMORPHONE HYDROCHLORIDE 2 MILLIGRAM(S): 2 INJECTION INTRAMUSCULAR; INTRAVENOUS; SUBCUTANEOUS at 15:19

## 2020-09-10 RX ADMIN — HYDROMORPHONE HYDROCHLORIDE 2 MILLIGRAM(S): 2 INJECTION INTRAMUSCULAR; INTRAVENOUS; SUBCUTANEOUS at 18:14

## 2020-09-10 RX ADMIN — OXYCODONE HYDROCHLORIDE 10 MILLIGRAM(S): 5 TABLET ORAL at 13:16

## 2020-09-10 RX ADMIN — OXYCODONE HYDROCHLORIDE 15 MILLIGRAM(S): 5 TABLET ORAL at 06:04

## 2020-09-10 RX ADMIN — HYDROMORPHONE HYDROCHLORIDE 2 MILLIGRAM(S): 2 INJECTION INTRAMUSCULAR; INTRAVENOUS; SUBCUTANEOUS at 21:59

## 2020-09-10 RX ADMIN — HYDROMORPHONE HYDROCHLORIDE 2 MILLIGRAM(S): 2 INJECTION INTRAMUSCULAR; INTRAVENOUS; SUBCUTANEOUS at 02:01

## 2020-09-10 RX ADMIN — HYDROMORPHONE HYDROCHLORIDE 2 MILLIGRAM(S): 2 INJECTION INTRAMUSCULAR; INTRAVENOUS; SUBCUTANEOUS at 06:42

## 2020-09-10 RX ADMIN — OXYCODONE HYDROCHLORIDE 10 MILLIGRAM(S): 5 TABLET ORAL at 22:59

## 2020-09-10 RX ADMIN — OXYCODONE HYDROCHLORIDE 15 MILLIGRAM(S): 5 TABLET ORAL at 04:52

## 2020-09-10 RX ADMIN — HYDROMORPHONE HYDROCHLORIDE 2 MILLIGRAM(S): 2 INJECTION INTRAMUSCULAR; INTRAVENOUS; SUBCUTANEOUS at 22:30

## 2020-09-10 RX ADMIN — OXYCODONE HYDROCHLORIDE 20 MILLIGRAM(S): 5 TABLET ORAL at 19:13

## 2020-09-10 RX ADMIN — OXYCODONE HYDROCHLORIDE 10 MILLIGRAM(S): 5 TABLET ORAL at 06:42

## 2020-09-10 RX ADMIN — OXYCODONE HYDROCHLORIDE 10 MILLIGRAM(S): 5 TABLET ORAL at 17:13

## 2020-09-10 RX ADMIN — Medication 1 TABLET(S): at 18:14

## 2020-09-10 RX ADMIN — HYDROMORPHONE HYDROCHLORIDE 2 MILLIGRAM(S): 2 INJECTION INTRAMUSCULAR; INTRAVENOUS; SUBCUTANEOUS at 16:00

## 2020-09-10 RX ADMIN — HYDROMORPHONE HYDROCHLORIDE 2 MILLIGRAM(S): 2 INJECTION INTRAMUSCULAR; INTRAVENOUS; SUBCUTANEOUS at 03:00

## 2020-09-10 RX ADMIN — GABAPENTIN 300 MILLIGRAM(S): 400 CAPSULE ORAL at 13:16

## 2020-09-10 RX ADMIN — HYDROMORPHONE HYDROCHLORIDE 2 MILLIGRAM(S): 2 INJECTION INTRAMUSCULAR; INTRAVENOUS; SUBCUTANEOUS at 11:45

## 2020-09-10 NOTE — CONSULT NOTE ADULT - ASSESSMENT
Assessment: Pt presents with intact cognitive functioning. In the emotional area, his affect is constricted and irritable, and he reports several symptoms of anxiety and depression as reported above, most of them very frequent, in response to his current medical condition. He has hx of depressive and anxiety symptoms without adequate treatment so far. FIM scores: Social Interaction 4; Problem Solving 7; Memory 7.     Plan: Individual supportive psychotherapy to monitor cognition, affect/mood, and behavior. Pt will also benefit from antidepressant medication to assist his coping during his recovery. Cognitive remediation during speech therapy sessions. Participation in recreation/art therapy in order to have pleasure and mastery experiences and regain/reestablish a sense of routine.

## 2020-09-10 NOTE — CONSULT NOTE ADULT - SUBJECTIVE AND OBJECTIVE BOX
Pt is a 34 y/o right-handed male with no significant PMHx admitted to [facility] on 8/22/20 with back pain after falling down six steps at home during night. On admission BAL<10. Utox done after receiving pain meds and Valium in ED positive only for benzos and opiates. Imaging revealed fractures of L1-L5 pedicles bilaterally, leading to widening of the spinal canal with anterolisthesis of L5 on S1 with the entire L5 vertebral body width, disruption of the ALL and PLL at L5-S1 level, probable epidural hemorrhage and hemorrhage within the foramina at L1-L5, and enlargement/edema of bilateral psoas muscle.  Ortho consulted. Pt is s/p evacuation of epidural hematoma, T11-pelvis posterior spinal fusion, and L2-S1 Laminectomy on 8/23/20 with Dr. Villegas. Intraoperatively, Pt received 4U PRBCs, 1U platelets, and 1U FFP due to 2200mL EBL. Post-operatively, he was admitted to CCU for pressure support and required additional blood products due to continued bleeding into JETHRO drains.  He was febrile after transfusions but remained without other signs of infection.  IVC filter placed 8/26 as patient unable to be pharmacologically anticoagulated due to extensive surgical requirements.  IVC filter placed by Dr. Merchant on 8/26, with plan to remove in 5-6 months.  He was seen by nephro fo CHELO (multifactorial) and elevated CPK, and started on IVF.  He is also s/p t L5-S1 anterior interbody fusion and L3-4 lateral interbody fusion on 9/1/20. TLSO and AFOs provided at [facility]. Man removed 9/4/20. Patient voiding without need for SC. Pt cleared for discharge to acute inpatient rehab at [facility]. PMHx: None previously. Current meds: Valium 5 mg Q 8 hrs. Please see list in Pt’s chart. Social Hx: Pt is single and lives with his mother. 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 Pt reported seeking help for depression and anxiety a few years ago but only having 2 visits due to insurance issues. Pt is Christian. He enjoys barbecuing and going to the beach.      Findings: Pt was seen for an initial assessment of his cognitive and emotional functioning. MMSE was administered; Pt’s results (29/30) were in the Normal range. His scores in mood measures suggested moderate to severe levels of anxiety and depression (GAD7 = 20/21; PHQ9 = 17/27). Pt was alert, fully Ox3, and relatively cooperative. Attn/Conc: Simple auditory attention - impaired. Concentration - intact. Working memory for calculations – intact (5/5 serial 7s). Language: Pt’s speech was of normal volume, pitch and pace. Naming - intact. Sentence repetition - intact. Auditory comprehension - intact. Reading - intact. Writing - intact. Memory: Encoding of 3 words was intact (3/3); delayed verbal recall – intact (3/3). LTM was intact for [place] presidents (4/4). Visual memory – not assessed. Visuospatial: Visuomotor integration – mildly impaired for copy of a 3D figure, sloppiness was noted. Executive Functions: Motor planning - intact. Organizational skills - not assessed. Verbal problem solving – intact. Emotional functioning: Affect - constricted, irritable. Mood - dysphoric ("up and down"); Pt reported experiencing sadness, crying spells, anxiety, worry, hopelessness/helplessness, poor sleep and low energy. On mood measures he additionally reported very frequent depressed mood, poor sleep, fatigue/low energy, low self-esteem, poor concentration, anxiety, worry/difficulty to control his worry, difficulty relaxing, restlessness and catastrophization, as well as frequent irritability and thoughts of death without plan or intent. Thought processes were goal-directed.  No abnormal thought contents were observed.  Pt denied any AH/VH. Pt also denied SI/HI/I/P. Insight - WFL. Judgment - WFL.

## 2020-09-10 NOTE — PROGRESS NOTE ADULT - ASSESSMENT
35M admitted to [facility] on 8/22/20 with back pain, found to have fractures of L1-L5 pedicles bilaterally, leading to widening of the spinal canal with anterolisthesis of L5 on S1 with the entire L5 vertebral body width, disruption of the ALL and PLL at L5-S1 level, probable epidural hemorrhage and hemorrhage within the foramina at L1-L5, and enlargement/edema of bilateral psoas muscle.  Patient underwent evacuation of epidural hematoma, T11-pelvis posterior spinal fusion, and L2-S1 Laminectomy on 8/23/20 and subsequent L5-S1 anterior interbody fusion and L3-4 lateral interbody fusion on 9/1.  Hospital course c/b acute blood loss anemia requiring multiple transfusions, traumatic rhabdomyolysis and intractable pain.  Patient now admitted for a multidisciplinary rehab program- pt/ot/dvt ppx, pain meds    # tachycardia/dizziness- likely multifactorial secondary to debility/ DVT b/l LE ( ivc filter +), monitor labs    #Rhabdomyolysis- 2/2 trauma , resolved, encourage PO fluids    #Pain Control- c/w Dilaudid, Oxycontin, valium    #Bladder Management  - Man removed 9/4 am, voiding  - bladder scans, PVR PRN     #IVC filter  - placed 8/26      b/l DVT  - IVC filter in place, c/w SCDs for now    will follow  d/w dr. arnett

## 2020-09-10 NOTE — PROGRESS NOTE ADULT - SUBJECTIVE AND OBJECTIVE BOX
35 y old  Male who presents with a chief complaint of Traumatic Spinal Cord Disfunction, after falling down six steps at home during night. s/p evacuation of epidural hematoma, T11-pelvis posterior spinal fusion, and L2-S1 Laminectomy on 8/23/20 and subsequent L5-S1 anterior interbody fusion and L3-4 lateral interbody fusion on 9/1    seen at PT, c/o pain in the lower back, 9/10, this am, associated with some dizziness, no n/v, no sob.      Vital Signs Last 24 Hrs  T(C): 36.8 (09 Sep 2020 20:47), Max: 36.8 (09 Sep 2020 20:47)  T(F): 98.3 (09 Sep 2020 20:47), Max: 98.3 (09 Sep 2020 20:47)  HR: 103 (09 Sep 2020 20:47) (103 - 103)  BP: 133/85 (09 Sep 2020 20:47) (133/85 - 133/85)  BP(mean): --  RR: 18 (09 Sep 2020 20:47) (18 - 18)  SpO2: 96% (09 Sep 2020 20:47) (96% - 96%)    GENERAL- NAD  EAR/NOSE/MOUTH/THROAT - no pharyngeal exudates, no oral lesions  MMM  EYES- PATRICIA, conjunctiva and Sclera clear  NECK- supple  RESPIRATORY-  clear to auscultation bilaterally  CARDIOVASCULAR - SIS2, RRR  GI - soft NT BS present, +incision left of midline with dressing clean  EXTREMITIES- no pedal edema  NEUROLOGY- B/L LE weakness  SKIN-  midline incision on back with Aquacel dressing,  mid back w/ JETHRO drain ( serosanguinous drainage)  PSYCHIATRY- AAO X 3                  9.6                  135  | 28   | 13           8.84  >-----------< 245     ------------------------< 120                   30.3                 3.6  | 97   | 0.76                                         Ca 8.5   Mg x     Ph x        MEDICATIONS  (STANDING):  cephalexin 500 milliGRAM(s) Oral every 8 hours  diazepam    Tablet 5 milliGRAM(s) Oral every 8 hours  gabapentin 300 milliGRAM(s) Oral three times a day  HYDROmorphone   Tablet 2 milliGRAM(s) Oral every 4 hours  influenza   Vaccine 0.5 milliLiter(s) IntraMuscular once  lactobacillus acidophilus 1 Tablet(s) Oral two times a day  melatonin 3 milliGRAM(s) Oral at bedtime  oxyCODONE  ER Tablet 20 milliGRAM(s) Oral every 12 hours  senna 2 Tablet(s) Oral at bedtime    MEDICATIONS  (PRN):  acetaminophen   Tablet .. 650 milliGRAM(s) Oral every 6 hours PRN Temp greater or equal to 38C (100.4F), Mild Pain (1 - 3)  famotidine    Tablet 20 milliGRAM(s) Oral every 12 hours PRN Dyspepsia  lidocaine   Patch 1 Patch Transdermal every 24 hours PRN Severe pain  lidocaine 2% Gel 1 Application(s) Topical four times a day PRN pain  magnesium hydroxide Suspension 30 milliLiter(s) Oral every 12 hours PRN Constipation  oxyCODONE    IR 5 milliGRAM(s) Oral every 4 hours PRN Moderate Pain (4 - 6)  oxyCODONE    IR 10 milliGRAM(s) Oral every 4 hours PRN Severe Pain (7 - 10)

## 2020-09-10 NOTE — PROGRESS NOTE ADULT - ASSESSMENT
ASSESSMENT/PLAN  BRUNO CHO is a 35M admitted to [facility] on 8/22/20 with back pain, found to have fractures of L1-L5 pedicles bilaterally, leading to widening of the spinal canal with anterolisthesis of L5 on S1 with the entire L5 vertebral body width, disruption of the ALL and PLL at L5-S1 level, probable epidural hemorrhage and hemorrhage within the foramina at L1-L5, and enlargement/edema of bilateral psoas muscle.  Patient underwent evacuation of epidural hematoma, T11-pelvis posterior spinal fusion, and L2-S1 Laminectomy on 8/23/20 and subsequent L5-S1 anterior interbody fusion and L3-4 lateral interbody fusion on 9/1.  Hospital course c/b acute blood loss anemia requiring multiple transfusions, traumatic rhabodmyolysis, and intractable pain.  Patient now admitted for a multidisciplinary rehab program. 09-04-20 @ 14:37    Comprehensive Multidisciplinary Rehab Program:  - Start comprehensive rehab program of PT/OT - 3 hours a day, 5 days a week  - P&O as needed    -------------  MEDICAL MANAGEMENT     #Traumatic Spinal Cord Injury  - pt with complete anterolisthesis of L5 on S1, multiple pedicular fractures, and epidural hemorrhage   - s/p T11-pelvis posterior spinal fusion, and L2-S1 Laminectomy on 8/23/20 and subsequent L5-S1 anterior interbody fusion and L3-4 lateral interbody fusion on 9/1  - pain control as below  - PT/OT  - spinal & Fall precautions  - TLSO/AFOs when OOB  - monitor bladder/bowel function out of concern for neurogenic bowel  - monitor JETHRO drain outpt; Keflex 500mg Q8h while drain is in place  - Drain dc instructions: Per DC note can be pulled on Monday 9/7/20. The drain is sewn in, so please cut the one suture that is holding the drain in place. Once the suture is cut, open the drain cap so that the drain is open. After both previous steps are done then the drain can be pulled. Pull lightly until drain is completely out. Place a 4x4 with Tegaderm over the drain wound  - Drain output still >300mL/day. Will defer drain removal until output decreases.     #Rhabdomyolysis  - 2/2 trauma   - largely resolved: <900 on 8/31  - encourage PO fluids    #Tachycardia  - per chart review, pt tachycardic prior to transfer to [facility], but no dopplers/CTA obtained  - CTA 9/6 inconclusive re: PE  - Doppler 96/: +B/L DVT   - Vascular (Dr. Nice) consulted; call placed to ortho (Dr. Rivera) to discuss   - TTE ordered to assess for right heart strain    #Pain Control  - c/w dilaudid, oxycontin scheduled. oxycodone prn, Increased oxycodone IR to 15 mg, started gabapentin.   - valium for muscle spasms     #Bladder Management  - francis removed 9/4 am  - monitor bladder scans, PVR PRN as pt at risk for neurogenic bladder       #IVC filter  - placed 8/26  - will need removed in 5-6 months       DVT prophylaxis:   - IVC filter  - SCDs  - tachycardia: Doppler and cTA chest to eval for DVT and PE. however per chart review pt appears to have been tachycardic for some time prior to arrival in rehab.  - doppler showed bilateral DVTs, vascular surgery consult- continue to monitor, okay to continue therapy.     Outpatient Follow-up:    Gabrielle Villegas  ORTHOPAEDIC SURGERY  [address], [address]  [place] [address]  Phone: [phone]  Fax: [phone]  Follow Up Time:     Dave Rivera  ORTHOPAEDIC SURGERY  [address] [place] [address]  Phone: [phone]  Fax: [phone] ASSESSMENT/PLAN  BRUNO CHO is a 35M admitted to [facility] on 8/22/20 with back pain, found to have fractures of L1-L5 pedicles bilaterally, leading to widening of the spinal canal with anterolisthesis of L5 on S1 with the entire L5 vertebral body width, disruption of the ALL and PLL at L5-S1 level, probable epidural hemorrhage and hemorrhage within the foramina at L1-L5, and enlargement/edema of bilateral psoas muscle.  Patient underwent evacuation of epidural hematoma, T11-pelvis posterior spinal fusion, and L2-S1 Laminectomy on 8/23/20 and subsequent L5-S1 anterior interbody fusion and L3-4 lateral interbody fusion on 9/1.  Hospital course c/b acute blood loss anemia requiring multiple transfusions, traumatic rhabodmyolysis, and intractable pain.  Patient now admitted for a multidisciplinary rehab program. 09-04-20 @ 14:37    Comprehensive Multidisciplinary Rehab Program:  - Start comprehensive rehab program of PT/OT - 3 hours a day, 5 days a week  - P&O as needed    -------------  MEDICAL MANAGEMENT     #Traumatic Spinal Cord Injury  - pt with complete anterolisthesis of L5 on S1, multiple pedicular fractures, and epidural hemorrhage   - s/p T11-pelvis posterior spinal fusion, and L2-S1 Laminectomy on 8/23/20 and subsequent L5-S1 anterior interbody fusion and L3-4 lateral interbody fusion on 9/1  - pain control as below  - PT/OT  - spinal & Fall precautions  - TLSO/AFOs when OOB  - monitor bladder/bowel function out of concern for neurogenic bowel  - monitor JETHRO drain outpt; Keflex 500mg Q8h while drain is in place  - Drain dc instructions: Per DC note can be pulled on Monday 9/7/20. The drain is sewn in, so please cut the one suture that is holding the drain in place. Once the suture is cut, open the drain cap so that the drain is open. After both previous steps are done then the drain can be pulled. Pull lightly until drain is completely out. Place a 4x4 with Tegaderm over the drain wound  - Drain output still >300mL/day. Will defer drain removal until output decreases.     #Rhabdomyolysis  - 2/2 trauma   - largely resolved: <900 on 8/31  - encourage PO fluids    #Tachycardia  - per chart review, pt tachycardic prior to transfer to [facility], but no dopplers/CTA obtained  - CTA 9/6 inconclusive re: PE  - Doppler 96/: +B/L DVT   - Vascular (Dr. Nice) consulted; call placed to ortho (Dr. Rivera) to discuss   - TTE ordered to assess for right heart strain    #Pain Control  - c/w dilaudid, oxycontin scheduled. oxycodone prn, Increased oxycodone IR to 15 mg, started gabapentin.   - valium for muscle spasms     # adjustment issue: neuropsych    #Bladder Management  - francis removed 9/4 am  - monitor bladder scans, PVR PRN as pt at risk for neurogenic bladder       #IVC filter  - placed 8/26  - will need removed in 5-6 months       DVT prophylaxis:   - IVC filter  - SCDs  - tachycardia: Doppler and cTA chest to eval for DVT and PE. however per chart review pt appears to have been tachycardic for some time prior to arrival in rehab.  - doppler showed bilateral DVTs, vascular surgery consult- continue to monitor, okay to continue therapy.     Outpatient Follow-up:    Gabrielle Villegas  ORTHOPAEDIC SURGERY  [address], [address]  [place] [address]  Phone: [phone]  Fax: [phone]  Follow Up Time:     Dave Rivera  ORTHOPAEDIC SURGERY  [address] [place] [address]  Phone: [phone]  Fax: [phone]

## 2020-09-10 NOTE — CHART NOTE - NSCHARTNOTEFT_GEN_A_CORE
Nutrition Follow Up Note  Hospital Course   (Per Electronic Medical Record)    Source:  Patient [X]  Medical Record [X]      Diet:   Regular Diet w/ Thin Liquids  Tolerates Diet Well  No Chewing/Swallowing Difficulties  No Recent Nausea, Vomiting, Diarrhea or Constipation  Consumes 75% of Meals (as Per Documentation)  States Good PO Intake/Appetite     Enteral/Parenteral Nutrition: Not Applicable    Current Weight: 239.6lb on 9/6  Obtain Weights Weekly  Weights Currently Stable @This Time     Pertinent Medications: MEDICATIONS  (STANDING):  diazepam    Tablet 5 milliGRAM(s) Oral every 8 hours  gabapentin 300 milliGRAM(s) Oral two times a day  HYDROmorphone   Tablet 2 milliGRAM(s) Oral every 4 hours  influenza   Vaccine 0.5 milliLiter(s) IntraMuscular once  lactobacillus acidophilus 1 Tablet(s) Oral two times a day  melatonin 3 milliGRAM(s) Oral at bedtime  oxyCODONE  ER Tablet 10 milliGRAM(s) Oral every 12 hours  senna 2 Tablet(s) Oral at bedtime    MEDICATIONS  (PRN):  acetaminophen   Tablet .. 650 milliGRAM(s) Oral every 6 hours PRN Temp greater or equal to 38C (100.4F), Mild Pain (1 - 3)  famotidine    Tablet 20 milliGRAM(s) Oral every 12 hours PRN Dyspepsia  lidocaine   Patch 1 Patch Transdermal every 24 hours PRN Severe pain  lidocaine 2% Gel 1 Application(s) Topical four times a day PRN pain  magnesium hydroxide Suspension 30 milliLiter(s) Oral every 12 hours PRN Constipation  oxyCODONE    IR 5 milliGRAM(s) Oral every 4 hours PRN Moderate Pain (4 - 6)  oxyCODONE    IR 15 milliGRAM(s) Oral every 4 hours PRN Severe Pain (7 - 10)    Pertinent Labs:  09-10 Na135 mmol/L Glu 120 mg/dL<H> K+ 3.6 mmol/L Cr  0.76 mg/dL BUN 13 mg/dL 09-06 Alb 2.1 g/dL<L>    Skin: No Pressure Ulcers  Multiple Surgical Incisions  (as Per Nursing Flow Sheet)     Edema: None Noted     Last Bowel Movement: on 9/9    Estimated Needs:   [X] No Change Since Previous Assessment    Previous Nutrition Diagnosis:   Obese    Nutrition Diagnosis is [X] Ongoing - Continue Nutrition Plan of Care    New Nutrition Diagnosis: [X] Not Applicable    Interventions:   1. Recommend Continue Nutrition Plan of Care     Monitoring & Evaluation:   [X] Weights   [X] PO Intake   [X] Skin Integrity   [X] Follow Up (Per Protocol)  [X] Tolerance to Diet Prescription   [X] Other: Labs     Registered Dietitian/Nutritionist Remains Available.  Chandu Fonseca RDN    Pager # [phone]  Phone# [phone]

## 2020-09-10 NOTE — PROGRESS NOTE ADULT - SUBJECTIVE AND OBJECTIVE BOX
Patient is a 35y old  Male who presents with a chief complaint of Traumatic Spinal Cord Disfunction: 04.211 incomplete paraplegia (04 Sep 2020 14:36)      HPI:  TODAY'S SUBJECTIVE & REVIEW OF SYMPTOMS: Patient seen and evaluated this morning. No acute events overnight.    low PVRs. Able to have sensations for bowel and bladder movements. no bowel or bladder accidents. Reports able to control/hold bowel movements. Pain is relatively better controlled today. No new complaints. Seen by vascular surgery.     [X] Constitutional WNL        [X] Cardio WNL              [X] Resp WNL  [X] GI WNL                            []  +retention                    [X] Heme WNL  [X] Endo WNL                       [X] Skin WNL                  [] MSK +pain  [] Neuro +weakness                     [X] Cognitive WNL         [X] Psych WNL      PHYSICAL EXAM Vital Signs Last 24 Hrs  T(C): 36.8 (09 Sep 2020 20:47), Max: 36.8 (09 Sep 2020 20:47)  T(F): 98.3 (09 Sep 2020 20:47), Max: 98.3 (09 Sep 2020 20:47)  HR: 103 (09 Sep 2020 20:47) (103 - 103)  BP: 133/85 (09 Sep 2020 20:47) (133/85 - 133/85)  BP(mean): --  RR: 18 (09 Sep 2020 20:47) (18 - 18)  SpO2: 96% (09 Sep 2020 20:47) (96% - 96%)     Gen - NAD, uncomfortable  	HEENT - NCAT, EOMI  	Neck - Supple, + limited ROM  	Pulm - CTAB, No wheeze, No rhonchi, No crackles  	Cardiovascular - RRR, S1S2, No murmurs  	Abdomen - Soft, NT/ND, +BS, +incision left of midline with dressing c/d/i  	Extremities - No C/C/E, No calf tenderness  	Neuro-  	   Cognitive - AAOx3  	   Communication - Fluent, No dysarthria  	   Attention: Intact   	   Judgement: Good evidence of judgement  	   Memory: Recall 3 objects immediate and 3 min later      	   Cranial Nerves - CN 2-12 intact  	   Motor -   	                  LEFT    UE - ShAB 5/5, EF 5/5, EE 5/5, WE 5/5,  5/5  	                  RIGHT UE - ShAB 5/5, EF 5/5, EE 5/5, WE 5/5,  5/5  	                  LEFT    LE - HF 1/5 (limited by pain), KE 3/5, DF 0/5, PF 3/5  	                  RIGHT LE - HF 1/5 (limited by pain), KE 3/5, DF 1/5, PF 3/5     	   Sensory - Intact to LT  	   Reflexes - DTR Intact, No primitive reflexive  	   Coordination - FTN intact  	   Tone - normal  	Psychiatric - Mood stable, Affect WNL  Skin:  abdominal incision as above, midline incision on back from T11 to S2 with staples, mid back w/ JETHRO drain, lacy pink rash under bandages which were soaked through with serosanguinous drainage      LABS:                        9.6    8.84  )-----------( 245      ( 10 Sep 2020 07:30 )             30.3     09-10    135  |  97  |  13  ----------------------------<  120<H>  3.6   |  28  |  0.76    Ca    8.5      10 Sep 2020 07:30 Patient is a 35y old  Male who presents with a chief complaint of Traumatic Spinal Cord Disfunction: 04.211 incomplete paraplegia (04 Sep 2020 14:36)      HPI:  TODAY'S SUBJECTIVE & REVIEW OF SYMPTOMS: Patient seen and evaluated this morning. No acute events overnight.    low PVRs. Able to have sensations for bowel and bladder movements. no bowel or bladder accidents. Reports able to control/hold bowel movements. Pain is relatively better controlled today. No new complaints. Seen by vascular surgery. distracted during therapy, verbal agitation with therapist.     [X] Constitutional WNL        [X] Cardio WNL              [X] Resp WNL  [X] GI WNL                            []  +retention                    [X] Heme WNL  [X] Endo WNL                       [X] Skin WNL                  [] MSK +pain  [] Neuro +weakness                     [X] Cognitive WNL         [X] Psych WNL      PHYSICAL EXAM Vital Signs Last 24 Hrs  T(C): 36.8 (09 Sep 2020 20:47), Max: 36.8 (09 Sep 2020 20:47)  T(F): 98.3 (09 Sep 2020 20:47), Max: 98.3 (09 Sep 2020 20:47)  HR: 103 (09 Sep 2020 20:47) (103 - 103)  BP: 133/85 (09 Sep 2020 20:47) (133/85 - 133/85)  BP(mean): --  RR: 18 (09 Sep 2020 20:47) (18 - 18)  SpO2: 96% (09 Sep 2020 20:47) (96% - 96%)     Gen - NAD, uncomfortable  	HEENT - NCAT, EOMI  	Neck - Supple, + limited ROM  	Pulm - CTAB, No wheeze, No rhonchi, No crackles  	Cardiovascular - RRR, S1S2, No murmurs  	Abdomen - Soft, NT/ND, +BS, +incision left of midline with dressing c/d/i  	Extremities - No C/C/E, No calf tenderness  	Neuro-  	   Cognitive - AAOx3  	   Communication - Fluent, No dysarthria  	   Attention: Intact   	   Judgement: Good evidence of judgement  	   Memory: Recall 3 objects immediate and 3 min later      	   Cranial Nerves - CN 2-12 intact  	   Motor -   	                  LEFT    UE - ShAB 5/5, EF 5/5, EE 5/5, WE 5/5,  5/5  	                  RIGHT UE - ShAB 5/5, EF 5/5, EE 5/5, WE 5/5,  5/5  	                  LEFT    LE - HF 1/5 (limited by pain), KE 3/5, DF 0/5, PF 3/5  	                  RIGHT LE - HF 1/5 (limited by pain), KE 3/5, DF 1/5, PF 3/5     	   Sensory - Intact to LT  	   Reflexes - DTR Intact, No primitive reflexive  	   Coordination - FTN intact  	   Tone - normal  	Psychiatric - Mood stable, Affect WNL  Skin:  abdominal incision as above, midline incision on back from T11 to S2 with staples, mid back w/ JETHRO drain, lacy pink rash under bandages which were soaked through with serosanguinous drainage      LABS:                        9.6    8.84  )-----------( 245      ( 10 Sep 2020 07:30 )             30.3     09-10    135  |  97  |  13  ----------------------------<  120<H>  3.6   |  28  |  0.76    Ca    8.5      10 Sep 2020 07:30

## 2020-09-11 DIAGNOSIS — F43.23 ADJUSTMENT DISORDER WITH MIXED ANXIETY AND DEPRESSED MOOD: ICD-10-CM

## 2020-09-11 LAB
HCT VFR BLD CALC: 28.4 % — LOW (ref 39–50)
HGB BLD-MCNC: 9.2 G/DL — LOW (ref 13–17)
MCHC RBC-ENTMCNC: 29.3 PG — SIGNIFICANT CHANGE UP (ref 27–34)
MCHC RBC-ENTMCNC: 32.4 GM/DL — SIGNIFICANT CHANGE UP (ref 32–36)
MCV RBC AUTO: 90.4 FL — SIGNIFICANT CHANGE UP (ref 80–100)
NRBC # BLD: 0 /100 WBCS — SIGNIFICANT CHANGE UP (ref 0–0)
PLATELET # BLD AUTO: 268 K/UL — SIGNIFICANT CHANGE UP (ref 150–400)
RBC # BLD: 3.14 M/UL — LOW (ref 4.2–5.8)
RBC # FLD: 13.6 % — SIGNIFICANT CHANGE UP (ref 10.3–14.5)
WBC # BLD: 9.34 K/UL — SIGNIFICANT CHANGE UP (ref 3.8–10.5)
WBC # FLD AUTO: 9.34 K/UL — SIGNIFICANT CHANGE UP (ref 3.8–10.5)

## 2020-09-11 PROCEDURE — 99232 SBSQ HOSP IP/OBS MODERATE 35: CPT

## 2020-09-11 PROCEDURE — 99223 1ST HOSP IP/OBS HIGH 75: CPT

## 2020-09-11 RX ORDER — ESCITALOPRAM OXALATE 10 MG/1
10 TABLET, FILM COATED ORAL DAILY
Refills: 0 | Status: DISCONTINUED | OUTPATIENT
Start: 2020-09-11 | End: 2020-09-14

## 2020-09-11 RX ADMIN — HYDROMORPHONE HYDROCHLORIDE 2 MILLIGRAM(S): 2 INJECTION INTRAMUSCULAR; INTRAVENOUS; SUBCUTANEOUS at 10:10

## 2020-09-11 RX ADMIN — OXYCODONE HYDROCHLORIDE 10 MILLIGRAM(S): 5 TABLET ORAL at 17:15

## 2020-09-11 RX ADMIN — Medication 500 MILLIGRAM(S): at 05:19

## 2020-09-11 RX ADMIN — Medication 1 TABLET(S): at 17:30

## 2020-09-11 RX ADMIN — OXYCODONE HYDROCHLORIDE 20 MILLIGRAM(S): 5 TABLET ORAL at 18:00

## 2020-09-11 RX ADMIN — HYDROMORPHONE HYDROCHLORIDE 2 MILLIGRAM(S): 2 INJECTION INTRAMUSCULAR; INTRAVENOUS; SUBCUTANEOUS at 06:14

## 2020-09-11 RX ADMIN — HYDROMORPHONE HYDROCHLORIDE 2 MILLIGRAM(S): 2 INJECTION INTRAMUSCULAR; INTRAVENOUS; SUBCUTANEOUS at 10:45

## 2020-09-11 RX ADMIN — LIDOCAINE 1 PATCH: 4 CREAM TOPICAL at 08:44

## 2020-09-11 RX ADMIN — Medication 3 MILLIGRAM(S): at 20:48

## 2020-09-11 RX ADMIN — OXYCODONE HYDROCHLORIDE 10 MILLIGRAM(S): 5 TABLET ORAL at 06:00

## 2020-09-11 RX ADMIN — GABAPENTIN 300 MILLIGRAM(S): 400 CAPSULE ORAL at 05:19

## 2020-09-11 RX ADMIN — GABAPENTIN 300 MILLIGRAM(S): 400 CAPSULE ORAL at 20:46

## 2020-09-11 RX ADMIN — OXYCODONE HYDROCHLORIDE 10 MILLIGRAM(S): 5 TABLET ORAL at 21:45

## 2020-09-11 RX ADMIN — OXYCODONE HYDROCHLORIDE 20 MILLIGRAM(S): 5 TABLET ORAL at 07:00

## 2020-09-11 RX ADMIN — Medication 1 TABLET(S): at 05:19

## 2020-09-11 RX ADMIN — HYDROMORPHONE HYDROCHLORIDE 2 MILLIGRAM(S): 2 INJECTION INTRAMUSCULAR; INTRAVENOUS; SUBCUTANEOUS at 17:32

## 2020-09-11 RX ADMIN — OXYCODONE HYDROCHLORIDE 20 MILLIGRAM(S): 5 TABLET ORAL at 17:30

## 2020-09-11 RX ADMIN — HYDROMORPHONE HYDROCHLORIDE 2 MILLIGRAM(S): 2 INJECTION INTRAMUSCULAR; INTRAVENOUS; SUBCUTANEOUS at 07:00

## 2020-09-11 RX ADMIN — Medication 500 MILLIGRAM(S): at 20:47

## 2020-09-11 RX ADMIN — GABAPENTIN 300 MILLIGRAM(S): 400 CAPSULE ORAL at 13:20

## 2020-09-11 RX ADMIN — Medication 5 MILLIGRAM(S): at 21:23

## 2020-09-11 RX ADMIN — OXYCODONE HYDROCHLORIDE 10 MILLIGRAM(S): 5 TABLET ORAL at 16:35

## 2020-09-11 RX ADMIN — Medication 5 MILLIGRAM(S): at 05:19

## 2020-09-11 RX ADMIN — HYDROMORPHONE HYDROCHLORIDE 2 MILLIGRAM(S): 2 INJECTION INTRAMUSCULAR; INTRAVENOUS; SUBCUTANEOUS at 14:00

## 2020-09-11 RX ADMIN — OXYCODONE HYDROCHLORIDE 10 MILLIGRAM(S): 5 TABLET ORAL at 05:19

## 2020-09-11 RX ADMIN — OXYCODONE HYDROCHLORIDE 10 MILLIGRAM(S): 5 TABLET ORAL at 09:27

## 2020-09-11 RX ADMIN — OXYCODONE HYDROCHLORIDE 20 MILLIGRAM(S): 5 TABLET ORAL at 06:15

## 2020-09-11 RX ADMIN — HYDROMORPHONE HYDROCHLORIDE 2 MILLIGRAM(S): 2 INJECTION INTRAMUSCULAR; INTRAVENOUS; SUBCUTANEOUS at 21:23

## 2020-09-11 RX ADMIN — HYDROMORPHONE HYDROCHLORIDE 2 MILLIGRAM(S): 2 INJECTION INTRAMUSCULAR; INTRAVENOUS; SUBCUTANEOUS at 22:00

## 2020-09-11 RX ADMIN — OXYCODONE HYDROCHLORIDE 10 MILLIGRAM(S): 5 TABLET ORAL at 01:18

## 2020-09-11 RX ADMIN — Medication 500 MILLIGRAM(S): at 13:20

## 2020-09-11 RX ADMIN — HYDROMORPHONE HYDROCHLORIDE 2 MILLIGRAM(S): 2 INJECTION INTRAMUSCULAR; INTRAVENOUS; SUBCUTANEOUS at 18:15

## 2020-09-11 RX ADMIN — HYDROMORPHONE HYDROCHLORIDE 2 MILLIGRAM(S): 2 INJECTION INTRAMUSCULAR; INTRAVENOUS; SUBCUTANEOUS at 02:32

## 2020-09-11 RX ADMIN — HYDROMORPHONE HYDROCHLORIDE 2 MILLIGRAM(S): 2 INJECTION INTRAMUSCULAR; INTRAVENOUS; SUBCUTANEOUS at 02:11

## 2020-09-11 RX ADMIN — HYDROMORPHONE HYDROCHLORIDE 2 MILLIGRAM(S): 2 INJECTION INTRAMUSCULAR; INTRAVENOUS; SUBCUTANEOUS at 13:20

## 2020-09-11 RX ADMIN — OXYCODONE HYDROCHLORIDE 10 MILLIGRAM(S): 5 TABLET ORAL at 20:47

## 2020-09-11 RX ADMIN — SENNA PLUS 2 TABLET(S): 8.6 TABLET ORAL at 21:23

## 2020-09-11 RX ADMIN — Medication 5 MILLIGRAM(S): at 13:20

## 2020-09-11 RX ADMIN — OXYCODONE HYDROCHLORIDE 10 MILLIGRAM(S): 5 TABLET ORAL at 01:50

## 2020-09-11 RX ADMIN — OXYCODONE HYDROCHLORIDE 10 MILLIGRAM(S): 5 TABLET ORAL at 10:00

## 2020-09-11 NOTE — PROGRESS NOTE ADULT - ASSESSMENT
35M admitted to [facility] on 8/22/20 with back pain, found to have fractures of L1-L5 pedicles bilaterally, leading to widening of the spinal canal with anterolisthesis of L5 on S1 with the entire L5 vertebral body width, disruption of the ALL and PLL at L5-S1 level, probable epidural hemorrhage and hemorrhage within the foramina at L1-L5, and enlargement/edema of bilateral psoas muscle.  Patient underwent evacuation of epidural hematoma, T11-pelvis posterior spinal fusion, and L2-S1 Laminectomy on 8/23/20 and subsequent L5-S1 anterior interbody fusion and L3-4 lateral interbody fusion on 9/1.  Hospital course c/b acute blood loss anemia requiring multiple transfusions, traumatic rhabdomyolysis and intractable pain.  Patient now admitted for a multidisciplinary rehab program- pt/ot/dvt ppx, pain meds    #post op anemia  fluctuating hgb  had received multiple units of PRBC at OSH  will get anemia w/u during next blood draw     #b/l DVT  - IVC filter in place, c/w SCDs for now    #post op antibiotic Px   -keflex till when drain is in place     #Rhabdomyolysis- 2/2 trauma , resolved, encourage PO fluids    #Pain Control- c/w Dilaudid, Oxycontin, valium    #Bladder Management  - Man removed 9/4 am, voiding  - bladder scans, PVR PRN     #IVC filter  - placed 8/26

## 2020-09-11 NOTE — BEHAVIORAL HEALTH ASSESSMENT NOTE - SUMMARY
Pt seen and evaluated by writer. The pt is currently in [facility] for physical rehab. He states that ever since he's been hospitalized he's been experiencing feelings of hopelessness, guilt, sadness, unable to concentrate and has increased anxiety due to stress related to social factors. Father states pt fell backwards and states "I think my son was taking some kind of drug that made him dizzy but I cant tell you what because I don't know anything about drugs". The pt reports he smokes marijuana regularly however denies any additional substances. He states he saw a therapist three years ago for emotional support but has never taking medications for depression/anxiety prior to hospital admission. Further collateral has to be obtained by writer from pts mother since the pt lives with the mother. . Pt is currently on Valium 5mg Q hrs- can continue if this is used for issues with pain as it will also assist with anxiety. Writer recommending to start pt on Lexapro 10mg daily.  Psychiatry will continue to follow.

## 2020-09-11 NOTE — BEHAVIORAL HEALTH ASSESSMENT NOTE - NSBHADMITCOUNSEL_PSY_A_CORE
importance of adherence to chosen treatment/diagnostic results/impressions and/or recommended studies/client/family/caregiver education

## 2020-09-11 NOTE — BEHAVIORAL HEALTH ASSESSMENT NOTE - NSBHCHARTREVIEWLAB_PSY_A_CORE FT
9.2    9.34  )-----------( 268      ( 11 Sep 2020 05:15 )             28.4       09-10    135  |  97  |  13  ----------------------------<  120<H>  3.6   |  28  |  0.76    Ca    8.5      10 Sep 2020 07:30

## 2020-09-11 NOTE — BEHAVIORAL HEALTH ASSESSMENT NOTE - NSBHCONSULTFOLLOWDETAILS_PSY_A_CORE FT
Psychiatry will continue to follow. Psychiatry will continue to follow.  lexapro started, but can consider Cymbalta as an alternative.

## 2020-09-11 NOTE — BEHAVIORAL HEALTH ASSESSMENT NOTE - DESCRIPTION (FIRST USE, LAST USE, QUANTITY, FREQUENCY, DURATION)
pt states he smokes daily and has been smoking Marijuana since he was a teenager Pt admits to using in college several times

## 2020-09-11 NOTE — BEHAVIORAL HEALTH ASSESSMENT NOTE - HPI (INCLUDE ILLNESS QUALITY, SEVERITY, DURATION, TIMING, CONTEXT, MODIFYING FACTORS, ASSOCIATED SIGNS AND SYMPTOMS)
34 yo Male with no signifcant PMHx admitted to [facility] on 8/22/20 with back pain after falling down six steps at home during night. On admission BAL<10. Utox done after receiving pain meds and Valium in ED positive only for benzos and opiates. Imaging revealed fractures of L1-L5 pedicles bilaterally, leading to widening of the spinal canal with anterolisthesis of L5 on S1 with the entire L5 vertebral body width, disruption of the ALL and PLL at L5-S1 level, probable epidural hemorrhage and hemorrhage within the foramina at L1-L5, and enlargement/edema of bilateral psoas muscle.  Ortho consulted. Patient is s/p evacuation of epidural hematoma, T11-pelvis posterior spinal fusion, and L2-S1 Laminectomy on 8/23/20 with Dr. Villegas. Intraoperatively, pt received 4U PRBCs, 1U platelets, and 1U FFP due to 2200mL EBL. Post-operatively, he was admitted to CCU for pressure support and required additional blood products due to continued bleeding into JETHRO drains.  He was febrile after transfusions but remained without other signs of infection.  IVC filter placed 8/26 as patient unable to be pharmacologically anticoagulated due to extensive surgical requirements.  IVC filter placed by Dr. Merchant on 8/26, with plan to remove in 5-6 months.  He was seen by nephro fo CHELO (multifactorial) and elevated CPK, and started on IVF.  He is also s/p t L5-S1 anterior interbody fusion and L3-4 lateral interbody fusion on 9/1/20. TLSO and AFOs provided at [facility]. Man removed 9/4/20. Patient voiding without need for SC. Patient cleared for discharge to acute inpatient rehab at [facility].    Psychiatry: Pt seen and evaluated by writer. The pt is alert and oriented x4 and is cooperative during interview. He states that several weeks ago he sustained a fall at home and as a result has had multiple fractures that required surgical interventions. The pt is currently in [facility] for physical rehab. He states that ever since he's been hospitalized he's been experiencing feelings of hopelessness, guilt, sadness, unable to concentrate and has increased anxiety due to stress related to social factors. Prior to hospitalization the pt was living at home with his mother and the house they are living might be under foreclosure. The pt isnt sure of what the status of the home is, but he has also been increasingly worried about his living situation when and if he returns home. He assist his mother with utility bills and states that now that hes been in the hospital he feels like he's no longer helping his mom.     Father was at beside and stepped out of the room during the evaluation with the patient. Writer spoke to dad directly after to obtain collateral and the father states the pt has been increasingly depressed and stressed before he sustained his recent injury and according to father, the pt fell backwards and states "I think my son was taking some kind of drug that made him dizzy but I cant tell you what because I don't know anything about drugs". The pt reports he smokes marijuana regularly however denies any additional substances. He states he saw a therapist three years ago for emotional support but has never taking medications for depression/anxiety prior to hospital admission. Prescription drug monitoring registry checked and pt has no information recorded. Further collateral has to be obtained by writer from pts mother since the pt lives with the mother.     Pt reports to writer that sometimes he feels like life inst worth living however he has no plan and has no previous attempts of suicide in the past. Pt denies active homicidal ideation and or perceptual disturbances. He states his sleep is frequently interrupted by the staff. He denies issues with energy, concentration and appetite. Pt is currently on Valium 5mg Q hrs- can continue if this is used for issues with pain as it will also assist with anxiety. Writer recommending to start pt on Lexapro 10mg daily. Pt also appreciated having emotional support.   Psychiatry will continue to follow.

## 2020-09-11 NOTE — BEHAVIORAL HEALTH ASSESSMENT NOTE - NSBHCHARTREVIEWVS_PSY_A_CORE FT
ICU Vital Signs Last 24 Hrs  T(C): 36.6 (11 Sep 2020 09:23), Max: 36.6 (11 Sep 2020 09:23)  T(F): 97.9 (11 Sep 2020 09:23), Max: 97.9 (11 Sep 2020 09:23)  HR: 98 (11 Sep 2020 09:23) (98 - 107)  BP: 140/82 (11 Sep 2020 09:23) (135/88 - 140/82)  RR: 18 (11 Sep 2020 09:23) (18 - 18)  SpO2: 98% (11 Sep 2020 09:23) (97% - 98%)

## 2020-09-11 NOTE — BEHAVIORAL HEALTH ASSESSMENT NOTE - RISK ASSESSMENT
Low Acute Suicide Risk Pt reports that life isnt worth living however he has no plan and has never attempted suicide in the past. His has stated several reasons for living and is very close with his mother who he has good relationship. The pt reports many plans after being discharge.

## 2020-09-11 NOTE — BEHAVIORAL HEALTH ASSESSMENT NOTE - NSBHCONSULTRECOMMENDOTHER_PSY_A_CORE FT
Undersigned recommending starting Lexapro 10mg Q daily to improve symptoms of depression and anxiety. SSRI's are first line of therapy for depression and can also decrease symptoms of JOSE.    Continue Valium. Using for pain? It can also alleviate pts symptoms of anxiety as well.

## 2020-09-11 NOTE — BEHAVIORAL HEALTH ASSESSMENT NOTE - COMMENTS ON VIOLENCE RISK/PROTECTIVE FACTORS:
Pt at low risk for violence. He has no shown violent behavior thus far according to chart records and medical team.

## 2020-09-11 NOTE — BEHAVIORAL HEALTH ASSESSMENT NOTE - DETAILS
Pt reports to writer that sometimes he feels like life inst worth living however he has no plan and has no previous attempts of suicide in the past. SEE HPI pt has several incisions on abdomen area, covered with gauze.

## 2020-09-11 NOTE — PROGRESS NOTE ADULT - SUBJECTIVE AND OBJECTIVE BOX
Patient is a 35y old  Male who presents with a chief complaint of Traumatic Spinal Cord Disfunction: 04.211 incomplete paraplegia (11 Sep 2020 10:46)      Patient seen and examined at bedside.  pain controlled with meds at this time  no chest pain or SOB    ALLERGIES:  No Known Allergies    MEDICATIONS  (STANDING):  cephalexin 500 milliGRAM(s) Oral every 8 hours  diazepam    Tablet 5 milliGRAM(s) Oral every 8 hours  gabapentin 300 milliGRAM(s) Oral three times a day  HYDROmorphone   Tablet 2 milliGRAM(s) Oral every 4 hours  influenza   Vaccine 0.5 milliLiter(s) IntraMuscular once  lactobacillus acidophilus 1 Tablet(s) Oral two times a day  melatonin 3 milliGRAM(s) Oral at bedtime  oxyCODONE  ER Tablet 20 milliGRAM(s) Oral every 12 hours  senna 2 Tablet(s) Oral at bedtime    MEDICATIONS  (PRN):  acetaminophen   Tablet .. 650 milliGRAM(s) Oral every 6 hours PRN Temp greater or equal to 38C (100.4F), Mild Pain (1 - 3)  famotidine    Tablet 20 milliGRAM(s) Oral every 12 hours PRN Dyspepsia  lidocaine   Patch 1 Patch Transdermal every 24 hours PRN Severe pain  lidocaine 2% Gel 1 Application(s) Topical four times a day PRN pain  magnesium hydroxide Suspension 30 milliLiter(s) Oral every 12 hours PRN Constipation  oxyCODONE    IR 5 milliGRAM(s) Oral every 4 hours PRN Moderate Pain (4 - 6)  oxyCODONE    IR 10 milliGRAM(s) Oral every 4 hours PRN Severe Pain (7 - 10)    Vital Signs Last 24 Hrs  T(F): 97.9 (11 Sep 2020 09:23), Max: 97.9 (11 Sep 2020 09:23)  HR: 98 (11 Sep 2020 09:23) (98 - 107)  BP: 140/82 (11 Sep 2020 09:23) (135/88 - 140/82)  RR: 18 (11 Sep 2020 09:23) (18 - 18)  SpO2: 98% (11 Sep 2020 09:23) (97% - 98%)  I&O's Summary    10 Sep 2020 07:01  -  11 Sep 2020 07:00  --------------------------------------------------------  IN: 0 mL / OUT: 1710 mL / NET: -1710 mL        PHYSICAL EXAM:  General: NAD, A/O x 3  ENT: MMM  Neck: Supple, No JVD  Lungs: Clear to auscultation bilaterally  Cardio: RRR, S1/S2, No murmurs  Abdomen: Soft, Nontender, Nondistended; Bowel sounds present  Extremities: No calf tenderness, + b/l LE edema    LABS:                        9.2    9.34  )-----------( 268      ( 11 Sep 2020 05:15 )             28.4       09-10    135  |  97  |  13  ----------------------------<  120  3.6   |  28  |  0.76    Ca    8.5      10 Sep 2020 07:30       eGFR if Non African American: 118 mL/min/1.73M2 (09-10-20 @ 07:30)  eGFR if African American: 137 mL/min/1.73M2 (09-10-20 @ 07:30)                                     RADIOLOGY & ADDITIONAL TESTS:    Care Discussed with Consultants/Other Providers:

## 2020-09-11 NOTE — BEHAVIORAL HEALTH ASSESSMENT NOTE - NS ED BHA MED ROS EYES
Agree with today's assessment and clinical findings and plan for today.  
PRENATAL VISIT    Chief Complaint   Patient presents with   • Routine Prenatal Visit       SUBJECTIVE:    Patient ID: Sisi is a 28 Fetal heart rate: 130    ASSESSMENT: 28 Lluvia Benavides MD  
No complaints

## 2020-09-11 NOTE — BEHAVIORAL HEALTH ASSESSMENT NOTE - NSBHCHARTREVIEWIMAGING_PSY_A_CORE FT
EXAM:  CT ANGIO CHEST (W)AW IC    PROCEDURE DATE:  09/06/2020    INTERPRETATION:  CLINICAL INFORMATION: Postop tachycardia  COMPARISON: None.  PROCEDURE:  CT Angiography of the Chest.  75 ml of Omnipaque 350 was injected intravenously. 25ml were discarded.  Sagittal and coronal reformats were performed as well as 3D (MIP) reconstructions.  FINDINGS:  LUNGS AND AIRWAYS: Patent central airways.  Bilateral dependent and basilar atelectasis.  PLEURA: No pleural effusion.  MEDIASTINUMAND ROHIT: No lymphadenopathy.  VESSELS: No thoracic aortic aneurysm or dissection. Suboptimal pulmonary arterial enhancement, nondiagnostic for evaluation of pulmonary emboli.  HEART: Heart size is normal. No pericardial effusion.  CHEST WALL AND LOWER NECK: Within normal limits.  VISUALIZED UPPER ABDOMEN: Hepatic steatosis. Partially imaged inferior vena cava filter.  BONES: Degenerative changes. Partially imaged lumbar postsurgical changes.  IMPRESSION:  Suboptimal pulmonary arterial enhancement, nondiagnostic for evaluation of pulmonary emboli.  Recommend alternative study such as nuclear medicine ventilation/perfusion scan for further assessment.  HEVER JENSEN M.D., ATTENDING RADIOLOGIST  This document has been electronically signed. Sep  6 2020  2:22PM          EXAM:  US DPLX LWR EXT VEINS COMPL BI    PROCEDURE DATE:  09/06/2020    INTERPRETATION:  CLINICAL INFORMATION: Status post back surgery. Evaluate for lower extremity DVT.  COMPARISON: None available.  TECHNIQUE: Duplex sonography of the BILATERAL LOWER extremity veins with color and spectral Doppler, with and without compression.  FINDINGS:  Right lower extremity: DVT is identified within the right common femoral vein. DVT is also identified within the posterior tibial vein. The right femoral vein, right popliteal vein as well as right peroneal vein are patent and compressible.  Left lower extremity: DVT is identified within the left popliteal vein, as well as visualized segments of the posterior tibial vein and peroneal vein. The left common femoral vein and femoral vein are patent and compressible.  IMPRESSION:  Bilateral lower extremity DVT.  Findings were discussed with Dr. BLANCA BOO 9/6/2020 3:04 PM by Dr. Saavedra with read back confirmation.  MICHAEL SAAVEDRA M.D., ATTENDING RADIOLOGIST  This document has been electronically signed. Sep  6 2020  3:05PM

## 2020-09-11 NOTE — BEHAVIORAL HEALTH ASSESSMENT NOTE - NSBHCHARTREVIEWINVESTIGATE_PSY_A_CORE FT
Ventricular Rate 113 BPM  Atrial Rate 113 BPM  P-R Interval 142 ms  QRS Duration 74 ms  Q-T Interval 318 ms  QTC Calculation(Bezet) 436 ms  P Axis 12 degrees  R Axis 6 degrees  T Axis 28 degrees  Diagnosis Line Sinus tachycardia  Otherwise normal ECG  No previous ECGs available  Confirmed by JARRELL BURGOS, SIGRID ([id number]) on 9/8/2020 9:43:05 AM

## 2020-09-11 NOTE — BEHAVIORAL HEALTH ASSESSMENT NOTE - NSBHCONSULTMEDS_PSY_A_CORE FT
MEDICATIONS  (STANDING):  cephalexin 500 milliGRAM(s) Oral every 8 hours  diazepam    Tablet 5 milliGRAM(s) Oral every 8 hours  gabapentin 300 milliGRAM(s) Oral three times a day  HYDROmorphone   Tablet 2 milliGRAM(s) Oral every 4 hours  influenza   Vaccine 0.5 milliLiter(s) IntraMuscular once  lactobacillus acidophilus 1 Tablet(s) Oral two times a day  melatonin 3 milliGRAM(s) Oral at bedtime  oxyCODONE  ER Tablet 20 milliGRAM(s) Oral every 12 hours  senna 2 Tablet(s) Oral at bedtime

## 2020-09-11 NOTE — BEHAVIORAL HEALTH ASSESSMENT NOTE - OCCUPATION
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

## 2020-09-11 NOTE — PROGRESS NOTE ADULT - ASSESSMENT
ASSESSMENT/PLAN  BRUNO CHO is a 35M admitted to [facility] on 8/22/20 with back pain, found to have fractures of L1-L5 pedicles bilaterally, leading to widening of the spinal canal with anterolisthesis of L5 on S1 with the entire L5 vertebral body width, disruption of the ALL and PLL at L5-S1 level, probable epidural hemorrhage and hemorrhage within the foramina at L1-L5, and enlargement/edema of bilateral psoas muscle.  Patient underwent evacuation of epidural hematoma, T11-pelvis posterior spinal fusion, and L2-S1 Laminectomy on 8/23/20 and subsequent L5-S1 anterior interbody fusion and L3-4 lateral interbody fusion on 9/1.  Hospital course c/b acute blood loss anemia requiring multiple transfusions, traumatic rhabodmyolysis, and intractable pain.  Patient now admitted for a multidisciplinary rehab program. 09-04-20 @ 14:37    Comprehensive Multidisciplinary Rehab Program:  - Start comprehensive rehab program of PT/OT - 3 hours a day, 5 days a week  - P&O as needed    -------------  MEDICAL MANAGEMENT     #Traumatic Spinal Cord Injury  - pt with complete anterolisthesis of L5 on S1, multiple pedicular fractures, and epidural hemorrhage   - s/p T11-pelvis posterior spinal fusion, and L2-S1 Laminectomy on 8/23/20 and subsequent L5-S1 anterior interbody fusion and L3-4 lateral interbody fusion on 9/1  - pain control as below  - PT/OT  - spinal & Fall precautions  - TLSO/AFOs when OOB  - monitor bladder/bowel function out of concern for neurogenic bowel  - monitor JETHRO drain outpt; Keflex 500mg Q8h while drain is in place  - Drain dc instructions: Per DC note can be pulled on Monday 9/7/20. The drain is sewn in, so please cut the one suture that is holding the drain in place. Once the suture is cut, open the drain cap so that the drain is open. After both previous steps are done then the drain can be pulled. Pull lightly until drain is completely out. Place a 4x4 with Tegaderm over the drain wound  - Drain output still >300mL/day. Will defer drain removal until output decreases.     #Rhabdomyolysis  - 2/2 trauma   - largely resolved: <900 on 8/31  - encourage PO fluids    #Tachycardia  - per chart review, pt tachycardic prior to transfer to [facility], but no dopplers/CTA obtained  - CTA 9/6 inconclusive re: PE  - Doppler 96/: +B/L DVT   - Vascular (Dr. Nice) consulted; call placed to ortho (Dr. Rivera) to discuss   - TTE ordered to assess for right heart strain    #Pain Control  - c/w dilaudid, oxycontin scheduled. oxycodone prn, Increased oxycodone IR to 15 mg, started gabapentin.   - valium for muscle spasms     # adjustment issue: neuropsych    #Bladder Management  - francis removed 9/4 am  - monitor bladder scans, PVR PRN as pt at risk for neurogenic bladder       #IVC filter  - placed 8/26  - will need removed in 5-6 months       DVT prophylaxis:   - IVC filter  - SCDs  - tachycardia: Doppler and cTA chest to eval for DVT and PE. however per chart review pt appears to have been tachycardic for some time prior to arrival in rehab.  - doppler showed bilateral DVTs, vascular surgery consult- continue to monitor, okay to continue therapy.     Outpatient Follow-up:    Gabrielle Villegas  ORTHOPAEDIC SURGERY  [address], [address]  [place] [address]  Phone: [phone]  Fax: [phone]  Follow Up Time:     Dave Rivera  ORTHOPAEDIC SURGERY  [address] [place] [address]  Phone: [phone]  Fax: [phone]

## 2020-09-11 NOTE — PROGRESS NOTE ADULT - SUBJECTIVE AND OBJECTIVE BOX
Patient is a 35y old  Male who presents with a chief complaint of Traumatic Spinal Cord Disfunction: 04.211 incomplete paraplegia (04 Sep 2020 14:36)      HPI:  TODAY'S SUBJECTIVE & REVIEW OF SYMPTOMS:  Patient seen and evaluated this morning. No acute events overnight. Participating well in therapy. Pain is little better with increase in gabapentin. Denies drowsiness. . Denies chest pain, SOB, nausea, vomiting, constipation or diarrhea. Slept well last night.     [X] Constitutional WNL        [X] Cardio WNL              [X] Resp WNL  [X] GI WNL                            []  +retention                    [X] Heme WNL  [X] Endo WNL                       [X] Skin WNL                  [] MSK +pain  [] Neuro +weakness                     [X] Cognitive WNL         [X] Psych WNL     Vital Signs Last 24 Hrs  T(C): 36.6 (11 Sep 2020 09:23), Max: 36.6 (11 Sep 2020 09:23)  T(F): 97.9 (11 Sep 2020 09:23), Max: 97.9 (11 Sep 2020 09:23)  HR: 98 (11 Sep 2020 09:23) (98 - 107)  BP: 140/82 (11 Sep 2020 09:23) (135/88 - 140/82)  BP(mean): --  RR: 18 (11 Sep 2020 09:23) (18 - 18)  SpO2: 98% (11 Sep 2020 09:23) (97% - 98%)     Gen - NAD, uncomfortable  	HEENT - NCAT, EOMI  	Neck - Supple, + limited ROM  	Pulm - CTAB, No wheeze, No rhonchi, No crackles  	Cardiovascular - RRR, S1S2, No murmurs  	Abdomen - Soft, NT/ND, +BS, +incision left of midline with dressing c/d/i  	Extremities - No C/C/E, No calf tenderness  	Neuro-  	   Cognitive - AAOx3  	   Communication - Fluent, No dysarthria  	   Attention: Intact   	   Judgement: Good evidence of judgement  	   Memory: Recall 3 objects immediate and 3 min later      	   Cranial Nerves - CN 2-12 intact  	   Motor -   	                  LEFT    UE - ShAB 5/5, EF 5/5, EE 5/5, WE 5/5,  5/5  	                  RIGHT UE - ShAB 5/5, EF 5/5, EE 5/5, WE 5/5,  5/5  	                  LEFT    LE - HF 1/5 (limited by pain), KE 3/5, DF 0/5, PF 3/5  	                  RIGHT LE - HF 1/5 (limited by pain), KE 3/5, DF 1/5, PF 3/5     	   Sensory - Intact to LT  	   Reflexes - DTR Intact, No primitive reflexive  	   Coordination - FTN intact  	   Tone - normal  	Psychiatric - Mood stable, Affect WNL  Skin:  abdominal incision as above, midline incision on back from T11 to S2 with staples, mid back w/ JETHRO drain, lacy pink rash under bandages which were soaked through with serosanguinous drainage      LABS:                                 9.2    9.34  )-----------( 268      ( 11 Sep 2020 05:15 )             28.4     09-10    135  |  97  |  13  ----------------------------<  120<H>  3.6   |  28  |  0.76    Ca    8.5      10 Sep 2020 07:30

## 2020-09-12 DIAGNOSIS — F12.90 CANNABIS USE, UNSPECIFIED, UNCOMPLICATED: ICD-10-CM

## 2020-09-12 PROCEDURE — 99232 SBSQ HOSP IP/OBS MODERATE 35: CPT

## 2020-09-12 RX ADMIN — OXYCODONE HYDROCHLORIDE 20 MILLIGRAM(S): 5 TABLET ORAL at 20:25

## 2020-09-12 RX ADMIN — Medication 1 TABLET(S): at 18:32

## 2020-09-12 RX ADMIN — ESCITALOPRAM OXALATE 10 MILLIGRAM(S): 10 TABLET, FILM COATED ORAL at 13:03

## 2020-09-12 RX ADMIN — OXYCODONE HYDROCHLORIDE 10 MILLIGRAM(S): 5 TABLET ORAL at 13:03

## 2020-09-12 RX ADMIN — HYDROMORPHONE HYDROCHLORIDE 2 MILLIGRAM(S): 2 INJECTION INTRAMUSCULAR; INTRAVENOUS; SUBCUTANEOUS at 14:43

## 2020-09-12 RX ADMIN — Medication 500 MILLIGRAM(S): at 22:36

## 2020-09-12 RX ADMIN — OXYCODONE HYDROCHLORIDE 10 MILLIGRAM(S): 5 TABLET ORAL at 09:00

## 2020-09-12 RX ADMIN — OXYCODONE HYDROCHLORIDE 10 MILLIGRAM(S): 5 TABLET ORAL at 09:30

## 2020-09-12 RX ADMIN — HYDROMORPHONE HYDROCHLORIDE 2 MILLIGRAM(S): 2 INJECTION INTRAMUSCULAR; INTRAVENOUS; SUBCUTANEOUS at 05:30

## 2020-09-12 RX ADMIN — SENNA PLUS 2 TABLET(S): 8.6 TABLET ORAL at 22:36

## 2020-09-12 RX ADMIN — Medication 500 MILLIGRAM(S): at 13:03

## 2020-09-12 RX ADMIN — Medication 5 MILLIGRAM(S): at 14:43

## 2020-09-12 RX ADMIN — HYDROMORPHONE HYDROCHLORIDE 2 MILLIGRAM(S): 2 INJECTION INTRAMUSCULAR; INTRAVENOUS; SUBCUTANEOUS at 11:10

## 2020-09-12 RX ADMIN — Medication 1 TABLET(S): at 05:50

## 2020-09-12 RX ADMIN — GABAPENTIN 300 MILLIGRAM(S): 400 CAPSULE ORAL at 05:50

## 2020-09-12 RX ADMIN — HYDROMORPHONE HYDROCHLORIDE 2 MILLIGRAM(S): 2 INJECTION INTRAMUSCULAR; INTRAVENOUS; SUBCUTANEOUS at 02:12

## 2020-09-12 RX ADMIN — GABAPENTIN 300 MILLIGRAM(S): 400 CAPSULE ORAL at 22:36

## 2020-09-12 RX ADMIN — OXYCODONE HYDROCHLORIDE 20 MILLIGRAM(S): 5 TABLET ORAL at 05:50

## 2020-09-12 RX ADMIN — HYDROMORPHONE HYDROCHLORIDE 2 MILLIGRAM(S): 2 INJECTION INTRAMUSCULAR; INTRAVENOUS; SUBCUTANEOUS at 06:00

## 2020-09-12 RX ADMIN — OXYCODONE HYDROCHLORIDE 20 MILLIGRAM(S): 5 TABLET ORAL at 18:32

## 2020-09-12 RX ADMIN — HYDROMORPHONE HYDROCHLORIDE 2 MILLIGRAM(S): 2 INJECTION INTRAMUSCULAR; INTRAVENOUS; SUBCUTANEOUS at 05:50

## 2020-09-12 RX ADMIN — OXYCODONE HYDROCHLORIDE 10 MILLIGRAM(S): 5 TABLET ORAL at 13:33

## 2020-09-12 RX ADMIN — HYDROMORPHONE HYDROCHLORIDE 2 MILLIGRAM(S): 2 INJECTION INTRAMUSCULAR; INTRAVENOUS; SUBCUTANEOUS at 22:36

## 2020-09-12 RX ADMIN — Medication 3 MILLIGRAM(S): at 22:36

## 2020-09-12 RX ADMIN — HYDROMORPHONE HYDROCHLORIDE 2 MILLIGRAM(S): 2 INJECTION INTRAMUSCULAR; INTRAVENOUS; SUBCUTANEOUS at 20:25

## 2020-09-12 RX ADMIN — OXYCODONE HYDROCHLORIDE 20 MILLIGRAM(S): 5 TABLET ORAL at 06:00

## 2020-09-12 RX ADMIN — HYDROMORPHONE HYDROCHLORIDE 2 MILLIGRAM(S): 2 INJECTION INTRAMUSCULAR; INTRAVENOUS; SUBCUTANEOUS at 03:00

## 2020-09-12 RX ADMIN — GABAPENTIN 300 MILLIGRAM(S): 400 CAPSULE ORAL at 13:03

## 2020-09-12 RX ADMIN — HYDROMORPHONE HYDROCHLORIDE 2 MILLIGRAM(S): 2 INJECTION INTRAMUSCULAR; INTRAVENOUS; SUBCUTANEOUS at 15:15

## 2020-09-12 RX ADMIN — OXYCODONE HYDROCHLORIDE 10 MILLIGRAM(S): 5 TABLET ORAL at 01:50

## 2020-09-12 RX ADMIN — HYDROMORPHONE HYDROCHLORIDE 2 MILLIGRAM(S): 2 INJECTION INTRAMUSCULAR; INTRAVENOUS; SUBCUTANEOUS at 10:40

## 2020-09-12 RX ADMIN — OXYCODONE HYDROCHLORIDE 10 MILLIGRAM(S): 5 TABLET ORAL at 00:53

## 2020-09-12 RX ADMIN — Medication 5 MILLIGRAM(S): at 22:36

## 2020-09-12 RX ADMIN — HYDROMORPHONE HYDROCHLORIDE 2 MILLIGRAM(S): 2 INJECTION INTRAMUSCULAR; INTRAVENOUS; SUBCUTANEOUS at 18:32

## 2020-09-12 RX ADMIN — Medication 5 MILLIGRAM(S): at 05:50

## 2020-09-12 RX ADMIN — Medication 500 MILLIGRAM(S): at 05:50

## 2020-09-12 NOTE — PROGRESS NOTE ADULT - SUBJECTIVE AND OBJECTIVE BOX
Cc: Gait dysfunction    HPI: Patient with no new medical issues today.  Pain controlled, no chest pain, no N/V, no Fevers/Chills. No other new ROS  Has been tolerating rehabilitation program.    acetaminophen   Tablet .. 650 milliGRAM(s) Oral every 6 hours PRN  cephalexin 500 milliGRAM(s) Oral every 8 hours  diazepam    Tablet 5 milliGRAM(s) Oral every 8 hours  escitalopram 10 milliGRAM(s) Oral daily  famotidine    Tablet 20 milliGRAM(s) Oral every 12 hours PRN  gabapentin 300 milliGRAM(s) Oral three times a day  HYDROmorphone   Tablet 2 milliGRAM(s) Oral every 4 hours  influenza   Vaccine 0.5 milliLiter(s) IntraMuscular once  lactobacillus acidophilus 1 Tablet(s) Oral two times a day  lidocaine   Patch 1 Patch Transdermal every 24 hours PRN  lidocaine 2% Gel 1 Application(s) Topical four times a day PRN  magnesium hydroxide Suspension 30 milliLiter(s) Oral every 12 hours PRN  melatonin 3 milliGRAM(s) Oral at bedtime  oxyCODONE    IR 5 milliGRAM(s) Oral every 4 hours PRN  oxyCODONE    IR 10 milliGRAM(s) Oral every 4 hours PRN  oxyCODONE  ER Tablet 20 milliGRAM(s) Oral every 12 hours  senna 2 Tablet(s) Oral at bedtime      T(C): 36.8 (09-12-20 @ 07:33), Max: 36.8 (09-12-20 @ 07:33)  HR: 93 (09-12-20 @ 07:33) (93 - 111)  BP: 144/80 (09-12-20 @ 07:33) (129/84 - 144/80)  RR: 16 (09-12-20 @ 07:33) (16 - 18)  SpO2: 99% (09-12-20 @ 07:33) (98% - 99%)    In NAD  HEENT- EOMI  Heart- Well Perfused  Lungs- No resp distress, no use of accessory resp muscles  Abd- + BS, NT  Ext- No calf pain  Neuro- Exam unchanged  Psych- Affect wnl    Imp: Patient with diagnosis of T11-Pelvis fusion, L2-S1 lami admitted for comprehensive acute rehabilitation.    Plan:  - Continue therapies  - DVT prophylaxis- SCDs  - Skin- Turn q2h, check skin daily  - Continue current medications; patient medically stable.   - Patient is stable to continue current rehabilitation program.

## 2020-09-12 NOTE — PROGRESS NOTE ADULT - SUBJECTIVE AND OBJECTIVE BOX
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  pain not well controlled and could not sleep well due to it  Albert drain in place: OP 400cc; serosanguinous   no chest pain or SOB    ALLERGIES:  No Known Allergies    MEDICATIONS  (STANDING):  cephalexin 500 milliGRAM(s) Oral every 8 hours  diazepam    Tablet 5 milliGRAM(s) Oral every 8 hours  escitalopram 10 milliGRAM(s) Oral daily  gabapentin 300 milliGRAM(s) Oral three times a day  HYDROmorphone   Tablet 2 milliGRAM(s) Oral every 4 hours  influenza   Vaccine 0.5 milliLiter(s) IntraMuscular once  lactobacillus acidophilus 1 Tablet(s) Oral two times a day  melatonin 3 milliGRAM(s) Oral at bedtime  oxyCODONE  ER Tablet 20 milliGRAM(s) Oral every 12 hours  senna 2 Tablet(s) Oral at bedtime    MEDICATIONS  (PRN):  acetaminophen   Tablet .. 650 milliGRAM(s) Oral every 6 hours PRN Temp greater or equal to 38C (100.4F), Mild Pain (1 - 3)  famotidine    Tablet 20 milliGRAM(s) Oral every 12 hours PRN Dyspepsia  lidocaine   Patch 1 Patch Transdermal every 24 hours PRN Severe pain  lidocaine 2% Gel 1 Application(s) Topical four times a day PRN pain  magnesium hydroxide Suspension 30 milliLiter(s) Oral every 12 hours PRN Constipation  oxyCODONE    IR 5 milliGRAM(s) Oral every 4 hours PRN Moderate Pain (4 - 6)  oxyCODONE    IR 10 milliGRAM(s) Oral every 4 hours PRN Severe Pain (7 - 10)    Vital Signs Last 24 Hrs  T(F): 98.3 (12 Sep 2020 07:33), Max: 98.3 (12 Sep 2020 07:33)  HR: 93 (12 Sep 2020 07:33) (93 - 111)  BP: 144/80 (12 Sep 2020 07:33) (129/84 - 144/80)  RR: 16 (12 Sep 2020 07:33) (16 - 18)  SpO2: 99% (12 Sep 2020 07:33) (98% - 99%)  I&O's Summary    11 Sep 2020 07:01  -  12 Sep 2020 07:00  --------------------------------------------------------  IN: 0 mL / OUT: 1300 mL / NET: -1300 mL      PHYSICAL EXAM:  General: NAD,   ENT: MMM  Neck: Supple, No JVD  Lungs: Clear to auscultation bilaterally  Cardio: RRR, S1/S2, No murmurs  Abdomen: Soft, Nontender, Nondistended; Bowel sounds present  Extremities: No calf tenderness but ++ b/l LE edema  back: drain in place with serosanguinous fluid   neuro: A/O x 3    LABS:                        9.2    9.34  )-----------( 268      ( 11 Sep 2020 05:15 )             28.4       09-10    135  |  97  |  13  ----------------------------<  120  3.6   |  28  |  0.76    Ca    8.5      10 Sep 2020 07:30       eGFR if Non African American: 118 mL/min/1.73M2 (09-10-20 @ 07:30)  eGFR if African American: 137 mL/min/1.73M2 (09-10-20 @ 07:30)                                     RADIOLOGY & ADDITIONAL TESTS:    Care Discussed with Consultants/Other Providers:

## 2020-09-12 NOTE — PROGRESS NOTE ADULT - ASSESSMENT
35M admitted to [facility] on 8/22/20 with back pain, found to have fractures of L1-L5 pedicles bilaterally, leading to widening of the spinal canal with anterolisthesis of L5 on S1 with the entire L5 vertebral body width, disruption of the ALL and PLL at L5-S1 level, probable epidural hemorrhage and hemorrhage within the foramina at L1-L5, and enlargement/edema of bilateral psoas muscle.  Patient underwent evacuation of epidural hematoma, T11-pelvis posterior spinal fusion, and L2-S1 Laminectomy on 8/23/20 and subsequent L5-S1 anterior interbody fusion and L3-4 lateral interbody fusion on 9/1.  Hospital course c/b acute blood loss anemia requiring multiple transfusions, traumatic rhabdomyolysis and intractable pain.  Patient now admitted for a multidisciplinary rehab program- pt/ot/dvt ppx, pain meds    #s/p epidural hematoma  # T11-pelvis posterior spinal fusion  #L2-S1 Laminectomy   #L5-S1 anterior interbody fusion and L3-4 lateral interbody fusion on 9/1  -optimize pain control  -JETHRO drain still on place; serosanguinous output; >400cc/d  -drain management per NeuroSx and primary team  -Abx Px while drain in place    #post op anemia  fluctuating hgb  had received multiple units of PRBC at OSH  will get anemia w/u during next blood draw     #b/l DVT  - IVC filter in place, c/w SCDs for now    #post op antibiotic Px   -keflex till when drain is in place     #Rhabdomyolysis- 2/2 trauma , resolved, encourage PO fluids    #Pain Control- c/w Dilaudid, Oxycontin, valium    #Bladder Management  - Man removed 9/4 am, voiding  - bladder scans, PVR PRN     #IVC filter  - placed 8/26

## 2020-09-13 LAB
APPEARANCE UR: CLEAR — SIGNIFICANT CHANGE UP
BASOPHILS # BLD AUTO: 0.06 K/UL — SIGNIFICANT CHANGE UP (ref 0–0.2)
BASOPHILS NFR BLD AUTO: 0.6 % — SIGNIFICANT CHANGE UP (ref 0–2)
BILIRUB UR-MCNC: NEGATIVE — SIGNIFICANT CHANGE UP
COLOR SPEC: YELLOW — SIGNIFICANT CHANGE UP
DIFF PNL FLD: NEGATIVE — SIGNIFICANT CHANGE UP
EOSINOPHIL # BLD AUTO: 0.13 K/UL — SIGNIFICANT CHANGE UP (ref 0–0.5)
EOSINOPHIL NFR BLD AUTO: 1.3 % — SIGNIFICANT CHANGE UP (ref 0–6)
FERRITIN SERPL-MCNC: 601 NG/ML — HIGH (ref 30–400)
FOLATE SERPL-MCNC: 8.6 NG/ML — SIGNIFICANT CHANGE UP
GLUCOSE UR QL: NEGATIVE — SIGNIFICANT CHANGE UP
HAPTOGLOB SERPL-MCNC: 408 MG/DL — HIGH (ref 34–200)
HCT VFR BLD CALC: 31.2 % — LOW (ref 39–50)
HGB BLD-MCNC: 9.6 G/DL — LOW (ref 13–17)
IMM GRANULOCYTES NFR BLD AUTO: 1.3 % — SIGNIFICANT CHANGE UP (ref 0–1.5)
IRON SATN MFR SERPL: 16 % — SIGNIFICANT CHANGE UP (ref 16–55)
IRON SATN MFR SERPL: 32 UG/DL — LOW (ref 45–165)
KETONES UR-MCNC: NEGATIVE — SIGNIFICANT CHANGE UP
LDH SERPL L TO P-CCNC: 416 U/L — HIGH (ref 50–242)
LEUKOCYTE ESTERASE UR-ACNC: NEGATIVE — SIGNIFICANT CHANGE UP
LYMPHOCYTES # BLD AUTO: 1.38 K/UL — SIGNIFICANT CHANGE UP (ref 1–3.3)
LYMPHOCYTES # BLD AUTO: 13.4 % — SIGNIFICANT CHANGE UP (ref 13–44)
MCHC RBC-ENTMCNC: 28.1 PG — SIGNIFICANT CHANGE UP (ref 27–34)
MCHC RBC-ENTMCNC: 30.8 GM/DL — LOW (ref 32–36)
MCV RBC AUTO: 91.2 FL — SIGNIFICANT CHANGE UP (ref 80–100)
MONOCYTES # BLD AUTO: 0.76 K/UL — SIGNIFICANT CHANGE UP (ref 0–0.9)
MONOCYTES NFR BLD AUTO: 7.4 % — SIGNIFICANT CHANGE UP (ref 2–14)
NEUTROPHILS # BLD AUTO: 7.81 K/UL — HIGH (ref 1.8–7.4)
NEUTROPHILS NFR BLD AUTO: 76 % — SIGNIFICANT CHANGE UP (ref 43–77)
NITRITE UR-MCNC: NEGATIVE — SIGNIFICANT CHANGE UP
NRBC # BLD: 0 /100 WBCS — SIGNIFICANT CHANGE UP (ref 0–0)
PH UR: 6 — SIGNIFICANT CHANGE UP (ref 5–8)
PLATELET # BLD AUTO: 282 K/UL — SIGNIFICANT CHANGE UP (ref 150–400)
PROT UR-MCNC: NEGATIVE — SIGNIFICANT CHANGE UP
RBC # BLD: 3.42 M/UL — LOW (ref 4.2–5.8)
RBC # BLD: 3.42 M/UL — LOW (ref 4.2–5.8)
RBC # FLD: 13.7 % — SIGNIFICANT CHANGE UP (ref 10.3–14.5)
RETICS #: 123.5 K/UL — SIGNIFICANT CHANGE UP (ref 25–125)
RETICS/RBC NFR: 3.6 % — HIGH (ref 0.5–2.5)
SP GR SPEC: 1.01 — SIGNIFICANT CHANGE UP (ref 1.01–1.02)
TIBC SERPL-MCNC: 197 UG/DL — LOW (ref 220–430)
TRANSFERRIN SERPL-MCNC: 166 MG/DL — LOW (ref 200–360)
UIBC SERPL-MCNC: 165 UG/DL — SIGNIFICANT CHANGE UP (ref 110–370)
UROBILINOGEN FLD QL: NEGATIVE — SIGNIFICANT CHANGE UP
VIT B12 SERPL-MCNC: 343 PG/ML — SIGNIFICANT CHANGE UP (ref 232–1245)
WBC # BLD: 10.27 K/UL — SIGNIFICANT CHANGE UP (ref 3.8–10.5)
WBC # FLD AUTO: 10.27 K/UL — SIGNIFICANT CHANGE UP (ref 3.8–10.5)

## 2020-09-13 PROCEDURE — 99232 SBSQ HOSP IP/OBS MODERATE 35: CPT

## 2020-09-13 RX ADMIN — HYDROMORPHONE HYDROCHLORIDE 2 MILLIGRAM(S): 2 INJECTION INTRAMUSCULAR; INTRAVENOUS; SUBCUTANEOUS at 19:25

## 2020-09-13 RX ADMIN — Medication 500 MILLIGRAM(S): at 14:34

## 2020-09-13 RX ADMIN — Medication 500 MILLIGRAM(S): at 05:02

## 2020-09-13 RX ADMIN — HYDROMORPHONE HYDROCHLORIDE 2 MILLIGRAM(S): 2 INJECTION INTRAMUSCULAR; INTRAVENOUS; SUBCUTANEOUS at 05:01

## 2020-09-13 RX ADMIN — Medication 5 MILLIGRAM(S): at 21:17

## 2020-09-13 RX ADMIN — HYDROMORPHONE HYDROCHLORIDE 2 MILLIGRAM(S): 2 INJECTION INTRAMUSCULAR; INTRAVENOUS; SUBCUTANEOUS at 23:51

## 2020-09-13 RX ADMIN — OXYCODONE HYDROCHLORIDE 20 MILLIGRAM(S): 5 TABLET ORAL at 19:25

## 2020-09-13 RX ADMIN — HYDROMORPHONE HYDROCHLORIDE 2 MILLIGRAM(S): 2 INJECTION INTRAMUSCULAR; INTRAVENOUS; SUBCUTANEOUS at 11:15

## 2020-09-13 RX ADMIN — HYDROMORPHONE HYDROCHLORIDE 2 MILLIGRAM(S): 2 INJECTION INTRAMUSCULAR; INTRAVENOUS; SUBCUTANEOUS at 02:20

## 2020-09-13 RX ADMIN — HYDROMORPHONE HYDROCHLORIDE 2 MILLIGRAM(S): 2 INJECTION INTRAMUSCULAR; INTRAVENOUS; SUBCUTANEOUS at 10:35

## 2020-09-13 RX ADMIN — Medication 3 MILLIGRAM(S): at 21:18

## 2020-09-13 RX ADMIN — HYDROMORPHONE HYDROCHLORIDE 2 MILLIGRAM(S): 2 INJECTION INTRAMUSCULAR; INTRAVENOUS; SUBCUTANEOUS at 22:51

## 2020-09-13 RX ADMIN — ESCITALOPRAM OXALATE 10 MILLIGRAM(S): 10 TABLET, FILM COATED ORAL at 12:45

## 2020-09-13 RX ADMIN — HYDROMORPHONE HYDROCHLORIDE 2 MILLIGRAM(S): 2 INJECTION INTRAMUSCULAR; INTRAVENOUS; SUBCUTANEOUS at 14:34

## 2020-09-13 RX ADMIN — OXYCODONE HYDROCHLORIDE 10 MILLIGRAM(S): 5 TABLET ORAL at 22:53

## 2020-09-13 RX ADMIN — OXYCODONE HYDROCHLORIDE 10 MILLIGRAM(S): 5 TABLET ORAL at 13:15

## 2020-09-13 RX ADMIN — Medication 5 MILLIGRAM(S): at 05:01

## 2020-09-13 RX ADMIN — OXYCODONE HYDROCHLORIDE 20 MILLIGRAM(S): 5 TABLET ORAL at 05:01

## 2020-09-13 RX ADMIN — GABAPENTIN 300 MILLIGRAM(S): 400 CAPSULE ORAL at 21:17

## 2020-09-13 RX ADMIN — HYDROMORPHONE HYDROCHLORIDE 2 MILLIGRAM(S): 2 INJECTION INTRAMUSCULAR; INTRAVENOUS; SUBCUTANEOUS at 18:52

## 2020-09-13 RX ADMIN — OXYCODONE HYDROCHLORIDE 20 MILLIGRAM(S): 5 TABLET ORAL at 05:30

## 2020-09-13 RX ADMIN — GABAPENTIN 300 MILLIGRAM(S): 400 CAPSULE ORAL at 14:34

## 2020-09-13 RX ADMIN — OXYCODONE HYDROCHLORIDE 20 MILLIGRAM(S): 5 TABLET ORAL at 18:52

## 2020-09-13 RX ADMIN — GABAPENTIN 300 MILLIGRAM(S): 400 CAPSULE ORAL at 05:02

## 2020-09-13 RX ADMIN — HYDROMORPHONE HYDROCHLORIDE 2 MILLIGRAM(S): 2 INJECTION INTRAMUSCULAR; INTRAVENOUS; SUBCUTANEOUS at 15:10

## 2020-09-13 RX ADMIN — OXYCODONE HYDROCHLORIDE 10 MILLIGRAM(S): 5 TABLET ORAL at 00:43

## 2020-09-13 RX ADMIN — OXYCODONE HYDROCHLORIDE 10 MILLIGRAM(S): 5 TABLET ORAL at 21:17

## 2020-09-13 RX ADMIN — OXYCODONE HYDROCHLORIDE 10 MILLIGRAM(S): 5 TABLET ORAL at 12:45

## 2020-09-13 RX ADMIN — Medication 1 TABLET(S): at 18:52

## 2020-09-13 RX ADMIN — Medication 1 TABLET(S): at 05:02

## 2020-09-13 RX ADMIN — Medication 500 MILLIGRAM(S): at 21:17

## 2020-09-13 RX ADMIN — SENNA PLUS 2 TABLET(S): 8.6 TABLET ORAL at 21:17

## 2020-09-13 RX ADMIN — Medication 5 MILLIGRAM(S): at 14:34

## 2020-09-13 NOTE — PROGRESS NOTE ADULT - ASSESSMENT
35M admitted to [facility] on 8/22/20 with back pain, found to have fractures of L1-L5 pedicles bilaterally, leading to widening of the spinal canal with anterolisthesis of L5 on S1 with the entire L5 vertebral body width, disruption of the ALL and PLL at L5-S1 level, probable epidural hemorrhage and hemorrhage within the foramina at L1-L5, and enlargement/edema of bilateral psoas muscle.  Patient underwent evacuation of epidural hematoma, T11-pelvis posterior spinal fusion, and L2-S1 Laminectomy on 8/23/20 and subsequent L5-S1 anterior interbody fusion and L3-4 lateral interbody fusion on 9/1.  Hospital course c/b acute blood loss anemia requiring multiple transfusions, traumatic rhabdomyolysis and intractable pain.  Patient now admitted for a multidisciplinary rehab program- pt/ot/dvt ppx, pain meds    #s/p epidural hematoma  # T11-pelvis posterior spinal fusion  #L2-S1 Laminectomy   #L5-S1 anterior interbody fusion and L3-4 lateral interbody fusion on 9/1  -optimize pain control  -JETHRO drain still in place; serosanguinous output but seems to be going down  -drain management per NeuroSx and primary team  -Abx Px while drain in place    # on and off urinary retention:   -PVR high despite good void  -will get UA and UCx  -encourage voiding; PRN straight cath     #post op anemia  fluctuating hgb but stable  had received multiple units of PRBC at OSH  iron study NOT suggestive of BROOK; possible inflammation, hapto NOT low; retic responding well    follow CBC for now    #b/l DVT  - IVC filter in place, c/w SCDs for now    #post op antibiotic Px   -keflex till when drain is in place     #Rhabdomyolysis- 2/2 trauma , resolved, encourage PO fluids    #Pain Control- c/w Dilaudid, Oxycontin, valium    #Bladder Management  - Man removed 9/4 am, voiding  - bladder scans, PVR PRN     #IVC filter  - placed 8/26

## 2020-09-13 NOTE — PROGRESS NOTE ADULT - SUBJECTIVE AND OBJECTIVE BOX
Cc: Gait dysfunction    HPI: Patient with no new medical issues today.  Pain controlled, no chest pain, no N/V, no Fevers/Chills. No other new ROS  Has been tolerating rehabilitation program.  Appreciate Hospitalist follow up.     MEDICATIONS  (STANDING):  cephalexin 500 milliGRAM(s) Oral every 8 hours  diazepam    Tablet 5 milliGRAM(s) Oral every 8 hours  escitalopram 10 milliGRAM(s) Oral daily  gabapentin 300 milliGRAM(s) Oral three times a day  HYDROmorphone   Tablet 2 milliGRAM(s) Oral every 4 hours  influenza   Vaccine 0.5 milliLiter(s) IntraMuscular once  lactobacillus acidophilus 1 Tablet(s) Oral two times a day  melatonin 3 milliGRAM(s) Oral at bedtime  oxyCODONE  ER Tablet 20 milliGRAM(s) Oral every 12 hours  senna 2 Tablet(s) Oral at bedtime    MEDICATIONS  (PRN):  acetaminophen   Tablet .. 650 milliGRAM(s) Oral every 6 hours PRN Temp greater or equal to 38C (100.4F), Mild Pain (1 - 3)  famotidine    Tablet 20 milliGRAM(s) Oral every 12 hours PRN Dyspepsia  lidocaine   Patch 1 Patch Transdermal every 24 hours PRN Severe pain  lidocaine 2% Gel 1 Application(s) Topical four times a day PRN pain  magnesium hydroxide Suspension 30 milliLiter(s) Oral every 12 hours PRN Constipation  oxyCODONE    IR 5 milliGRAM(s) Oral every 4 hours PRN Moderate Pain (4 - 6)  oxyCODONE    IR 10 milliGRAM(s) Oral every 4 hours PRN Severe Pain (7 - 10)    Vital Signs Last 24 Hrs  T(C): 36.7 (13 Sep 2020 05:22), Max: 37.2 (12 Sep 2020 22:14)  T(F): 98.1 (13 Sep 2020 05:22), Max: 98.9 (12 Sep 2020 22:14)  HR: 97 (13 Sep 2020 05:22) (97 - 99)  BP: 130/74 (13 Sep 2020 05:22) (130/74 - 130/74)  BP(mean): --  RR: 18 (13 Sep 2020 05:22) (16 - 18)  SpO2: 98% (13 Sep 2020 05:22) (98% - 98%)    In NAD  HEENT- EOMI  Heart- Well Perfused  Lungs- No resp distress, no use of accessory resp muscles  Abd- + BS, NT  Ext- No calf pain  Neuro- Exam unchanged  Psych- Affect wnl    Imp: Patient with diagnosis of T11-Pelvis fusion, L2-S1 lami admitted for comprehensive acute rehabilitation.    Plan:  - Continue therapies  - DVT prophylaxis- SCDs  - Skin- Turn q2h, check skin daily  - Continue current medications; patient medically stable.   - Patient is stable to continue current rehabilitation program.

## 2020-09-13 NOTE — PROGRESS NOTE ADULT - SUBJECTIVE AND OBJECTIVE BOX
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    ALLERGIES:  No Known Allergies    MEDICATIONS  (STANDING):  cephalexin 500 milliGRAM(s) Oral every 8 hours  diazepam    Tablet 5 milliGRAM(s) Oral every 8 hours  escitalopram 10 milliGRAM(s) Oral daily  gabapentin 300 milliGRAM(s) Oral three times a day  HYDROmorphone   Tablet 2 milliGRAM(s) Oral every 4 hours  influenza   Vaccine 0.5 milliLiter(s) IntraMuscular once  lactobacillus acidophilus 1 Tablet(s) Oral two times a day  melatonin 3 milliGRAM(s) Oral at bedtime  oxyCODONE  ER Tablet 20 milliGRAM(s) Oral every 12 hours  senna 2 Tablet(s) Oral at bedtime    MEDICATIONS  (PRN):  acetaminophen   Tablet .. 650 milliGRAM(s) Oral every 6 hours PRN Temp greater or equal to 38C (100.4F), Mild Pain (1 - 3)  famotidine    Tablet 20 milliGRAM(s) Oral every 12 hours PRN Dyspepsia  lidocaine   Patch 1 Patch Transdermal every 24 hours PRN Severe pain  lidocaine 2% Gel 1 Application(s) Topical four times a day PRN pain  magnesium hydroxide Suspension 30 milliLiter(s) Oral every 12 hours PRN Constipation  oxyCODONE    IR 5 milliGRAM(s) Oral every 4 hours PRN Moderate Pain (4 - 6)  oxyCODONE    IR 10 milliGRAM(s) Oral every 4 hours PRN Severe Pain (7 - 10)    Vital Signs Last 24 Hrs  T(F): 98.1 (13 Sep 2020 05:22), Max: 98.9 (12 Sep 2020 22:14)  HR: 97 (13 Sep 2020 05:22) (97 - 99)  BP: 130/74 (13 Sep 2020 05:22) (130/74 - 130/74)  RR: 18 (13 Sep 2020 05:22) (16 - 18)  SpO2: 98% (13 Sep 2020 05:22) (98% - 98%)  I&O's Summary    12 Sep 2020 07:01  -  13 Sep 2020 07:00  --------------------------------------------------------  IN: 0 mL / OUT: 1475 mL / NET: -1475 mL        PHYSICAL EXAM:  General: NAD, A/O x 3  ENT: MMM  Neck: Supple, No JVD  Lungs: Clear to auscultation bilaterally  Cardio: RRR, S1/S2, No murmurs  Abdomen: Soft, Nontender, Nondistended; Bowel sounds present  Extremities: No calf tenderness, No pitting edema    LABS:                        9.6    10.27 )-----------( 282      ( 13 Sep 2020 06:18 )             31.2                                                 RADIOLOGY & ADDITIONAL TESTS:    Care Discussed with Consultants/Other Providers:    Patient is a 35y old  Male who presents with a chief complaint of 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  pain not well controlled  able to void but PVR still > 500  denies dysuria   no chest pain or SOB  JETHRO drain output going down:  100 cc    ALLERGIES:  No Known Allergies    MEDICATIONS  (STANDING):  cephalexin 500 milliGRAM(s) Oral every 8 hours  diazepam    Tablet 5 milliGRAM(s) Oral every 8 hours  escitalopram 10 milliGRAM(s) Oral daily  gabapentin 300 milliGRAM(s) Oral three times a day  HYDROmorphone   Tablet 2 milliGRAM(s) Oral every 4 hours  influenza   Vaccine 0.5 milliLiter(s) IntraMuscular once  lactobacillus acidophilus 1 Tablet(s) Oral two times a day  melatonin 3 milliGRAM(s) Oral at bedtime  oxyCODONE  ER Tablet 20 milliGRAM(s) Oral every 12 hours  senna 2 Tablet(s) Oral at bedtime    MEDICATIONS  (PRN):  acetaminophen   Tablet .. 650 milliGRAM(s) Oral every 6 hours PRN Temp greater or equal to 38C (100.4F), Mild Pain (1 - 3)  famotidine    Tablet 20 milliGRAM(s) Oral every 12 hours PRN Dyspepsia  lidocaine   Patch 1 Patch Transdermal every 24 hours PRN Severe pain  lidocaine 2% Gel 1 Application(s) Topical four times a day PRN pain  magnesium hydroxide Suspension 30 milliLiter(s) Oral every 12 hours PRN Constipation  oxyCODONE    IR 5 milliGRAM(s) Oral every 4 hours PRN Moderate Pain (4 - 6)  oxyCODONE    IR 10 milliGRAM(s) Oral every 4 hours PRN Severe Pain (7 - 10)    Vital Signs Last 24 Hrs  T(F): 98.1 (13 Sep 2020 05:22), Max: 98.9 (12 Sep 2020 22:14)  HR: 97 (13 Sep 2020 05:22) (97 - 99)  BP: 130/74 (13 Sep 2020 05:22) (130/74 - 130/74)  RR: 18 (13 Sep 2020 05:22) (16 - 18)  SpO2: 98% (13 Sep 2020 05:22) (98% - 98%)  I&O's Summary    12 Sep 2020 07:01  -  13 Sep 2020 07:00  --------------------------------------------------------  IN: 0 mL / OUT: 1475 mL / NET: -1475 mL    PHYSICAL EXAM:  General: NAD   ENT: MMM  Neck: Supple, No JVD  Lungs: Clear to auscultation bilaterally  Cardio: RRR, S1/S2, No murmurs  Abdomen: Soft, Nontender, Nondistended; Bowel sounds present  Extremities: No calf tenderness or redness; ++ b/l LE edema  back: drain in place with serosanguinous fluid   neuro: A/O x 3    LABS:                        9.6    10.27 )-----------( 282      ( 13 Sep 2020 06:18 )             31.2                                                 RADIOLOGY & ADDITIONAL TESTS:    Care Discussed with Consultants/Other Providers:

## 2020-09-14 DIAGNOSIS — S34.109A UNSPECIFIED INJURY TO UNSPECIFIED LEVEL OF LUMBAR SPINAL CORD, INITIAL ENCOUNTER: ICD-10-CM

## 2020-09-14 DIAGNOSIS — S32.018A OTHER FRACTURE OF FIRST LUMBAR VERTEBRA, INITIAL ENCOUNTER FOR CLOSED FRACTURE: ICD-10-CM

## 2020-09-14 DIAGNOSIS — E87.5 HYPERKALEMIA: ICD-10-CM

## 2020-09-14 DIAGNOSIS — T79.6XXA TRAUMATIC ISCHEMIA OF MUSCLE, INITIAL ENCOUNTER: ICD-10-CM

## 2020-09-14 DIAGNOSIS — S32.058A OTHER FRACTURE OF FIFTH LUMBAR VERTEBRA, INITIAL ENCOUNTER FOR CLOSED FRACTURE: ICD-10-CM

## 2020-09-14 DIAGNOSIS — D50.9 IRON DEFICIENCY ANEMIA, UNSPECIFIED: ICD-10-CM

## 2020-09-14 DIAGNOSIS — S33.5XXA SPRAIN OF LIGAMENTS OF LUMBAR SPINE, INITIAL ENCOUNTER: ICD-10-CM

## 2020-09-14 DIAGNOSIS — S32.038A OTHER FRACTURE OF THIRD LUMBAR VERTEBRA, INITIAL ENCOUNTER FOR CLOSED FRACTURE: ICD-10-CM

## 2020-09-14 DIAGNOSIS — N17.0 ACUTE KIDNEY FAILURE WITH TUBULAR NECROSIS: ICD-10-CM

## 2020-09-14 DIAGNOSIS — S34.21XA INJURY OF NERVE ROOT OF LUMBAR SPINE, INITIAL ENCOUNTER: ICD-10-CM
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
## 2020-09-14 DIAGNOSIS — S32.048A OTHER FRACTURE OF FOURTH LUMBAR VERTEBRA, INITIAL ENCOUNTER FOR CLOSED FRACTURE: ICD-10-CM
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
## 2020-09-14 DIAGNOSIS — M54.9 DORSALGIA, UNSPECIFIED: ICD-10-CM

## 2020-09-14 DIAGNOSIS — G95.19 OTHER VASCULAR MYELOPATHIES: ICD-10-CM

## 2020-09-14 DIAGNOSIS — S32.028A OTHER FRACTURE OF SECOND LUMBAR VERTEBRA, INITIAL ENCOUNTER FOR CLOSED FRACTURE: ICD-10-CM
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
## 2020-09-14 DIAGNOSIS — S32.10XA UNSPECIFIED FRACTURE OF SACRUM, INITIAL ENCOUNTER FOR CLOSED FRACTURE: ICD-10-CM

## 2020-09-14 DIAGNOSIS — Z78.1 PHYSICAL RESTRAINT STATUS: ICD-10-CM

## 2020-09-14 LAB
ANION GAP SERPL CALC-SCNC: 8 MMOL/L — SIGNIFICANT CHANGE UP (ref 5–17)
BASOPHILS # BLD AUTO: 0.04 K/UL — SIGNIFICANT CHANGE UP (ref 0–0.2)
BASOPHILS NFR BLD AUTO: 0.4 % — SIGNIFICANT CHANGE UP (ref 0–2)
BUN SERPL-MCNC: 12 MG/DL — SIGNIFICANT CHANGE UP (ref 7–23)
CALCIUM SERPL-MCNC: 8.7 MG/DL — SIGNIFICANT CHANGE UP (ref 8.4–10.5)
CHLORIDE SERPL-SCNC: 97 MMOL/L — SIGNIFICANT CHANGE UP (ref 96–108)
CO2 SERPL-SCNC: 28 MMOL/L — SIGNIFICANT CHANGE UP (ref 22–31)
CREAT SERPL-MCNC: 0.8 MG/DL — SIGNIFICANT CHANGE UP (ref 0.5–1.3)
EOSINOPHIL # BLD AUTO: 0.16 K/UL — SIGNIFICANT CHANGE UP (ref 0–0.5)
EOSINOPHIL NFR BLD AUTO: 1.6 % — SIGNIFICANT CHANGE UP (ref 0–6)
GLUCOSE SERPL-MCNC: 100 MG/DL — HIGH (ref 70–99)
HCT VFR BLD CALC: 29.5 % — LOW (ref 39–50)
HGB BLD-MCNC: 9.5 G/DL — LOW (ref 13–17)
IMM GRANULOCYTES NFR BLD AUTO: 1.1 % — SIGNIFICANT CHANGE UP (ref 0–1.5)
LYMPHOCYTES # BLD AUTO: 1.33 K/UL — SIGNIFICANT CHANGE UP (ref 1–3.3)
LYMPHOCYTES # BLD AUTO: 13.1 % — SIGNIFICANT CHANGE UP (ref 13–44)
MCHC RBC-ENTMCNC: 29.1 PG — SIGNIFICANT CHANGE UP (ref 27–34)
MCHC RBC-ENTMCNC: 32.2 GM/DL — SIGNIFICANT CHANGE UP (ref 32–36)
MCV RBC AUTO: 90.5 FL — SIGNIFICANT CHANGE UP (ref 80–100)
MONOCYTES # BLD AUTO: 0.79 K/UL — SIGNIFICANT CHANGE UP (ref 0–0.9)
MONOCYTES NFR BLD AUTO: 7.8 % — SIGNIFICANT CHANGE UP (ref 2–14)
NEUTROPHILS # BLD AUTO: 7.69 K/UL — HIGH (ref 1.8–7.4)
NEUTROPHILS NFR BLD AUTO: 76 % — SIGNIFICANT CHANGE UP (ref 43–77)
NRBC # BLD: 0 /100 WBCS — SIGNIFICANT CHANGE UP (ref 0–0)
PLATELET # BLD AUTO: 298 K/UL — SIGNIFICANT CHANGE UP (ref 150–400)
POTASSIUM SERPL-MCNC: 4.3 MMOL/L — SIGNIFICANT CHANGE UP (ref 3.5–5.3)
POTASSIUM SERPL-SCNC: 4.3 MMOL/L — SIGNIFICANT CHANGE UP (ref 3.5–5.3)
RBC # BLD: 3.26 M/UL — LOW (ref 4.2–5.8)
RBC # FLD: 13.9 % — SIGNIFICANT CHANGE UP (ref 10.3–14.5)
SODIUM SERPL-SCNC: 133 MMOL/L — LOW (ref 135–145)
WBC # BLD: 10.12 K/UL — SIGNIFICANT CHANGE UP (ref 3.8–10.5)
WBC # FLD AUTO: 10.12 K/UL — SIGNIFICANT CHANGE UP (ref 3.8–10.5)

## 2020-09-14 PROCEDURE — 99233 SBSQ HOSP IP/OBS HIGH 50: CPT

## 2020-09-14 PROCEDURE — 99232 SBSQ HOSP IP/OBS MODERATE 35: CPT

## 2020-09-14 RX ORDER — OXYCODONE HYDROCHLORIDE 5 MG/1
5 TABLET ORAL EVERY 6 HOURS
Refills: 0 | Status: DISCONTINUED | OUTPATIENT
Start: 2020-09-14 | End: 2020-09-14

## 2020-09-14 RX ORDER — HYDROMORPHONE HYDROCHLORIDE 2 MG/ML
2 INJECTION INTRAMUSCULAR; INTRAVENOUS; SUBCUTANEOUS EVERY 4 HOURS
Refills: 0 | Status: DISCONTINUED | OUTPATIENT
Start: 2020-09-14 | End: 2020-09-21

## 2020-09-14 RX ORDER — TAMSULOSIN HYDROCHLORIDE 0.4 MG/1
0.4 CAPSULE ORAL AT BEDTIME
Refills: 0 | Status: DISCONTINUED | OUTPATIENT
Start: 2020-09-14 | End: 2020-10-12

## 2020-09-14 RX ORDER — OXYCODONE HYDROCHLORIDE 5 MG/1
5 TABLET ORAL EVERY 4 HOURS
Refills: 0 | Status: DISCONTINUED | OUTPATIENT
Start: 2020-09-14 | End: 2020-09-21

## 2020-09-14 RX ORDER — DIAZEPAM 5 MG
5 TABLET ORAL EVERY 8 HOURS
Refills: 0 | Status: DISCONTINUED | OUTPATIENT
Start: 2020-09-14 | End: 2020-09-21

## 2020-09-14 RX ORDER — GABAPENTIN 400 MG/1
400 CAPSULE ORAL THREE TIMES A DAY
Refills: 0 | Status: DISCONTINUED | OUTPATIENT
Start: 2020-09-14 | End: 2020-09-15

## 2020-09-14 RX ORDER — OXYCODONE HYDROCHLORIDE 5 MG/1
10 TABLET ORAL EVERY 4 HOURS
Refills: 0 | Status: DISCONTINUED | OUTPATIENT
Start: 2020-09-14 | End: 2020-09-21

## 2020-09-14 RX ORDER — DULOXETINE HYDROCHLORIDE 30 MG/1
30 CAPSULE, DELAYED RELEASE ORAL DAILY
Refills: 0 | Status: DISCONTINUED | OUTPATIENT
Start: 2020-09-15 | End: 2020-09-19

## 2020-09-14 RX ORDER — FUROSEMIDE 40 MG
40 TABLET ORAL DAILY
Refills: 0 | Status: COMPLETED | OUTPATIENT
Start: 2020-09-14 | End: 2020-09-15

## 2020-09-14 RX ORDER — OXYCODONE HYDROCHLORIDE 5 MG/1
10 TABLET ORAL EVERY 6 HOURS
Refills: 0 | Status: DISCONTINUED | OUTPATIENT
Start: 2020-09-14 | End: 2020-09-14

## 2020-09-14 RX ADMIN — TAMSULOSIN HYDROCHLORIDE 0.4 MILLIGRAM(S): 0.4 CAPSULE ORAL at 21:00

## 2020-09-14 RX ADMIN — HYDROMORPHONE HYDROCHLORIDE 2 MILLIGRAM(S): 2 INJECTION INTRAMUSCULAR; INTRAVENOUS; SUBCUTANEOUS at 11:15

## 2020-09-14 RX ADMIN — Medication 3 MILLIGRAM(S): at 21:00

## 2020-09-14 RX ADMIN — Medication 40 MILLIGRAM(S): at 12:36

## 2020-09-14 RX ADMIN — OXYCODONE HYDROCHLORIDE 10 MILLIGRAM(S): 5 TABLET ORAL at 21:30

## 2020-09-14 RX ADMIN — OXYCODONE HYDROCHLORIDE 20 MILLIGRAM(S): 5 TABLET ORAL at 06:00

## 2020-09-14 RX ADMIN — OXYCODONE HYDROCHLORIDE 20 MILLIGRAM(S): 5 TABLET ORAL at 17:33

## 2020-09-14 RX ADMIN — Medication 1 TABLET(S): at 06:00

## 2020-09-14 RX ADMIN — OXYCODONE HYDROCHLORIDE 10 MILLIGRAM(S): 5 TABLET ORAL at 01:51

## 2020-09-14 RX ADMIN — ESCITALOPRAM OXALATE 10 MILLIGRAM(S): 10 TABLET, FILM COATED ORAL at 12:36

## 2020-09-14 RX ADMIN — OXYCODONE HYDROCHLORIDE 10 MILLIGRAM(S): 5 TABLET ORAL at 16:53

## 2020-09-14 RX ADMIN — Medication 5 MILLIGRAM(S): at 14:33

## 2020-09-14 RX ADMIN — OXYCODONE HYDROCHLORIDE 20 MILLIGRAM(S): 5 TABLET ORAL at 18:03

## 2020-09-14 RX ADMIN — OXYCODONE HYDROCHLORIDE 10 MILLIGRAM(S): 5 TABLET ORAL at 09:15

## 2020-09-14 RX ADMIN — OXYCODONE HYDROCHLORIDE 10 MILLIGRAM(S): 5 TABLET ORAL at 03:01

## 2020-09-14 RX ADMIN — OXYCODONE HYDROCHLORIDE 20 MILLIGRAM(S): 5 TABLET ORAL at 07:01

## 2020-09-14 RX ADMIN — OXYCODONE HYDROCHLORIDE 10 MILLIGRAM(S): 5 TABLET ORAL at 13:15

## 2020-09-14 RX ADMIN — HYDROMORPHONE HYDROCHLORIDE 2 MILLIGRAM(S): 2 INJECTION INTRAMUSCULAR; INTRAVENOUS; SUBCUTANEOUS at 02:56

## 2020-09-14 RX ADMIN — OXYCODONE HYDROCHLORIDE 10 MILLIGRAM(S): 5 TABLET ORAL at 17:28

## 2020-09-14 RX ADMIN — OXYCODONE HYDROCHLORIDE 10 MILLIGRAM(S): 5 TABLET ORAL at 12:40

## 2020-09-14 RX ADMIN — HYDROMORPHONE HYDROCHLORIDE 2 MILLIGRAM(S): 2 INJECTION INTRAMUSCULAR; INTRAVENOUS; SUBCUTANEOUS at 07:01

## 2020-09-14 RX ADMIN — HYDROMORPHONE HYDROCHLORIDE 2 MILLIGRAM(S): 2 INJECTION INTRAMUSCULAR; INTRAVENOUS; SUBCUTANEOUS at 10:34

## 2020-09-14 RX ADMIN — Medication 1 TABLET(S): at 17:33

## 2020-09-14 RX ADMIN — GABAPENTIN 400 MILLIGRAM(S): 400 CAPSULE ORAL at 13:20

## 2020-09-14 RX ADMIN — Medication 5 MILLIGRAM(S): at 06:00

## 2020-09-14 RX ADMIN — HYDROMORPHONE HYDROCHLORIDE 2 MILLIGRAM(S): 2 INJECTION INTRAMUSCULAR; INTRAVENOUS; SUBCUTANEOUS at 23:36

## 2020-09-14 RX ADMIN — OXYCODONE HYDROCHLORIDE 10 MILLIGRAM(S): 5 TABLET ORAL at 08:25

## 2020-09-14 RX ADMIN — Medication 500 MILLIGRAM(S): at 21:00

## 2020-09-14 RX ADMIN — Medication 500 MILLIGRAM(S): at 13:20

## 2020-09-14 RX ADMIN — GABAPENTIN 400 MILLIGRAM(S): 400 CAPSULE ORAL at 21:00

## 2020-09-14 RX ADMIN — OXYCODONE HYDROCHLORIDE 10 MILLIGRAM(S): 5 TABLET ORAL at 20:57

## 2020-09-14 RX ADMIN — Medication 500 MILLIGRAM(S): at 06:00

## 2020-09-14 RX ADMIN — HYDROMORPHONE HYDROCHLORIDE 2 MILLIGRAM(S): 2 INJECTION INTRAMUSCULAR; INTRAVENOUS; SUBCUTANEOUS at 03:45

## 2020-09-14 RX ADMIN — GABAPENTIN 300 MILLIGRAM(S): 400 CAPSULE ORAL at 06:00

## 2020-09-14 RX ADMIN — SENNA PLUS 2 TABLET(S): 8.6 TABLET ORAL at 21:00

## 2020-09-14 RX ADMIN — HYDROMORPHONE HYDROCHLORIDE 2 MILLIGRAM(S): 2 INJECTION INTRAMUSCULAR; INTRAVENOUS; SUBCUTANEOUS at 06:00

## 2020-09-14 NOTE — PROGRESS NOTE ADULT - ASSESSMENT
35M admitted to [facility] on 8/22/20 with back pain, found to have fractures of L1-L5 pedicles bilaterally, leading to widening of the spinal canal with anterolisthesis of L5 on S1 with the entire L5 vertebral body width, disruption of the ALL and PLL at L5-S1 level, probable epidural hemorrhage and hemorrhage within the foramina at L1-L5, and enlargement/edema of bilateral psoas muscle.  Patient underwent evacuation of epidural hematoma, T11-pelvis posterior spinal fusion, and L2-S1 Laminectomy on 8/23/20 and subsequent L5-S1 anterior interbody fusion and L3-4 lateral interbody fusion on 9/1.  Hospital course c/b acute blood loss anemia requiring multiple transfusions, traumatic rhabdomyolysis and intractable pain.  Patient now admitted for a multidisciplinary rehab program- pt/ot/dvt ppx, pain meds    #s/p epidural hematoma  # T11-pelvis posterior spinal fusion  #L2-S1 Laminectomy   #L5-S1 anterior interbody fusion and L3-4 lateral interbody fusion on 9/1  -optimize pain control  -JETHRO drain still in place; serosanguinous pinkish output but seems to be going down  -drain management per NeuroSx and primary team  -Abx Px while drain in place    # on and off urinary retention:   -PVR high despite good void  -? if bladder scan is picking up ascitic fluid rather than bladder volume (prior CT had psoas hematoma)   -UA: no features of UTI  -encourage voiding; PRN straight cath     # B/l LE edema ( L>R)  -may be related to underlying DVT  -also mild hyponatremia  -will try lasix 40 mg PO q24 for 2 days    #post op anemia  fluctuating hgb but stable  had received multiple units of PRBC at OSH  iron study NOT suggestive of BROOK; possible inflammation, hapto NOT low; retic responding well    follow CBC for now    #b/l DVT  - IVC filter in place, c/w SCDs for now    #post op antibiotic Px   -keflex till when drain is in place     #Rhabdomyolysis- 2/2 trauma , resolved, encourage PO fluids    #Pain Control- c/w Dilaudid, Oxycontin, valium    #Bladder Management  - Man removed 9/4 am, voiding  - bladder scans, PVR PRN     #IVC filter  - placed 8/26

## 2020-09-14 NOTE — PROGRESS NOTE ADULT - ASSESSMENT
ASSESSMENT/PLAN  BRUNO CHO is a 35M admitted to [facility] on 8/22/20 with back pain, found to have fractures of L1-L5 pedicles bilaterally, leading to widening of the spinal canal with anterolisthesis of L5 on S1 with the entire L5 vertebral body width, disruption of the ALL and PLL at L5-S1 level, probable epidural hemorrhage and hemorrhage within the foramina at L1-L5, and enlargement/edema of bilateral psoas muscle.  Patient underwent evacuation of epidural hematoma, T11-pelvis posterior spinal fusion, and L2-S1 Laminectomy on 8/23/20 and subsequent L5-S1 anterior interbody fusion and L3-4 lateral interbody fusion on 9/1.  Hospital course c/b acute blood loss anemia requiring multiple transfusions, traumatic rhabodmyolysis, and intractable pain.  Patient now admitted for a multidisciplinary rehab program. 09-04-20 @ 14:37    Comprehensive Multidisciplinary Rehab Program:  - Start comprehensive rehab program of PT/OT - 3 hours a day, 5 days a week  - P&O as needed    -------------  MEDICAL MANAGEMENT     #Traumatic Spinal Cord Injury  - pt with complete anterolisthesis of L5 on S1, multiple pedicular fractures, and epidural hemorrhage   - s/p T11-pelvis posterior spinal fusion, and L2-S1 Laminectomy on 8/23/20 and subsequent L5-S1 anterior interbody fusion and L3-4 lateral interbody fusion on 9/1  - pain control as below  - PT/OT  - spinal & Fall precautions  - TLSO/AFOs when OOB  - monitor bladder/bowel function out of concern for neurogenic bowel  - monitor JETHRO drain outpt; Keflex 500mg Q8h while drain is in place  - Drain dc instructions: Per DC note can be pulled on Monday 9/7/20. The drain is sewn in, so please cut the one suture that is holding the drain in place. Once the suture is cut, open the drain cap so that the drain is open. After both previous steps are done then the drain can be pulled. Pull lightly until drain is completely out. Place a 4x4 with Tegaderm over the drain wound  - Drain output still >300mL/day. Will defer drain removal until output decreases.     #Rhabdomyolysis  - 2/2 trauma   - largely resolved: <900 on 8/31  - encourage PO fluids    #Tachycardia  - per chart review, pt tachycardic prior to transfer to [facility], but no dopplers/CTA obtained  - CTA 9/6 inconclusive re: PE  - Doppler 96/: +B/L DVT   - Vascular (Dr. Nice) consulted; call placed to ortho (Dr. Rivera) to discuss   - TTE ordered to assess for right heart strain    #Pain Control  - c/w dilaudid, oxycontin scheduled. oxycodone prn, Increased oxycodone IR to 15 mg, started gabapentin.   - valium for muscle spasms     # adjustment issue: neuropsych    #Bladder Management  Neurogenic bladder  - francis removed 9/4 am  - monitor bladder scans, PVR PRN as pt at risk for neurogenic bladder   started flomax.       #IVC filter  - placed 8/26  - will need removed in 5-6 months       DVT prophylaxis:   - IVC filter  - SCDs  - tachycardia: Doppler and cTA chest to eval for DVT and PE. however per chart review pt appears to have been tachycardic for some time prior to arrival in rehab.  - doppler showed bilateral DVTs, vascular surgery consult- continue to monitor, okay to continue therapy.     Outpatient Follow-up:    Gabrielle Villegas  ORTHOPAEDIC SURGERY  [address], [address]  [place] [address]  Phone: [phone]  Fax: [phone]  Follow Up Time:     Dave Rivera  ORTHOPAEDIC SURGERY  [address] [place] [address]  Phone: [phone]  Fax: [phone]       ASSESSMENT/PLAN  BRUNO CHO is a 35M admitted to [facility] on 8/22/20 with back pain, found to have fractures of L1-L5 pedicles bilaterally, leading to widening of the spinal canal with anterolisthesis of L5 on S1 with the entire L5 vertebral body width, disruption of the ALL and PLL at L5-S1 level, probable epidural hemorrhage and hemorrhage within the foramina at L1-L5, and enlargement/edema of bilateral psoas muscle.  Patient underwent evacuation of epidural hematoma, T11-pelvis posterior spinal fusion, and L2-S1 Laminectomy on 8/23/20 and subsequent L5-S1 anterior interbody fusion and L3-4 lateral interbody fusion on 9/1.  Hospital course c/b acute blood loss anemia requiring multiple transfusions, traumatic rhabodmyolysis, and intractable pain.  Patient now admitted for a multidisciplinary rehab program. 09-04-20 @ 14:37    Comprehensive Multidisciplinary Rehab Program:  - Start comprehensive rehab program of PT/OT - 3 hours a day, 5 days a week  - P&O as needed    -------------  MEDICAL MANAGEMENT     #Traumatic Spinal Cord Injury  - pt with complete anterolisthesis of L5 on S1, multiple pedicular fractures, and epidural hemorrhage   - s/p T11-pelvis posterior spinal fusion, and L2-S1 Laminectomy on 8/23/20 and subsequent L5-S1 anterior interbody fusion and L3-4 lateral interbody fusion on 9/1  - pain control as below  - PT/OT  - spinal & Fall precautions  - TLSO/AFOs when OOB  - monitor bladder/bowel function out of concern for neurogenic bowel  - monitor JETHRO drain outpt; Keflex 500mg Q8h while drain is in place  - Drain dc instructions: Per DC note can be pulled on Monday 9/7/20. The drain is sewn in, so please cut the one suture that is holding the drain in place. Once the suture is cut, open the drain cap so that the drain is open. After both previous steps are done then the drain can be pulled. Pull lightly until drain is completely out. Place a 4x4 with Tegaderm over the drain wound  - Drain output still >300mL/day. Will defer drain removal until output decreases.     #Rhabdomyolysis  - 2/2 trauma   - largely resolved: <900 on 8/31  - encourage PO fluids    #Tachycardia  - per chart review, pt tachycardic prior to transfer to [facility], but no dopplers/CTA obtained  - CTA 9/6 inconclusive re: PE  - Doppler 96/: +B/L DVT   - Vascular (Dr. Nice) consulted; call placed to ortho (Dr. Rivera) to discuss   - TTE ordered to assess for right heart strain    #Pain Control   - oxycodone 5-10 mg for moderate-severe pain, oxycontin 20 mg Q12h, valium 5 mg three times a day prn for spasms, Gabapentin increased to 400 mg three times a day. Dilaudid 2 mg Q4h for breakthrough pain.        # adjustment issue: neuropsych    #Bladder Management  Neurogenic bladder  - francis removed 9/4 am  - monitor bladder scans, PVR PRN as pt at risk for neurogenic bladder   started flomax.       #IVC filter  - placed 8/26  - will need removed in 5-6 months       DVT prophylaxis:   - IVC filter  - SCDs  - tachycardia: Doppler and cTA chest to eval for DVT and PE. however per chart review pt appears to have been tachycardic for some time prior to arrival in rehab.  - doppler showed bilateral DVTs, vascular surgery consult- continue to monitor, okay to continue therapy.     Outpatient Follow-up:    Gabrielle Villegas  ORTHOPAEDIC SURGERY  [address], [address]  [place] [address]  Phone: [phone]  Fax: [phone]  Follow Up Time:     Dave Rivera  ORTHOPAEDIC SURGERY  [address] [place] [address]  Phone: [phone]  Fax: [phone]

## 2020-09-14 NOTE — PROGRESS NOTE ADULT - SUBJECTIVE AND OBJECTIVE BOX
Patient is a 35y old  Male who presents with a chief complaint of Traumatic Spinal Cord Disfunction:  incomplete paraplegia (14 Sep 2020 09:01)      Patient seen and examined at bedside.  continues to complain of back pain ( scale 6-9/10) even with pain medication  also complains of worsening LE edema specially L>R  JETHRO drain still > 150cc/day  able to void but PVR still high; denies dysuria     ALLERGIES:  No Known Allergies    MEDICATIONS  (STANDING):  cephalexin 500 milliGRAM(s) Oral every 8 hours  diazepam    Tablet 5 milliGRAM(s) Oral every 8 hours  escitalopram 10 milliGRAM(s) Oral daily  gabapentin 300 milliGRAM(s) Oral three times a day  HYDROmorphone   Tablet 2 milliGRAM(s) Oral every 4 hours  influenza   Vaccine 0.5 milliLiter(s) IntraMuscular once  lactobacillus acidophilus 1 Tablet(s) Oral two times a day  melatonin 3 milliGRAM(s) Oral at bedtime  oxyCODONE  ER Tablet 20 milliGRAM(s) Oral every 12 hours  senna 2 Tablet(s) Oral at bedtime  tamsulosin 0.4 milliGRAM(s) Oral at bedtime    MEDICATIONS  (PRN):  acetaminophen   Tablet .. 650 milliGRAM(s) Oral every 6 hours PRN Temp greater or equal to 38C (100.4F), Mild Pain (1 - 3)  famotidine    Tablet 20 milliGRAM(s) Oral every 12 hours PRN Dyspepsia  lidocaine   Patch 1 Patch Transdermal every 24 hours PRN Severe pain  lidocaine 2% Gel 1 Application(s) Topical four times a day PRN pain  magnesium hydroxide Suspension 30 milliLiter(s) Oral every 12 hours PRN Constipation  oxyCODONE    IR 5 milliGRAM(s) Oral every 4 hours PRN Moderate Pain (4 - 6)  oxyCODONE    IR 10 milliGRAM(s) Oral every 4 hours PRN Severe Pain (7 - 10)    Vital Signs Last 24 Hrs  T(F): 97.9 (14 Sep 2020 09:06), Max: 98.2 (13 Sep 2020 20:43)  HR: 109 (14 Sep 2020 09:06) (99 - 109)  BP: 120/82 (14 Sep 2020 09:06) (120/82 - 122/83)  RR: 15 (14 Sep 2020 09:06) (15 - 16)  SpO2: 98% (14 Sep 2020 09:06) (98% - 98%)  I&O's Summary    13 Sep 2020 07:01  -  14 Sep 2020 07:00  --------------------------------------------------------  IN: 0 mL / OUT: 125 mL / NET: -125 mL      PHYSICAL EXAM:    General: NAD   ENT: MMM  Neck: Supple, No JVD  Lungs: Clear to auscultation bilaterally  Cardio: RRR, S1/S2, No murmurs  Abdomen: Soft, Nontender, Nondistended; Bowel sounds present  Extremities: No calf tenderness or redness; ++ b/l LE edema L>R  back: drain in place with pinkish serosanguinous fluid   neuro: A/O x 3  skin: scattered superficial skin ulcers on the b/l torso; don't seem infected     LABS:                        9.5    10.12 )-----------( 298      ( 14 Sep 2020 05:15 )             29.5           133  |  97  |  12  ----------------------------<  100  4.3   |  28  |  0.80    Ca    8.7      14 Sep 2020 05:15       eGFR if Non African American: 116 mL/min/1.73M2 (20 @ 05:15)  eGFR if African American: 134 mL/min/1.73M2 (20 @ 05:15)                               Urinalysis Basic - ( 13 Sep 2020 14:00 )    Color: Yellow / Appearance: Clear / S.010 / pH: x  Gluc: x / Ketone: Negative  / Bili: Negative / Urobili: Negative   Blood: x / Protein: Negative / Nitrite: Negative   Leuk Esterase: Negative / RBC: x / WBC x   Sq Epi: x / Non Sq Epi: x / Bacteria: x          RADIOLOGY & ADDITIONAL TESTS:    Care Discussed with Consultants/Other Providers:

## 2020-09-14 NOTE — PROGRESS NOTE ADULT - SUBJECTIVE AND OBJECTIVE BOX
Patient is a 35y old  Male who presents with a chief complaint of Traumatic Spinal Cord Disfunction:  incomplete paraplegia (04 Sep 2020 14:36)      HPI:  TODAY'S SUBJECTIVE & REVIEW OF SYMPTOMS:  Patient seen and evaluated this morning. No acute events overnight. Participating well in therapy.  pain is well controlled. Denies chest pain, SOB, nausea, vomiting, constipation or diarrhea. Slept well last night. retaining 200-300 ml urine.     [X] Constitutional WNL        [X] Cardio WNL              [X] Resp WNL  [X] GI WNL                            []  +retention                    [X] Heme WNL  [X] Endo WNL                       [X] Skin WNL                  [] MSK +pain  [] Neuro +weakness                     [X] Cognitive WNL         [X] Psych WNL     Vital Signs Last 24 Hrs  T(C): 36.8 (13 Sep 2020 20:43), Max: 36.8 (13 Sep 2020 20:43)  T(F): 98.2 (13 Sep 2020 20:43), Max: 98.2 (13 Sep 2020 20:43)  HR: 99 (13 Sep 2020 20:43) (99 - 99)  BP: 122/83 (13 Sep 2020 20:43) (122/83 - 122/83)  BP(mean): --  RR: 16 (13 Sep 2020 20:43) (16 - 16)  SpO2: 98% (13 Sep 2020 20:43) (98% - 98%)     Gen - NAD, uncomfortable  	HEENT - NCAT, EOMI  	Neck - Supple, + limited ROM  	Pulm - CTAB, No wheeze, No rhonchi, No crackles  	Cardiovascular - RRR, S1S2, No murmurs  	Abdomen - Soft, NT/ND, +BS, +incision left of midline with dressing c/d/i  	Extremities - No C/C/E, No calf tenderness  	Neuro-  	   Cognitive - AAOx3  	   Communication - Fluent, No dysarthria  	   Attention: Intact   	   Judgement: Good evidence of judgement  	   Memory: Recall 3 objects immediate and 3 min later      	   Cranial Nerves - CN 2-12 intact  	   Motor -   	                  LEFT    UE - ShAB 5/5, EF 5/5, EE 5/5, WE 5/5,  5/5  	                  RIGHT UE - ShAB 5/5, EF 5/5, EE 5/5, WE 5/5,  5/5  	                  LEFT    LE - HF 1/5 (limited by pain), KE 3/5, DF 0/5, PF 3/5  	                  RIGHT LE - HF 1/5 (limited by pain), KE 3/5, DF 1/5, PF 3/5     	   Sensory - Intact to LT  	   Reflexes - DTR Intact, No primitive reflexive  	   Coordination - FTN intact  	   Tone - normal  	Psychiatric - Mood stable, Affect WNL  Skin:  abdominal incision as above, midline incision on back from T11 to S2 with staples, mid back w/ JETHRO drain, lacy pink rash under bandages which were soaked through with serosanguinous drainage      LABS:                                             9.5    10.12 )-----------( 298      ( 14 Sep 2020 05:15 )             29.5     09-14    133<L>  |  97  |  12  ----------------------------<  100<H>  4.3   |  28  |  0.80    Ca    8.7      14 Sep 2020 05:15        Urinalysis Basic - ( 13 Sep 2020 14:00 )    Color: Yellow / Appearance: Clear / S.010 / pH: x  Gluc: x / Ketone: Negative  / Bili: Negative / Urobili: Negative   Blood: x / Protein: Negative / Nitrite: Negative   Leuk Esterase: Negative / RBC: x / WBC x   Sq Epi: x / Non Sq Epi: x / Bacteria: x

## 2020-09-15 PROCEDURE — 99232 SBSQ HOSP IP/OBS MODERATE 35: CPT

## 2020-09-15 RX ORDER — GABAPENTIN 400 MG/1
600 CAPSULE ORAL THREE TIMES A DAY
Refills: 0 | Status: DISCONTINUED | OUTPATIENT
Start: 2020-09-15 | End: 2020-09-22

## 2020-09-15 RX ORDER — OXYCODONE HYDROCHLORIDE 5 MG/1
30 TABLET ORAL EVERY 12 HOURS
Refills: 0 | Status: DISCONTINUED | OUTPATIENT
Start: 2020-09-15 | End: 2020-09-21

## 2020-09-15 RX ADMIN — OXYCODONE HYDROCHLORIDE 10 MILLIGRAM(S): 5 TABLET ORAL at 23:00

## 2020-09-15 RX ADMIN — OXYCODONE HYDROCHLORIDE 30 MILLIGRAM(S): 5 TABLET ORAL at 18:14

## 2020-09-15 RX ADMIN — SENNA PLUS 2 TABLET(S): 8.6 TABLET ORAL at 22:22

## 2020-09-15 RX ADMIN — HYDROMORPHONE HYDROCHLORIDE 2 MILLIGRAM(S): 2 INJECTION INTRAMUSCULAR; INTRAVENOUS; SUBCUTANEOUS at 06:59

## 2020-09-15 RX ADMIN — HYDROMORPHONE HYDROCHLORIDE 2 MILLIGRAM(S): 2 INJECTION INTRAMUSCULAR; INTRAVENOUS; SUBCUTANEOUS at 11:56

## 2020-09-15 RX ADMIN — Medication 1 TABLET(S): at 06:18

## 2020-09-15 RX ADMIN — HYDROMORPHONE HYDROCHLORIDE 2 MILLIGRAM(S): 2 INJECTION INTRAMUSCULAR; INTRAVENOUS; SUBCUTANEOUS at 00:37

## 2020-09-15 RX ADMIN — OXYCODONE HYDROCHLORIDE 10 MILLIGRAM(S): 5 TABLET ORAL at 18:18

## 2020-09-15 RX ADMIN — GABAPENTIN 600 MILLIGRAM(S): 400 CAPSULE ORAL at 13:05

## 2020-09-15 RX ADMIN — HYDROMORPHONE HYDROCHLORIDE 2 MILLIGRAM(S): 2 INJECTION INTRAMUSCULAR; INTRAVENOUS; SUBCUTANEOUS at 15:34

## 2020-09-15 RX ADMIN — Medication 3 MILLIGRAM(S): at 22:21

## 2020-09-15 RX ADMIN — Medication 500 MILLIGRAM(S): at 06:18

## 2020-09-15 RX ADMIN — OXYCODONE HYDROCHLORIDE 20 MILLIGRAM(S): 5 TABLET ORAL at 06:59

## 2020-09-15 RX ADMIN — OXYCODONE HYDROCHLORIDE 10 MILLIGRAM(S): 5 TABLET ORAL at 04:30

## 2020-09-15 RX ADMIN — OXYCODONE HYDROCHLORIDE 10 MILLIGRAM(S): 5 TABLET ORAL at 08:54

## 2020-09-15 RX ADMIN — GABAPENTIN 400 MILLIGRAM(S): 400 CAPSULE ORAL at 06:18

## 2020-09-15 RX ADMIN — Medication 1 TABLET(S): at 18:19

## 2020-09-15 RX ADMIN — GABAPENTIN 600 MILLIGRAM(S): 400 CAPSULE ORAL at 22:21

## 2020-09-15 RX ADMIN — Medication 40 MILLIGRAM(S): at 06:17

## 2020-09-15 RX ADMIN — OXYCODONE HYDROCHLORIDE 20 MILLIGRAM(S): 5 TABLET ORAL at 06:18

## 2020-09-15 RX ADMIN — HYDROMORPHONE HYDROCHLORIDE 2 MILLIGRAM(S): 2 INJECTION INTRAMUSCULAR; INTRAVENOUS; SUBCUTANEOUS at 10:56

## 2020-09-15 RX ADMIN — OXYCODONE HYDROCHLORIDE 10 MILLIGRAM(S): 5 TABLET ORAL at 03:21

## 2020-09-15 RX ADMIN — OXYCODONE HYDROCHLORIDE 10 MILLIGRAM(S): 5 TABLET ORAL at 13:02

## 2020-09-15 RX ADMIN — OXYCODONE HYDROCHLORIDE 10 MILLIGRAM(S): 5 TABLET ORAL at 19:00

## 2020-09-15 RX ADMIN — HYDROMORPHONE HYDROCHLORIDE 2 MILLIGRAM(S): 2 INJECTION INTRAMUSCULAR; INTRAVENOUS; SUBCUTANEOUS at 16:00

## 2020-09-15 RX ADMIN — HYDROMORPHONE HYDROCHLORIDE 2 MILLIGRAM(S): 2 INJECTION INTRAMUSCULAR; INTRAVENOUS; SUBCUTANEOUS at 06:18

## 2020-09-15 RX ADMIN — Medication 5 MILLIGRAM(S): at 00:12

## 2020-09-15 RX ADMIN — OXYCODONE HYDROCHLORIDE 30 MILLIGRAM(S): 5 TABLET ORAL at 19:00

## 2020-09-15 RX ADMIN — Medication 500 MILLIGRAM(S): at 13:06

## 2020-09-15 RX ADMIN — TAMSULOSIN HYDROCHLORIDE 0.4 MILLIGRAM(S): 0.4 CAPSULE ORAL at 22:21

## 2020-09-15 RX ADMIN — OXYCODONE HYDROCHLORIDE 10 MILLIGRAM(S): 5 TABLET ORAL at 22:21

## 2020-09-15 RX ADMIN — Medication 500 MILLIGRAM(S): at 22:21

## 2020-09-15 RX ADMIN — OXYCODONE HYDROCHLORIDE 10 MILLIGRAM(S): 5 TABLET ORAL at 09:54

## 2020-09-15 RX ADMIN — OXYCODONE HYDROCHLORIDE 10 MILLIGRAM(S): 5 TABLET ORAL at 14:02

## 2020-09-15 RX ADMIN — DULOXETINE HYDROCHLORIDE 30 MILLIGRAM(S): 30 CAPSULE, DELAYED RELEASE ORAL at 12:59

## 2020-09-15 NOTE — PROGRESS NOTE ADULT - ASSESSMENT
ASSESSMENT/PLAN  BRUNO CHO is a 35M admitted to [facility] on 8/22/20 with back pain, found to have fractures of L1-L5 pedicles bilaterally, leading to widening of the spinal canal with anterolisthesis of L5 on S1 with the entire L5 vertebral body width, disruption of the ALL and PLL at L5-S1 level, probable epidural hemorrhage and hemorrhage within the foramina at L1-L5, and enlargement/edema of bilateral psoas muscle.  Patient underwent evacuation of epidural hematoma, T11-pelvis posterior spinal fusion, and L2-S1 Laminectomy on 8/23/20 and subsequent L5-S1 anterior interbody fusion and L3-4 lateral interbody fusion on 9/1.  Hospital course c/b acute blood loss anemia requiring multiple transfusions, traumatic rhabodmyolysis, and intractable pain.  Patient now admitted for a multidisciplinary rehab program. 09-04-20 @ 14:37    Comprehensive Multidisciplinary Rehab Program:  - Start comprehensive rehab program of PT/OT - 3 hours a day, 5 days a week  - P&O as needed    -------------  MEDICAL MANAGEMENT     #Traumatic Spinal Cord Injury  - pt with complete anterolisthesis of L5 on S1, multiple pedicular fractures, and epidural hemorrhage   - s/p T11-pelvis posterior spinal fusion, and L2-S1 Laminectomy on 8/23/20 and subsequent L5-S1 anterior interbody fusion and L3-4 lateral interbody fusion on 9/1  - pain control as below  - PT/OT  - spinal & Fall precautions  - TLSO/AFOs when OOB  - monitor bladder/bowel function out of concern for neurogenic bowel  - monitor JETHRO drain outpt; Keflex 500mg Q8h while drain is in place  - Drain dc instructions: Per DC note can be pulled on Monday 9/7/20. The drain is sewn in, so please cut the one suture that is holding the drain in place. Once the suture is cut, open the drain cap so that the drain is open. After both previous steps are done then the drain can be pulled. Pull lightly until drain is completely out. Place a 4x4 with Tegaderm over the drain wound  - Drain output still >300mL/day. Will defer drain removal until output decreases.     #Rhabdomyolysis  - 2/2 trauma   - largely resolved: <900 on 8/31  - encourage PO fluids    #Tachycardia  - per chart review, pt tachycardic prior to transfer to [facility], but no dopplers/CTA obtained  - CTA 9/6 inconclusive re: PE  - Doppler 96/: +B/L DVT   - Vascular (Dr. Nice) consulted; call placed to ortho (Dr. Rivera) to discuss   - TTE ordered to assess for right heart strain    #Pain Control   9/14/20- oxycodone 5-10 mg for moderate-severe pain, oxycontin 20 mg Q12h, valium 5 mg three times a day prn for spasms, Gabapentin increased to 400 mg three times a day. Dilaudid 2 mg Q4h for breakthrough pain.    9/15/20: plan to increase oxycontin to 30 mg Q12h, Gabapentin to 600 mg TID. SBP > 120,     # adjustment issue: neuropsych    #Bladder Management  Neurogenic bladder  - francis removed 9/4 am  - monitor bladder scans, PVR PRN as pt at risk for neurogenic bladder   started flomax.       #IVC filter  - placed 8/26  - will need removed in 5-6 months       DVT prophylaxis:   - IVC filter  - SCDs  - tachycardia: Doppler and cTA chest to eval for DVT and PE. however per chart review pt appears to have been tachycardic for some time prior to arrival in rehab.  - doppler showed bilateral DVTs, vascular surgery consult- continue to monitor, okay to continue therapy.     Outpatient Follow-up:    Gabrielle Villegas  ORTHOPAEDIC SURGERY  [address], [address]  [place] [address]  Phone: [phone]  Fax: [phone]  Follow Up Time:     Dave Rivera  ORTHOPAEDIC SURGERY  [address] [place] [address]  Phone: [phone]  Fax: [phone]

## 2020-09-15 NOTE — PROGRESS NOTE ADULT - SUBJECTIVE AND OBJECTIVE BOX
Patient is a 35y old  Male who presents with a chief complaint of Traumatic Spinal Cord Disfunction:  incomplete paraplegia (15 Sep 2020 10:18)    Interval Hx  Patient seen and examined at bedside. No overnight events reported.   Continues to have low back pain, otherwise denies other symptoms.  JETHRO drain still draining >250    ALLERGIES:  No Known Allergies    MEDICATIONS  (STANDING):  cephalexin 500 milliGRAM(s) Oral every 8 hours  DULoxetine 30 milliGRAM(s) Oral daily  gabapentin 600 milliGRAM(s) Oral three times a day  influenza   Vaccine 0.5 milliLiter(s) IntraMuscular once  lactobacillus acidophilus 1 Tablet(s) Oral two times a day  melatonin 3 milliGRAM(s) Oral at bedtime  oxyCODONE  ER Tablet 30 milliGRAM(s) Oral every 12 hours  senna 2 Tablet(s) Oral at bedtime  tamsulosin 0.4 milliGRAM(s) Oral at bedtime    MEDICATIONS  (PRN):  acetaminophen   Tablet .. 650 milliGRAM(s) Oral every 6 hours PRN Temp greater or equal to 38C (100.4F), Mild Pain (1 - 3)  diazepam    Tablet 5 milliGRAM(s) Oral every 8 hours PRN spasms  famotidine    Tablet 20 milliGRAM(s) Oral every 12 hours PRN Dyspepsia  HYDROmorphone   Tablet 2 milliGRAM(s) Oral every 4 hours PRN Breakthrougth pain  lidocaine   Patch 1 Patch Transdermal every 24 hours PRN Severe pain  lidocaine 2% Gel 1 Application(s) Topical four times a day PRN pain  magnesium hydroxide Suspension 30 milliLiter(s) Oral every 12 hours PRN Constipation  oxyCODONE    IR 5 milliGRAM(s) Oral every 4 hours PRN Moderate Pain (4 - 6)  oxyCODONE    IR 10 milliGRAM(s) Oral every 4 hours PRN Severe Pain (7 - 10)    Vital Signs Last 24 Hrs  T(F): 98 (15 Sep 2020 09:24), Max: 98.5 (14 Sep 2020 20:15)  HR: 109 (15 Sep 2020 09:24) (108 - 109)  BP: 139/89 (15 Sep 2020 09:24) (133/91 - 139/89)  RR: 16 (15 Sep 2020 09:24) (15 - 16)  SpO2: 100% (15 Sep 2020 09:24) (98% - 100%)  I&O's Summary    14 Sep 2020 07:01  -  15 Sep 2020 07:00  --------------------------------------------------------  IN: 0 mL / OUT: 1380 mL / NET: -1380 mL      PHYSICAL EXAM:  General: NAD, A/O x 3  ENT: MMM, no thrush  Neck: Supple, No JVD  Lungs: Clear to auscultation bilaterally, good air entry, non-labored breathing  Cardio: RRR, S1/S2, No murmur  Abdomen: Soft, Nontender, Nondistended; Bowel sounds present  Extremities: No calf tenderness or redness; b/l LE edema   Back: drain in place with pinkish serosanguinous fluid   Neuro: A/O x 3, b/l LE motor /5, UE 5/      LABS:                        9.5    10.12 )-----------( 298      ( 14 Sep 2020 05:15 )             29.5         133  |  97  |  12  ----------------------------<  100  4.3   |  28  |  0.80    Ca    8.7      14 Sep 2020 05:15          eGFR if Non African American: 116 mL/min/1.73M2 (20 @ 05:15)  eGFR if African American: 134 mL/min/1.73M2 (20 @ 05:15)      Urinalysis Basic - ( 13 Sep 2020 14:00 )    Color: Yellow / Appearance: Clear / S.010 / pH: x  Gluc: x / Ketone: Negative  / Bili: Negative / Urobili: Negative   Blood: x / Protein: Negative / Nitrite: Negative   Leuk Esterase: Negative / RBC: x / WBC x   Sq Epi: x / Non Sq Epi: x / Bacteria: x        RADIOLOGY & ADDITIONAL TESTS:    Care Discussed with Consultants/Other Providers:

## 2020-09-15 NOTE — PROGRESS NOTE ADULT - SUBJECTIVE AND OBJECTIVE BOX
Patient is a 35y old  Male who presents with a chief complaint of Traumatic Spinal Cord Disfunction:  incomplete paraplegia (04 Sep 2020 14:36)      HPI:  TODAY'S SUBJECTIVE & REVIEW OF SYMPTOMS:  Patient seen and evaluated this morning. No acute events overnight. Participating well in therapy. Reports pain is not well controlled specially gets worse after therapy and during nighttime, sharp pain in back region, shoots down to lower extremities. Denies chest pain, SOB, nausea, vomiting, constipation or diarrhea. Slept well last night.     [X] Constitutional WNL        [X] Cardio WNL              [X] Resp WNL  [X] GI WNL                            []  +retention                    [X] Heme WNL  [X] Endo WNL                       [X] Skin WNL                  [] MSK +pain  [] Neuro +weakness                     [X] Cognitive WNL         [X] Psych WNL     Vital Signs Last 24 Hrs  T(C): 36.7 (15 Sep 2020 09:24), Max: 36.9 (14 Sep 2020 20:15)  T(F): 98 (15 Sep 2020 09:24), Max: 98.5 (14 Sep 2020 20:15)  HR: 109 (15 Sep 2020 09:24) (108 - 109)  BP: 139/89 (15 Sep 2020 09:24) (133/91 - 139/89)  BP(mean): --  RR: 16 (15 Sep 2020 09:24) (15 - 16)  SpO2: 100% (15 Sep 2020 09:24) (98% - 100%)     Gen - NAD, uncomfortable  	HEENT - NCAT, EOMI  	Neck - Supple, + limited ROM  	Pulm - CTAB, No wheeze, No rhonchi, No crackles  	Cardiovascular - RRR, S1S2, No murmurs  	Abdomen - Soft, NT/ND, +BS, +incision left of midline with dressing c/d/i  	Extremities - No C/C/E, No calf tenderness  	Neuro-  	   Cognitive - AAOx3  	   Communication - Fluent, No dysarthria  	   Attention: Intact   	   Judgement: Good evidence of judgement  	   Memory: Recall 3 objects immediate and 3 min later      	   Cranial Nerves - CN 2-12 intact  	   Motor -   	                  LEFT    UE - ShAB 5/5, EF 5/5, EE 5/5, WE 5/5,  5/5  	                  RIGHT UE - ShAB 5/5, EF 5/5, EE 5/5, WE 5/5,  5/5  	                  LEFT    LE - HF 1/5 (limited by pain), KE 3/5, DF 0/5, PF 3/5  	                  RIGHT LE - HF 1/5 (limited by pain), KE 3/5, DF 1/5, PF 3/5     	   Sensory - Intact to LT  	   Reflexes - DTR Intact, No primitive reflexive  	   Coordination - FTN intact  	   Tone - normal  	Psychiatric - Mood stable, Affect WNL  Skin:  abdominal incision as above, midline incision on back from T11 to S2 with staples, mid back w/ JETHRO drain, lacy pink rash under bandages which were soaked through with serosanguinous drainage      LABS:                                             9.5    10.12 )-----------( 298      ( 14 Sep 2020 05:15 )             29.5     09-14    133<L>  |  97  |  12  ----------------------------<  100<H>  4.3   |  28  |  0.80    Ca    8.7      14 Sep 2020 05:15        Urinalysis Basic - ( 13 Sep 2020 14:00 )    Color: Yellow / Appearance: Clear / S.010 / pH: x  Gluc: x / Ketone: Negative  / Bili: Negative / Urobili: Negative   Blood: x / Protein: Negative / Nitrite: Negative   Leuk Esterase: Negative / RBC: x / WBC x   Sq Epi: x / Non Sq Epi: x / Bacteria: x

## 2020-09-15 NOTE — PROGRESS NOTE ADULT - ASSESSMENT
35M admitted to [facility] [place] on 8/22/20 with back pain, found to have fractures of L1-L5 pedicles bilaterally, leading to widening of the spinal canal with anterolisthesis of L5 on S1 with the entire L5 vertebral body width, disruption of the ALL and PLL at L5-S1 level, probable epidural hemorrhage and hemorrhage within the foramina at L1-L5, and enlargement/edema of bilateral psoas muscle.  Patient underwent evacuation of epidural hematoma, T11-pelvis posterior spinal fusion, and L2-S1 Laminectomy on 8/23/20 and subsequent L5-S1 anterior interbody fusion and L3-4 lateral interbody fusion on 9/1.  Hospital course c/b acute blood loss anemia requiring multiple transfusions, traumatic rhabdomyolysis and intractable pain.  Patient now admitted for a multidisciplinary rehab program- pt/ot/dvt ppx, pain meds    #s/p epidural hematoma  # T11-pelvis posterior spinal fusion  #L2-S1 Laminectomy   #L5-S1 anterior interbody fusion and L3-4 lateral interbody fusion on 9/1  - cont comprehensive rehab program  - optimize pain meds  - JETHRO drain still in place draining serosanguinous outpt , being managed as per primary team , plan to remove when output decreases  - on prophylactic keflex while drain in place    #Intermittent urinary retention  monitor PVR, prn straight cath  cont flomax    # B/l LE edema   may be related to underlying DVT  s/p lasix for 2 doses , will monitor    #Anemia , likely post op anemia  hb remains stable  s/p multiple PRBCs at OSH  monitor h&h, keep hb >7    #b/l DVT  - IVC filter in place, c/w SCDs for now    #Rhabdomyolysis- 2/2 trauma , resolved, encourage PO fluids    #Pain Control- c/w Dilaudid, Oxycontin, valium    #DVT ppx: IVC filter in place, SCDs for now    case d/w Dr. Pettit

## 2020-09-16 PROCEDURE — 99232 SBSQ HOSP IP/OBS MODERATE 35: CPT

## 2020-09-16 RX ORDER — ONDANSETRON 8 MG/1
4 TABLET, FILM COATED ORAL EVERY 8 HOURS
Refills: 0 | Status: DISCONTINUED | OUTPATIENT
Start: 2020-09-16 | End: 2020-10-12

## 2020-09-16 RX ADMIN — Medication 1 TABLET(S): at 05:32

## 2020-09-16 RX ADMIN — Medication 500 MILLIGRAM(S): at 05:32

## 2020-09-16 RX ADMIN — Medication 1 TABLET(S): at 17:42

## 2020-09-16 RX ADMIN — Medication 3 MILLIGRAM(S): at 21:10

## 2020-09-16 RX ADMIN — SENNA PLUS 2 TABLET(S): 8.6 TABLET ORAL at 21:10

## 2020-09-16 NOTE — CHART NOTE - NSCHARTNOTEFT_GEN_A_CORE
Nutrition Follow Up Note  Hospital Course   (Per Electronic Medical Record)    Source:  Patient [X]  Medical Record [X]      Diet:   Regular Diet w/ Thin Liquids  Tolerates Diet Well  No Chewing/Swallowing Difficulties  No Recent Nausea, Vomiting, Diarrhea or Constipation  Consumes % of Meals (as Per Documentation)  Education Provided on Proper Nutrition    Enteral/Parenteral Nutrition: Not Applicable    Current Weight: 239.6lb on 9/6  Obtain New Weight  Obtain Weights Weekly     Pertinent Medications: MEDICATIONS  (STANDING):  DULoxetine 30 milliGRAM(s) Oral daily  gabapentin 600 milliGRAM(s) Oral three times a day  influenza   Vaccine 0.5 milliLiter(s) IntraMuscular once  lactobacillus acidophilus 1 Tablet(s) Oral two times a day  melatonin 3 milliGRAM(s) Oral at bedtime  oxyCODONE  ER Tablet 30 milliGRAM(s) Oral every 12 hours  senna 2 Tablet(s) Oral at bedtime  tamsulosin 0.4 milliGRAM(s) Oral at bedtime    MEDICATIONS  (PRN):  acetaminophen   Tablet .. 650 milliGRAM(s) Oral every 6 hours PRN Temp greater or equal to 38C (100.4F), Mild Pain (1 - 3)  diazepam    Tablet 5 milliGRAM(s) Oral every 8 hours PRN spasms  famotidine    Tablet 20 milliGRAM(s) Oral every 12 hours PRN Dyspepsia  HYDROmorphone   Tablet 2 milliGRAM(s) Oral every 4 hours PRN Breakthrougth pain  lidocaine   Patch 1 Patch Transdermal every 24 hours PRN Severe pain  lidocaine 2% Gel 1 Application(s) Topical four times a day PRN pain  magnesium hydroxide Suspension 30 milliLiter(s) Oral every 12 hours PRN Constipation  oxyCODONE    IR 5 milliGRAM(s) Oral every 4 hours PRN Moderate Pain (4 - 6)  oxyCODONE    IR 10 milliGRAM(s) Oral every 4 hours PRN Severe Pain (7 - 10)    Pertinent Labs:  09-14 Na133 mmol/L<L> Glu 100 mg/dL<H> K+ 4.3 mmol/L Cr  0.80 mg/dL BUN 12 mg/dL    Skin: No Pressure Ulcers   Multiple Surgical Incisions  (as Per Nursing Flow Sheet)     Edema: None Noted     Last Bowel Movement: on 9/14    Estimated Needs:   [X] No Change Since Previous Assessment    Previous Nutrition Diagnosis:   Obese    Nutrition Diagnosis is [X] Ongoing - Education Provided on Proper Nutrition     New Nutrition Diagnosis: [X] Not Applicable    Interventions:   1. Education Provided on Proper Nutrition   2. Recommend Continue Nutrition Plan of Care     Monitoring & Evaluation:   [X] Weights   [X] PO Intake   [X] Skin Integrity   [X] Follow Up (Per Protocol)  [X] Tolerance to Diet Prescription   [X] Other: Labs     Registered Dietitian/Nutritionist Remains Available.  Chandu Fonseca RDN    Pager # [phone]  Phone# [phone]

## 2020-09-16 NOTE — PROGRESS NOTE ADULT - ASSESSMENT
ASSESSMENT/PLAN  BRUNO CHO is a 35M admitted to [facility] on 8/22/20 with back pain, found to have fractures of L1-L5 pedicles bilaterally, leading to widening of the spinal canal with anterolisthesis of L5 on S1 with the entire L5 vertebral body width, disruption of the ALL and PLL at L5-S1 level, probable epidural hemorrhage and hemorrhage within the foramina at L1-L5, and enlargement/edema of bilateral psoas muscle.  Patient underwent evacuation of epidural hematoma, T11-pelvis posterior spinal fusion, and L2-S1 Laminectomy on 8/23/20 and subsequent L5-S1 anterior interbody fusion and L3-4 lateral interbody fusion on 9/1.  Hospital course c/b acute blood loss anemia requiring multiple transfusions, traumatic rhabodmyolysis, and intractable pain.  Patient now admitted for a multidisciplinary rehab program. 09-04-20 @ 14:37    Comprehensive Multidisciplinary Rehab Program:  - Start comprehensive rehab program of PT/OT - 3 hours a day, 5 days a week  - P&O as needed    -------------  MEDICAL MANAGEMENT     #Traumatic Spinal Cord Injury  - pt with complete anterolisthesis of L5 on S1, multiple pedicular fractures, and epidural hemorrhage   - s/p T11-pelvis posterior spinal fusion, and L2-S1 Laminectomy on 8/23/20 and subsequent L5-S1 anterior interbody fusion and L3-4 lateral interbody fusion on 9/1  - pain control as below  - PT/OT  - spinal & Fall precautions  - TLSO/AFOs when OOB  - monitor bladder/bowel function out of concern for neurogenic bowel  - monitor JETHRO drain outpt; Keflex 500mg Q8h while drain is in place  - Drain dc instructions: Per DC note can be pulled on Monday 9/7/20. The drain is sewn in, so please cut the one suture that is holding the drain in place. Once the suture is cut, open the drain cap so that the drain is open. After both previous steps are done then the drain can be pulled. Pull lightly until drain is completely out. Place a 4x4 with Tegaderm over the drain wound  - Drain output  80 cc/day, fell out 9/16/20, discussed with surgeon, okay to monitor for now. continue dressing changes at drain site, some serosangenous discharge is expected. stopped keflex.     #Rhabdomyolysis  - 2/2 trauma   - largely resolved: <900 on 8/31  - encourage PO fluids    #Tachycardia  - per chart review, pt tachycardic prior to transfer to [facility], but no dopplers/CTA obtained  - CTA 9/6 inconclusive re: PE  - Doppler 96/: +B/L DVT   - Vascular (Dr. Nice) consulted; call placed to ortho (Dr. Rivera) to discuss   - TTE ordered to assess for right heart strain    #Pain Control   9/14/20- oxycodone 5-10 mg for moderate-severe pain, oxycontin 20 mg Q12h, valium 5 mg three times a day prn for spasms, Gabapentin increased to 400 mg three times a day. Dilaudid 2 mg Q4h for breakthrough pain.    9/15/20: plan to increase oxycontin to 30 mg Q12h, Gabapentin to 600 mg TID. SBP > 120,     # adjustment issue: neuropsych    #Bladder Management  Neurogenic bladder  - francis removed 9/4 am  - monitor bladder scans, PVR PRN as pt at risk for neurogenic bladder   started flomax.       #IVC filter  - placed 8/26  - will need removed in 5-6 months       DVT prophylaxis:   - IVC filter  - SCDs  - tachycardia: Doppler and cTA chest to eval for DVT and PE. however per chart review pt appears to have been tachycardic for some time prior to arrival in rehab.  - doppler showed bilateral DVTs, vascular surgery consult- continue to monitor, okay to continue therapy.     Outpatient Follow-up:    Gabrielle Villegas  ORTHOPAEDIC SURGERY  [address], [address]  [place] [address]  Phone: [phone]  Fax: [phone]  Follow Up Time:     Dave Rivera  ORTHOPAEDIC SURGERY  [address] [place] [address]  Phone: [phone]  Fax: [phone]

## 2020-09-16 NOTE — PROGRESS NOTE ADULT - SUBJECTIVE AND OBJECTIVE BOX
Patient is a 35y old  Male who presents with a chief complaint of Traumatic Spinal Cord Disfunction:  incomplete paraplegia (04 Sep 2020 14:36)      HPI:  TODAY'S SUBJECTIVE & REVIEW OF SYMPTOMS:  Patient seen and evaluated this morning. No acute events overnight. Participating well in therapy.  Pain is little better controlled today, ranges 5-6/10 but still gets worse when stands upright in therapy. JETHRO drain fell out last night. Denies chest pain, SOB, nausea, vomiting, constipation or diarrhea. Slept well last night.     [X] Constitutional WNL        [X] Cardio WNL              [X] Resp WNL  [X] GI WNL                            []  +retention                    [X] Heme WNL  [X] Endo WNL                       [X] Skin WNL                  [] MSK +pain  [] Neuro +weakness                     [X] Cognitive WNL         [X] Psych WNL     Vital Signs Last 24 Hrs  T(C): 37.4 (16 Sep 2020 07:41), Max: 37.4 (16 Sep 2020 07:41)  T(F): 99.3 (16 Sep 2020 07:41), Max: 99.3 (16 Sep 2020 07:41)  HR: 112 (16 Sep 2020 07:41) (110 - 112)  BP: 113/67 (16 Sep 2020 07:41) (113/67 - 124/72)  BP(mean): --  RR: 16 (16 Sep 2020 07:41) (16 - 16)  SpO2: 96% (16 Sep 2020 07:41) (96% - 96%)     Gen - NAD, uncomfortable  	HEENT - NCAT, EOMI  	Neck - Supple, + limited ROM  	Pulm - CTAB, No wheeze, No rhonchi, No crackles  	Cardiovascular - RRR, S1S2, No murmurs  	Abdomen - Soft, NT/ND, +BS, +incision left of midline with dressing c/d/i  	Extremities - No C/C/E, No calf tenderness  	Neuro-  	   Cognitive - AAOx3  	   Communication - Fluent, No dysarthria  	   Attention: Intact   	   Judgement: Good evidence of judgement  	   Memory: Recall 3 objects immediate and 3 min later      	   Cranial Nerves - CN 2-12 intact  	   Motor -   	                  LEFT    UE - ShAB 5/5, EF 5/5, EE 5/5, WE 5/5,  5/5  	                  RIGHT UE - ShAB 5/5, EF 5/5, EE 5/5, WE 5/5,  5/5  	                  LEFT    LE - HF 1/5 (limited by pain), KE 3/5, DF 0/5, PF 3/5  	                  RIGHT LE - HF 1/5 (limited by pain), KE 3/5, DF 1/5, PF 3/5     	   Sensory - Intact to LT  	   Reflexes - DTR Intact, No primitive reflexive  	   Coordination - FTN intact  	   Tone - normal  	Psychiatric - Mood stable, Affect WNL  Skin:  abdominal incision as above, midline incision on back from T11 to S2 with staples, mid back w/ JETHRO drain, lacy pink rash under bandages which were soaked through with serosanguinous drainage      LABS:           Urinalysis Basic - ( 13 Sep 2020 14:00 )    Color: Yellow / Appearance: Clear / S.010 / pH: x  Gluc: x / Ketone: Negative  / Bili: Negative / Urobili: Negative   Blood: x / Protein: Negative / Nitrite: Negative   Leuk Esterase: Negative / RBC: x / WBC x   Sq Epi: x / Non Sq Epi: x / Bacteria: x

## 2020-09-17 LAB
BASOPHILS # BLD AUTO: 0.04 K/UL — SIGNIFICANT CHANGE UP (ref 0–0.2)
BASOPHILS NFR BLD AUTO: 0.3 % — SIGNIFICANT CHANGE UP (ref 0–2)
EOSINOPHIL # BLD AUTO: 0.08 K/UL — SIGNIFICANT CHANGE UP (ref 0–0.5)
EOSINOPHIL NFR BLD AUTO: 0.7 % — SIGNIFICANT CHANGE UP (ref 0–6)
HCT VFR BLD CALC: 26.5 % — LOW (ref 39–50)
HGB BLD-MCNC: 8.7 G/DL — LOW (ref 13–17)
IMM GRANULOCYTES NFR BLD AUTO: 0.9 % — SIGNIFICANT CHANGE UP (ref 0–1.5)
LYMPHOCYTES # BLD AUTO: 0.97 K/UL — LOW (ref 1–3.3)
LYMPHOCYTES # BLD AUTO: 8.1 % — LOW (ref 13–44)
MCHC RBC-ENTMCNC: 29.2 PG — SIGNIFICANT CHANGE UP (ref 27–34)
MCHC RBC-ENTMCNC: 32.8 GM/DL — SIGNIFICANT CHANGE UP (ref 32–36)
MCV RBC AUTO: 88.9 FL — SIGNIFICANT CHANGE UP (ref 80–100)
MONOCYTES # BLD AUTO: 0.89 K/UL — SIGNIFICANT CHANGE UP (ref 0–0.9)
MONOCYTES NFR BLD AUTO: 7.5 % — SIGNIFICANT CHANGE UP (ref 2–14)
NEUTROPHILS # BLD AUTO: 9.83 K/UL — HIGH (ref 1.8–7.4)
NEUTROPHILS NFR BLD AUTO: 82.5 % — HIGH (ref 43–77)
NRBC # BLD: 0 /100 WBCS — SIGNIFICANT CHANGE UP (ref 0–0)
PLATELET # BLD AUTO: 258 K/UL — SIGNIFICANT CHANGE UP (ref 150–400)
RBC # BLD: 2.98 M/UL — LOW (ref 4.2–5.8)
RBC # FLD: 13.6 % — SIGNIFICANT CHANGE UP (ref 10.3–14.5)
WBC # BLD: 11.92 K/UL — HIGH (ref 3.8–10.5)
WBC # FLD AUTO: 11.92 K/UL — HIGH (ref 3.8–10.5)

## 2020-09-17 PROCEDURE — 99233 SBSQ HOSP IP/OBS HIGH 50: CPT

## 2020-09-17 PROCEDURE — 93970 EXTREMITY STUDY: CPT | Mod: 26

## 2020-09-17 PROCEDURE — 99232 SBSQ HOSP IP/OBS MODERATE 35: CPT

## 2020-09-17 RX ORDER — LACTULOSE 10 G/15ML
20 SOLUTION ORAL ONCE
Refills: 0 | Status: COMPLETED | OUTPATIENT
Start: 2020-09-17 | End: 2020-09-17

## 2020-09-17 RX ORDER — ENOXAPARIN SODIUM 100 MG/ML
100 INJECTION SUBCUTANEOUS EVERY 12 HOURS
Refills: 0 | Status: DISCONTINUED | OUTPATIENT
Start: 2020-09-17 | End: 2020-10-06

## 2020-09-17 RX ADMIN — LACTULOSE 20 GRAM(S): 10 SOLUTION ORAL at 19:27

## 2020-09-17 RX ADMIN — Medication 1 TABLET(S): at 05:17

## 2020-09-17 RX ADMIN — OXYCODONE HYDROCHLORIDE 10 MILLIGRAM(S): 5 TABLET ORAL at 21:04

## 2020-09-17 RX ADMIN — LIDOCAINE 1 PATCH: 4 CREAM TOPICAL at 20:40

## 2020-09-17 RX ADMIN — HYDROMORPHONE HYDROCHLORIDE 2 MILLIGRAM(S): 2 INJECTION INTRAMUSCULAR; INTRAVENOUS; SUBCUTANEOUS at 16:00

## 2020-09-17 RX ADMIN — OXYCODONE HYDROCHLORIDE 10 MILLIGRAM(S): 5 TABLET ORAL at 13:30

## 2020-09-17 RX ADMIN — ENOXAPARIN SODIUM 100 MILLIGRAM(S): 100 INJECTION SUBCUTANEOUS at 13:14

## 2020-09-17 RX ADMIN — OXYCODONE HYDROCHLORIDE 30 MILLIGRAM(S): 5 TABLET ORAL at 17:30

## 2020-09-17 RX ADMIN — HYDROMORPHONE HYDROCHLORIDE 2 MILLIGRAM(S): 2 INJECTION INTRAMUSCULAR; INTRAVENOUS; SUBCUTANEOUS at 23:54

## 2020-09-17 RX ADMIN — OXYCODONE HYDROCHLORIDE 10 MILLIGRAM(S): 5 TABLET ORAL at 16:51

## 2020-09-17 RX ADMIN — HYDROMORPHONE HYDROCHLORIDE 2 MILLIGRAM(S): 2 INJECTION INTRAMUSCULAR; INTRAVENOUS; SUBCUTANEOUS at 15:20

## 2020-09-17 RX ADMIN — Medication 1 TABLET(S): at 17:30

## 2020-09-17 RX ADMIN — LIDOCAINE 1 PATCH: 4 CREAM TOPICAL at 22:42

## 2020-09-17 RX ADMIN — HYDROMORPHONE HYDROCHLORIDE 2 MILLIGRAM(S): 2 INJECTION INTRAMUSCULAR; INTRAVENOUS; SUBCUTANEOUS at 20:40

## 2020-09-17 RX ADMIN — GABAPENTIN 600 MILLIGRAM(S): 400 CAPSULE ORAL at 21:03

## 2020-09-17 RX ADMIN — ENOXAPARIN SODIUM 100 MILLIGRAM(S): 100 INJECTION SUBCUTANEOUS at 21:02

## 2020-09-17 RX ADMIN — OXYCODONE HYDROCHLORIDE 10 MILLIGRAM(S): 5 TABLET ORAL at 17:34

## 2020-09-17 RX ADMIN — HYDROMORPHONE HYDROCHLORIDE 2 MILLIGRAM(S): 2 INJECTION INTRAMUSCULAR; INTRAVENOUS; SUBCUTANEOUS at 19:30

## 2020-09-17 RX ADMIN — OXYCODONE HYDROCHLORIDE 10 MILLIGRAM(S): 5 TABLET ORAL at 22:43

## 2020-09-17 RX ADMIN — Medication 3 MILLIGRAM(S): at 21:03

## 2020-09-17 RX ADMIN — TAMSULOSIN HYDROCHLORIDE 0.4 MILLIGRAM(S): 0.4 CAPSULE ORAL at 21:03

## 2020-09-17 NOTE — PROGRESS NOTE ADULT - SUBJECTIVE AND OBJECTIVE BOX
Patient is a 35y old  Male who presents with a chief complaint of Traumatic Spinal Cord Disfunction: 04.211 incomplete paraplegia (17 Sep 2020 12:03)      Vascular Surgery Progress Note    Interval HPI: The patient developed bilateral DVT after 2 spinal surgeries. He had a IVC filter inserted after the 1st spine procedure. He was kept off AC therapy as per his spine surgeon because of the risk of bleeding into the spine. Repeat venous doppler today shows increase clot burden in both legs with extensive bilateral DVT. Therapeutic Lovenox was started today after approval by his spine surgeon. He admits to right  thigh and calf pain coupled with increased swelling of the right leg. I was requested to reevaluate his LE circulation.    Medications:      Allergies:  Allergies    No Known Allergies    Intolerances        Vital Signs Last 24 Hrs  T(C): 36.7 (17 Sep 2020 08:24), Max: 36.8 (16 Sep 2020 20:32)  T(F): 98 (17 Sep 2020 08:24), Max: 98.3 (16 Sep 2020 20:32)  HR: 115 (17 Sep 2020 08:24) (115 - 120)  BP: 129/88 (17 Sep 2020 08:24) (115/82 - 129/88)  BP(mean): --  RR: 15 (17 Sep 2020 08:24) (15 - 169)  SpO2: 99% (17 Sep 2020 08:24) (99% - 99%)  I&O's Summary      Physical Exam:  Gen: NAD, A&Ox3  CV: No incr WOB  Extremities: there is moderate swelling of the right >> left thigh and calves. No cords, cellulitis, or limb hematomas. There is no evidence for compartment syndrome and no pain on passive motion.  Pulses: The femoral,  popliteal,  DP and PT pulses in both legs are 4/4   Neurologically : unchanged    LABS:                        8.7    11.92 )-----------( 258      ( 17 Sep 2020 07:54 )             26.5     09-17    129<L>  |  93<L>  |  11  ----------------------------<  153<H>  3.9   |  28  |  0.76    Ca    8.2<L>      17 Sep 2020 07:54

## 2020-09-17 NOTE — PROGRESS NOTE ADULT - ASSESSMENT
ASSESSMENT/PLAN  BRUNO CHO is a 35M admitted to [facility] on 8/22/20 with back pain, found to have fractures of L1-L5 pedicles bilaterally, leading to widening of the spinal canal with anterolisthesis of L5 on S1 with the entire L5 vertebral body width, disruption of the ALL and PLL at L5-S1 level, probable epidural hemorrhage and hemorrhage within the foramina at L1-L5, and enlargement/edema of bilateral psoas muscle.  Patient underwent evacuation of epidural hematoma, T11-pelvis posterior spinal fusion, and L2-S1 Laminectomy on 8/23/20 and subsequent L5-S1 anterior interbody fusion and L3-4 lateral interbody fusion on 9/1.  Hospital course c/b acute blood loss anemia requiring multiple transfusions, traumatic rhabodmyolysis, and intractable pain.  Patient now admitted for a multidisciplinary rehab program. 09-04-20 @ 14:37    Comprehensive Multidisciplinary Rehab Program:  - Start comprehensive rehab program of PT/OT - 3 hours a day, 5 days a week  - P&O as needed    -------------  MEDICAL MANAGEMENT     #Traumatic Spinal Cord Injury  - pt with complete anterolisthesis of L5 on S1, multiple pedicular fractures, and epidural hemorrhage   - s/p T11-pelvis posterior spinal fusion, and L2-S1 Laminectomy on 8/23/20 and subsequent L5-S1 anterior interbody fusion and L3-4 lateral interbody fusion on 9/1  - pain control as below  - PT/OT  - spinal & Fall precautions  - TLSO/AFOs when OOB  - monitor bladder/bowel function out of concern for neurogenic bowel  - monitor JETHRO drain outpt; Keflex 500mg Q8h while drain is in place  - Drain dc instructions: Per DC note can be pulled on Monday 9/7/20. The drain is sewn in, so please cut the one suture that is holding the drain in place. Once the suture is cut, open the drain cap so that the drain is open. After both previous steps are done then the drain can be pulled. Pull lightly until drain is completely out. Place a 4x4 with Tegaderm over the drain wound  - Drain output  80 cc/day, fell out 9/16/20, discussed with surgeon, okay to monitor for now. continue dressing changes at drain site, some serosangenous discharge is expected. stopped keflex.     #Rhabdomyolysis  - 2/2 trauma   - largely resolved: <900 on 8/31  - encourage PO fluids    #Tachycardia  - per chart review, pt tachycardic prior to transfer to [facility], but no dopplers/CTA obtained  - CTA 9/6 inconclusive re: PE  - Doppler 96/: +B/L DVT   - Vascular (Dr. Nice) consulted; call placed to ortho (Dr. Rivera) to discuss   - TTE ordered to assess for right heart strain    #Pain Control   9/14/20- oxycodone 5-10 mg for moderate-severe pain, oxycontin 20 mg Q12h, valium 5 mg three times a day prn for spasms, Gabapentin increased to 400 mg three times a day. Dilaudid 2 mg Q4h for breakthrough pain.    9/15/20: plan to increase oxycontin to 30 mg Q12h, Gabapentin to 600 mg TID. SBP > 120,     # adjustment issue: neuropsych    #Bladder Management  Neurogenic bladder  - francis removed 9/4 am  - monitor bladder scans, PVR PRN as pt at risk for neurogenic bladder   started flomax.       #IVC filter  - placed 8/26  - will need removed in 5-6 months       DVT prophylaxis:   - IVC filter  - SCDs  - tachycardia: Doppler and cTA chest to eval for DVT and PE. however per chart review pt appears to have been tachycardic for some time prior to arrival in rehab.  - doppler showed bilateral DVTs, vascular surgery consult- continue to monitor, okay to continue therapy.   - RLE edema: got 2 days of lasix few days ago, improved.   - 9/17: plan to repeat doppler in BLE to eval worsening DVTs in setting of worsening RLE edema. pain not worse with foot or toes ROM. plan to give 3 more days of lasix 40 mg.   If worsening DVTs then will call surgery to get clearance for anticoagulation therapy.     Outpatient Follow-up:    Gabrielle Villegas  ORTHOPAEDIC SURGERY  [address], [address]  [place] [address]  Phone: [phone]  Fax: [phone]  Follow Up Time:     Dave Rivera  ORTHOPAEDIC SURGERY  [address] [place] [address]  Phone: [phone]  Fax: [phone]       ASSESSMENT/PLAN  BRUNO CHO is a 35M admitted to [facility] on 8/22/20 with back pain, found to have fractures of L1-L5 pedicles bilaterally, leading to widening of the spinal canal with anterolisthesis of L5 on S1 with the entire L5 vertebral body width, disruption of the ALL and PLL at L5-S1 level, probable epidural hemorrhage and hemorrhage within the foramina at L1-L5, and enlargement/edema of bilateral psoas muscle.  Patient underwent evacuation of epidural hematoma, T11-pelvis posterior spinal fusion, and L2-S1 Laminectomy on 8/23/20 and subsequent L5-S1 anterior interbody fusion and L3-4 lateral interbody fusion on 9/1.  Hospital course c/b acute blood loss anemia requiring multiple transfusions, traumatic rhabodmyolysis, and intractable pain.  Patient now admitted for a multidisciplinary rehab program. 09-04-20 @ 14:37    Comprehensive Multidisciplinary Rehab Program:  - Start comprehensive rehab program of PT/OT - 3 hours a day, 5 days a week  - P&O as needed    -------------  MEDICAL MANAGEMENT     #Traumatic Spinal Cord Injury  - pt with complete anterolisthesis of L5 on S1, multiple pedicular fractures, and epidural hemorrhage   - s/p T11-pelvis posterior spinal fusion, and L2-S1 Laminectomy on 8/23/20 and subsequent L5-S1 anterior interbody fusion and L3-4 lateral interbody fusion on 9/1  - pain control as below  - PT/OT  - spinal & Fall precautions  - TLSO/AFOs when OOB  - monitor bladder/bowel function out of concern for neurogenic bowel  - monitor JETHRO drain outpt; Keflex 500mg Q8h while drain is in place  - Drain dc instructions: Per DC note can be pulled on Monday 9/7/20. The drain is sewn in, so please cut the one suture that is holding the drain in place. Once the suture is cut, open the drain cap so that the drain is open. After both previous steps are done then the drain can be pulled. Pull lightly until drain is completely out. Place a 4x4 with Tegaderm over the drain wound  - Drain output  80 cc/day, fell out 9/16/20, discussed with surgeon, okay to monitor for now. continue dressing changes at drain site, some serosangenous discharge is expected. stopped keflex.     #Rhabdomyolysis  - 2/2 trauma   - largely resolved: <900 on 8/31  - encourage PO fluids    #Tachycardia  - per chart review, pt tachycardic prior to transfer to [facility], but no dopplers/CTA obtained  - CTA 9/6 inconclusive re: PE  - Doppler 96/: +B/L DVT   - Vascular (Dr. Nice) consulted; call placed to ortho (Dr. Rivera) to discuss   - TTE ordered to assess for right heart strain    #Pain Control   9/14/20- oxycodone 5-10 mg for moderate-severe pain, oxycontin 20 mg Q12h, valium 5 mg three times a day prn for spasms, Gabapentin increased to 400 mg three times a day. Dilaudid 2 mg Q4h for breakthrough pain.    9/15/20: plan to increase oxycontin to 30 mg Q12h, Gabapentin to 600 mg TID. SBP > 120,     # adjustment issue: neuropsych    #Bladder Management  Neurogenic bladder  - francis removed 9/4 am  - monitor bladder scans, PVR PRN as pt at risk for neurogenic bladder   started flomax.       #IVC filter  - placed 8/26  - will need removed in 5-6 months       DVT prophylaxis:   - IVC filter  - SCDs  - tachycardia: Doppler and cTA chest to eval for DVT and PE. however per chart review pt appears to have been tachycardic for some time prior to arrival in rehab.  - doppler showed bilateral DVTs, vascular surgery consult- continue to monitor, okay to continue therapy.   - RLE edema: got 2 days of lasix few days ago, improved.   - 9/17: plan to repeat doppler in BLE to eval worsening DVTs in setting of worsening RLE edema. pain not worse with foot or toes ROM. plan to give 3 more days of lasix 40 mg.   If worsening DVTs then will call surgery to get clearance for anticoagulation therapy.     Insomnia: plan to try trazodone 50 mg at night.     Outpatient Follow-up:    Gabrielle Villegas  ORTHOPAEDIC SURGERY  [address], [address]  [place] [address]  Phone: [phone]  Fax: [phone]  Follow Up Time:     Dave Rivera  ORTHOPAEDIC SURGERY  [address] [place] [address]  Phone: [phone]  Fax: [phone]       ASSESSMENT/PLAN  BRUNO CHO is a 35M admitted to [facility] on 8/22/20 with back pain, found to have fractures of L1-L5 pedicles bilaterally, leading to widening of the spinal canal with anterolisthesis of L5 on S1 with the entire L5 vertebral body width, disruption of the ALL and PLL at L5-S1 level, probable epidural hemorrhage and hemorrhage within the foramina at L1-L5, and enlargement/edema of bilateral psoas muscle.  Patient underwent evacuation of epidural hematoma, T11-pelvis posterior spinal fusion, and L2-S1 Laminectomy on 8/23/20 and subsequent L5-S1 anterior interbody fusion and L3-4 lateral interbody fusion on 9/1.  Hospital course c/b acute blood loss anemia requiring multiple transfusions, traumatic rhabodmyolysis, and intractable pain.  Patient now admitted for a multidisciplinary rehab program. 09-04-20 @ 14:37    Comprehensive Multidisciplinary Rehab Program:  - Start comprehensive rehab program of PT/OT - 3 hours a day, 5 days a week  - P&O as needed    -------------  MEDICAL MANAGEMENT     #Traumatic Spinal Cord Injury  - pt with complete anterolisthesis of L5 on S1, multiple pedicular fractures, and epidural hemorrhage   - s/p T11-pelvis posterior spinal fusion, and L2-S1 Laminectomy on 8/23/20 and subsequent L5-S1 anterior interbody fusion and L3-4 lateral interbody fusion on 9/1  - pain control as below  - PT/OT  - spinal & Fall precautions  - TLSO/AFOs when OOB  - monitor bladder/bowel function out of concern for neurogenic bowel  - monitor JETHRO drain outpt; Keflex 500mg Q8h while drain is in place  - Drain dc instructions: Per DC note can be pulled on Monday 9/7/20. The drain is sewn in, so please cut the one suture that is holding the drain in place. Once the suture is cut, open the drain cap so that the drain is open. After both previous steps are done then the drain can be pulled. Pull lightly until drain is completely out. Place a 4x4 with Tegaderm over the drain wound  - Drain output  80 cc/day, fell out 9/16/20, discussed with surgeon, okay to monitor for now. continue dressing changes at drain site, some serosangenous discharge is expected. stopped keflex.     #Rhabdomyolysis  - 2/2 trauma   - largely resolved: <900 on 8/31  - encourage PO fluids    #Tachycardia  - per chart review, pt tachycardic prior to transfer to [facility], but no dopplers/CTA obtained  - CTA 9/6 inconclusive re: PE  - Doppler 96/: +B/L DVT   - Vascular (Dr. Nice) consulted; call placed to ortho (Dr. Rivera) to discuss   - TTE ordered to assess for right heart strain    #Pain Control   9/14/20- oxycodone 5-10 mg for moderate-severe pain, oxycontin 20 mg Q12h, valium 5 mg three times a day prn for spasms, Gabapentin increased to 400 mg three times a day. Dilaudid 2 mg Q4h for breakthrough pain.    9/15/20: plan to increase oxycontin to 30 mg Q12h, Gabapentin to 600 mg TID. SBP > 120,     # adjustment issue: neuropsych    #Bladder Management  Neurogenic bladder  - francis removed 9/4 am  - monitor bladder scans, PVR PRN as pt at risk for neurogenic bladder   started flomax.       #IVC filter  - placed 8/26  - will need removed in 5-6 months       DVT prophylaxis:   - IVC filter  - SCDs  - tachycardia: Doppler and cTA chest to eval for DVT and PE. however per chart review pt appears to have been tachycardic for some time prior to arrival in rehab.  - doppler showed bilateral DVTs, vascular surgery consult- continue to monitor, okay to continue therapy.   - RLE edema: got 2 days of lasix few days ago, improved.   - 9/17: plan to repeat doppler in BLE to eval worsening DVTs in setting of worsening RLE edema. pain not worse with foot or toes ROM. plan to give 3 more days of lasix 40 mg.    . Doppler positive for worsening BLE clots, occlusive clots in RLE, nonocclusive clots in LLE. Pending clearance by surgeon to start full or prophylactic Anticoagulation. Discussed result with patient.     Insomnia: plan to try trazodone 50 mg at night.     Outpatient Follow-up:    Gabrielle Villegas  ORTHOPAEDIC SURGERY  [address], [address]  [place] [address]  Phone: [phone]  Fax: [phone]  Follow Up Time:     Dave Rivera  ORTHOPAEDIC SURGERY  [address] [place] [address]  Phone: [phone]  Fax: [phone]       ASSESSMENT/PLAN  BRUNO CHO is a 35M admitted to [facility] on 8/22/20 with back pain, found to have fractures of L1-L5 pedicles bilaterally, leading to widening of the spinal canal with anterolisthesis of L5 on S1 with the entire L5 vertebral body width, disruption of the ALL and PLL at L5-S1 level, probable epidural hemorrhage and hemorrhage within the foramina at L1-L5, and enlargement/edema of bilateral psoas muscle.  Patient underwent evacuation of epidural hematoma, T11-pelvis posterior spinal fusion, and L2-S1 Laminectomy on 8/23/20 and subsequent L5-S1 anterior interbody fusion and L3-4 lateral interbody fusion on 9/1.  Hospital course c/b acute blood loss anemia requiring multiple transfusions, traumatic rhabodmyolysis, and intractable pain.  Patient now admitted for a multidisciplinary rehab program. 09-04-20 @ 14:37    Comprehensive Multidisciplinary Rehab Program:  - Start comprehensive rehab program of PT/OT - 3 hours a day, 5 days a week  - P&O as needed    -------------  MEDICAL MANAGEMENT     #Traumatic Spinal Cord Injury  - pt with complete anterolisthesis of L5 on S1, multiple pedicular fractures, and epidural hemorrhage   - s/p T11-pelvis posterior spinal fusion, and L2-S1 Laminectomy on 8/23/20 and subsequent L5-S1 anterior interbody fusion and L3-4 lateral interbody fusion on 9/1  - pain control as below  - PT/OT  - spinal & Fall precautions  - TLSO/AFOs when OOB  - monitor bladder/bowel function out of concern for neurogenic bowel  - monitor JETHRO drain outpt; Keflex 500mg Q8h while drain is in place  - Drain dc instructions: Per DC note can be pulled on Monday 9/7/20. The drain is sewn in, so please cut the one suture that is holding the drain in place. Once the suture is cut, open the drain cap so that the drain is open. After both previous steps are done then the drain can be pulled. Pull lightly until drain is completely out. Place a 4x4 with Tegaderm over the drain wound  - Drain output  80 cc/day, fell out 9/16/20, discussed with surgeon, okay to monitor for now. continue dressing changes at drain site, some serosangenous discharge is expected. stopped keflex.     #Rhabdomyolysis  - 2/2 trauma   - largely resolved: <900 on 8/31  - encourage PO fluids    #Tachycardia  - per chart review, pt tachycardic prior to transfer to [facility], but no dopplers/CTA obtained  - CTA 9/6 inconclusive re: PE  - Doppler 96/: +B/L DVT   - Vascular (Dr. Nice) consulted; call placed to ortho (Dr. Rivera) to discuss   - TTE ordered to assess for right heart strain    #Pain Control   9/14/20- oxycodone 5-10 mg for moderate-severe pain, oxycontin 20 mg Q12h, valium 5 mg three times a day prn for spasms, Gabapentin increased to 400 mg three times a day. Dilaudid 2 mg Q4h for breakthrough pain.    9/15/20: plan to increase oxycontin to 30 mg Q12h, Gabapentin to 600 mg TID. SBP > 120,     # adjustment issue: neuropsych    #Bladder Management  Neurogenic bladder  - francis removed 9/4 am  - monitor bladder scans, PVR PRN as pt at risk for neurogenic bladder   started flomax.       #IVC filter  - placed 8/26  - will need removed in 5-6 months       DVT prophylaxis:   - IVC filter  - SCDs  - tachycardia: Doppler and cTA chest to eval for DVT and PE. however per chart review pt appears to have been tachycardic for some time prior to arrival in rehab.  - doppler showed bilateral DVTs, vascular surgery consult- continue to monitor, okay to continue therapy.   - RLE edema: got 2 days of lasix few days ago, improved.   - 9/17: plan to repeat doppler in BLE to eval worsening DVTs in setting of worsening RLE edema. pain not worse with foot or toes ROM. plan to give 3 more days of lasix 40 mg.    . Doppler positive for worsening BLE clots, occlusive clots in RLE, nonocclusive clots in LLE. Pending clearance by surgeon to start full or prophylactic Anticoagulation. Discussed result with patient.     Hb slowly dropping- likely due to increased clot burden with worsening DVTs. Continue to monitor.     Insomnia: plan to try trazodone 50 mg at night.     Outpatient Follow-up:    Gabrielle Villegas  ORTHOPAEDIC SURGERY  [address], [address]  [place] [address]  Phone: [phone]  Fax: [phone]  Follow Up Time:     Dave Rivera  ORTHOPAEDIC SURGERY  [address] [place] [address]  Phone: [phone]  Fax: [phone]       ASSESSMENT/PLAN  BRUNO CHO is a 35M admitted to [facility] on 8/22/20 with back pain, found to have fractures of L1-L5 pedicles bilaterally, leading to widening of the spinal canal with anterolisthesis of L5 on S1 with the entire L5 vertebral body width, disruption of the ALL and PLL at L5-S1 level, probable epidural hemorrhage and hemorrhage within the foramina at L1-L5, and enlargement/edema of bilateral psoas muscle.  Patient underwent evacuation of epidural hematoma, T11-pelvis posterior spinal fusion, and L2-S1 Laminectomy on 8/23/20 and subsequent L5-S1 anterior interbody fusion and L3-4 lateral interbody fusion on 9/1.  Hospital course c/b acute blood loss anemia requiring multiple transfusions, traumatic rhabodmyolysis, and intractable pain.  Patient now admitted for a multidisciplinary rehab program. 09-04-20 @ 14:37    Comprehensive Multidisciplinary Rehab Program:  - Start comprehensive rehab program of PT/OT - 3 hours a day, 5 days a week  - P&O as needed    -------------  MEDICAL MANAGEMENT     #Traumatic Spinal Cord Injury  - pt with complete anterolisthesis of L5 on S1, multiple pedicular fractures, and epidural hemorrhage   - s/p T11-pelvis posterior spinal fusion, and L2-S1 Laminectomy on 8/23/20 and subsequent L5-S1 anterior interbody fusion and L3-4 lateral interbody fusion on 9/1  - pain control as below  - PT/OT  - spinal & Fall precautions  - TLSO/AFOs when OOB  - monitor bladder/bowel function out of concern for neurogenic bowel  - monitor JETHRO drain outpt; Keflex 500mg Q8h while drain is in place  - Drain dc instructions: Per DC note can be pulled on Monday 9/7/20. The drain is sewn in, so please cut the one suture that is holding the drain in place. Once the suture is cut, open the drain cap so that the drain is open. After both previous steps are done then the drain can be pulled. Pull lightly until drain is completely out. Place a 4x4 with Tegaderm over the drain wound  - Drain output  80 cc/day, fell out 9/16/20, discussed with surgeon, okay to monitor for now. continue dressing changes at drain site, some serosangenous discharge is expected. stopped keflex.     #Rhabdomyolysis  - 2/2 trauma   - largely resolved: <900 on 8/31  - encourage PO fluids    #Tachycardia  - per chart review, pt tachycardic prior to transfer to [facility], but no dopplers/CTA obtained  - CTA 9/6 inconclusive re: PE  - Doppler 96/: +B/L DVT   - Vascular (Dr. Nice) consulted; call placed to ortho (Dr. Rivera) to discuss   - TTE ordered to assess for right heart strain    #Pain Control   9/14/20- oxycodone 5-10 mg for moderate-severe pain, oxycontin 20 mg Q12h, valium 5 mg three times a day prn for spasms, Gabapentin increased to 400 mg three times a day. Dilaudid 2 mg Q4h for breakthrough pain.    9/15/20: plan to increase oxycontin to 30 mg Q12h, Gabapentin to 600 mg TID. SBP > 120,     # adjustment issue: neuropsych    #Bladder Management  Neurogenic bladder  - francis removed 9/4 am  - monitor bladder scans, PVR PRN as pt at risk for neurogenic bladder   started flomax.       #IVC filter  - placed 8/26  - will need removed in 5-6 months       DVT prophylaxis:   - IVC filter  - SCDs  - tachycardia: Doppler and cTA chest to eval for DVT and PE. however per chart review pt appears to have been tachycardic for some time prior to arrival in rehab.  - doppler showed bilateral DVTs, vascular surgery consult- continue to monitor, okay to continue therapy.   - RLE edema: got 2 days of lasix few days ago, improved.   - 9/17: plan to repeat doppler in BLE to eval worsening DVTs in setting of worsening RLE edema. pain not worse with foot or toes ROM. plan to give 3 more days of lasix 40 mg.    . Doppler positive for worsening BLE clots, occlusive clots in RLE, nonocclusive clots in LLE. Surgeon Dr. Rivera cleared patient to start on full anticoagulation.  Starting 100 mg Lovenox Q12h. Discussed result and therapy with patient. pending vascular surgery consult.     Hb slowly dropping- likely due to increased clot burden with worsening DVTs. Continue to monitor.     Insomnia: plan to try trazodone 50 mg at night.     Outpatient Follow-up:    Gabrielle Villegas  ORTHOPAEDIC SURGERY  [address] [place] [address]  Phone: [phone]  Fax: [phone]  Follow Up Time:     Dave Rivera  ORTHOPAEDIC SURGERY  [address] [place] [address]  Phone: [phone]  Fax: [phone]

## 2020-09-17 NOTE — PROGRESS NOTE ADULT - SUBJECTIVE AND OBJECTIVE BOX
Patient is a 35y old  Male who presents with a chief complaint of Traumatic Spinal Cord Disfunction: 04.211 incomplete paraplegia (17 Sep 2020 10:10)    Interval Hx  Patient seen and examined at bedside. No overnight events reported.   reports back pain , JETHRO drain fell off yesterday.    ALLERGIES:  No Known Allergies    MEDICATIONS  (STANDING):  DULoxetine 30 milliGRAM(s) Oral daily  famotidine    Tablet 20 milliGRAM(s) Oral <User Schedule>  furosemide    Tablet 40 milliGRAM(s) Oral daily  gabapentin 600 milliGRAM(s) Oral three times a day  influenza   Vaccine 0.5 milliLiter(s) IntraMuscular once  lactobacillus acidophilus 1 Tablet(s) Oral two times a day  lactulose Syrup 20 Gram(s) Oral once  melatonin 3 milliGRAM(s) Oral at bedtime  oxyCODONE  ER Tablet 30 milliGRAM(s) Oral every 12 hours  senna 2 Tablet(s) Oral at bedtime  tamsulosin 0.4 milliGRAM(s) Oral at bedtime  traZODone 50 milliGRAM(s) Oral <User Schedule>    MEDICATIONS  (PRN):  acetaminophen   Tablet .. 650 milliGRAM(s) Oral every 6 hours PRN Temp greater or equal to 38C (100.4F), Mild Pain (1 - 3)  bisacodyl Suppository 10 milliGRAM(s) Rectal <User Schedule> PRN no bowel movement for 2 days.  diazepam    Tablet 5 milliGRAM(s) Oral every 8 hours PRN spasms  HYDROmorphone   Tablet 2 milliGRAM(s) Oral every 4 hours PRN Breakthrougth pain  lidocaine   Patch 1 Patch Transdermal every 24 hours PRN Severe pain  lidocaine 2% Gel 1 Application(s) Topical four times a day PRN pain  magnesium hydroxide Suspension 30 milliLiter(s) Oral every 12 hours PRN Constipation  ondansetron    Tablet 4 milliGRAM(s) Oral every 8 hours PRN Nausea and/or Vomiting  oxyCODONE    IR 5 milliGRAM(s) Oral every 4 hours PRN Moderate Pain (4 - 6)  oxyCODONE    IR 10 milliGRAM(s) Oral every 4 hours PRN Severe Pain (7 - 10)    Vital Signs Last 24 Hrs  T(F): 98 (17 Sep 2020 08:24), Max: 98.3 (16 Sep 2020 20:32)  HR: 115 (17 Sep 2020 08:24) (60 - 120)  BP: 129/88 (17 Sep 2020 08:24) (115/82 - 129/88)  RR: 15 (17 Sep 2020 08:24) (15 - 169)  SpO2: 99% (17 Sep 2020 08:24) (99% - 99%)  I&O's Summary    PHYSICAL EXAM:  General: NAD, A/O x 3  ENT: MMM, no thrush  Neck: Supple, No JVD  Lungs: Clear to auscultation bilaterally, good air entry, non-labored breathing  Cardio: RRR, S1/S2, No murmur  Abdomen: Soft, Nontender, Nondistended; Bowel sounds present  Extremities: No calf tenderness or redness; b/l LE edema   Neuro :  A/O x 3, b/l LE motor 1/5, UE 5/5      LABS:                        8.7    11.92 )-----------( 258      ( 17 Sep 2020 07:54 )             26.5     09-17    129  |  93  |  11  ----------------------------<  153  3.9   |  28  |  0.76    Ca    8.2      17 Sep 2020 07:54          eGFR if Non African American: 118 mL/min/1.73M2 (09-17-20 @ 07:54)  eGFR if African American: 137 mL/min/1.73M2 (09-17-20 @ 07:54)        RADIOLOGY & ADDITIONAL TESTS:    Care Discussed with Consultants/Other Providers: Dr. arnett   Patient is a 35y old  Male who presents with a chief complaint of Traumatic Spinal Cord Disfunction: 04.211 incomplete paraplegia (17 Sep 2020 10:10)    Interval Hx  Patient seen and examined at bedside. No overnight events reported.   reports back pain , JETHRO drain fell off yesterday.    ALLERGIES:  No Known Allergies    MEDICATIONS  (STANDING):  DULoxetine 30 milliGRAM(s) Oral daily  famotidine    Tablet 20 milliGRAM(s) Oral <User Schedule>  furosemide    Tablet 40 milliGRAM(s) Oral daily  gabapentin 600 milliGRAM(s) Oral three times a day  influenza   Vaccine 0.5 milliLiter(s) IntraMuscular once  lactobacillus acidophilus 1 Tablet(s) Oral two times a day  lactulose Syrup 20 Gram(s) Oral once  melatonin 3 milliGRAM(s) Oral at bedtime  oxyCODONE  ER Tablet 30 milliGRAM(s) Oral every 12 hours  senna 2 Tablet(s) Oral at bedtime  tamsulosin 0.4 milliGRAM(s) Oral at bedtime  traZODone 50 milliGRAM(s) Oral <User Schedule>    MEDICATIONS  (PRN):  acetaminophen   Tablet .. 650 milliGRAM(s) Oral every 6 hours PRN Temp greater or equal to 38C (100.4F), Mild Pain (1 - 3)  bisacodyl Suppository 10 milliGRAM(s) Rectal <User Schedule> PRN no bowel movement for 2 days.  diazepam    Tablet 5 milliGRAM(s) Oral every 8 hours PRN spasms  HYDROmorphone   Tablet 2 milliGRAM(s) Oral every 4 hours PRN Breakthrougth pain  lidocaine   Patch 1 Patch Transdermal every 24 hours PRN Severe pain  lidocaine 2% Gel 1 Application(s) Topical four times a day PRN pain  magnesium hydroxide Suspension 30 milliLiter(s) Oral every 12 hours PRN Constipation  ondansetron    Tablet 4 milliGRAM(s) Oral every 8 hours PRN Nausea and/or Vomiting  oxyCODONE    IR 5 milliGRAM(s) Oral every 4 hours PRN Moderate Pain (4 - 6)  oxyCODONE    IR 10 milliGRAM(s) Oral every 4 hours PRN Severe Pain (7 - 10)    Vital Signs Last 24 Hrs  T(F): 98 (17 Sep 2020 08:24), Max: 98.3 (16 Sep 2020 20:32)  HR: 115 (17 Sep 2020 08:24) (60 - 120)  BP: 129/88 (17 Sep 2020 08:24) (115/82 - 129/88)  RR: 15 (17 Sep 2020 08:24) (15 - 169)  SpO2: 99% (17 Sep 2020 08:24) (99% - 99%)  I&O's Summary    PHYSICAL EXAM:  General: NAD, A/O x 3  ENT: MMM, no thrush  Neck: Supple, No JVD  Lungs: Clear to auscultation bilaterally, good air entry, non-labored breathing  Cardio: RRR, S1/S2, No murmur  Abdomen: Soft, Nontender, Nondistended; Bowel sounds present  Extremities: No calf tenderness or redness; b/l LE edema   Neuro :  A/O x 3, b/l LE motor 1/5, UE 5/5      LABS:                        8.7    11.92 )-----------( 258      ( 17 Sep 2020 07:54 )             26.5     09-17    129  |  93  |  11  ----------------------------<  153  3.9   |  28  |  0.76    Ca    8.2      17 Sep 2020 07:54          eGFR if Non African American: 118 mL/min/1.73M2 (09-17-20 @ 07:54)  eGFR if African American: 137 mL/min/1.73M2 (09-17-20 @ 07:54)        RADIOLOGY & ADDITIONAL TESTS:    < from: US Duplex Venous Lower Ext Complete, Bilateral (09.17.20 @ 10:56) >    IMPRESSION:  Extensive bilateral DVT with increased clot burden compared to 09/06/2020.    Occlusive DVT seen throughout the right lower extremity (aside from partially occlusive thrombus within the proximal popliteal vein). The right soleal vein (below the knee) is patent.    Nonocclusive DVT within the left common femoral, superficial femoral and proximal to mid popliteal veins. Occlusive DVT throughout the left calf.    < end of copied text >      Care Discussed with Consultants/Other Providers: Dr. arnett

## 2020-09-17 NOTE — PROGRESS NOTE ADULT - SUBJECTIVE AND OBJECTIVE BOX
Patient is a 35y old  Male who presents with a chief complaint of Traumatic Spinal Cord Disfunction:  incomplete paraplegia (04 Sep 2020 14:36)      HPI:  TODAY'S SUBJECTIVE & REVIEW OF SYMPTOMS:  Patient seen and evaluated this morning. No acute events overnight.  Right lower extremity swelling worse today and also pain in RLE is worse. Wants to delay monring therapy sessions due to pain. Reports not able to sleep at night and would like to try some meds. Pain in back is better controlled but worried about worsening RLE pain and swelling.     [X] Constitutional WNL        [X] Cardio WNL              [X] Resp WNL  [X] GI WNL                            []  +retention                    [X] Heme WNL  [X] Endo WNL                       [X] Skin WNL                  [] MSK +pain  [] Neuro +weakness                     [X] Cognitive WNL         [X] Psych WNL     Vital Signs Last 24 Hrs   Vital Signs Last 24 Hrs  T(C): 36.7 (17 Sep 2020 08:24), Max: 36.8 (16 Sep 2020 20:32)  T(F): 98 (17 Sep 2020 08:24), Max: 98.3 (16 Sep 2020 20:32)  HR: 115 (17 Sep 2020 08:24) (60 - 120)  BP: 129/88 (17 Sep 2020 08:24) (115/82 - 129/88)  BP(mean): --  RR: 15 (17 Sep 2020 08:24) (15 - 169)  SpO2: 99% (17 Sep 2020 08:24) (99% - 99%)     Gen - NAD, uncomfortable  	HEENT - NCAT, EOMI  	Neck - Supple, + limited ROM  	Pulm - CTAB, No wheeze, No rhonchi, No crackles  	Cardiovascular - RRR, S1S2, No murmurs  	Abdomen - Soft, NT/ND, +BS, +incision left of midline with dressing c/d/i  	Extremities - No C/C/E, No calf tenderness  	Neuro-  	   Cognitive - AAOx3  	   Communication - Fluent, No dysarthria  	   Attention: Intact   	   Judgement: Good evidence of judgement  	   Memory: Recall 3 objects immediate and 3 min later      	   Cranial Nerves - CN 2-12 intact  	   Motor -   	                  LEFT    UE - ShAB 5/5, EF 5/5, EE 5/5, WE 5/5,  5/5  	                  RIGHT UE - ShAB 5/5, EF 5/5, EE 5/5, WE 5/5,  5/5  	                  LEFT    LE - HF 1/5 (limited by pain), KE 3/5, DF 0/5, PF 3/5  	                  RIGHT LE - HF 1/5 (limited by pain), KE 3/5, DF 1/5, PF 3/5     	   Sensory - Intact to LT  	   Reflexes - DTR Intact, No primitive reflexive  	   Coordination - FTN intact  	   Tone - normal  	Psychiatric - Mood stable, Affect WNL  Skin:  abdominal incision as above, midline incision on back from T11 to S2 with staples, mid back w/ JETHRO drain, lacy pink rash under bandages which were soaked through with serosanguinous drainage      LABS:                               8.7    11.92 )-----------( 258      ( 17 Sep 2020 07:54 )             26.5     09-17    129<L>  |  93<L>  |  11  ----------------------------<  153<H>  3.9   |  28  |  0.76    Ca    8.2<L>      17 Sep 2020 07:54            Urinalysis Basic - ( 13 Sep 2020 14:00 )    Color: Yellow / Appearance: Clear / S.010 / pH: x  Gluc: x / Ketone: Negative  / Bili: Negative / Urobili: Negative   Blood: x / Protein: Negative / Nitrite: Negative   Leuk Esterase: Negative / RBC: x / WBC x   Sq Epi: x / Non Sq Epi: x / Bacteria: x

## 2020-09-17 NOTE — PROGRESS NOTE ADULT - ASSESSMENT
There is progression of the bilateral DVT after being off AC therapy. He has a IVC filter and was started on therapeutic Lovenox today. He will need AC therapy for at least 6-12 months. I also recommend extremity elevation when in bed. He may continue PT therapy. The patient was given informed consent.

## 2020-09-18 LAB
ANION GAP SERPL CALC-SCNC: 8 MMOL/L — SIGNIFICANT CHANGE UP (ref 5–17)
APPEARANCE UR: CLEAR — SIGNIFICANT CHANGE UP
BACTERIA # UR AUTO: NEGATIVE /HPF — SIGNIFICANT CHANGE UP
BILIRUB UR-MCNC: NEGATIVE — SIGNIFICANT CHANGE UP
BUN SERPL-MCNC: 9 MG/DL — SIGNIFICANT CHANGE UP (ref 7–23)
CALCIUM SERPL-MCNC: 8.4 MG/DL — SIGNIFICANT CHANGE UP (ref 8.4–10.5)
CHLORIDE SERPL-SCNC: 91 MMOL/L — LOW (ref 96–108)
CO2 SERPL-SCNC: 29 MMOL/L — SIGNIFICANT CHANGE UP (ref 22–31)
COLOR SPEC: YELLOW — SIGNIFICANT CHANGE UP
CREAT SERPL-MCNC: 0.77 MG/DL — SIGNIFICANT CHANGE UP (ref 0.5–1.3)
DIFF PNL FLD: NEGATIVE — SIGNIFICANT CHANGE UP
EPI CELLS # UR: SIGNIFICANT CHANGE UP
GLUCOSE SERPL-MCNC: 121 MG/DL — HIGH (ref 70–99)
GLUCOSE UR QL: NEGATIVE — SIGNIFICANT CHANGE UP
HCT VFR BLD CALC: 28.1 % — LOW (ref 39–50)
HGB BLD-MCNC: 8.9 G/DL — LOW (ref 13–17)
KETONES UR-MCNC: NEGATIVE — SIGNIFICANT CHANGE UP
LEUKOCYTE ESTERASE UR-ACNC: NEGATIVE — SIGNIFICANT CHANGE UP
MCHC RBC-ENTMCNC: 27.7 PG — SIGNIFICANT CHANGE UP (ref 27–34)
MCHC RBC-ENTMCNC: 31.7 GM/DL — LOW (ref 32–36)
MCV RBC AUTO: 87.5 FL — SIGNIFICANT CHANGE UP (ref 80–100)
NITRITE UR-MCNC: NEGATIVE — SIGNIFICANT CHANGE UP
NRBC # BLD: 0 /100 WBCS — SIGNIFICANT CHANGE UP (ref 0–0)
PH UR: 8 — SIGNIFICANT CHANGE UP (ref 5–8)
PLATELET # BLD AUTO: 346 K/UL — SIGNIFICANT CHANGE UP (ref 150–400)
POTASSIUM SERPL-MCNC: 4.3 MMOL/L — SIGNIFICANT CHANGE UP (ref 3.5–5.3)
POTASSIUM SERPL-SCNC: 4.3 MMOL/L — SIGNIFICANT CHANGE UP (ref 3.5–5.3)
PROT UR-MCNC: 30 MG/DL
RBC # BLD: 3.21 M/UL — LOW (ref 4.2–5.8)
RBC # FLD: 13.6 % — SIGNIFICANT CHANGE UP (ref 10.3–14.5)
RBC CASTS # UR COMP ASSIST: SIGNIFICANT CHANGE UP /HPF (ref 0–4)
SODIUM SERPL-SCNC: 128 MMOL/L — LOW (ref 135–145)
SP GR SPEC: 1.01 — SIGNIFICANT CHANGE UP (ref 1.01–1.02)
UROBILINOGEN FLD QL: NEGATIVE — SIGNIFICANT CHANGE UP
WBC # BLD: 12.13 K/UL — HIGH (ref 3.8–10.5)
WBC # FLD AUTO: 12.13 K/UL — HIGH (ref 3.8–10.5)
WBC UR QL: SIGNIFICANT CHANGE UP /HPF (ref 0–5)

## 2020-09-18 PROCEDURE — 99232 SBSQ HOSP IP/OBS MODERATE 35: CPT

## 2020-09-18 RX ADMIN — OXYCODONE HYDROCHLORIDE 10 MILLIGRAM(S): 5 TABLET ORAL at 01:38

## 2020-09-18 RX ADMIN — ENOXAPARIN SODIUM 100 MILLIGRAM(S): 100 INJECTION SUBCUTANEOUS at 10:15

## 2020-09-18 RX ADMIN — OXYCODONE HYDROCHLORIDE 10 MILLIGRAM(S): 5 TABLET ORAL at 16:00

## 2020-09-18 RX ADMIN — OXYCODONE HYDROCHLORIDE 30 MILLIGRAM(S): 5 TABLET ORAL at 06:09

## 2020-09-18 RX ADMIN — HYDROMORPHONE HYDROCHLORIDE 2 MILLIGRAM(S): 2 INJECTION INTRAMUSCULAR; INTRAVENOUS; SUBCUTANEOUS at 04:10

## 2020-09-18 RX ADMIN — LIDOCAINE 1 PATCH: 4 CREAM TOPICAL at 12:42

## 2020-09-18 RX ADMIN — OXYCODONE HYDROCHLORIDE 30 MILLIGRAM(S): 5 TABLET ORAL at 07:14

## 2020-09-18 RX ADMIN — HYDROMORPHONE HYDROCHLORIDE 2 MILLIGRAM(S): 2 INJECTION INTRAMUSCULAR; INTRAVENOUS; SUBCUTANEOUS at 12:36

## 2020-09-18 RX ADMIN — HYDROMORPHONE HYDROCHLORIDE 2 MILLIGRAM(S): 2 INJECTION INTRAMUSCULAR; INTRAVENOUS; SUBCUTANEOUS at 04:41

## 2020-09-18 RX ADMIN — OXYCODONE HYDROCHLORIDE 10 MILLIGRAM(S): 5 TABLET ORAL at 20:16

## 2020-09-18 RX ADMIN — HYDROMORPHONE HYDROCHLORIDE 2 MILLIGRAM(S): 2 INJECTION INTRAMUSCULAR; INTRAVENOUS; SUBCUTANEOUS at 01:36

## 2020-09-18 RX ADMIN — Medication 650 MILLIGRAM(S): at 14:45

## 2020-09-18 RX ADMIN — OXYCODONE HYDROCHLORIDE 10 MILLIGRAM(S): 5 TABLET ORAL at 03:06

## 2020-09-18 RX ADMIN — OXYCODONE HYDROCHLORIDE 30 MILLIGRAM(S): 5 TABLET ORAL at 18:30

## 2020-09-18 RX ADMIN — Medication 1 TABLET(S): at 17:41

## 2020-09-18 RX ADMIN — Medication 3 MILLIGRAM(S): at 21:48

## 2020-09-18 RX ADMIN — HYDROMORPHONE HYDROCHLORIDE 2 MILLIGRAM(S): 2 INJECTION INTRAMUSCULAR; INTRAVENOUS; SUBCUTANEOUS at 21:49

## 2020-09-18 RX ADMIN — OXYCODONE HYDROCHLORIDE 10 MILLIGRAM(S): 5 TABLET ORAL at 21:49

## 2020-09-18 RX ADMIN — ENOXAPARIN SODIUM 100 MILLIGRAM(S): 100 INJECTION SUBCUTANEOUS at 21:47

## 2020-09-18 RX ADMIN — HYDROMORPHONE HYDROCHLORIDE 2 MILLIGRAM(S): 2 INJECTION INTRAMUSCULAR; INTRAVENOUS; SUBCUTANEOUS at 21:48

## 2020-09-18 RX ADMIN — LIDOCAINE 1 PATCH: 4 CREAM TOPICAL at 21:47

## 2020-09-18 RX ADMIN — HYDROMORPHONE HYDROCHLORIDE 2 MILLIGRAM(S): 2 INJECTION INTRAMUSCULAR; INTRAVENOUS; SUBCUTANEOUS at 13:15

## 2020-09-18 RX ADMIN — OXYCODONE HYDROCHLORIDE 10 MILLIGRAM(S): 5 TABLET ORAL at 11:00

## 2020-09-18 RX ADMIN — OXYCODONE HYDROCHLORIDE 10 MILLIGRAM(S): 5 TABLET ORAL at 10:16

## 2020-09-18 RX ADMIN — GABAPENTIN 600 MILLIGRAM(S): 400 CAPSULE ORAL at 15:03

## 2020-09-18 RX ADMIN — HYDROMORPHONE HYDROCHLORIDE 2 MILLIGRAM(S): 2 INJECTION INTRAMUSCULAR; INTRAVENOUS; SUBCUTANEOUS at 08:24

## 2020-09-18 RX ADMIN — GABAPENTIN 600 MILLIGRAM(S): 400 CAPSULE ORAL at 06:09

## 2020-09-18 RX ADMIN — HYDROMORPHONE HYDROCHLORIDE 2 MILLIGRAM(S): 2 INJECTION INTRAMUSCULAR; INTRAVENOUS; SUBCUTANEOUS at 18:30

## 2020-09-18 RX ADMIN — OXYCODONE HYDROCHLORIDE 10 MILLIGRAM(S): 5 TABLET ORAL at 06:10

## 2020-09-18 RX ADMIN — OXYCODONE HYDROCHLORIDE 30 MILLIGRAM(S): 5 TABLET ORAL at 17:41

## 2020-09-18 RX ADMIN — DULOXETINE HYDROCHLORIDE 30 MILLIGRAM(S): 30 CAPSULE, DELAYED RELEASE ORAL at 12:36

## 2020-09-18 RX ADMIN — OXYCODONE HYDROCHLORIDE 10 MILLIGRAM(S): 5 TABLET ORAL at 06:39

## 2020-09-18 RX ADMIN — HYDROMORPHONE HYDROCHLORIDE 2 MILLIGRAM(S): 2 INJECTION INTRAMUSCULAR; INTRAVENOUS; SUBCUTANEOUS at 17:40

## 2020-09-18 RX ADMIN — Medication 650 MILLIGRAM(S): at 13:55

## 2020-09-18 RX ADMIN — HYDROMORPHONE HYDROCHLORIDE 2 MILLIGRAM(S): 2 INJECTION INTRAMUSCULAR; INTRAVENOUS; SUBCUTANEOUS at 09:00

## 2020-09-18 RX ADMIN — OXYCODONE HYDROCHLORIDE 10 MILLIGRAM(S): 5 TABLET ORAL at 15:03

## 2020-09-18 RX ADMIN — Medication 1 TABLET(S): at 06:09

## 2020-09-18 RX ADMIN — GABAPENTIN 600 MILLIGRAM(S): 400 CAPSULE ORAL at 21:47

## 2020-09-18 RX ADMIN — TAMSULOSIN HYDROCHLORIDE 0.4 MILLIGRAM(S): 0.4 CAPSULE ORAL at 21:48

## 2020-09-18 NOTE — PROGRESS NOTE ADULT - SUBJECTIVE AND OBJECTIVE BOX
Patient is a 35y old  Male who presents with a chief complaint of Traumatic Spinal Cord Disfunction:  incomplete paraplegia (04 Sep 2020 14:36)      HPI:  TODAY'S SUBJECTIVE & REVIEW OF SYMPTOMS:  Patient seen and evaluated this morning. No acute events overnight.  pain is little better today specially at rest. Wants to do therapy and see how everything goes. No SOB, nausea, vomiting, abdominal pain. Low grade temp. denies urinary symptoms or cough.     [X] Constitutional WNL        [X] Cardio WNL              [X] Resp WNL  [X] GI WNL                            []  +retention                    [X] Heme WNL  [X] Endo WNL                       [X] Skin WNL                  [] MSK +pain  [] Neuro +weakness                     [X] Cognitive WNL         [X] Psych WNL     Vital Signs Last 24 Hrs  T(C): 37.8 (18 Sep 2020 08:42), Max: 37.8 (18 Sep 2020 08:42)  T(F): 100 (18 Sep 2020 08:42), Max: 100 (18 Sep 2020 08:42)  HR: 114 (18 Sep 2020 08:42) (114 - 119)  BP: 140/79 (18 Sep 2020 08:42) (122/73 - 140/79)  BP(mean): --  RR: 17 (18 Sep 2020 08:42) (15 - 17)  SpO2: 97% (18 Sep 2020 08:42) (96% - 97%)     Gen - NAD, uncomfortable  	HEENT - NCAT, EOMI  	Neck - Supple, + limited ROM  	Pulm - CTAB, No wheeze, No rhonchi, No crackles  	Cardiovascular - RRR, S1S2, No murmurs  	Abdomen - Soft, NT/ND, +BS, +incision left of midline with dressing c/d/i  	Extremities - No C/C/E, No calf tenderness  	Neuro-  	   Cognitive - AAOx3  	   Communication - Fluent, No dysarthria  	   Attention: Intact   	   Judgement: Good evidence of judgement  	   Memory: Recall 3 objects immediate and 3 min later      	   Cranial Nerves - CN 2-12 intact  	   Motor -   	                  LEFT    UE - ShAB 5/5, EF 5/5, EE 5/5, WE 5/5,  5/5  	                  RIGHT UE - ShAB 5/5, EF 5/5, EE 5/5, WE 5/5,  5/5  	                  LEFT    LE - HF 1/5 (limited by pain), KE 3/5, DF 0/5, PF 3/5  	                  RIGHT LE - HF 1/5 (limited by pain), KE 3/5, DF 1/5, PF 3/5     	   Sensory - Intact to LT  	   Reflexes - DTR Intact, No primitive reflexive  	   Coordination - FTN intact  	   Tone - normal  	Psychiatric - Mood stable, Affect WNL  Skin:  abdominal incision as above, midline incision on back from T11 to S2 with staples, mid back w/ JETHRO drain, lacy pink rash under bandages which were soaked through with serosanguinous drainage      LABS:                                           8.9    12.13 )-----------( 346      ( 18 Sep 2020 06:00 )             28.1     09-18    128<L>  |  91<L>  |  9   ----------------------------<  121<H>  4.3   |  29  |  0.77    Ca    8.4      18 Sep 2020 06:00      Urinalysis Basic - ( 13 Sep 2020 14:00 )    Color: Yellow / Appearance: Clear / S.010 / pH: x  Gluc: x / Ketone: Negative  / Bili: Negative / Urobili: Negative   Blood: x / Protein: Negative / Nitrite: Negative   Leuk Esterase: Negative / RBC: x / WBC x   Sq Epi: x / Non Sq Epi: x / Bacteria: x

## 2020-09-18 NOTE — PROGRESS NOTE ADULT - ASSESSMENT
ASSESSMENT/PLAN  BRUNO CHO is a 35M admitted to [facility] on 8/22/20 with back pain, found to have fractures of L1-L5 pedicles bilaterally, leading to widening of the spinal canal with anterolisthesis of L5 on S1 with the entire L5 vertebral body width, disruption of the ALL and PLL at L5-S1 level, probable epidural hemorrhage and hemorrhage within the foramina at L1-L5, and enlargement/edema of bilateral psoas muscle.  Patient underwent evacuation of epidural hematoma, T11-pelvis posterior spinal fusion, and L2-S1 Laminectomy on 8/23/20 and subsequent L5-S1 anterior interbody fusion and L3-4 lateral interbody fusion on 9/1.  Hospital course c/b acute blood loss anemia requiring multiple transfusions, traumatic rhabodmyolysis, and intractable pain.  Patient now admitted for a multidisciplinary rehab program. 09-04-20 @ 14:37    Comprehensive Multidisciplinary Rehab Program:  - Start comprehensive rehab program of PT/OT - 3 hours a day, 5 days a week  - P&O as needed    -------------  MEDICAL MANAGEMENT     #Traumatic Spinal Cord Injury  - pt with complete anterolisthesis of L5 on S1, multiple pedicular fractures, and epidural hemorrhage   - s/p T11-pelvis posterior spinal fusion, and L2-S1 Laminectomy on 8/23/20 and subsequent L5-S1 anterior interbody fusion and L3-4 lateral interbody fusion on 9/1  - pain control as below  - PT/OT  - spinal & Fall precautions  - TLSO/AFOs when OOB  - monitor bladder/bowel function out of concern for neurogenic bowel  - monitor JETHRO drain outpt; Keflex 500mg Q8h while drain is in place  - Drain dc instructions: Per DC note can be pulled on Monday 9/7/20. The drain is sewn in, so please cut the one suture that is holding the drain in place. Once the suture is cut, open the drain cap so that the drain is open. After both previous steps are done then the drain can be pulled. Pull lightly until drain is completely out. Place a 4x4 with Tegaderm over the drain wound  - Drain output  80 cc/day, fell out 9/16/20, discussed with surgeon, okay to monitor for now. continue dressing changes at drain site, some serosangenous discharge is expected. stopped keflex.     #Rhabdomyolysis  - 2/2 trauma   - largely resolved: <900 on 8/31  - encourage PO fluids    #Tachycardia  - per chart review, pt tachycardic prior to transfer to [facility], but no dopplers/CTA obtained  - CTA 9/6 inconclusive re: PE  - Doppler 96/: +B/L DVT   - TTE ordered to assess for right heart strain    #Pain Control   9/14/20- oxycodone 5-10 mg for moderate-severe pain, oxycontin 20 mg Q12h, valium 5 mg three times a day prn for spasms, Gabapentin increased to 400 mg three times a day. Dilaudid 2 mg Q4h for breakthrough pain.    9/15/20: plan to increase oxycontin to 30 mg Q12h, Gabapentin to 600 mg TID. SBP > 120,     # adjustment issue: neuropsych    #Bladder Management  Neurogenic bladder  - francis removed 9/4 am  - monitor bladder scans, PVR PRN as pt at risk for neurogenic bladder   started flomax.       #IVC filter  - placed 8/26  - will need removed in 5-6 months     # Leucocytosis, low grade temp: Likely from increased clot  burden in bilateral lower extremities. Plan to get UA.     DVT prophylaxis:   - IVC filter  - SCDs  - tachycardia: Doppler and cTA chest to eval for DVT and PE. however per chart review pt appears to have been tachycardic for some time prior to arrival in rehab.  - doppler showed bilateral DVTs, vascular surgery consult- continue to monitor, okay to continue therapy.   - RLE edema: got 2 days of lasix few days ago, improved.   - 9/17: plan to repeat doppler in BLE to eval worsening DVTs in setting of worsening RLE edema. pain not worse with foot or toes ROM. plan to give 3 more days of lasix 40 mg.    . Doppler positive for worsening BLE clots, occlusive clots in RLE, nonocclusive clots in LLE. Surgeon Dr. Rivera cleared patient to start on full anticoagulation.  Starting 100 mg Lovenox Q12h. Discussed result and therapy with patient. Vascular surgery - continue full anticoagulation treatment for 6-12 months. Elevate bilateral lower extremities.     Hb slowly dropping- likely due to increased clot burden with worsening DVTs. Continue to monitor.     Insomnia: plan to try trazodone 50 mg at night.     Outpatient Follow-up:    Gabrielle Villegas  ORTHOPAEDIC SURGERY  [address], [address]  [place] [address]  Phone: [phone]  Fax: [phone]  Follow Up Time:     Dave Rivera  ORTHOPAEDIC SURGERY  [address] [place] [address]  Phone: [phone]  Fax: [phone]

## 2020-09-19 LAB
BASOPHILS # BLD AUTO: 0.03 K/UL — SIGNIFICANT CHANGE UP (ref 0–0.2)
BASOPHILS NFR BLD AUTO: 0.2 % — SIGNIFICANT CHANGE UP (ref 0–2)
EOSINOPHIL # BLD AUTO: 0.07 K/UL — SIGNIFICANT CHANGE UP (ref 0–0.5)
EOSINOPHIL NFR BLD AUTO: 0.6 % — SIGNIFICANT CHANGE UP (ref 0–6)
HCT VFR BLD CALC: 27.5 % — LOW (ref 39–50)
HGB BLD-MCNC: 8.8 G/DL — LOW (ref 13–17)
IMM GRANULOCYTES NFR BLD AUTO: 0.8 % — SIGNIFICANT CHANGE UP (ref 0–1.5)
LYMPHOCYTES # BLD AUTO: 1.17 K/UL — SIGNIFICANT CHANGE UP (ref 1–3.3)
LYMPHOCYTES # BLD AUTO: 9.3 % — LOW (ref 13–44)
MCHC RBC-ENTMCNC: 28.3 PG — SIGNIFICANT CHANGE UP (ref 27–34)
MCHC RBC-ENTMCNC: 32 GM/DL — SIGNIFICANT CHANGE UP (ref 32–36)
MCV RBC AUTO: 88.4 FL — SIGNIFICANT CHANGE UP (ref 80–100)
MONOCYTES # BLD AUTO: 0.91 K/UL — HIGH (ref 0–0.9)
MONOCYTES NFR BLD AUTO: 7.2 % — SIGNIFICANT CHANGE UP (ref 2–14)
NEUTROPHILS # BLD AUTO: 10.28 K/UL — HIGH (ref 1.8–7.4)
NEUTROPHILS NFR BLD AUTO: 81.9 % — HIGH (ref 43–77)
NRBC # BLD: 0 /100 WBCS — SIGNIFICANT CHANGE UP (ref 0–0)
PLATELET # BLD AUTO: 376 K/UL — SIGNIFICANT CHANGE UP (ref 150–400)
RBC # BLD: 3.11 M/UL — LOW (ref 4.2–5.8)
RBC # FLD: 13.5 % — SIGNIFICANT CHANGE UP (ref 10.3–14.5)
WBC # BLD: 12.56 K/UL — HIGH (ref 3.8–10.5)
WBC # FLD AUTO: 12.56 K/UL — HIGH (ref 3.8–10.5)

## 2020-09-19 PROCEDURE — 99232 SBSQ HOSP IP/OBS MODERATE 35: CPT

## 2020-09-19 RX ORDER — DULOXETINE HYDROCHLORIDE 30 MG/1
20 CAPSULE, DELAYED RELEASE ORAL DAILY
Refills: 0 | Status: DISCONTINUED | OUTPATIENT
Start: 2020-09-19 | End: 2020-09-19

## 2020-09-19 RX ORDER — DULOXETINE HYDROCHLORIDE 30 MG/1
40 CAPSULE, DELAYED RELEASE ORAL DAILY
Refills: 0 | Status: DISCONTINUED | OUTPATIENT
Start: 2020-09-19 | End: 2020-09-25

## 2020-09-19 RX ADMIN — OXYCODONE HYDROCHLORIDE 10 MILLIGRAM(S): 5 TABLET ORAL at 22:37

## 2020-09-19 RX ADMIN — OXYCODONE HYDROCHLORIDE 10 MILLIGRAM(S): 5 TABLET ORAL at 03:44

## 2020-09-19 RX ADMIN — GABAPENTIN 600 MILLIGRAM(S): 400 CAPSULE ORAL at 21:45

## 2020-09-19 RX ADMIN — Medication 3 MILLIGRAM(S): at 21:45

## 2020-09-19 RX ADMIN — TAMSULOSIN HYDROCHLORIDE 0.4 MILLIGRAM(S): 0.4 CAPSULE ORAL at 21:45

## 2020-09-19 RX ADMIN — HYDROMORPHONE HYDROCHLORIDE 2 MILLIGRAM(S): 2 INJECTION INTRAMUSCULAR; INTRAVENOUS; SUBCUTANEOUS at 05:09

## 2020-09-19 RX ADMIN — HYDROMORPHONE HYDROCHLORIDE 2 MILLIGRAM(S): 2 INJECTION INTRAMUSCULAR; INTRAVENOUS; SUBCUTANEOUS at 18:01

## 2020-09-19 RX ADMIN — LIDOCAINE 1 PATCH: 4 CREAM TOPICAL at 12:30

## 2020-09-19 RX ADMIN — LIDOCAINE 1 PATCH: 4 CREAM TOPICAL at 22:42

## 2020-09-19 RX ADMIN — OXYCODONE HYDROCHLORIDE 30 MILLIGRAM(S): 5 TABLET ORAL at 05:09

## 2020-09-19 RX ADMIN — OXYCODONE HYDROCHLORIDE 10 MILLIGRAM(S): 5 TABLET ORAL at 07:39

## 2020-09-19 RX ADMIN — Medication 1 TABLET(S): at 05:09

## 2020-09-19 RX ADMIN — DULOXETINE HYDROCHLORIDE 30 MILLIGRAM(S): 30 CAPSULE, DELAYED RELEASE ORAL at 12:38

## 2020-09-19 RX ADMIN — HYDROMORPHONE HYDROCHLORIDE 2 MILLIGRAM(S): 2 INJECTION INTRAMUSCULAR; INTRAVENOUS; SUBCUTANEOUS at 09:30

## 2020-09-19 RX ADMIN — OXYCODONE HYDROCHLORIDE 30 MILLIGRAM(S): 5 TABLET ORAL at 06:35

## 2020-09-19 RX ADMIN — HYDROMORPHONE HYDROCHLORIDE 2 MILLIGRAM(S): 2 INJECTION INTRAMUSCULAR; INTRAVENOUS; SUBCUTANEOUS at 06:33

## 2020-09-19 RX ADMIN — OXYCODONE HYDROCHLORIDE 30 MILLIGRAM(S): 5 TABLET ORAL at 17:52

## 2020-09-19 RX ADMIN — OXYCODONE HYDROCHLORIDE 10 MILLIGRAM(S): 5 TABLET ORAL at 19:34

## 2020-09-19 RX ADMIN — OXYCODONE HYDROCHLORIDE 10 MILLIGRAM(S): 5 TABLET ORAL at 02:05

## 2020-09-19 RX ADMIN — HYDROMORPHONE HYDROCHLORIDE 2 MILLIGRAM(S): 2 INJECTION INTRAMUSCULAR; INTRAVENOUS; SUBCUTANEOUS at 13:58

## 2020-09-19 RX ADMIN — OXYCODONE HYDROCHLORIDE 10 MILLIGRAM(S): 5 TABLET ORAL at 10:29

## 2020-09-19 RX ADMIN — HYDROMORPHONE HYDROCHLORIDE 2 MILLIGRAM(S): 2 INJECTION INTRAMUSCULAR; INTRAVENOUS; SUBCUTANEOUS at 09:07

## 2020-09-19 RX ADMIN — OXYCODONE HYDROCHLORIDE 10 MILLIGRAM(S): 5 TABLET ORAL at 06:34

## 2020-09-19 RX ADMIN — ENOXAPARIN SODIUM 100 MILLIGRAM(S): 100 INJECTION SUBCUTANEOUS at 21:44

## 2020-09-19 RX ADMIN — ENOXAPARIN SODIUM 100 MILLIGRAM(S): 100 INJECTION SUBCUTANEOUS at 10:29

## 2020-09-19 RX ADMIN — HYDROMORPHONE HYDROCHLORIDE 2 MILLIGRAM(S): 2 INJECTION INTRAMUSCULAR; INTRAVENOUS; SUBCUTANEOUS at 20:42

## 2020-09-19 RX ADMIN — GABAPENTIN 600 MILLIGRAM(S): 400 CAPSULE ORAL at 14:34

## 2020-09-19 RX ADMIN — Medication 1 TABLET(S): at 17:52

## 2020-09-19 RX ADMIN — LIDOCAINE 1 PATCH: 4 CREAM TOPICAL at 02:03

## 2020-09-19 RX ADMIN — OXYCODONE HYDROCHLORIDE 10 MILLIGRAM(S): 5 TABLET ORAL at 14:34

## 2020-09-19 RX ADMIN — GABAPENTIN 600 MILLIGRAM(S): 400 CAPSULE ORAL at 05:08

## 2020-09-19 RX ADMIN — OXYCODONE HYDROCHLORIDE 10 MILLIGRAM(S): 5 TABLET ORAL at 22:41

## 2020-09-19 NOTE — PROGRESS NOTE ADULT - SUBJECTIVE AND OBJECTIVE BOX
Patient is a 35y old  Male who presents with a chief complaint of Traumatic Spinal Cord Disfunction:  incomplete paraplegia (19 Sep 2020 09:04)    Interval Hx  Patient seen and examined at bedside. No overnight events reported.     ALLERGIES:  No Known Allergies    MEDICATIONS  (STANDING):  DULoxetine 30 milliGRAM(s) Oral daily  enoxaparin Injectable 100 milliGRAM(s) SubCutaneous every 12 hours  famotidine    Tablet 20 milliGRAM(s) Oral <User Schedule>  furosemide    Tablet 40 milliGRAM(s) Oral daily  gabapentin 600 milliGRAM(s) Oral three times a day  influenza   Vaccine 0.5 milliLiter(s) IntraMuscular once  lactobacillus acidophilus 1 Tablet(s) Oral two times a day  melatonin 3 milliGRAM(s) Oral at bedtime  oxyCODONE  ER Tablet 30 milliGRAM(s) Oral every 12 hours  senna 2 Tablet(s) Oral at bedtime  tamsulosin 0.4 milliGRAM(s) Oral at bedtime  traZODone 50 milliGRAM(s) Oral <User Schedule>    MEDICATIONS  (PRN):  acetaminophen   Tablet .. 650 milliGRAM(s) Oral every 6 hours PRN Temp greater or equal to 38C (100.4F), Mild Pain (1 - 3)  bisacodyl Suppository 10 milliGRAM(s) Rectal <User Schedule> PRN no bowel movement for 2 days.  diazepam    Tablet 5 milliGRAM(s) Oral every 8 hours PRN spasms  HYDROmorphone   Tablet 2 milliGRAM(s) Oral every 4 hours PRN Breakthrougth pain  lidocaine   Patch 1 Patch Transdermal every 24 hours PRN Severe pain  lidocaine 2% Gel 1 Application(s) Topical four times a day PRN pain  magnesium hydroxide Suspension 30 milliLiter(s) Oral every 12 hours PRN Constipation  ondansetron    Tablet 4 milliGRAM(s) Oral every 8 hours PRN Nausea and/or Vomiting  oxyCODONE    IR 5 milliGRAM(s) Oral every 4 hours PRN Moderate Pain (4 - 6)  oxyCODONE    IR 10 milliGRAM(s) Oral every 4 hours PRN Severe Pain (7 - 10)    Vital Signs Last 24 Hrs  T(F): 98.2 (19 Sep 2020 08:00), Max: 98.7 (18 Sep 2020 18:17)  HR: 120 (19 Sep 2020 08:00) (115 - 120)  BP: 105/68 (19 Sep 2020 08:00) (105/68 - 112/70)  RR: 16 (19 Sep 2020 08:00) (16 - 17)  SpO2: 98% (19 Sep 2020 08:00) (98% - 98%)  I&O's Summary    PHYSICAL EXAM:  General: NAD, A/O x 3  ENT: MMM, no thrush  Neck: Supple, No JVD  Lungs: Clear to auscultation bilaterally, good air entry, non-labored breathing  Cardio: RRR, S1/S2, No murmur  Abdomen: Soft, Nontender, Nondistended; Bowel sounds present  Extremities: No calf tenderness or redness; b/l LE edema   Neuro :  A/O x 3, b/l LE motor 1/5, UE 5/5    LABS:                        8.8    12.56 )-----------( 376      ( 19 Sep 2020 08:50 )             27.5     -    128  |  91  |  9   ----------------------------<  121  4.3   |  29  |  0.77    Ca    8.4      18 Sep 2020 06:00          eGFR if Non African American: 118 mL/min/1.73M2 (20 @ 06:00)  eGFR if : 136 mL/min/1.73M2 (20 @ 06:00)        Urinalysis Basic - ( 18 Sep 2020 12:45 )    Color: Yellow / Appearance: Clear / S.010 / pH: x  Gluc: x / Ketone: Negative  / Bili: Negative / Urobili: Negative   Blood: x / Protein: 30 mg/dL / Nitrite: Negative   Leuk Esterase: Negative / RBC: 0-4 /HPF / WBC 0-2 /HPF   Sq Epi: x / Non Sq Epi: Neg.-Few / Bacteria: Negative /HPF        RADIOLOGY & ADDITIONAL TESTS:    Care Discussed with Consultants/Other Providers:

## 2020-09-19 NOTE — PROGRESS NOTE ADULT - ASSESSMENT
35M admitted to [facility] on 8/22/20 with back pain, found to have fractures of L1-L5 pedicles bilaterally, leading to widening of the spinal canal with anterolisthesis of L5 on S1 with the entire L5 vertebral body width, disruption of the ALL and PLL at L5-S1 level, probable epidural hemorrhage and hemorrhage within the foramina at L1-L5, and enlargement/edema of bilateral psoas muscle.  Patient underwent evacuation of epidural hematoma, T11-pelvis posterior spinal fusion, and L2-S1 Laminectomy on 8/23/20 and subsequent L5-S1 anterior interbody fusion and L3-4 lateral interbody fusion on 9/1.  Hospital course c/b acute blood loss anemia requiring multiple transfusions, traumatic rhabdomyolysis and intractable pain.  Patient now admitted for a multidisciplinary rehab program- pt/ot/dvt ppx, pain meds    #s/p epidural hematoma  #T11-pelvis posterior spinal fusion  #L2-S1 Laminectomy   #L5-S1 anterior interbody fusion and L3-4 lateral interbody fusion on 9/1  Cont w/ comprehensive rehab program   optimize pain meds  Monitor dressing at drain site, Albert drain fell off.    # B/l LE edema , worsening due to DVT  #B/L DVT  Extensive bilateral DVT with increased clot burden compared to 09/06/2020 seen on repeat US duplex  started on therapeutic lovenox after discussion with vascular and pts neurosurgeon clearance  edema slightly improved  Monitor for now    #Hyponatremia  please repeat labs    #Leukocytosis , likely reactive  afebrile  UA neg  check cxr  monitor cbc    #Intermittent urinary retention  monitor PVR, prn straight cath  cont flomax    #Anemia , likely post op anemia  s/p multiple PRBCs at OSH  monitor h&h, keep hb >7    #Rhabdomyolysis- 2/2 trauma , resolved, encourage PO fluids    #Pain Control- c/w Dilaudid, Oxycontin, valium    #DVT ppx: IVC filter in place, s/c lovenox    will follow up

## 2020-09-19 NOTE — PROGRESS NOTE ADULT - SUBJECTIVE AND OBJECTIVE BOX
No overnight events.  Slept well.  Reports pain is controlled.  Looking to make up some therapy he missed.     REVIEW OF SYSTEMS  Constitutional - No fever,  No fatigue  Musculoskeletal - +joint pain, No joint swelling, +muscle pain    VITALS  T(C): 36.7 (20 @ 20:09), Max: 37.1 (20 @ 18:17)  HR: 115 (20 @ 20:09) (115 - 115)  BP: 112/70 (20 @ 20:09) (112/70 - 112/70)  RR: 17 (20 @ 20:09) (17 - 17)  SpO2: 98% (20 @ 20:09) (98% - 98%)  Wt(kg): --     MEDICATIONS   acetaminophen   Tablet .. 650 milliGRAM(s) every 6 hours PRN  bisacodyl Suppository 10 milliGRAM(s) <User Schedule> PRN  diazepam    Tablet 5 milliGRAM(s) every 8 hours PRN  DULoxetine 30 milliGRAM(s) daily  enoxaparin Injectable 100 milliGRAM(s) every 12 hours  famotidine    Tablet 20 milliGRAM(s) <User Schedule>  furosemide    Tablet 40 milliGRAM(s) daily  gabapentin 600 milliGRAM(s) three times a day  HYDROmorphone   Tablet 2 milliGRAM(s) every 4 hours PRN  influenza   Vaccine 0.5 milliLiter(s) once  lactobacillus acidophilus 1 Tablet(s) two times a day  lidocaine   Patch 1 Patch every 24 hours PRN  lidocaine 2% Gel 1 Application(s) four times a day PRN  magnesium hydroxide Suspension 30 milliLiter(s) every 12 hours PRN  melatonin 3 milliGRAM(s) at bedtime  ondansetron    Tablet 4 milliGRAM(s) every 8 hours PRN  oxyCODONE    IR 5 milliGRAM(s) every 4 hours PRN  oxyCODONE    IR 10 milliGRAM(s) every 4 hours PRN  oxyCODONE  ER Tablet 30 milliGRAM(s) every 12 hours  senna 2 Tablet(s) at bedtime  tamsulosin 0.4 milliGRAM(s) at bedtime  traZODone 50 milliGRAM(s) <User Schedule>      RECENT LABS/IMAGING                        8.8    12.56 )-----------( 376      ( 19 Sep 2020 08:50 )             27.5     09-18    128<L>  |  91<L>  |  9   ----------------------------<  121<H>  4.3   |  29  |  0.77    Ca    8.4      18 Sep 2020 06:00        Urinalysis Basic - ( 18 Sep 2020 12:45 )    Color: Yellow / Appearance: Clear / S.010 / pH: x  Gluc: x / Ketone: Negative  / Bili: Negative / Urobili: Negative   Blood: x / Protein: 30 mg/dL / Nitrite: Negative   Leuk Esterase: Negative / RBC: 0-4 /HPF / WBC 0-2 /HPF   Sq Epi: x / Non Sq Epi: Neg.-Few / Bacteria: Negative /HPF              ---------  PHYSICAL EXAM  Constitutional - NAD, Comfortable  Pulm - Breathing comfortably, No wheezing  Abd - Nondistended  Extremities - No calf tenderness  Neurologic Exam - Awake, Alert            Motor - Exam unchanged  Psychiatric - Mood WNL     ASSESSMENT/PLAN  35y Male with impairments in mobility and ADLs   - Continue 3hrs a day of comprehensive rehab program  - Continue current medications, medically stable  - DVTs - Lovenox  - Leukocytosis - Increased slightly with shift, no fever in last 24hrs, UA negative  - Skin - OOB and mobilization daily

## 2020-09-20 LAB
ANION GAP SERPL CALC-SCNC: 7 MMOL/L — SIGNIFICANT CHANGE UP (ref 5–17)
BASOPHILS # BLD AUTO: 0.03 K/UL — SIGNIFICANT CHANGE UP (ref 0–0.2)
BASOPHILS NFR BLD AUTO: 0.3 % — SIGNIFICANT CHANGE UP (ref 0–2)
BUN SERPL-MCNC: 10 MG/DL — SIGNIFICANT CHANGE UP (ref 7–23)
CALCIUM SERPL-MCNC: 8.6 MG/DL — SIGNIFICANT CHANGE UP (ref 8.4–10.5)
CHLORIDE SERPL-SCNC: 95 MMOL/L — LOW (ref 96–108)
CO2 SERPL-SCNC: 32 MMOL/L — HIGH (ref 22–31)
CREAT SERPL-MCNC: 0.85 MG/DL — SIGNIFICANT CHANGE UP (ref 0.5–1.3)
EOSINOPHIL # BLD AUTO: 0.07 K/UL — SIGNIFICANT CHANGE UP (ref 0–0.5)
EOSINOPHIL NFR BLD AUTO: 0.7 % — SIGNIFICANT CHANGE UP (ref 0–6)
GLUCOSE SERPL-MCNC: 114 MG/DL — HIGH (ref 70–99)
HCT VFR BLD CALC: 28.8 % — LOW (ref 39–50)
HGB BLD-MCNC: 8.8 G/DL — LOW (ref 13–17)
IMM GRANULOCYTES NFR BLD AUTO: 0.9 % — SIGNIFICANT CHANGE UP (ref 0–1.5)
LYMPHOCYTES # BLD AUTO: 1.23 K/UL — SIGNIFICANT CHANGE UP (ref 1–3.3)
LYMPHOCYTES # BLD AUTO: 12.3 % — LOW (ref 13–44)
MAGNESIUM SERPL-MCNC: 1.8 MG/DL — SIGNIFICANT CHANGE UP (ref 1.6–2.6)
MCHC RBC-ENTMCNC: 27.2 PG — SIGNIFICANT CHANGE UP (ref 27–34)
MCHC RBC-ENTMCNC: 30.6 GM/DL — LOW (ref 32–36)
MCV RBC AUTO: 89.2 FL — SIGNIFICANT CHANGE UP (ref 80–100)
MONOCYTES # BLD AUTO: 0.82 K/UL — SIGNIFICANT CHANGE UP (ref 0–0.9)
MONOCYTES NFR BLD AUTO: 8.2 % — SIGNIFICANT CHANGE UP (ref 2–14)
NEUTROPHILS # BLD AUTO: 7.8 K/UL — HIGH (ref 1.8–7.4)
NEUTROPHILS NFR BLD AUTO: 77.6 % — HIGH (ref 43–77)
NRBC # BLD: 0 /100 WBCS — SIGNIFICANT CHANGE UP (ref 0–0)
PHOSPHATE SERPL-MCNC: 4.7 MG/DL — HIGH (ref 2.5–4.5)
PLATELET # BLD AUTO: 410 K/UL — HIGH (ref 150–400)
POTASSIUM SERPL-MCNC: 4.6 MMOL/L — SIGNIFICANT CHANGE UP (ref 3.5–5.3)
POTASSIUM SERPL-SCNC: 4.6 MMOL/L — SIGNIFICANT CHANGE UP (ref 3.5–5.3)
RBC # BLD: 3.23 M/UL — LOW (ref 4.2–5.8)
RBC # FLD: 13.7 % — SIGNIFICANT CHANGE UP (ref 10.3–14.5)
SODIUM SERPL-SCNC: 134 MMOL/L — LOW (ref 135–145)
WBC # BLD: 10.04 K/UL — SIGNIFICANT CHANGE UP (ref 3.8–10.5)
WBC # FLD AUTO: 10.04 K/UL — SIGNIFICANT CHANGE UP (ref 3.8–10.5)

## 2020-09-20 PROCEDURE — 99232 SBSQ HOSP IP/OBS MODERATE 35: CPT

## 2020-09-20 RX ADMIN — GABAPENTIN 600 MILLIGRAM(S): 400 CAPSULE ORAL at 21:17

## 2020-09-20 RX ADMIN — OXYCODONE HYDROCHLORIDE 10 MILLIGRAM(S): 5 TABLET ORAL at 21:10

## 2020-09-20 RX ADMIN — HYDROMORPHONE HYDROCHLORIDE 2 MILLIGRAM(S): 2 INJECTION INTRAMUSCULAR; INTRAVENOUS; SUBCUTANEOUS at 00:37

## 2020-09-20 RX ADMIN — Medication 1 TABLET(S): at 17:39

## 2020-09-20 RX ADMIN — OXYCODONE HYDROCHLORIDE 10 MILLIGRAM(S): 5 TABLET ORAL at 06:46

## 2020-09-20 RX ADMIN — GABAPENTIN 600 MILLIGRAM(S): 400 CAPSULE ORAL at 05:19

## 2020-09-20 RX ADMIN — OXYCODONE HYDROCHLORIDE 30 MILLIGRAM(S): 5 TABLET ORAL at 05:19

## 2020-09-20 RX ADMIN — HYDROMORPHONE HYDROCHLORIDE 2 MILLIGRAM(S): 2 INJECTION INTRAMUSCULAR; INTRAVENOUS; SUBCUTANEOUS at 13:43

## 2020-09-20 RX ADMIN — OXYCODONE HYDROCHLORIDE 30 MILLIGRAM(S): 5 TABLET ORAL at 17:39

## 2020-09-20 RX ADMIN — TAMSULOSIN HYDROCHLORIDE 0.4 MILLIGRAM(S): 0.4 CAPSULE ORAL at 21:17

## 2020-09-20 RX ADMIN — HYDROMORPHONE HYDROCHLORIDE 2 MILLIGRAM(S): 2 INJECTION INTRAMUSCULAR; INTRAVENOUS; SUBCUTANEOUS at 06:43

## 2020-09-20 RX ADMIN — Medication 3 MILLIGRAM(S): at 21:17

## 2020-09-20 RX ADMIN — GABAPENTIN 600 MILLIGRAM(S): 400 CAPSULE ORAL at 13:43

## 2020-09-20 RX ADMIN — HYDROMORPHONE HYDROCHLORIDE 2 MILLIGRAM(S): 2 INJECTION INTRAMUSCULAR; INTRAVENOUS; SUBCUTANEOUS at 22:08

## 2020-09-20 RX ADMIN — OXYCODONE HYDROCHLORIDE 10 MILLIGRAM(S): 5 TABLET ORAL at 02:56

## 2020-09-20 RX ADMIN — DULOXETINE HYDROCHLORIDE 40 MILLIGRAM(S): 30 CAPSULE, DELAYED RELEASE ORAL at 11:33

## 2020-09-20 RX ADMIN — Medication 1 TABLET(S): at 05:19

## 2020-09-20 RX ADMIN — ENOXAPARIN SODIUM 100 MILLIGRAM(S): 100 INJECTION SUBCUTANEOUS at 21:15

## 2020-09-20 RX ADMIN — HYDROMORPHONE HYDROCHLORIDE 2 MILLIGRAM(S): 2 INJECTION INTRAMUSCULAR; INTRAVENOUS; SUBCUTANEOUS at 17:39

## 2020-09-20 RX ADMIN — HYDROMORPHONE HYDROCHLORIDE 2 MILLIGRAM(S): 2 INJECTION INTRAMUSCULAR; INTRAVENOUS; SUBCUTANEOUS at 09:20

## 2020-09-20 RX ADMIN — HYDROMORPHONE HYDROCHLORIDE 2 MILLIGRAM(S): 2 INJECTION INTRAMUSCULAR; INTRAVENOUS; SUBCUTANEOUS at 23:00

## 2020-09-20 RX ADMIN — LIDOCAINE 1 PATCH: 4 CREAM TOPICAL at 20:00

## 2020-09-20 RX ADMIN — OXYCODONE HYDROCHLORIDE 10 MILLIGRAM(S): 5 TABLET ORAL at 10:57

## 2020-09-20 RX ADMIN — OXYCODONE HYDROCHLORIDE 10 MILLIGRAM(S): 5 TABLET ORAL at 00:00

## 2020-09-20 RX ADMIN — OXYCODONE HYDROCHLORIDE 10 MILLIGRAM(S): 5 TABLET ORAL at 20:30

## 2020-09-20 RX ADMIN — HYDROMORPHONE HYDROCHLORIDE 2 MILLIGRAM(S): 2 INJECTION INTRAMUSCULAR; INTRAVENOUS; SUBCUTANEOUS at 01:34

## 2020-09-20 RX ADMIN — OXYCODONE HYDROCHLORIDE 10 MILLIGRAM(S): 5 TABLET ORAL at 15:11

## 2020-09-20 RX ADMIN — HYDROMORPHONE HYDROCHLORIDE 2 MILLIGRAM(S): 2 INJECTION INTRAMUSCULAR; INTRAVENOUS; SUBCUTANEOUS at 05:19

## 2020-09-20 RX ADMIN — ENOXAPARIN SODIUM 100 MILLIGRAM(S): 100 INJECTION SUBCUTANEOUS at 10:57

## 2020-09-20 RX ADMIN — LIDOCAINE 1 PATCH: 4 CREAM TOPICAL at 13:43

## 2020-09-20 NOTE — PROGRESS NOTE ADULT - SUBJECTIVE AND OBJECTIVE BOX
Patient is a 35y old  Male who presents with a chief complaint of Traumatic Spinal Cord Disfunction:  incomplete paraplegia (20 Sep 2020 08:20)    Interval Hx  Patient seen and examined at bedside. No overnight events reported. Reports Pain in LE a/w swelling.   Denies chest pain/sob/abd pain/n/v.    ALLERGIES:  No Known Allergies    MEDICATIONS  (STANDING):  DULoxetine 40 milliGRAM(s) Oral daily  enoxaparin Injectable 100 milliGRAM(s) SubCutaneous every 12 hours  famotidine    Tablet 20 milliGRAM(s) Oral <User Schedule>  gabapentin 600 milliGRAM(s) Oral three times a day  influenza   Vaccine 0.5 milliLiter(s) IntraMuscular once  lactobacillus acidophilus 1 Tablet(s) Oral two times a day  melatonin 3 milliGRAM(s) Oral at bedtime  oxyCODONE  ER Tablet 30 milliGRAM(s) Oral every 12 hours  senna 2 Tablet(s) Oral at bedtime  tamsulosin 0.4 milliGRAM(s) Oral at bedtime  traZODone 50 milliGRAM(s) Oral <User Schedule>    MEDICATIONS  (PRN):  acetaminophen   Tablet .. 650 milliGRAM(s) Oral every 6 hours PRN Temp greater or equal to 38C (100.4F), Mild Pain (1 - 3)  bisacodyl Suppository 10 milliGRAM(s) Rectal <User Schedule> PRN no bowel movement for 2 days.  diazepam    Tablet 5 milliGRAM(s) Oral every 8 hours PRN spasms  HYDROmorphone   Tablet 2 milliGRAM(s) Oral every 4 hours PRN Breakthrougth pain  lidocaine   Patch 1 Patch Transdermal every 24 hours PRN Severe pain  lidocaine 2% Gel 1 Application(s) Topical four times a day PRN pain  magnesium hydroxide Suspension 30 milliLiter(s) Oral every 12 hours PRN Constipation  ondansetron    Tablet 4 milliGRAM(s) Oral every 8 hours PRN Nausea and/or Vomiting  oxyCODONE    IR 5 milliGRAM(s) Oral every 4 hours PRN Moderate Pain (4 - 6)  oxyCODONE    IR 10 milliGRAM(s) Oral every 4 hours PRN Severe Pain (7 - 10)    Vital Signs Last 24 Hrs  T(F): 98.9 (20 Sep 2020 08:30), Max: 98.9 (20 Sep 2020 08:30)  HR: 123 (20 Sep 2020 08:30) (123 - 123)  BP: 123/65 (20 Sep 2020 08:30) (123/65 - 123/65)  RR: 16 (20 Sep 2020 08:30) (16 - 16)  SpO2: 98% (20 Sep 2020 08:30) (98% - 98%)  I&O's Summary    PHYSICAL EXAM:  General: NAD, A/O x 3  ENT: MMM, no thrush  Neck: Supple, No JVD  Lungs: Clear to auscultation bilaterally, good air entry, non-labored breathing  Cardio: RRR, S1/S2, No murmur  Abdomen: Soft, Nontender, Nondistended; Bowel sounds present  Extremities: No calf tenderness or redness; b/l LE edema , Rt. >Left  Neuro :  A/O x 3, b/l LE motor , UE     LABS:                        8.8    10.04 )-----------( 410      ( 20 Sep 2020 06:55 )             28.8     -20    134  |  95  |  10  ----------------------------<  114  4.6   |  32  |  0.85    Ca    8.6      20 Sep 2020 06:55  Phos  4.7     -  Mg     1.8     -20          eGFR if Non African American: 113 mL/min/1.73M2 (20 @ 06:55)  eGFR if African American: 131 mL/min/1.73M2 (20 @ 06:55)            Urinalysis Basic - ( 18 Sep 2020 12:45 )    Color: Yellow / Appearance: Clear / S.010 / pH: x  Gluc: x / Ketone: Negative  / Bili: Negative / Urobili: Negative   Blood: x / Protein: 30 mg/dL / Nitrite: Negative   Leuk Esterase: Negative / RBC: 0-4 /HPF / WBC 0-2 /HPF   Sq Epi: x / Non Sq Epi: Neg.-Few / Bacteria: Negative /HPF        RADIOLOGY & ADDITIONAL TESTS:    Care Discussed with Consultants/Other Providers:

## 2020-09-20 NOTE — PROGRESS NOTE ADULT - ASSESSMENT
35M admitted to [facility] on 8/22/20 with back pain, found to have fractures of L1-L5 pedicles bilaterally, leading to widening of the spinal canal with anterolisthesis of L5 on S1 with the entire L5 vertebral body width, disruption of the ALL and PLL at L5-S1 level, probable epidural hemorrhage and hemorrhage within the foramina at L1-L5, and enlargement/edema of bilateral psoas muscle.  Patient underwent evacuation of epidural hematoma, T11-pelvis posterior spinal fusion, and L2-S1 Laminectomy on 8/23/20 and subsequent L5-S1 anterior interbody fusion and L3-4 lateral interbody fusion on 9/1.  Hospital course c/b acute blood loss anemia requiring multiple transfusions, traumatic rhabdomyolysis and intractable pain.  Patient now admitted for a multidisciplinary rehab program- pt/ot/dvt ppx, pain meds    #s/p epidural hematoma  #T11-pelvis posterior spinal fusion  #L2-S1 Laminectomy   #L5-S1 anterior interbody fusion and L3-4 lateral interbody fusion on 9/1  Cont w/ comprehensive rehab program   optimize pain meds  Monitor dressing at drain site, Albert drain fell off.    # B/l LE edema , worsening due to DVT  #B/L DVT  Extensive bilateral DVT with increased clot burden compared to 09/06/2020 seen on repeat US duplex  cont on therapeutic lovenox   edema slightly improved  Elevate LE  Monitor for now    #Hyponatremia, Improved on repeat labs  monitor BMP    #Leukocytosis , likely reactive: resolved  afebrile  UA neg    #Intermittent urinary retention  monitor PVR, prn straight cath  cont flomax    #Anemia , likely post op anemia  s/p multiple PRBCs at OSH  monitor h&h, keep hb >7    #Rhabdomyolysis- 2/2 trauma , resolved, encourage PO fluids    #Pain Control- c/w Dilaudid, Oxycontin, valium    #DVT ppx: IVC filter in place, s/c lovenox

## 2020-09-20 NOTE — PROGRESS NOTE ADULT - SUBJECTIVE AND OBJECTIVE BOX
No overnight events.  Slept well.  Reports pain is controlled and is usual.  Did get up yesterday despite no therapy and performed his own program.     REVIEW OF SYSTEMS  Constitutional - No fever,  No fatigue  Musculoskeletal - +joint pain, No joint swelling, +muscle pain    VITALS  Vital Signs Last 24 Hrs  T(C): --  T(F): --  HR: --  BP: --  BP(mean): --  RR: --  SpO2: --        MEDICATIONS   acetaminophen   Tablet .. 650 milliGRAM(s) every 6 hours PRN  bisacodyl Suppository 10 milliGRAM(s) <User Schedule> PRN  diazepam    Tablet 5 milliGRAM(s) every 8 hours PRN  DULoxetine 40 milliGRAM(s) daily  enoxaparin Injectable 100 milliGRAM(s) every 12 hours  famotidine    Tablet 20 milliGRAM(s) <User Schedule>  gabapentin 600 milliGRAM(s) three times a day  HYDROmorphone   Tablet 2 milliGRAM(s) every 4 hours PRN  influenza   Vaccine 0.5 milliLiter(s) once  lactobacillus acidophilus 1 Tablet(s) two times a day  lidocaine   Patch 1 Patch every 24 hours PRN  lidocaine 2% Gel 1 Application(s) four times a day PRN  magnesium hydroxide Suspension 30 milliLiter(s) every 12 hours PRN  melatonin 3 milliGRAM(s) at bedtime  ondansetron    Tablet 4 milliGRAM(s) every 8 hours PRN  oxyCODONE    IR 5 milliGRAM(s) every 4 hours PRN  oxyCODONE    IR 10 milliGRAM(s) every 4 hours PRN  oxyCODONE  ER Tablet 30 milliGRAM(s) every 12 hours  senna 2 Tablet(s) at bedtime  tamsulosin 0.4 milliGRAM(s) at bedtime  traZODone 50 milliGRAM(s) <User Schedule>      RECENT LABS/IMAGING - Reviewed                        8.8    10.04 )-----------( 410      ( 20 Sep 2020 06:55 )             28.8     09-20    134<L>  |  95<L>  |  10  ----------------------------<  114<H>  4.6   |  32<H>  |  0.85    Ca    8.6      20 Sep 2020 06:55  Phos  4.7     09-20  Mg     1.8     09-20        Urinalysis Basic - ( 18 Sep 2020 12:45 )    Color: Yellow / Appearance: Clear / S.010 / pH: x  Gluc: x / Ketone: Negative  / Bili: Negative / Urobili: Negative   Blood: x / Protein: 30 mg/dL / Nitrite: Negative   Leuk Esterase: Negative / RBC: 0-4 /HPF / WBC 0-2 /HPF   Sq Epi: x / Non Sq Epi: Neg.-Few / Bacteria: Negative /HPF          ---------  PHYSICAL EXAM  Constitutional - NAD, Comfortable  Pulm - Breathing comfortably, No wheezing  Abd - Nondistended  Extremities - No calf tenderness  Neurologic Exam - Awake, Alert            Motor - Exam unchanged  Psychiatric - Mood WNL     ASSESSMENT/PLAN  35y Male with impairments in mobility and ADLs   - Continue 3hrs a day of comprehensive rehab program  - Continue current medications, medically stable  - DVTs - Lovenox  - Leukocytosis - Resolved, Left shift improved, UA negative  - Skin - OOB and mobilization daily   - HypoNa+ - Improved  - Appreciate psych and hospitalist involvement

## 2020-09-21 LAB
ANION GAP SERPL CALC-SCNC: 6 MMOL/L — SIGNIFICANT CHANGE UP (ref 5–17)
BASOPHILS # BLD AUTO: 0.03 K/UL — SIGNIFICANT CHANGE UP (ref 0–0.2)
BASOPHILS NFR BLD AUTO: 0.3 % — SIGNIFICANT CHANGE UP (ref 0–2)
BUN SERPL-MCNC: 8 MG/DL — SIGNIFICANT CHANGE UP (ref 7–23)
CALCIUM SERPL-MCNC: 8.4 MG/DL — SIGNIFICANT CHANGE UP (ref 8.4–10.5)
CHLORIDE SERPL-SCNC: 94 MMOL/L — LOW (ref 96–108)
CO2 SERPL-SCNC: 32 MMOL/L — HIGH (ref 22–31)
CREAT SERPL-MCNC: 0.66 MG/DL — SIGNIFICANT CHANGE UP (ref 0.5–1.3)
EOSINOPHIL # BLD AUTO: 0.08 K/UL — SIGNIFICANT CHANGE UP (ref 0–0.5)
EOSINOPHIL NFR BLD AUTO: 0.9 % — SIGNIFICANT CHANGE UP (ref 0–6)
GLUCOSE SERPL-MCNC: 114 MG/DL — HIGH (ref 70–99)
HCT VFR BLD CALC: 26.8 % — LOW (ref 39–50)
HGB BLD-MCNC: 8.6 G/DL — LOW (ref 13–17)
IMM GRANULOCYTES NFR BLD AUTO: 1 % — SIGNIFICANT CHANGE UP (ref 0–1.5)
LYMPHOCYTES # BLD AUTO: 1.11 K/UL — SIGNIFICANT CHANGE UP (ref 1–3.3)
LYMPHOCYTES # BLD AUTO: 12.5 % — LOW (ref 13–44)
MCHC RBC-ENTMCNC: 28.3 PG — SIGNIFICANT CHANGE UP (ref 27–34)
MCHC RBC-ENTMCNC: 32.1 GM/DL — SIGNIFICANT CHANGE UP (ref 32–36)
MCV RBC AUTO: 88.2 FL — SIGNIFICANT CHANGE UP (ref 80–100)
MONOCYTES # BLD AUTO: 0.75 K/UL — SIGNIFICANT CHANGE UP (ref 0–0.9)
MONOCYTES NFR BLD AUTO: 8.5 % — SIGNIFICANT CHANGE UP (ref 2–14)
NEUTROPHILS # BLD AUTO: 6.81 K/UL — SIGNIFICANT CHANGE UP (ref 1.8–7.4)
NEUTROPHILS NFR BLD AUTO: 76.8 % — SIGNIFICANT CHANGE UP (ref 43–77)
NRBC # BLD: 0 /100 WBCS — SIGNIFICANT CHANGE UP (ref 0–0)
PLATELET # BLD AUTO: 408 K/UL — HIGH (ref 150–400)
POTASSIUM SERPL-MCNC: 3.8 MMOL/L — SIGNIFICANT CHANGE UP (ref 3.5–5.3)
POTASSIUM SERPL-SCNC: 3.8 MMOL/L — SIGNIFICANT CHANGE UP (ref 3.5–5.3)
RBC # BLD: 3.04 M/UL — LOW (ref 4.2–5.8)
RBC # FLD: 13.7 % — SIGNIFICANT CHANGE UP (ref 10.3–14.5)
SODIUM SERPL-SCNC: 132 MMOL/L — LOW (ref 135–145)
WBC # BLD: 8.87 K/UL — SIGNIFICANT CHANGE UP (ref 3.8–10.5)
WBC # FLD AUTO: 8.87 K/UL — SIGNIFICANT CHANGE UP (ref 3.8–10.5)

## 2020-09-21 PROCEDURE — 99232 SBSQ HOSP IP/OBS MODERATE 35: CPT

## 2020-09-21 RX ORDER — OXYCODONE HYDROCHLORIDE 5 MG/1
30 TABLET ORAL EVERY 12 HOURS
Refills: 0 | Status: DISCONTINUED | OUTPATIENT
Start: 2020-09-21 | End: 2020-09-25

## 2020-09-21 RX ORDER — HYDROMORPHONE HYDROCHLORIDE 2 MG/ML
2 INJECTION INTRAMUSCULAR; INTRAVENOUS; SUBCUTANEOUS EVERY 4 HOURS
Refills: 0 | Status: DISCONTINUED | OUTPATIENT
Start: 2020-09-21 | End: 2020-09-25

## 2020-09-21 RX ORDER — OXYCODONE HYDROCHLORIDE 5 MG/1
5 TABLET ORAL EVERY 4 HOURS
Refills: 0 | Status: DISCONTINUED | OUTPATIENT
Start: 2020-09-21 | End: 2020-09-25

## 2020-09-21 RX ORDER — OXYCODONE HYDROCHLORIDE 5 MG/1
10 TABLET ORAL EVERY 4 HOURS
Refills: 0 | Status: DISCONTINUED | OUTPATIENT
Start: 2020-09-21 | End: 2020-09-25

## 2020-09-21 RX ORDER — DIAZEPAM 5 MG
5 TABLET ORAL EVERY 8 HOURS
Refills: 0 | Status: DISCONTINUED | OUTPATIENT
Start: 2020-09-21 | End: 2020-09-25

## 2020-09-21 RX ADMIN — Medication 1 TABLET(S): at 06:11

## 2020-09-21 RX ADMIN — HYDROMORPHONE HYDROCHLORIDE 2 MILLIGRAM(S): 2 INJECTION INTRAMUSCULAR; INTRAVENOUS; SUBCUTANEOUS at 06:16

## 2020-09-21 RX ADMIN — Medication 1 TABLET(S): at 17:38

## 2020-09-21 RX ADMIN — OXYCODONE HYDROCHLORIDE 30 MILLIGRAM(S): 5 TABLET ORAL at 06:16

## 2020-09-21 RX ADMIN — OXYCODONE HYDROCHLORIDE 10 MILLIGRAM(S): 5 TABLET ORAL at 11:42

## 2020-09-21 RX ADMIN — OXYCODONE HYDROCHLORIDE 30 MILLIGRAM(S): 5 TABLET ORAL at 17:38

## 2020-09-21 RX ADMIN — HYDROMORPHONE HYDROCHLORIDE 2 MILLIGRAM(S): 2 INJECTION INTRAMUSCULAR; INTRAVENOUS; SUBCUTANEOUS at 11:15

## 2020-09-21 RX ADMIN — HYDROMORPHONE HYDROCHLORIDE 2 MILLIGRAM(S): 2 INJECTION INTRAMUSCULAR; INTRAVENOUS; SUBCUTANEOUS at 13:45

## 2020-09-21 RX ADMIN — ENOXAPARIN SODIUM 100 MILLIGRAM(S): 100 INJECTION SUBCUTANEOUS at 08:24

## 2020-09-21 RX ADMIN — LIDOCAINE 1 PATCH: 4 CREAM TOPICAL at 19:35

## 2020-09-21 RX ADMIN — HYDROMORPHONE HYDROCHLORIDE 2 MILLIGRAM(S): 2 INJECTION INTRAMUSCULAR; INTRAVENOUS; SUBCUTANEOUS at 02:08

## 2020-09-21 RX ADMIN — LIDOCAINE 1 PATCH: 4 CREAM TOPICAL at 13:20

## 2020-09-21 RX ADMIN — HYDROMORPHONE HYDROCHLORIDE 2 MILLIGRAM(S): 2 INJECTION INTRAMUSCULAR; INTRAVENOUS; SUBCUTANEOUS at 02:40

## 2020-09-21 RX ADMIN — GABAPENTIN 600 MILLIGRAM(S): 400 CAPSULE ORAL at 13:20

## 2020-09-21 RX ADMIN — OXYCODONE HYDROCHLORIDE 30 MILLIGRAM(S): 5 TABLET ORAL at 06:11

## 2020-09-21 RX ADMIN — OXYCODONE HYDROCHLORIDE 10 MILLIGRAM(S): 5 TABLET ORAL at 21:00

## 2020-09-21 RX ADMIN — GABAPENTIN 600 MILLIGRAM(S): 400 CAPSULE ORAL at 06:11

## 2020-09-21 RX ADMIN — OXYCODONE HYDROCHLORIDE 10 MILLIGRAM(S): 5 TABLET ORAL at 20:23

## 2020-09-21 RX ADMIN — TAMSULOSIN HYDROCHLORIDE 0.4 MILLIGRAM(S): 0.4 CAPSULE ORAL at 21:48

## 2020-09-21 RX ADMIN — HYDROMORPHONE HYDROCHLORIDE 2 MILLIGRAM(S): 2 INJECTION INTRAMUSCULAR; INTRAVENOUS; SUBCUTANEOUS at 21:45

## 2020-09-21 RX ADMIN — OXYCODONE HYDROCHLORIDE 10 MILLIGRAM(S): 5 TABLET ORAL at 05:35

## 2020-09-21 RX ADMIN — Medication 3 MILLIGRAM(S): at 21:44

## 2020-09-21 RX ADMIN — OXYCODONE HYDROCHLORIDE 10 MILLIGRAM(S): 5 TABLET ORAL at 16:40

## 2020-09-21 RX ADMIN — HYDROMORPHONE HYDROCHLORIDE 2 MILLIGRAM(S): 2 INJECTION INTRAMUSCULAR; INTRAVENOUS; SUBCUTANEOUS at 18:30

## 2020-09-21 RX ADMIN — OXYCODONE HYDROCHLORIDE 30 MILLIGRAM(S): 5 TABLET ORAL at 18:46

## 2020-09-21 RX ADMIN — GABAPENTIN 600 MILLIGRAM(S): 400 CAPSULE ORAL at 21:45

## 2020-09-21 RX ADMIN — LIDOCAINE 1 PATCH: 4 CREAM TOPICAL at 01:54

## 2020-09-21 RX ADMIN — DULOXETINE HYDROCHLORIDE 40 MILLIGRAM(S): 30 CAPSULE, DELAYED RELEASE ORAL at 11:44

## 2020-09-21 RX ADMIN — HYDROMORPHONE HYDROCHLORIDE 2 MILLIGRAM(S): 2 INJECTION INTRAMUSCULAR; INTRAVENOUS; SUBCUTANEOUS at 22:30

## 2020-09-21 RX ADMIN — ENOXAPARIN SODIUM 100 MILLIGRAM(S): 100 INJECTION SUBCUTANEOUS at 21:44

## 2020-09-21 RX ADMIN — OXYCODONE HYDROCHLORIDE 10 MILLIGRAM(S): 5 TABLET ORAL at 01:40

## 2020-09-21 RX ADMIN — OXYCODONE HYDROCHLORIDE 10 MILLIGRAM(S): 5 TABLET ORAL at 08:20

## 2020-09-21 RX ADMIN — HYDROMORPHONE HYDROCHLORIDE 2 MILLIGRAM(S): 2 INJECTION INTRAMUSCULAR; INTRAVENOUS; SUBCUTANEOUS at 10:25

## 2020-09-21 RX ADMIN — OXYCODONE HYDROCHLORIDE 10 MILLIGRAM(S): 5 TABLET ORAL at 09:15

## 2020-09-21 RX ADMIN — HYDROMORPHONE HYDROCHLORIDE 2 MILLIGRAM(S): 2 INJECTION INTRAMUSCULAR; INTRAVENOUS; SUBCUTANEOUS at 17:38

## 2020-09-21 RX ADMIN — OXYCODONE HYDROCHLORIDE 10 MILLIGRAM(S): 5 TABLET ORAL at 12:45

## 2020-09-21 RX ADMIN — OXYCODONE HYDROCHLORIDE 10 MILLIGRAM(S): 5 TABLET ORAL at 15:41

## 2020-09-21 RX ADMIN — OXYCODONE HYDROCHLORIDE 10 MILLIGRAM(S): 5 TABLET ORAL at 00:31

## 2020-09-21 RX ADMIN — OXYCODONE HYDROCHLORIDE 10 MILLIGRAM(S): 5 TABLET ORAL at 04:42

## 2020-09-21 RX ADMIN — HYDROMORPHONE HYDROCHLORIDE 2 MILLIGRAM(S): 2 INJECTION INTRAMUSCULAR; INTRAVENOUS; SUBCUTANEOUS at 13:20

## 2020-09-21 NOTE — PROGRESS NOTE ADULT - ASSESSMENT
35M admitted to [facility] on 8/22/20 with back pain, found to have fractures of L1-L5 pedicles bilaterally, leading to widening of the spinal canal with anterolisthesis of L5 on S1 with the entire L5 vertebral body width, disruption of the ALL and PLL at L5-S1 level, probable epidural hemorrhage and hemorrhage within the foramina at L1-L5, and enlargement/edema of bilateral psoas muscle.  Patient underwent evacuation of epidural hematoma, T11-pelvis posterior spinal fusion, and L2-S1 Laminectomy on 8/23/20 and subsequent L5-S1 anterior interbody fusion and L3-4 lateral interbody fusion on 9/1.  Hospital course c/b acute blood loss anemia requiring multiple transfusions, traumatic rhabdomyolysis and intractable pain.  Patient now admitted for a multidisciplinary rehab program- pt/ot/dvt ppx, pain meds    #s/p epidural hematoma  #T11-pelvis posterior spinal fusion  #L2-S1 Laminectomy   #L5-S1 anterior interbody fusion and L3-4 lateral interbody fusion on 9/1  Cont w/ comprehensive rehab program   Pain control as per primary team     # B/l LE edema ,slightly Improved   #B/L DVT  Extensive bilateral DVT with increased clot burden compared to 09/06/2020 seen on repeat US duplex  cont on therapeutic lovenox   Elevate LE, pain control  Monitor for now    #Hyponatremia  check serum/urine osmolarity  will monitor    #Leukocytosis , likely reactive: resolved  afebrile  UA neg    #Intermittent urinary retention  monitor PVR, prn straight cath  cont flomax    #Anemia , likely post op anemia  s/p multiple PRBCs at OSH  monitor h&h, keep hb >7    #Rhabdomyolysis- 2/2 trauma , resolved, encourage PO fluids    #Pain Control- c/w Dilaudid, Oxycontin, valium    #DVT ppx: IVC filter in place, s/c lovenox    case d/w Dr. arnett

## 2020-09-21 NOTE — PROGRESS NOTE ADULT - SUBJECTIVE AND OBJECTIVE BOX
SUBJECTIVE & OVERNIGHT EVENTS:  Patient seen and examined.  No acute events overnight. Continues to have pain in RLE>LLE due to clot burden.  Difficulty sleeping due to pain.  No other new complaints.       [X] Constitutional WNL        [X] Cardio WNL              [X] Resp WNL  [X] GI WNL                            [X]  WNL                    [] Heme +B/L LE DVTs  [X] Endo WNL                       [] Skin +spinal incision                  [X] MSK WNL  [] Neuro +LE weakness                     [X] Cognitive WNL         [] Psych +Depression/anxiety    OBJECTIVE  T(C): 37.3 (09-21-20 @ 07:52), Max: 37.3 (09-21-20 @ 07:52)  HR: 109 (09-21-20 @ 07:52) (109 - 111)  BP: 120/68 (09-21-20 @ 07:52) (120/68 - 126/76)  RR: 18 (09-21-20 @ 07:52) (17 - 18)  SpO2: 96% (09-21-20 @ 07:52) (96% - 97%)      PHYSICAL EXAM  Constitutional: NAD  HEENT: NCAT, EOMI, handling secretions well  Cardio: well-perfused  Resp: symmetric chest-rise, no increased WOB  GI: non-distended  : no francis  Extremities: well-perfused; RLE>LLE swelling. RLE erythematous and warm to touch        LABS:                        8.6    8.87  )-----------( 408      ( 21 Sep 2020 08:00 )             26.8     21 Sep 2020 08:00    132    |  94     |  8      ----------------------------<  114    3.8     |  32     |  0.66     Ca    8.4        21 Sep 2020 08:00            MEDICATIONS   acetaminophen   Tablet .. 650 milliGRAM(s) Oral every 6 hours PRN  bisacodyl Suppository 10 milliGRAM(s) Rectal <User Schedule> PRN  diazepam    Tablet 5 milliGRAM(s) Oral every 8 hours PRN  DULoxetine 40 milliGRAM(s) Oral daily  enoxaparin Injectable 100 milliGRAM(s) SubCutaneous every 12 hours  famotidine    Tablet 20 milliGRAM(s) Oral <User Schedule>  gabapentin 600 milliGRAM(s) Oral three times a day  HYDROmorphone   Tablet 2 milliGRAM(s) Oral every 4 hours PRN  influenza   Vaccine 0.5 milliLiter(s) IntraMuscular once  lactobacillus acidophilus 1 Tablet(s) Oral two times a day  lidocaine   Patch 1 Patch Transdermal every 24 hours PRN  lidocaine 2% Gel 1 Application(s) Topical four times a day PRN  magnesium hydroxide Suspension 30 milliLiter(s) Oral every 12 hours PRN  melatonin 3 milliGRAM(s) Oral at bedtime  ondansetron    Tablet 4 milliGRAM(s) Oral every 8 hours PRN  oxyCODONE    IR 5 milliGRAM(s) Oral every 4 hours PRN  oxyCODONE    IR 10 milliGRAM(s) Oral every 4 hours PRN  oxyCODONE  ER Tablet 30 milliGRAM(s) Oral every 12 hours  senna 2 Tablet(s) Oral at bedtime  tamsulosin 0.4 milliGRAM(s) Oral at bedtime  traZODone 50 milliGRAM(s) Oral <User Schedule>

## 2020-09-21 NOTE — PROGRESS NOTE ADULT - SUBJECTIVE AND OBJECTIVE BOX
Patient is a 35y old  Male who presents with a chief complaint of Traumatic Spinal Cord Disfunction: 04.211 incomplete paraplegia (20 Sep 2020 12:50)    Interval Hx  Patient seen and examined at bedside. No overnight events reported.   Continues to have pain right lower extremity , otherwise denies other symptoms.    ALLERGIES:  No Known Allergies    MEDICATIONS  (STANDING):  DULoxetine 40 milliGRAM(s) Oral daily  enoxaparin Injectable 100 milliGRAM(s) SubCutaneous every 12 hours  famotidine    Tablet 20 milliGRAM(s) Oral <User Schedule>  gabapentin 600 milliGRAM(s) Oral three times a day  influenza   Vaccine 0.5 milliLiter(s) IntraMuscular once  lactobacillus acidophilus 1 Tablet(s) Oral two times a day  melatonin 3 milliGRAM(s) Oral at bedtime  oxyCODONE  ER Tablet 30 milliGRAM(s) Oral every 12 hours  senna 2 Tablet(s) Oral at bedtime  tamsulosin 0.4 milliGRAM(s) Oral at bedtime  traZODone 50 milliGRAM(s) Oral <User Schedule>    MEDICATIONS  (PRN):  acetaminophen   Tablet .. 650 milliGRAM(s) Oral every 6 hours PRN Temp greater or equal to 38C (100.4F), Mild Pain (1 - 3)  bisacodyl Suppository 10 milliGRAM(s) Rectal <User Schedule> PRN no bowel movement for 2 days.  diazepam    Tablet 5 milliGRAM(s) Oral every 8 hours PRN spasms  HYDROmorphone   Tablet 2 milliGRAM(s) Oral every 4 hours PRN Breakthrougth pain  lidocaine   Patch 1 Patch Transdermal every 24 hours PRN Severe pain  lidocaine 2% Gel 1 Application(s) Topical four times a day PRN pain  magnesium hydroxide Suspension 30 milliLiter(s) Oral every 12 hours PRN Constipation  ondansetron    Tablet 4 milliGRAM(s) Oral every 8 hours PRN Nausea and/or Vomiting  oxyCODONE    IR 5 milliGRAM(s) Oral every 4 hours PRN Moderate Pain (4 - 6)  oxyCODONE    IR 10 milliGRAM(s) Oral every 4 hours PRN Severe Pain (7 - 10)    Vital Signs Last 24 Hrs  T(F): 99.2 (21 Sep 2020 07:52), Max: 99.2 (21 Sep 2020 07:52)  HR: 109 (21 Sep 2020 07:52) (109 - 111)  BP: 120/68 (21 Sep 2020 07:52) (120/68 - 126/76)  RR: 18 (21 Sep 2020 07:52) (17 - 18)  SpO2: 96% (21 Sep 2020 07:52) (96% - 97%)  I&O's Summary    PHYSICAL EXAM:  General: NAD, A/O x 3  ENT: MMM, no thrush  Neck: Supple, No JVD  Lungs: Clear to auscultation bilaterally, good air entry, non-labored breathing  Cardio: RRR, S1/S2, No murmur  Abdomen: Soft, Nontender, Nondistended; Bowel sounds present  Extremities: No calf tenderness or redness; b/l LE edema , Rt. >Left  Neuro :  A/O x 3, b/l LE motor 2/5, UE 5/5      LABS                        8.6    8.87  )-----------( 408      ( 21 Sep 2020 08:00 )             26.8     09-21    132  |  94  |  8   ----------------------------<  114  3.8   |  32  |  0.66    Ca    8.4      21 Sep 2020 08:00  Phos  4.7     09-20  Mg     1.8     09-20          eGFR if Non African American: 125 mL/min/1.73M2 (09-21-20 @ 08:00)  eGFR if : 145 mL/min/1.73M2 (09-21-20 @ 08:00)          RADIOLOGY & ADDITIONAL TESTS:    Care Discussed with Consultants/Other Providers: Dr. Pettit

## 2020-09-21 NOTE — PROGRESS NOTE ADULT - ASSESSMENT
ASSESSMENT/PLAN  BRUNO CHO is a 35M admitted to [facility] on 8/22/20 with back pain, found to have fractures of L1-L5 pedicles bilaterally, leading to widening of the spinal canal with anterolisthesis of L5 on S1 with the entire L5 vertebral body width, disruption of the ALL and PLL at L5-S1 level, probable epidural hemorrhage and hemorrhage within the foramina at L1-L5, and enlargement/edema of bilateral psoas muscle.  Patient underwent evacuation of epidural hematoma, T11-pelvis posterior spinal fusion, and L2-S1 Laminectomy on 8/23/20 and subsequent L5-S1 anterior interbody fusion and L3-4 lateral interbody fusion on 9/1.  Hospital course c/b acute blood loss anemia requiring multiple transfusions, traumatic rhabodmyolysis, and intractable pain.  Patient now admitted for a multidisciplinary rehab program. 09-04-20 @ 14:37    Comprehensive Multidisciplinary Rehab Program:  - Start comprehensive rehab program of PT/OT - 3 hours a day, 5 days a week  - P&O as needed    -------------  MEDICAL MANAGEMENT     #Traumatic Spinal Cord Injury  - pt with complete anterolisthesis of L5 on S1, multiple pedicular fractures, and epidural hemorrhage   - s/p T11-pelvis posterior spinal fusion, and L2-S1 Laminectomy on 8/23/20 and subsequent L5-S1 anterior interbody fusion and L3-4 lateral interbody fusion on 9/1  - pain control as below  - PT/OT  - spinal & Fall precautions  - TLSO/AFOs when OOB  - monitor bladder/bowel function out of concern for neurogenic bowel  - JETHRO drain removed; off keflex      #Tachycardia  - per chart review, pt tachycardic prior to transfer to [facility], but no dopplers/CTA obtained  - CTA 9/6 inconclusive re: PE  - Doppler 9/6+B/L DVT   - TTE 9/9 w/o evidence of right heart strain     #Pain Control   9/14/20- oxycodone 5-10 mg for moderate-severe pain, oxycontin 20 mg Q12h, valium 5 mg three times a day prn for spasms, Gabapentin increased to 400 mg three times a day. Dilaudid 2 mg Q4h for breakthrough pain.    9/15/20: plan to increase oxycontin to 30 mg Q12h, Gabapentin to 600 mg TID. SBP > 120,     # adjustment issues  - c/w psychological support   - c/w cymbalta    #Rhabdomyolysis (resolved)  - 2/2 trauma   - largely resolved: <900 on 8/31  - encourage PO fluids    #Bladder Management  Neurogenic bladder  - francis removed 9/4 am  - monitor bladder scans, PVR PRN as pt at risk for neurogenic bladder   - c/w flomax.     #IVC filter  - placed 8/26  - will need removed in 5-6 months     #DVT   - IVC filter  - SCDs  - tachycardia: Doppler and cTA chest to eval for DVT and PE. however per chart review pt appears to have been tachycardic for some time prior to arrival in rehab.  - doppler showed bilateral DVTs, vascular surgery consult- continue to monitor, okay to continue therapy.   - RLE edema: got 2 days of lasix few days ago, improved.   - 9/17: Doppler positive for worsening BLE clots, occlusive clots in RLE, nonocclusive clots in LLE. Surgeon Dr. Rivera cleared patient to start on full anticoagulation.  Now on 100 mg Lovenox Q12h. Discussed result and therapy with patient. Vascular surgery - continue full anticoagulation treatment for 6-12 months. Elevate bilateral lower extremities and ACE wrap. Awaiting call back from vascular surgery to discuss possible clot retrieval as pt with significant pain/functional limitations due to clots  - Hb slowly dropping- likely due to increased clot burden with worsening DVTs. Continue to monitor.     # Insomnia: trazodone 50 mg at night.     Outpatient Follow-up:    Gabrielle Villegas  ORTHOPAEDIC SURGERY  [address], [address]  [place] [address]  Phone: [phone]  Fax: [phone]  Follow Up Time:     Dave Rivera  ORTHOPAEDIC SURGERY  [address] [place] [address]  Phone: [phone]  Fax: [phone]

## 2020-09-22 LAB
ANION GAP SERPL CALC-SCNC: 6 MMOL/L — SIGNIFICANT CHANGE UP (ref 5–17)
BUN SERPL-MCNC: 6 MG/DL — LOW (ref 7–23)
CALCIUM SERPL-MCNC: 8.3 MG/DL — LOW (ref 8.4–10.5)
CHLORIDE SERPL-SCNC: 96 MMOL/L — SIGNIFICANT CHANGE UP (ref 96–108)
CO2 SERPL-SCNC: 33 MMOL/L — HIGH (ref 22–31)
CREAT SERPL-MCNC: 0.79 MG/DL — SIGNIFICANT CHANGE UP (ref 0.5–1.3)
GLUCOSE SERPL-MCNC: 114 MG/DL — HIGH (ref 70–99)
OSMOLALITY SERPL: 276 MOSMOL/KG — SIGNIFICANT CHANGE UP (ref 275–300)
OSMOLALITY UR: 168 MOSM/KG — SIGNIFICANT CHANGE UP (ref 50–1200)
POTASSIUM SERPL-MCNC: 3.7 MMOL/L — SIGNIFICANT CHANGE UP (ref 3.5–5.3)
POTASSIUM SERPL-SCNC: 3.7 MMOL/L — SIGNIFICANT CHANGE UP (ref 3.5–5.3)
SODIUM SERPL-SCNC: 135 MMOL/L — SIGNIFICANT CHANGE UP (ref 135–145)

## 2020-09-22 PROCEDURE — 99232 SBSQ HOSP IP/OBS MODERATE 35: CPT

## 2020-09-22 RX ORDER — GABAPENTIN 400 MG/1
800 CAPSULE ORAL THREE TIMES A DAY
Refills: 0 | Status: DISCONTINUED | OUTPATIENT
Start: 2020-09-22 | End: 2020-09-30

## 2020-09-22 RX ADMIN — OXYCODONE HYDROCHLORIDE 10 MILLIGRAM(S): 5 TABLET ORAL at 13:15

## 2020-09-22 RX ADMIN — TAMSULOSIN HYDROCHLORIDE 0.4 MILLIGRAM(S): 0.4 CAPSULE ORAL at 22:25

## 2020-09-22 RX ADMIN — OXYCODONE HYDROCHLORIDE 10 MILLIGRAM(S): 5 TABLET ORAL at 04:50

## 2020-09-22 RX ADMIN — HYDROMORPHONE HYDROCHLORIDE 2 MILLIGRAM(S): 2 INJECTION INTRAMUSCULAR; INTRAVENOUS; SUBCUTANEOUS at 21:00

## 2020-09-22 RX ADMIN — GABAPENTIN 800 MILLIGRAM(S): 400 CAPSULE ORAL at 14:36

## 2020-09-22 RX ADMIN — OXYCODONE HYDROCHLORIDE 10 MILLIGRAM(S): 5 TABLET ORAL at 21:00

## 2020-09-22 RX ADMIN — Medication 1 TABLET(S): at 18:15

## 2020-09-22 RX ADMIN — OXYCODONE HYDROCHLORIDE 10 MILLIGRAM(S): 5 TABLET ORAL at 18:48

## 2020-09-22 RX ADMIN — GABAPENTIN 600 MILLIGRAM(S): 400 CAPSULE ORAL at 06:21

## 2020-09-22 RX ADMIN — OXYCODONE HYDROCHLORIDE 10 MILLIGRAM(S): 5 TABLET ORAL at 00:55

## 2020-09-22 RX ADMIN — LIDOCAINE 1 PATCH: 4 CREAM TOPICAL at 12:21

## 2020-09-22 RX ADMIN — HYDROMORPHONE HYDROCHLORIDE 2 MILLIGRAM(S): 2 INJECTION INTRAMUSCULAR; INTRAVENOUS; SUBCUTANEOUS at 02:50

## 2020-09-22 RX ADMIN — OXYCODONE HYDROCHLORIDE 30 MILLIGRAM(S): 5 TABLET ORAL at 07:00

## 2020-09-22 RX ADMIN — OXYCODONE HYDROCHLORIDE 10 MILLIGRAM(S): 5 TABLET ORAL at 20:30

## 2020-09-22 RX ADMIN — ENOXAPARIN SODIUM 100 MILLIGRAM(S): 100 INJECTION SUBCUTANEOUS at 22:18

## 2020-09-22 RX ADMIN — OXYCODONE HYDROCHLORIDE 30 MILLIGRAM(S): 5 TABLET ORAL at 18:12

## 2020-09-22 RX ADMIN — OXYCODONE HYDROCHLORIDE 10 MILLIGRAM(S): 5 TABLET ORAL at 08:15

## 2020-09-22 RX ADMIN — LIDOCAINE 1 PATCH: 4 CREAM TOPICAL at 00:45

## 2020-09-22 RX ADMIN — LIDOCAINE 1 PATCH: 4 CREAM TOPICAL at 19:56

## 2020-09-22 RX ADMIN — HYDROMORPHONE HYDROCHLORIDE 2 MILLIGRAM(S): 2 INJECTION INTRAMUSCULAR; INTRAVENOUS; SUBCUTANEOUS at 10:21

## 2020-09-22 RX ADMIN — HYDROMORPHONE HYDROCHLORIDE 2 MILLIGRAM(S): 2 INJECTION INTRAMUSCULAR; INTRAVENOUS; SUBCUTANEOUS at 07:00

## 2020-09-22 RX ADMIN — Medication 1 TABLET(S): at 06:21

## 2020-09-22 RX ADMIN — OXYCODONE HYDROCHLORIDE 10 MILLIGRAM(S): 5 TABLET ORAL at 09:00

## 2020-09-22 RX ADMIN — HYDROMORPHONE HYDROCHLORIDE 2 MILLIGRAM(S): 2 INJECTION INTRAMUSCULAR; INTRAVENOUS; SUBCUTANEOUS at 11:15

## 2020-09-22 RX ADMIN — HYDROMORPHONE HYDROCHLORIDE 2 MILLIGRAM(S): 2 INJECTION INTRAMUSCULAR; INTRAVENOUS; SUBCUTANEOUS at 18:12

## 2020-09-22 RX ADMIN — HYDROMORPHONE HYDROCHLORIDE 2 MILLIGRAM(S): 2 INJECTION INTRAMUSCULAR; INTRAVENOUS; SUBCUTANEOUS at 22:19

## 2020-09-22 RX ADMIN — OXYCODONE HYDROCHLORIDE 10 MILLIGRAM(S): 5 TABLET ORAL at 12:22

## 2020-09-22 RX ADMIN — GABAPENTIN 800 MILLIGRAM(S): 400 CAPSULE ORAL at 22:18

## 2020-09-22 RX ADMIN — ENOXAPARIN SODIUM 100 MILLIGRAM(S): 100 INJECTION SUBCUTANEOUS at 10:23

## 2020-09-22 RX ADMIN — OXYCODONE HYDROCHLORIDE 10 MILLIGRAM(S): 5 TABLET ORAL at 16:57

## 2020-09-22 RX ADMIN — HYDROMORPHONE HYDROCHLORIDE 2 MILLIGRAM(S): 2 INJECTION INTRAMUSCULAR; INTRAVENOUS; SUBCUTANEOUS at 06:53

## 2020-09-22 RX ADMIN — OXYCODONE HYDROCHLORIDE 10 MILLIGRAM(S): 5 TABLET ORAL at 00:29

## 2020-09-22 RX ADMIN — HYDROMORPHONE HYDROCHLORIDE 2 MILLIGRAM(S): 2 INJECTION INTRAMUSCULAR; INTRAVENOUS; SUBCUTANEOUS at 15:30

## 2020-09-22 RX ADMIN — SENNA PLUS 2 TABLET(S): 8.6 TABLET ORAL at 22:19

## 2020-09-22 RX ADMIN — DULOXETINE HYDROCHLORIDE 40 MILLIGRAM(S): 30 CAPSULE, DELAYED RELEASE ORAL at 11:52

## 2020-09-22 RX ADMIN — Medication 3 MILLIGRAM(S): at 22:18

## 2020-09-22 RX ADMIN — HYDROMORPHONE HYDROCHLORIDE 2 MILLIGRAM(S): 2 INJECTION INTRAMUSCULAR; INTRAVENOUS; SUBCUTANEOUS at 14:36

## 2020-09-22 RX ADMIN — OXYCODONE HYDROCHLORIDE 30 MILLIGRAM(S): 5 TABLET ORAL at 06:21

## 2020-09-22 RX ADMIN — HYDROMORPHONE HYDROCHLORIDE 2 MILLIGRAM(S): 2 INJECTION INTRAMUSCULAR; INTRAVENOUS; SUBCUTANEOUS at 03:40

## 2020-09-22 NOTE — PROGRESS NOTE ADULT - ASSESSMENT
35M admitted to [facility] on 8/22/20 with back pain, found to have fractures of L1-L5 pedicles bilaterally, leading to widening of the spinal canal with anterolisthesis of L5 on S1 with the entire L5 vertebral body width, disruption of the ALL and PLL at L5-S1 level, probable epidural hemorrhage and hemorrhage within the foramina at L1-L5, and enlargement/edema of bilateral psoas muscle.  Patient underwent evacuation of epidural hematoma, T11-pelvis posterior spinal fusion, and L2-S1 Laminectomy on 8/23/20 and subsequent L5-S1 anterior interbody fusion and L3-4 lateral interbody fusion on 9/1.  Hospital course c/b acute blood loss anemia requiring multiple transfusions, traumatic rhabdomyolysis and intractable pain.  Patient now admitted for a multidisciplinary rehab program- pt/ot/dvt ppx, pain meds.    #s/p epidural hematoma  #T11-pelvis posterior spinal fusion  #L2-S1 Laminectomy   #L5-S1 anterior interbody fusion and L3-4 lateral interbody fusion on 9/1  Cont w/ comprehensive rehab program   Pain control as per primary team     # B/l LE edema ,Improving  #B/L DVT  Extensive bilateral DVT with increased clot burden compared to 09/06/2020 seen on repeat US duplex  cont on therapeutic lovenox   Elevate LE, pain control  Vascular following  Monitor for now    #Hyponatremia, improving  serum osm: 276 /urine osmolarity pending  will monitor    #Leukocytosis , likely reactive: resolved  afebrile  UA neg    #Intermittent urinary retention  monitor PVR, prn straight cath  cont flomax    #Anemia , likely post op anemia  s/p multiple PRBCs at OSH  monitor h&h, keep hb >7    #Rhabdomyolysis- 2/2 trauma , resolved, encourage PO fluids    #Pain Control- c/w Dilaudid, Oxycontin, valium    #DVT ppx: IVC filter in place, s/c lovenox    case d/w Dr. arnett

## 2020-09-22 NOTE — CHART NOTE - NSCHARTNOTEFT_GEN_A_CORE
Nutrition Follow Up Note  Hospital Course   (Per Electronic Medical Record)    Source:  Patient [X]  Medical Record [X]      Diet:   Regular Diet w/ Thin Liquids  Tolerates Diet Well  No Chewing/Swallowing Difficulties  No Recent Nausea, Vomiting, Diarrhea or Constipation  Consumes % of Meals (as Per Documentation)    Enteral/Parenteral Nutrition: Not Applicable    Current Weight: 239.6lb on 9/6  Weights Currently Stable @This Time  Obtain Weights Weekly     Pertinent Medications: MEDICATIONS  (STANDING):  DULoxetine 40 milliGRAM(s) Oral daily  enoxaparin Injectable 100 milliGRAM(s) SubCutaneous every 12 hours  famotidine    Tablet 20 milliGRAM(s) Oral <User Schedule>  gabapentin 600 milliGRAM(s) Oral three times a day  influenza   Vaccine 0.5 milliLiter(s) IntraMuscular once  lactobacillus acidophilus 1 Tablet(s) Oral two times a day  melatonin 3 milliGRAM(s) Oral at bedtime  oxyCODONE  ER Tablet 30 milliGRAM(s) Oral every 12 hours  senna 2 Tablet(s) Oral at bedtime  tamsulosin 0.4 milliGRAM(s) Oral at bedtime  traZODone 50 milliGRAM(s) Oral <User Schedule>    MEDICATIONS  (PRN):  acetaminophen   Tablet .. 650 milliGRAM(s) Oral every 6 hours PRN Temp greater or equal to 38C (100.4F), Mild Pain (1 - 3)  bisacodyl Suppository 10 milliGRAM(s) Rectal <User Schedule> PRN no bowel movement for 2 days.  diazepam    Tablet 5 milliGRAM(s) Oral every 8 hours PRN spasms  HYDROmorphone   Tablet 2 milliGRAM(s) Oral every 4 hours PRN Breakthrougth pain  lidocaine   Patch 1 Patch Transdermal every 24 hours PRN Severe pain  lidocaine 2% Gel 1 Application(s) Topical four times a day PRN pain  magnesium hydroxide Suspension 30 milliLiter(s) Oral every 12 hours PRN Constipation  ondansetron    Tablet 4 milliGRAM(s) Oral every 8 hours PRN Nausea and/or Vomiting  oxyCODONE    IR 5 milliGRAM(s) Oral every 4 hours PRN Moderate Pain (4 - 6)  oxyCODONE    IR 10 milliGRAM(s) Oral every 4 hours PRN Severe Pain (7 - 10)    Pertinent Labs:  09-22 Na135 mmol/L Glu 114 mg/dL<H> K+ 3.7 mmol/L Cr  0.79 mg/dL BUN 6 mg/dL<L> 09-20 Phos 4.7 mg/dL<H>    Skin: No Pressure Ulcers  Multiple Surgical Incisions  (as Per Nursing Flow Sheet)    Edema: +1 Edema Noted to Right Ankle  (as Per Documentation)     Last Bowel Movement: on 9/20    Estimated Needs:   [X] No Change Since Previous Assessment    Previous Nutrition Diagnosis:   Obese    Nutrition Diagnosis is [X] Ongoing - Continue Nutrition Plan of Care     New Nutrition Diagnosis: [X] Not Applicable    Interventions:   1. Recommend Continue Nutrition Plan of Care     Monitoring & Evaluation:   [X] Weights   [X] PO Intake   [X] Skin Integrity   [X] Follow Up (Per Protocol)  [X] Tolerance to Diet Prescription   [X] Other: Labs     Registered Dietitian/Nutritionist Remains Available.  Chandu Fonseca RDN    Pager # [phone]  Phone# [phone]

## 2020-09-22 NOTE — PROGRESS NOTE ADULT - ASSESSMENT
35 y.o. male has a post phlebitic right leg. He has a filter and is on therapeutic Lovenox. He has no limb ischemia . Lytic therapy or venous thrombectomy is not indicated. He may do bedside PT today and OOB for full therapy tomorrow. When in bed the right leg should be elevated above his heart level.

## 2020-09-22 NOTE — PROGRESS NOTE ADULT - SUBJECTIVE AND OBJECTIVE BOX
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  No overnight events reported.   Continues to have pain on right LE, but swelling improving.   Denies other issues.    ALLERGIES:  No Known Allergies    MEDICATIONS  (STANDING):  DULoxetine 40 milliGRAM(s) Oral daily  enoxaparin Injectable 100 milliGRAM(s) SubCutaneous every 12 hours  famotidine    Tablet 20 milliGRAM(s) Oral <User Schedule>  gabapentin 800 milliGRAM(s) Oral three times a day  influenza   Vaccine 0.5 milliLiter(s) IntraMuscular once  lactobacillus acidophilus 1 Tablet(s) Oral two times a day  melatonin 3 milliGRAM(s) Oral at bedtime  oxyCODONE  ER Tablet 30 milliGRAM(s) Oral every 12 hours  senna 2 Tablet(s) Oral at bedtime  tamsulosin 0.4 milliGRAM(s) Oral at bedtime  traZODone 50 milliGRAM(s) Oral <User Schedule>    MEDICATIONS  (PRN):  acetaminophen   Tablet .. 650 milliGRAM(s) Oral every 6 hours PRN Temp greater or equal to 38C (100.4F), Mild Pain (1 - 3)  bisacodyl Suppository 10 milliGRAM(s) Rectal <User Schedule> PRN no bowel movement for 2 days.  diazepam    Tablet 5 milliGRAM(s) Oral every 8 hours PRN spasms  HYDROmorphone   Tablet 2 milliGRAM(s) Oral every 4 hours PRN Breakthrougth pain  lidocaine   Patch 1 Patch Transdermal every 24 hours PRN Severe pain  lidocaine 2% Gel 1 Application(s) Topical four times a day PRN pain  magnesium hydroxide Suspension 30 milliLiter(s) Oral every 12 hours PRN Constipation  ondansetron    Tablet 4 milliGRAM(s) Oral every 8 hours PRN Nausea and/or Vomiting  oxyCODONE    IR 5 milliGRAM(s) Oral every 4 hours PRN Moderate Pain (4 - 6)  oxyCODONE    IR 10 milliGRAM(s) Oral every 4 hours PRN Severe Pain (7 - 10)    Vital Signs Last 24 Hrs  T(F): 97.9 (22 Sep 2020 07:50), Max: 97.9 (21 Sep 2020 20:26)  HR: 111 (22 Sep 2020 07:50) (111 - 112)  BP: 118/74 (22 Sep 2020 07:50) (118/74 - 132/87)  RR: 17 (22 Sep 2020 07:50) (17 - 17)  SpO2: 96% (22 Sep 2020 07:50) (96% - 98%)  I&O's Summary    PHYSICAL EXAM:  General: NAD, A/O x 3  ENT: MMM, no thrush  Neck: Supple, No JVD  Lungs: Clear to auscultation bilaterally, good air entry, non-labored breathing  Cardio: RRR, S1/S2, No murmur  Abdomen: Soft, Nontender, Nondistended; Bowel sounds present  Extremities: No calf tenderness or redness; b/l LE edema , Rt. >Left  Neuro :  A/O x 3, b/l LE motor 2/5, UE 5/5    LABS:                        8.6    8.87  )-----------( 408      ( 21 Sep 2020 08:00 )             26.8     09-22    135  |  96  |  6   ----------------------------<  114  3.7   |  33  |  0.79    Ca    8.3      22 Sep 2020 06:27  Phos  4.7     09-20  Mg     1.8     09-20          eGFR if African American: 135 mL/min/1.73M2 (09-22-20 @ 06:27)  eGFR if Non African American: 116 mL/min/1.73M2 (09-22-20 @ 06:27)          RADIOLOGY & ADDITIONAL TESTS:    Care Discussed with Consultants/Other Providers: Dr. Pettit

## 2020-09-22 NOTE — PROGRESS NOTE ADULT - ASSESSMENT
ASSESSMENT/PLAN  BRUNO CHO is a 35M admitted to [facility] on 8/22/20 with back pain, found to have fractures of L1-L5 pedicles bilaterally, leading to widening of the spinal canal with anterolisthesis of L5 on S1 with the entire L5 vertebral body width, disruption of the ALL and PLL at L5-S1 level, probable epidural hemorrhage and hemorrhage within the foramina at L1-L5, and enlargement/edema of bilateral psoas muscle.  Patient underwent evacuation of epidural hematoma, T11-pelvis posterior spinal fusion, and L2-S1 Laminectomy on 8/23/20 and subsequent L5-S1 anterior interbody fusion and L3-4 lateral interbody fusion on 9/1.  Hospital course c/b acute blood loss anemia requiring multiple transfusions, traumatic rhabodmyolysis, and intractable pain.  Patient now admitted for a multidisciplinary rehab program. 09-04-20 @ 14:37    Comprehensive Multidisciplinary Rehab Program:  - Start comprehensive rehab program of PT/OT - 3 hours a day, 5 days a week  - P&O as needed    -------------  MEDICAL MANAGEMENT     #Traumatic Spinal Cord Injury  - pt with complete anterolisthesis of L5 on S1, multiple pedicular fractures, and epidural hemorrhage   - s/p T11-pelvis posterior spinal fusion, and L2-S1 Laminectomy on 8/23/20 and subsequent L5-S1 anterior interbody fusion and L3-4 lateral interbody fusion on 9/1  - pain control as below  - PT/OT  - spinal & Fall precautions  - TLSO/AFOs when OOB  - monitor bladder/bowel function out of concern for neurogenic bowel  - JETHRO drain removed; off keflex      #Tachycardia  - per chart review, pt tachycardic prior to transfer to [facility], but no dopplers/CTA obtained  - CTA 9/6 inconclusive re: PE  - Doppler 9/6+B/L DVT   - TTE 9/9 w/o evidence of right heart strain     #Pain Control   9/14/20- oxycodone 5-10 mg for moderate-severe pain, oxycontin 20 mg Q12h, valium 5 mg three times a day prn for spasms, Gabapentin increased to 400 mg three times a day. Dilaudid 2 mg Q4h for breakthrough pain.    9/15/20: plan to increase oxycontin to 30 mg Q12h, Gabapentin to 600 mg TID. SBP > 120,   9/22 - consider switching to morphine-based long-acting and increasing gabapentin    # adjustment issues  - c/w psychological support   - c/w cymbalta    #Rhabdomyolysis (resolved)  - 2/2 trauma   - largely resolved: <900 on 8/31  - encourage PO fluids    #Bladder Management  - Neurogenic bladder (resolved)  - francis removed 9/4 am  - PVRs minimal   - c/w flomax.     #IVC filter  - placed 8/26  - will need removed in 5-6 months     #DVT   - IVC filter  - SCDs  - tachycardia: Doppler and cTA chest to eval for DVT and PE. however per chart review pt appears to have been tachycardic for some time prior to arrival in rehab.  - doppler showed bilateral DVTs, vascular surgery consult- continue to monitor, okay to continue therapy.   - RLE edema: got 2 days of lasix few days ago, improved.   - 9/17: Doppler positive for worsening BLE clots, occlusive clots in RLE, nonocclusive clots in LLE. Surgeon Dr. Rivera cleared patient to start on full anticoagulation.  Now on 100 mg Lovenox Q12h. Discussed result and therapy with patient. Vascular surgery - continue full anticoagulation treatment for 6-12 months. Seen by vascular 9/22: continue elevation x24h and ace wraps when OOB. Bedside therapies 9/22.   - Hb slowly dropping- likely due to increased clot burden with worsening DVTs. Continue to monitor.     #Insomnia  - trazodone 50 mg at night.     Outpatient Follow-up:    Gabrielle Villegas  ORTHOPAEDIC SURGERY  [address] [facility], [address] [place] [address]  Phone: [phone]  Fax: [phone]  Follow Up Time:     Dave Rivera  ORTHOPAEDIC SURGERY  [address] [place] [address]  Phone: [phone]  Fax: [phone]

## 2020-09-22 NOTE — PROGRESS NOTE ADULT - SUBJECTIVE AND OBJECTIVE BOX
SUBJECTIVE & OVERNIGHT EVENTS:  Patient seen and examined.  No acute events overnight. Has been compliant with leg elevation for RLE DVT and swelling has improved.  Continues to have significant neuropathic pain in both legs and feet, though this is somewhat masked by his right calf post-phlebitic pain.  Discussed option of switching to MS contin with patient; he is amenable.        [X] Constitutional WNL        [X] Cardio WNL              [X] Resp WNL  [X] GI WNL                            [X]  WNL                    [] Heme +B/L LE DVTs  [X] Endo WNL                       [] Skin +spinal incision                  [X] MSK WNL  [] Neuro +LE weakness                     [X] Cognitive WNL         [] Psych +Depression/anxiety    OBJECTIVE  T(F): 97.9 (09-22-20 @ 07:50), Max: 97.9 (09-21-20 @ 20:26)  HR: 111 (09-22-20 @ 07:50) (111 - 112)  BP: 118/74 (09-22-20 @ 07:50) (118/74 - 132/87)  RR: 17 (09-22-20 @ 07:50) (17 - 17)  SpO2: 96% (09-22-20 @ 07:50) (96% - 98%)        PHYSICAL EXAM  Constitutional: NAD  HEENT: NCAT, EOMI, handling secretions well  Cardio: well-perfused  Resp: symmetric chest-rise, no increased WOB  GI: non-distended  : no francis  Extremities: well-perfused; RLE>LLE swelling. RLE erythematous and warm to touch        LABS:                        8.6    8.87  )-----------( 408      ( 21 Sep 2020 08:00 )             26.8     21 Sep 2020 08:00    132    |  94     |  8      ----------------------------<  114    3.8     |  32     |  0.66     Ca    8.4        21 Sep 2020 08:00            MEDICATIONS  (STANDING):  DULoxetine 40 milliGRAM(s) Oral daily  enoxaparin Injectable 100 milliGRAM(s) SubCutaneous every 12 hours  famotidine    Tablet 20 milliGRAM(s) Oral <User Schedule>  gabapentin 600 milliGRAM(s) Oral three times a day  influenza   Vaccine 0.5 milliLiter(s) IntraMuscular once  lactobacillus acidophilus 1 Tablet(s) Oral two times a day  melatonin 3 milliGRAM(s) Oral at bedtime  oxyCODONE  ER Tablet 30 milliGRAM(s) Oral every 12 hours  senna 2 Tablet(s) Oral at bedtime  tamsulosin 0.4 milliGRAM(s) Oral at bedtime  traZODone 50 milliGRAM(s) Oral <User Schedule>    MEDICATIONS  (PRN):  acetaminophen   Tablet .. 650 milliGRAM(s) Oral every 6 hours PRN Temp greater or equal to 38C (100.4F), Mild Pain (1 - 3)  bisacodyl Suppository 10 milliGRAM(s) Rectal <User Schedule> PRN no bowel movement for 2 days.  diazepam    Tablet 5 milliGRAM(s) Oral every 8 hours PRN spasms  HYDROmorphone   Tablet 2 milliGRAM(s) Oral every 4 hours PRN Breakthrougth pain  lidocaine   Patch 1 Patch Transdermal every 24 hours PRN Severe pain  lidocaine 2% Gel 1 Application(s) Topical four times a day PRN pain  magnesium hydroxide Suspension 30 milliLiter(s) Oral every 12 hours PRN Constipation  ondansetron    Tablet 4 milliGRAM(s) Oral every 8 hours PRN Nausea and/or Vomiting  oxyCODONE    IR 5 milliGRAM(s) Oral every 4 hours PRN Moderate Pain (4 - 6)  oxyCODONE    IR 10 milliGRAM(s) Oral every 4 hours PRN Severe Pain (7 - 10)

## 2020-09-22 NOTE — PROGRESS NOTE ADULT - SUBJECTIVE AND OBJECTIVE BOX
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 male with bilateral LE  DVT presents with a post phlebitic right leg with pain and swelling. Yesterday  I instructed Dr. Harris to keep the patient at bed rest x 24 HRS. with his leg elevated above his heart level Since doing that the swelling and pain has gotten significantly better.    Medications:      Allergies:  Allergies    No Known Allergies    Intolerances        Vital Signs Last 24 Hrs  T(C): 36.6 (22 Sep 2020 07:50), Max: 36.6 (21 Sep 2020 20:26)  T(F): 97.9 (22 Sep 2020 07:50), Max: 97.9 (21 Sep 2020 20:26)  HR: 111 (22 Sep 2020 07:50) (111 - 112)  BP: 118/74 (22 Sep 2020 07:50) (118/74 - 132/87)  BP(mean): --  RR: 17 (22 Sep 2020 07:50) (17 - 17)  SpO2: 96% (22 Sep 2020 07:50) (96% - 98%)  I&O's Summary      Physical Exam:  Gen: NAD, A&Ox3  CV: No incr WOB  Extremities: there is mild edema of the right calf and thigh but the muscles are soft and nontender. There is no cellulitis or cords. No evidence for compartment syndrome.   Pulses: all PULSES IN BOTH LEGS ARE GRADED 4/4    LABS:                        8.6    8.87  )-----------( 408      ( 21 Sep 2020 08:00 )             26.8     09-22    135  |  96  |  6<L>  ----------------------------<  114<H>  3.7   |  33<H>  |  0.79    Ca    8.3<L>      22 Sep 2020 06:27

## 2020-09-23 PROCEDURE — 99232 SBSQ HOSP IP/OBS MODERATE 35: CPT

## 2020-09-23 PROCEDURE — 90832 PSYTX W PT 30 MINUTES: CPT

## 2020-09-23 RX ADMIN — HYDROMORPHONE HYDROCHLORIDE 2 MILLIGRAM(S): 2 INJECTION INTRAMUSCULAR; INTRAVENOUS; SUBCUTANEOUS at 15:27

## 2020-09-23 RX ADMIN — OXYCODONE HYDROCHLORIDE 10 MILLIGRAM(S): 5 TABLET ORAL at 09:50

## 2020-09-23 RX ADMIN — SENNA PLUS 2 TABLET(S): 8.6 TABLET ORAL at 21:54

## 2020-09-23 RX ADMIN — ENOXAPARIN SODIUM 100 MILLIGRAM(S): 100 INJECTION SUBCUTANEOUS at 10:29

## 2020-09-23 RX ADMIN — OXYCODONE HYDROCHLORIDE 10 MILLIGRAM(S): 5 TABLET ORAL at 08:52

## 2020-09-23 RX ADMIN — DULOXETINE HYDROCHLORIDE 40 MILLIGRAM(S): 30 CAPSULE, DELAYED RELEASE ORAL at 12:47

## 2020-09-23 RX ADMIN — ENOXAPARIN SODIUM 100 MILLIGRAM(S): 100 INJECTION SUBCUTANEOUS at 21:53

## 2020-09-23 RX ADMIN — GABAPENTIN 800 MILLIGRAM(S): 400 CAPSULE ORAL at 21:54

## 2020-09-23 RX ADMIN — GABAPENTIN 800 MILLIGRAM(S): 400 CAPSULE ORAL at 05:41

## 2020-09-23 RX ADMIN — Medication 1 TABLET(S): at 17:50

## 2020-09-23 RX ADMIN — OXYCODONE HYDROCHLORIDE 30 MILLIGRAM(S): 5 TABLET ORAL at 17:51

## 2020-09-23 RX ADMIN — OXYCODONE HYDROCHLORIDE 10 MILLIGRAM(S): 5 TABLET ORAL at 12:47

## 2020-09-23 RX ADMIN — HYDROMORPHONE HYDROCHLORIDE 2 MILLIGRAM(S): 2 INJECTION INTRAMUSCULAR; INTRAVENOUS; SUBCUTANEOUS at 23:41

## 2020-09-23 RX ADMIN — OXYCODONE HYDROCHLORIDE 10 MILLIGRAM(S): 5 TABLET ORAL at 21:55

## 2020-09-23 RX ADMIN — HYDROMORPHONE HYDROCHLORIDE 2 MILLIGRAM(S): 2 INJECTION INTRAMUSCULAR; INTRAVENOUS; SUBCUTANEOUS at 10:30

## 2020-09-23 RX ADMIN — OXYCODONE HYDROCHLORIDE 30 MILLIGRAM(S): 5 TABLET ORAL at 05:41

## 2020-09-23 RX ADMIN — HYDROMORPHONE HYDROCHLORIDE 2 MILLIGRAM(S): 2 INJECTION INTRAMUSCULAR; INTRAVENOUS; SUBCUTANEOUS at 19:15

## 2020-09-23 RX ADMIN — OXYCODONE HYDROCHLORIDE 30 MILLIGRAM(S): 5 TABLET ORAL at 06:32

## 2020-09-23 RX ADMIN — Medication 1 TABLET(S): at 05:41

## 2020-09-23 RX ADMIN — Medication 3 MILLIGRAM(S): at 21:54

## 2020-09-23 RX ADMIN — OXYCODONE HYDROCHLORIDE 10 MILLIGRAM(S): 5 TABLET ORAL at 17:50

## 2020-09-23 RX ADMIN — OXYCODONE HYDROCHLORIDE 10 MILLIGRAM(S): 5 TABLET ORAL at 13:30

## 2020-09-23 RX ADMIN — OXYCODONE HYDROCHLORIDE 10 MILLIGRAM(S): 5 TABLET ORAL at 04:30

## 2020-09-23 RX ADMIN — HYDROMORPHONE HYDROCHLORIDE 2 MILLIGRAM(S): 2 INJECTION INTRAMUSCULAR; INTRAVENOUS; SUBCUTANEOUS at 14:30

## 2020-09-23 RX ADMIN — HYDROMORPHONE HYDROCHLORIDE 2 MILLIGRAM(S): 2 INJECTION INTRAMUSCULAR; INTRAVENOUS; SUBCUTANEOUS at 11:30

## 2020-09-23 RX ADMIN — OXYCODONE HYDROCHLORIDE 10 MILLIGRAM(S): 5 TABLET ORAL at 22:30

## 2020-09-23 RX ADMIN — GABAPENTIN 800 MILLIGRAM(S): 400 CAPSULE ORAL at 13:03

## 2020-09-23 RX ADMIN — OXYCODONE HYDROCHLORIDE 30 MILLIGRAM(S): 5 TABLET ORAL at 18:48

## 2020-09-23 RX ADMIN — TAMSULOSIN HYDROCHLORIDE 0.4 MILLIGRAM(S): 0.4 CAPSULE ORAL at 21:53

## 2020-09-23 RX ADMIN — LIDOCAINE 1 PATCH: 4 CREAM TOPICAL at 00:15

## 2020-09-23 RX ADMIN — OXYCODONE HYDROCHLORIDE 10 MILLIGRAM(S): 5 TABLET ORAL at 03:45

## 2020-09-23 RX ADMIN — OXYCODONE HYDROCHLORIDE 10 MILLIGRAM(S): 5 TABLET ORAL at 18:48

## 2020-09-23 NOTE — PROGRESS NOTE ADULT - SUBJECTIVE AND OBJECTIVE BOX
Pt was seen for 20 min. Pt c/o fatigue, pain. Pt reported doing well since initially seen. He fully participates in tx and is aware of making progress. Pt admitted feeling a bit frustrated due to a blood cloth in his right leg which has required to slow down his tx within the last two days, but otherwise, reports that he knows he is getting better, and receives positive feedback from his therapists. Pt appeared concerned about being told that he might be discharged soon, and hopes his stay can be extended  a little in order to get more benefit from the program. He reported being in a good mood in general, and having good appetite. His sleep is also improving, now he is sleeping about 4 hours in average at night, compared to less than one when he was admitted to the unit. Pt also reported having frequent visitors, i.e., his parents, which also make him feel well. He denied any pressing issues or concerns at this time, and expects to continue improving in the near future. Pt stated "I'm doing well", and also appeared better groomed than at admission. Support and encouragement were provided.

## 2020-09-23 NOTE — PROGRESS NOTE ADULT - ASSESSMENT
ASSESSMENT/PLAN  BRUNO CHO is a 35M admitted to [facility] on 8/22/20 with back pain, found to have fractures of L1-L5 pedicles bilaterally, leading to widening of the spinal canal with anterolisthesis of L5 on S1 with the entire L5 vertebral body width, disruption of the ALL and PLL at L5-S1 level, probable epidural hemorrhage and hemorrhage within the foramina at L1-L5, and enlargement/edema of bilateral psoas muscle.  Patient underwent evacuation of epidural hematoma, T11-pelvis posterior spinal fusion, and L2-S1 Laminectomy on 8/23/20 and subsequent L5-S1 anterior interbody fusion and L3-4 lateral interbody fusion on 9/1.  Hospital course c/b acute blood loss anemia requiring multiple transfusions, traumatic rhabodmyolysis, and intractable pain.  Patient now admitted for a multidisciplinary rehab program. 09-04-20 @ 14:37    Comprehensive Multidisciplinary Rehab Program:  - Start comprehensive rehab program of PT/OT - 3 hours a day, 5 days a week  - P&O as needed    -------------  MEDICAL MANAGEMENT     #Traumatic Spinal Cord Injury  - pt with complete anterolisthesis of L5 on S1, multiple pedicular fractures, and epidural hemorrhage   - s/p T11-pelvis posterior spinal fusion, and L2-S1 Laminectomy on 8/23/20 and subsequent L5-S1 anterior interbody fusion and L3-4 lateral interbody fusion on 9/1  - pain control as below  - PT/OT  - spinal & Fall precautions  - TLSO/AFOs when OOB  - monitor bladder/bowel function out of concern for neurogenic bowel  - JETHRO drain removed; off keflex      #Tachycardia  - per chart review, pt tachycardic prior to transfer to [facility], but no dopplers/CTA obtained  - CTA 9/6 inconclusive re: PE  - Doppler 9/6+B/L DVT   - TTE 9/9 w/o evidence of right heart strain     #Pain Control   9/14/20- oxycodone 5-10 mg for moderate-severe pain, oxycontin 20 mg Q12h, valium 5 mg three times a day prn for spasms, Gabapentin increased to 400 mg three times a day. Dilaudid 2 mg Q4h for breakthrough pain.    9/15/20: plan to increase oxycontin to 30 mg Q12h, Gabapentin to 600 mg TID. SBP > 120,   9/22 - consider switching to morphine-based long-acting and increasing gabapentin    # adjustment issues  - c/w psychological support   - c/w cymbalta    #Rhabdomyolysis (resolved)  - 2/2 trauma   - largely resolved: <900 on 8/31  - encourage PO fluids    #Bladder Management  - Neurogenic bladder (resolved)  - francis removed 9/4 am  - PVRs minimal   - c/w flomax.     #IVC filter  - placed 8/26  - will need removed in 5-6 months     #DVT   - IVC filter  - SCDs  - tachycardia: Doppler and cTA chest to eval for DVT and PE. however per chart review pt appears to have been tachycardic for some time prior to arrival in rehab.  - doppler showed bilateral DVTs, vascular surgery consult- continue to monitor, okay to continue therapy.   - RLE edema: got 2 days of lasix few days ago, improved.   - 9/17: Doppler positive for worsening BLE clots, occlusive clots in RLE, nonocclusive clots in LLE. Surgeon Dr. Rivera cleared patient to start on full anticoagulation.  Now on 100 mg Lovenox Q12h. Discussed result and therapy with patient. Vascular surgery - continue full anticoagulation treatment for 6-12 months. Seen by vascular 9/22: continue elevation x24h and ace wraps when OOB. Bedside therapies 9/22.   - Hb slowly dropping- likely due to increased clot burden with worsening DVTs. Continue to monitor.     #Insomnia  - trazodone 50 mg at night.     Outpatient Follow-up:    Gabrielle Villegas  ORTHOPAEDIC SURGERY  [address] [facility], [address] [place] [address]  Phone: [phone]  Fax: [phone]  Follow Up Time:     Dave Rivera  ORTHOPAEDIC SURGERY  [address] [place] [address]  Phone: [phone]  Fax: [phone]

## 2020-09-23 NOTE — PROGRESS NOTE ADULT - SUBJECTIVE AND OBJECTIVE BOX
SUBJECTIVE & OVERNIGHT EVENTS:  Patient seen and examined.  No acute events overnight.  Right lower extremity pain little better (8-9/10 today compared to 10/10 yesterday) with increase in gabapentin, Swelling fluctuates> Stool with therapy at parallel bars 5 times today.       [X] Constitutional WNL        [X] Cardio WNL              [X] Resp WNL  [X] GI WNL                            [X]  WNL                    [] Heme +B/L LE DVTs  [X] Endo WNL                       [] Skin +spinal incision                  [X] MSK WNL  [] Neuro +LE weakness                     [X] Cognitive WNL         [] Psych +Depression/anxiety     Vital Signs Last 24 Hrs  T(C): 36.5 (23 Sep 2020 07:51), Max: 37.1 (22 Sep 2020 21:00)  T(F): 97.7 (23 Sep 2020 07:51), Max: 98.8 (22 Sep 2020 21:00)  HR: 112 (23 Sep 2020 07:51) (108 - 112)  BP: 127/74 (23 Sep 2020 07:51) (127/74 - 127/81)  BP(mean): --  RR: 14 (23 Sep 2020 07:51) (14 - 14)  SpO2: 97% (23 Sep 2020 07:51) (97% - 99%)      PHYSICAL EXAM  Constitutional: NAD  HEENT: NCAT, EOMI, handling secretions well  Cardio: well-perfused  Resp: symmetric chest-rise, no increased WOB  GI: non-distended  : no francis  Extremities: well-perfused; RLE>LLE swelling. RLE erythematous and warm to touch        LABS:     09-22    135  |  96  |  6<L>  ----------------------------<  114<H>  3.7   |  33<H>  |  0.79    Ca    8.3<L>      22 Sep 2020 06:27           MEDICATIONS  (STANDING):   MEDICATIONS  (STANDING):  DULoxetine 40 milliGRAM(s) Oral daily  enoxaparin Injectable 100 milliGRAM(s) SubCutaneous every 12 hours  famotidine    Tablet 20 milliGRAM(s) Oral <User Schedule>  gabapentin 800 milliGRAM(s) Oral three times a day  influenza   Vaccine 0.5 milliLiter(s) IntraMuscular once  lactobacillus acidophilus 1 Tablet(s) Oral two times a day  melatonin 3 milliGRAM(s) Oral at bedtime  oxyCODONE  ER Tablet 30 milliGRAM(s) Oral every 12 hours  senna 2 Tablet(s) Oral at bedtime  tamsulosin 0.4 milliGRAM(s) Oral at bedtime  traZODone 50 milliGRAM(s) Oral <User Schedule>    MEDICATIONS  (PRN):  acetaminophen   Tablet .. 650 milliGRAM(s) Oral every 6 hours PRN Temp greater or equal to 38C (100.4F), Mild Pain (1 - 3)  bisacodyl Suppository 10 milliGRAM(s) Rectal <User Schedule> PRN no bowel movement for 2 days.  diazepam    Tablet 5 milliGRAM(s) Oral every 8 hours PRN spasms  HYDROmorphone   Tablet 2 milliGRAM(s) Oral every 4 hours PRN Breakthrougth pain  lidocaine   Patch 1 Patch Transdermal every 24 hours PRN Severe pain  lidocaine 2% Gel 1 Application(s) Topical four times a day PRN pain  magnesium hydroxide Suspension 30 milliLiter(s) Oral every 12 hours PRN Constipation  ondansetron    Tablet 4 milliGRAM(s) Oral every 8 hours PRN Nausea and/or Vomiting  oxyCODONE    IR 5 milliGRAM(s) Oral every 4 hours PRN Moderate Pain (4 - 6)  oxyCODONE    IR 10 milliGRAM(s) Oral every 4 hours PRN Severe Pain (7 - 10)

## 2020-09-23 NOTE — PROGRESS NOTE ADULT - ASSESSMENT
Pt alert, attentive, constricted affect, euthymic mood, apparently coping better with anxiety and his current condition. Plan: Neuropsychologist remains available.

## 2020-09-24 LAB
ANION GAP SERPL CALC-SCNC: 7 MMOL/L — SIGNIFICANT CHANGE UP (ref 5–17)
BASOPHILS # BLD AUTO: 0.02 K/UL — SIGNIFICANT CHANGE UP (ref 0–0.2)
BASOPHILS NFR BLD AUTO: 0.3 % — SIGNIFICANT CHANGE UP (ref 0–2)
BUN SERPL-MCNC: 6 MG/DL — LOW (ref 7–23)
CALCIUM SERPL-MCNC: 8.5 MG/DL — SIGNIFICANT CHANGE UP (ref 8.4–10.5)
CHLORIDE SERPL-SCNC: 98 MMOL/L — SIGNIFICANT CHANGE UP (ref 96–108)
CO2 SERPL-SCNC: 31 MMOL/L — SIGNIFICANT CHANGE UP (ref 22–31)
CREAT SERPL-MCNC: 0.65 MG/DL — SIGNIFICANT CHANGE UP (ref 0.5–1.3)
EOSINOPHIL # BLD AUTO: 0.12 K/UL — SIGNIFICANT CHANGE UP (ref 0–0.5)
EOSINOPHIL NFR BLD AUTO: 1.6 % — SIGNIFICANT CHANGE UP (ref 0–6)
GLUCOSE SERPL-MCNC: 102 MG/DL — HIGH (ref 70–99)
HCT VFR BLD CALC: 26.9 % — LOW (ref 39–50)
HGB BLD-MCNC: 8.2 G/DL — LOW (ref 13–17)
IMM GRANULOCYTES NFR BLD AUTO: 0.9 % — SIGNIFICANT CHANGE UP (ref 0–1.5)
LYMPHOCYTES # BLD AUTO: 1.2 K/UL — SIGNIFICANT CHANGE UP (ref 1–3.3)
LYMPHOCYTES # BLD AUTO: 15.7 % — SIGNIFICANT CHANGE UP (ref 13–44)
MCHC RBC-ENTMCNC: 27.2 PG — SIGNIFICANT CHANGE UP (ref 27–34)
MCHC RBC-ENTMCNC: 30.5 GM/DL — LOW (ref 32–36)
MCV RBC AUTO: 89.4 FL — SIGNIFICANT CHANGE UP (ref 80–100)
MONOCYTES # BLD AUTO: 0.65 K/UL — SIGNIFICANT CHANGE UP (ref 0–0.9)
MONOCYTES NFR BLD AUTO: 8.5 % — SIGNIFICANT CHANGE UP (ref 2–14)
NEUTROPHILS # BLD AUTO: 5.57 K/UL — SIGNIFICANT CHANGE UP (ref 1.8–7.4)
NEUTROPHILS NFR BLD AUTO: 73 % — SIGNIFICANT CHANGE UP (ref 43–77)
NRBC # BLD: 0 /100 WBCS — SIGNIFICANT CHANGE UP (ref 0–0)
PLATELET # BLD AUTO: 394 K/UL — SIGNIFICANT CHANGE UP (ref 150–400)
POTASSIUM SERPL-MCNC: 3.6 MMOL/L — SIGNIFICANT CHANGE UP (ref 3.5–5.3)
POTASSIUM SERPL-SCNC: 3.6 MMOL/L — SIGNIFICANT CHANGE UP (ref 3.5–5.3)
RBC # BLD: 3.01 M/UL — LOW (ref 4.2–5.8)
RBC # FLD: 14.4 % — SIGNIFICANT CHANGE UP (ref 10.3–14.5)
SODIUM SERPL-SCNC: 136 MMOL/L — SIGNIFICANT CHANGE UP (ref 135–145)
WBC # BLD: 7.63 K/UL — SIGNIFICANT CHANGE UP (ref 3.8–10.5)
WBC # FLD AUTO: 7.63 K/UL — SIGNIFICANT CHANGE UP (ref 3.8–10.5)

## 2020-09-24 PROCEDURE — 99232 SBSQ HOSP IP/OBS MODERATE 35: CPT | Mod: GC

## 2020-09-24 RX ORDER — GABAPENTIN 400 MG/1
200 CAPSULE ORAL
Refills: 0 | Status: DISCONTINUED | OUTPATIENT
Start: 2020-09-24 | End: 2020-09-30

## 2020-09-24 RX ADMIN — Medication 650 MILLIGRAM(S): at 11:00

## 2020-09-24 RX ADMIN — OXYCODONE HYDROCHLORIDE 30 MILLIGRAM(S): 5 TABLET ORAL at 07:11

## 2020-09-24 RX ADMIN — HYDROMORPHONE HYDROCHLORIDE 2 MILLIGRAM(S): 2 INJECTION INTRAMUSCULAR; INTRAVENOUS; SUBCUTANEOUS at 09:15

## 2020-09-24 RX ADMIN — HYDROMORPHONE HYDROCHLORIDE 2 MILLIGRAM(S): 2 INJECTION INTRAMUSCULAR; INTRAVENOUS; SUBCUTANEOUS at 16:15

## 2020-09-24 RX ADMIN — OXYCODONE HYDROCHLORIDE 10 MILLIGRAM(S): 5 TABLET ORAL at 10:00

## 2020-09-24 RX ADMIN — OXYCODONE HYDROCHLORIDE 10 MILLIGRAM(S): 5 TABLET ORAL at 16:29

## 2020-09-24 RX ADMIN — GABAPENTIN 200 MILLIGRAM(S): 400 CAPSULE ORAL at 22:03

## 2020-09-24 RX ADMIN — OXYCODONE HYDROCHLORIDE 5 MILLIGRAM(S): 5 TABLET ORAL at 22:02

## 2020-09-24 RX ADMIN — HYDROMORPHONE HYDROCHLORIDE 2 MILLIGRAM(S): 2 INJECTION INTRAMUSCULAR; INTRAVENOUS; SUBCUTANEOUS at 00:30

## 2020-09-24 RX ADMIN — GABAPENTIN 800 MILLIGRAM(S): 400 CAPSULE ORAL at 22:01

## 2020-09-24 RX ADMIN — GABAPENTIN 800 MILLIGRAM(S): 400 CAPSULE ORAL at 14:08

## 2020-09-24 RX ADMIN — OXYCODONE HYDROCHLORIDE 30 MILLIGRAM(S): 5 TABLET ORAL at 19:16

## 2020-09-24 RX ADMIN — HYDROMORPHONE HYDROCHLORIDE 2 MILLIGRAM(S): 2 INJECTION INTRAMUSCULAR; INTRAVENOUS; SUBCUTANEOUS at 08:15

## 2020-09-24 RX ADMIN — Medication 3 MILLIGRAM(S): at 22:03

## 2020-09-24 RX ADMIN — Medication 1 TABLET(S): at 05:55

## 2020-09-24 RX ADMIN — HYDROMORPHONE HYDROCHLORIDE 2 MILLIGRAM(S): 2 INJECTION INTRAMUSCULAR; INTRAVENOUS; SUBCUTANEOUS at 14:07

## 2020-09-24 RX ADMIN — ENOXAPARIN SODIUM 100 MILLIGRAM(S): 100 INJECTION SUBCUTANEOUS at 10:36

## 2020-09-24 RX ADMIN — GABAPENTIN 800 MILLIGRAM(S): 400 CAPSULE ORAL at 05:55

## 2020-09-24 RX ADMIN — HYDROMORPHONE HYDROCHLORIDE 2 MILLIGRAM(S): 2 INJECTION INTRAMUSCULAR; INTRAVENOUS; SUBCUTANEOUS at 19:23

## 2020-09-24 RX ADMIN — OXYCODONE HYDROCHLORIDE 10 MILLIGRAM(S): 5 TABLET ORAL at 04:00

## 2020-09-24 RX ADMIN — HYDROMORPHONE HYDROCHLORIDE 2 MILLIGRAM(S): 2 INJECTION INTRAMUSCULAR; INTRAVENOUS; SUBCUTANEOUS at 19:58

## 2020-09-24 RX ADMIN — OXYCODONE HYDROCHLORIDE 10 MILLIGRAM(S): 5 TABLET ORAL at 09:07

## 2020-09-24 RX ADMIN — TAMSULOSIN HYDROCHLORIDE 0.4 MILLIGRAM(S): 0.4 CAPSULE ORAL at 22:02

## 2020-09-24 RX ADMIN — OXYCODONE HYDROCHLORIDE 10 MILLIGRAM(S): 5 TABLET ORAL at 17:07

## 2020-09-24 RX ADMIN — OXYCODONE HYDROCHLORIDE 10 MILLIGRAM(S): 5 TABLET ORAL at 03:02

## 2020-09-24 RX ADMIN — OXYCODONE HYDROCHLORIDE 10 MILLIGRAM(S): 5 TABLET ORAL at 12:31

## 2020-09-24 RX ADMIN — HYDROMORPHONE HYDROCHLORIDE 2 MILLIGRAM(S): 2 INJECTION INTRAMUSCULAR; INTRAVENOUS; SUBCUTANEOUS at 23:39

## 2020-09-24 RX ADMIN — SENNA PLUS 2 TABLET(S): 8.6 TABLET ORAL at 22:02

## 2020-09-24 RX ADMIN — OXYCODONE HYDROCHLORIDE 5 MILLIGRAM(S): 5 TABLET ORAL at 22:37

## 2020-09-24 RX ADMIN — OXYCODONE HYDROCHLORIDE 10 MILLIGRAM(S): 5 TABLET ORAL at 16:16

## 2020-09-24 RX ADMIN — OXYCODONE HYDROCHLORIDE 30 MILLIGRAM(S): 5 TABLET ORAL at 18:35

## 2020-09-24 RX ADMIN — DULOXETINE HYDROCHLORIDE 40 MILLIGRAM(S): 30 CAPSULE, DELAYED RELEASE ORAL at 12:31

## 2020-09-24 RX ADMIN — Medication 650 MILLIGRAM(S): at 10:36

## 2020-09-24 RX ADMIN — ENOXAPARIN SODIUM 100 MILLIGRAM(S): 100 INJECTION SUBCUTANEOUS at 22:02

## 2020-09-24 RX ADMIN — OXYCODONE HYDROCHLORIDE 10 MILLIGRAM(S): 5 TABLET ORAL at 21:36

## 2020-09-24 RX ADMIN — Medication 1 TABLET(S): at 18:37

## 2020-09-24 RX ADMIN — OXYCODONE HYDROCHLORIDE 10 MILLIGRAM(S): 5 TABLET ORAL at 20:48

## 2020-09-24 RX ADMIN — OXYCODONE HYDROCHLORIDE 30 MILLIGRAM(S): 5 TABLET ORAL at 05:56

## 2020-09-24 NOTE — PROGRESS NOTE ADULT - ASSESSMENT
ASSESSMENT/PLAN  BRUNO CHO is a 35M admitted to [facility] on 8/22/20 with back pain, found to have fractures of L1-L5 pedicles bilaterally, leading to widening of the spinal canal with anterolisthesis of L5 on S1 with the entire L5 vertebral body width, disruption of the ALL and PLL at L5-S1 level, probable epidural hemorrhage and hemorrhage within the foramina at L1-L5, and enlargement/edema of bilateral psoas muscle.  Patient underwent evacuation of epidural hematoma, T11-pelvis posterior spinal fusion, and L2-S1 Laminectomy on 8/23/20 and subsequent L5-S1 anterior interbody fusion and L3-4 lateral interbody fusion on 9/1.  Hospital course c/b acute blood loss anemia requiring multiple transfusions, traumatic rhabodmyolysis, and intractable pain.  Patient now admitted for a multidisciplinary rehab program. 09-04-20 @ 14:37    Comprehensive Multidisciplinary Rehab Program:  - Start comprehensive rehab program of PT/OT - 3 hours a day, 5 days a week  - P&O as needed    -------------  MEDICAL MANAGEMENT     #Traumatic Spinal Cord Injury  - pt with complete anterolisthesis of L5 on S1, multiple pedicular fractures, and epidural hemorrhage   - s/p T11-pelvis posterior spinal fusion, and L2-S1 Laminectomy on 8/23/20 and subsequent L5-S1 anterior interbody fusion and L3-4 lateral interbody fusion on 9/1  - pain control as below  - PT/OT  - spinal & Fall precautions  - TLSO/AFOs when OOB  - monitor bladder/bowel function out of concern for neurogenic bowel  - JETHRO drain removed; off keflex  - staples/sutures removed 9/23    #Tachycardia  - per chart review, pt tachycardic prior to transfer to [facility], but no dopplers/CTA obtained  - CTA 9/6 inconclusive re: PE  - Doppler 9/6+B/L DVT   - TTE 9/9 w/o evidence of right heart strain     #Pain Control   9/14/20- oxycodone 5-10 mg for moderate-severe pain, oxycontin 20 mg Q12h, valium 5 mg three times a day prn for spasms, Gabapentin increased to 400 mg three times a day. Dilaudid 2 mg Q4h for breakthrough pain.    9/15/20: plan to increase oxycontin to 30 mg Q12h, Gabapentin to 600 mg TID. SBP > 120,   9/22 - consider switching to morphine-based long-acting and increasing gabapentin  9/22 - increased gabapentin    #adjustment issues  - c/w psychological support   - c/w cymbalta    #Rhabdomyolysis (resolved)  - 2/2 trauma   - largely resolved: <900 on 8/31  - encourage PO fluids    #Bladder Management  - Neurogenic bladder (resolved)  - francis removed 9/4 am  - PVRs minimal   - c/w flomax.     #IVC filter  - placed 8/26  - will need removed in 5-6 months     #DVT   - IVC filter  - SCDs  - tachycardia: Doppler and cTA chest to eval for DVT and PE. however per chart review pt appears to have been tachycardic for some time prior to arrival in rehab.  - doppler showed bilateral DVTs, vascular surgery consult- continue to monitor, okay to continue therapy.   - RLE edema: got 2 days of lasix few days ago, improved.   - 9/17: Doppler positive for worsening BLE clots, occlusive clots in RLE, nonocclusive clots in LLE. Surgeon Dr. Rivera cleared patient to start on full anticoagulation.  Now on 100 mg Lovenox Q12h. Discussed result and therapy with patient. Vascular surgery - continue full anticoagulation treatment for 6-12 months. Seen by vascular 9/22: continue elevation x24h and ace wraps when OOB. Bedside therapies 9/22.   - Hb low but stable - likely due to increased clot burden with worsening DVTs. Continue to monitor.     #Insomnia  - trazodone 50 mg at night.     Outpatient Follow-up:    Gabrielle Villegas  ORTHOPAEDIC SURGERY  [address] [facility], [address]  [place] [address]  Phone: [phone]  Fax: [phone]  Follow Up Time:     Dave Rivera  ORTHOPAEDIC SURGERY  [address] [place] [address]  Phone: [phone]  Fax: [phone]    IDT Rounds 9/24: Pt making good functional gains. CG for LBD, tub transfers min/mod assist with sliding board.  Bed-wheelchair transfers with CG.  Standing with mod assist of 1-2 people.  Tentative d/c date 10/12 home.

## 2020-09-24 NOTE — PROGRESS NOTE ADULT - SUBJECTIVE AND OBJECTIVE BOX
SUBJECTIVE & OVERNIGHT EVENTS:  Patient seen and examined.  No acute events overnight.  Pain is consistent in both back and right leg.  +BM.  Sleeping well, eating well.  Volitional voiding.  No new complaints.       [X] Constitutional WNL        [X] Cardio WNL              [X] Resp WNL  [X] GI WNL                            [X]  WNL                    [] Heme +B/L LE DVTs  [X] Endo WNL                       [] Skin +spinal incision                  [X] MSK WNL  [] Neuro +LE weakness                     [X] Cognitive WNL         [] Psych +Depression/anxiety    OBJECTIVE  T(C): 36.7 (09-24-20 @ 07:57), Max: 36.7 (09-23-20 @ 21:00)  HR: 114 (09-24-20 @ 07:57) (100 - 114)  BP: 124/77 (09-24-20 @ 07:57) (122/79 - 124/77)  RR: 14 (09-24-20 @ 07:57) (14 - 14)  SpO2: 97% (09-24-20 @ 07:57) (97% - 98%)      PHYSICAL EXAM  Constitutional: NAD  HEENT: NCAT, EOMI, handling secretions well  Cardio: well-perfused  Resp: symmetric chest-rise, no increased WOB  GI: non-distended  : no francis  Extremities: well-perfused      LABS:                        8.2    7.63  )-----------( 394      ( 24 Sep 2020 07:10 )             26.9     24 Sep 2020 07:10    136    |  98     |  6      ----------------------------<  102    3.6     |  31     |  0.65     Ca    8.5        24 Sep 2020 07:10            MEDICATIONS   acetaminophen   Tablet .. 650 milliGRAM(s) Oral every 6 hours PRN  bisacodyl Suppository 10 milliGRAM(s) Rectal <User Schedule> PRN  diazepam    Tablet 5 milliGRAM(s) Oral every 8 hours PRN  DULoxetine 40 milliGRAM(s) Oral daily  enoxaparin Injectable 100 milliGRAM(s) SubCutaneous every 12 hours  famotidine    Tablet 20 milliGRAM(s) Oral <User Schedule>  gabapentin 800 milliGRAM(s) Oral three times a day  HYDROmorphone   Tablet 2 milliGRAM(s) Oral every 4 hours PRN  influenza   Vaccine 0.5 milliLiter(s) IntraMuscular once  lactobacillus acidophilus 1 Tablet(s) Oral two times a day  lidocaine   Patch 1 Patch Transdermal every 24 hours PRN  lidocaine 2% Gel 1 Application(s) Topical four times a day PRN  magnesium hydroxide Suspension 30 milliLiter(s) Oral every 12 hours PRN  melatonin 3 milliGRAM(s) Oral at bedtime  ondansetron    Tablet 4 milliGRAM(s) Oral every 8 hours PRN  oxyCODONE    IR 5 milliGRAM(s) Oral every 4 hours PRN  oxyCODONE    IR 10 milliGRAM(s) Oral every 4 hours PRN  oxyCODONE  ER Tablet 30 milliGRAM(s) Oral every 12 hours  senna 2 Tablet(s) Oral at bedtime  tamsulosin 0.4 milliGRAM(s) Oral at bedtime  traZODone 50 milliGRAM(s) Oral <User Schedule>

## 2020-09-25 PROCEDURE — 99232 SBSQ HOSP IP/OBS MODERATE 35: CPT

## 2020-09-25 PROCEDURE — 99231 SBSQ HOSP IP/OBS SF/LOW 25: CPT

## 2020-09-25 RX ORDER — OXYCODONE HYDROCHLORIDE 5 MG/1
20 TABLET ORAL EVERY 12 HOURS
Refills: 0 | Status: DISCONTINUED | OUTPATIENT
Start: 2020-09-25 | End: 2020-09-28

## 2020-09-25 RX ORDER — DULOXETINE HYDROCHLORIDE 30 MG/1
60 CAPSULE, DELAYED RELEASE ORAL DAILY
Refills: 0 | Status: DISCONTINUED | OUTPATIENT
Start: 2020-09-25 | End: 2020-10-12

## 2020-09-25 RX ORDER — HYDROMORPHONE HYDROCHLORIDE 2 MG/ML
2 INJECTION INTRAMUSCULAR; INTRAVENOUS; SUBCUTANEOUS EVERY 6 HOURS
Refills: 0 | Status: DISCONTINUED | OUTPATIENT
Start: 2020-09-25 | End: 2020-09-28

## 2020-09-25 RX ORDER — OXYCODONE HYDROCHLORIDE 5 MG/1
10 TABLET ORAL EVERY 4 HOURS
Refills: 0 | Status: DISCONTINUED | OUTPATIENT
Start: 2020-09-25 | End: 2020-09-28

## 2020-09-25 RX ORDER — DULOXETINE HYDROCHLORIDE 30 MG/1
20 CAPSULE, DELAYED RELEASE ORAL ONCE
Refills: 0 | Status: COMPLETED | OUTPATIENT
Start: 2020-09-25 | End: 2020-09-25

## 2020-09-25 RX ORDER — OXYCODONE HYDROCHLORIDE 5 MG/1
30 TABLET ORAL EVERY 12 HOURS
Refills: 0 | Status: DISCONTINUED | OUTPATIENT
Start: 2020-09-25 | End: 2020-09-25

## 2020-09-25 RX ORDER — HYDROMORPHONE HYDROCHLORIDE 2 MG/ML
2 INJECTION INTRAMUSCULAR; INTRAVENOUS; SUBCUTANEOUS EVERY 4 HOURS
Refills: 0 | Status: DISCONTINUED | OUTPATIENT
Start: 2020-09-25 | End: 2020-09-25

## 2020-09-25 RX ORDER — OXYCODONE HYDROCHLORIDE 5 MG/1
5 TABLET ORAL EVERY 4 HOURS
Refills: 0 | Status: DISCONTINUED | OUTPATIENT
Start: 2020-09-25 | End: 2020-09-28

## 2020-09-25 RX ORDER — ACETAMINOPHEN 500 MG
975 TABLET ORAL EVERY 6 HOURS
Refills: 0 | Status: DISCONTINUED | OUTPATIENT
Start: 2020-09-25 | End: 2020-09-28

## 2020-09-25 RX ADMIN — Medication 3 MILLIGRAM(S): at 21:46

## 2020-09-25 RX ADMIN — DULOXETINE HYDROCHLORIDE 20 MILLIGRAM(S): 30 CAPSULE, DELAYED RELEASE ORAL at 12:43

## 2020-09-25 RX ADMIN — GABAPENTIN 800 MILLIGRAM(S): 400 CAPSULE ORAL at 21:46

## 2020-09-25 RX ADMIN — HYDROMORPHONE HYDROCHLORIDE 2 MILLIGRAM(S): 2 INJECTION INTRAMUSCULAR; INTRAVENOUS; SUBCUTANEOUS at 21:52

## 2020-09-25 RX ADMIN — OXYCODONE HYDROCHLORIDE 10 MILLIGRAM(S): 5 TABLET ORAL at 07:37

## 2020-09-25 RX ADMIN — OXYCODONE HYDROCHLORIDE 10 MILLIGRAM(S): 5 TABLET ORAL at 20:04

## 2020-09-25 RX ADMIN — GABAPENTIN 800 MILLIGRAM(S): 400 CAPSULE ORAL at 06:58

## 2020-09-25 RX ADMIN — HYDROMORPHONE HYDROCHLORIDE 2 MILLIGRAM(S): 2 INJECTION INTRAMUSCULAR; INTRAVENOUS; SUBCUTANEOUS at 15:00

## 2020-09-25 RX ADMIN — Medication 975 MILLIGRAM(S): at 18:50

## 2020-09-25 RX ADMIN — OXYCODONE HYDROCHLORIDE 10 MILLIGRAM(S): 5 TABLET ORAL at 02:37

## 2020-09-25 RX ADMIN — ENOXAPARIN SODIUM 100 MILLIGRAM(S): 100 INJECTION SUBCUTANEOUS at 10:40

## 2020-09-25 RX ADMIN — HYDROMORPHONE HYDROCHLORIDE 2 MILLIGRAM(S): 2 INJECTION INTRAMUSCULAR; INTRAVENOUS; SUBCUTANEOUS at 00:37

## 2020-09-25 RX ADMIN — OXYCODONE HYDROCHLORIDE 30 MILLIGRAM(S): 5 TABLET ORAL at 06:58

## 2020-09-25 RX ADMIN — Medication 975 MILLIGRAM(S): at 12:23

## 2020-09-25 RX ADMIN — GABAPENTIN 200 MILLIGRAM(S): 400 CAPSULE ORAL at 21:46

## 2020-09-25 RX ADMIN — Medication 1 TABLET(S): at 06:59

## 2020-09-25 RX ADMIN — OXYCODONE HYDROCHLORIDE 20 MILLIGRAM(S): 5 TABLET ORAL at 18:07

## 2020-09-25 RX ADMIN — OXYCODONE HYDROCHLORIDE 10 MILLIGRAM(S): 5 TABLET ORAL at 21:52

## 2020-09-25 RX ADMIN — OXYCODONE HYDROCHLORIDE 10 MILLIGRAM(S): 5 TABLET ORAL at 12:12

## 2020-09-25 RX ADMIN — OXYCODONE HYDROCHLORIDE 10 MILLIGRAM(S): 5 TABLET ORAL at 15:30

## 2020-09-25 RX ADMIN — OXYCODONE HYDROCHLORIDE 10 MILLIGRAM(S): 5 TABLET ORAL at 03:13

## 2020-09-25 RX ADMIN — HYDROMORPHONE HYDROCHLORIDE 2 MILLIGRAM(S): 2 INJECTION INTRAMUSCULAR; INTRAVENOUS; SUBCUTANEOUS at 09:15

## 2020-09-25 RX ADMIN — OXYCODONE HYDROCHLORIDE 10 MILLIGRAM(S): 5 TABLET ORAL at 06:57

## 2020-09-25 RX ADMIN — ENOXAPARIN SODIUM 100 MILLIGRAM(S): 100 INJECTION SUBCUTANEOUS at 21:46

## 2020-09-25 RX ADMIN — SENNA PLUS 2 TABLET(S): 8.6 TABLET ORAL at 21:46

## 2020-09-25 RX ADMIN — Medication 1 TABLET(S): at 18:07

## 2020-09-25 RX ADMIN — OXYCODONE HYDROCHLORIDE 30 MILLIGRAM(S): 5 TABLET ORAL at 07:37

## 2020-09-25 RX ADMIN — OXYCODONE HYDROCHLORIDE 20 MILLIGRAM(S): 5 TABLET ORAL at 18:51

## 2020-09-25 RX ADMIN — HYDROMORPHONE HYDROCHLORIDE 2 MILLIGRAM(S): 2 INJECTION INTRAMUSCULAR; INTRAVENOUS; SUBCUTANEOUS at 08:15

## 2020-09-25 RX ADMIN — TAMSULOSIN HYDROCHLORIDE 0.4 MILLIGRAM(S): 0.4 CAPSULE ORAL at 21:46

## 2020-09-25 RX ADMIN — HYDROMORPHONE HYDROCHLORIDE 2 MILLIGRAM(S): 2 INJECTION INTRAMUSCULAR; INTRAVENOUS; SUBCUTANEOUS at 22:38

## 2020-09-25 RX ADMIN — DULOXETINE HYDROCHLORIDE 40 MILLIGRAM(S): 30 CAPSULE, DELAYED RELEASE ORAL at 11:12

## 2020-09-25 RX ADMIN — OXYCODONE HYDROCHLORIDE 10 MILLIGRAM(S): 5 TABLET ORAL at 16:30

## 2020-09-25 RX ADMIN — Medication 975 MILLIGRAM(S): at 13:23

## 2020-09-25 RX ADMIN — GABAPENTIN 800 MILLIGRAM(S): 400 CAPSULE ORAL at 14:01

## 2020-09-25 RX ADMIN — Medication 975 MILLIGRAM(S): at 18:07

## 2020-09-25 RX ADMIN — OXYCODONE HYDROCHLORIDE 10 MILLIGRAM(S): 5 TABLET ORAL at 11:12

## 2020-09-25 RX ADMIN — HYDROMORPHONE HYDROCHLORIDE 2 MILLIGRAM(S): 2 INJECTION INTRAMUSCULAR; INTRAVENOUS; SUBCUTANEOUS at 14:00

## 2020-09-25 NOTE — PROGRESS NOTE BEHAVIORAL HEALTH - OTHER
Not tested neutral . Pt smiled to writer which has never done before. worries about recovery process.

## 2020-09-25 NOTE — PROGRESS NOTE ADULT - SUBJECTIVE AND OBJECTIVE BOX
SUBJECTIVE & OVERNIGHT EVENTS:  Patient seen and examined.  No acute events overnight.  Pain is improved today.  Slept 4-5 hours overnight (great improvement from admission); and did not wake up to request meds overnight.  making great progress in therapy (standing with mod assist and transfers with CG/supervision).  Mood also seems improved.  Denies n/v/d; +BM, +voiding.        [X] Constitutional WNL        [X] Cardio WNL              [X] Resp WNL  [X] GI WNL                            [X]  WNL                    [] Heme +B/L LE DVTs  [X] Endo WNL                       [] Skin +spinal incision                  [X] MSK WNL  [] Neuro +LE weakness                     [X] Cognitive WNL         [] Psych +Depression/anxiety    OBJECTIVE  Vital Signs Last 24 Hrs  T(C): 36.9 (25 Sep 2020 08:32), Max: 37.9 (24 Sep 2020 19:47)  T(F): 98.5 (25 Sep 2020 08:32), Max: 100.3 (24 Sep 2020 19:47)  HR: 119 (25 Sep 2020 08:32) (116 - 119)  BP: 138/86 (25 Sep 2020 08:32) (125/74 - 138/86)  BP(mean): --  RR: 15 (25 Sep 2020 08:32) (15 - 15)  SpO2: 95% (25 Sep 2020 08:32) (95% - 98%)    PHYSICAL EXAM  Constitutional: NAD  HEENT: NCAT, EOMI, handling secretions well  Cardio: well-perfused  Resp: symmetric chest-rise, no increased WOB  GI: non-distended  : no francis  Extremities: well-perfused      LABS:                        8.2    7.63  )-----------( 394      ( 24 Sep 2020 07:10 )             26.9     24 Sep 2020 07:10    136    |  98     |  6      ----------------------------<  102    3.6     |  31     |  0.65     Ca    8.5        24 Sep 2020 07:10          MEDICATIONS  (STANDING):  DULoxetine 40 milliGRAM(s) Oral daily  enoxaparin Injectable 100 milliGRAM(s) SubCutaneous every 12 hours  famotidine    Tablet 20 milliGRAM(s) Oral <User Schedule>  gabapentin 800 milliGRAM(s) Oral three times a day  gabapentin 200 milliGRAM(s) Oral <User Schedule>  influenza   Vaccine 0.5 milliLiter(s) IntraMuscular once  lactobacillus acidophilus 1 Tablet(s) Oral two times a day  melatonin 3 milliGRAM(s) Oral at bedtime  oxyCODONE  ER Tablet 30 milliGRAM(s) Oral every 12 hours  senna 2 Tablet(s) Oral at bedtime  tamsulosin 0.4 milliGRAM(s) Oral at bedtime  traZODone 50 milliGRAM(s) Oral <User Schedule>    MEDICATIONS  (PRN):  acetaminophen   Tablet .. 650 milliGRAM(s) Oral every 6 hours PRN Temp greater or equal to 38C (100.4F), Mild Pain (1 - 3)  bisacodyl Suppository 10 milliGRAM(s) Rectal <User Schedule> PRN no bowel movement for 2 days.  diazepam    Tablet 5 milliGRAM(s) Oral every 8 hours PRN spasms  HYDROmorphone   Tablet 2 milliGRAM(s) Oral every 4 hours PRN Breakthrougth pain  lidocaine   Patch 1 Patch Transdermal every 24 hours PRN Severe pain  lidocaine 2% Gel 1 Application(s) Topical four times a day PRN pain  magnesium hydroxide Suspension 30 milliLiter(s) Oral every 12 hours PRN Constipation  ondansetron    Tablet 4 milliGRAM(s) Oral every 8 hours PRN Nausea and/or Vomiting  oxyCODONE    IR 5 milliGRAM(s) Oral every 4 hours PRN Moderate Pain (4 - 6)  oxyCODONE    IR 10 milliGRAM(s) Oral every 4 hours PRN Severe Pain (7 - 10)

## 2020-09-25 NOTE — PROGRESS NOTE ADULT - SUBJECTIVE AND OBJECTIVE BOX
Patient is a 35y old  Male who presents with a chief complaint of Traumatic Spinal Cord Disfunction: 04.211 incomplete paraplegia (24 Sep 2020 12:13)      Patient seen and examined at bedside.  pain overall better though still present  right leg swelling +  slept good last night  no chest pain or SOB    ALLERGIES:  No Known Allergies    MEDICATIONS  (STANDING):  acetaminophen   Tablet .. 975 milliGRAM(s) Oral every 6 hours  DULoxetine 60 milliGRAM(s) Oral daily  enoxaparin Injectable 100 milliGRAM(s) SubCutaneous every 12 hours  famotidine    Tablet 20 milliGRAM(s) Oral <User Schedule>  gabapentin 800 milliGRAM(s) Oral three times a day  gabapentin 200 milliGRAM(s) Oral <User Schedule>  influenza   Vaccine 0.5 milliLiter(s) IntraMuscular once  lactobacillus acidophilus 1 Tablet(s) Oral two times a day  melatonin 3 milliGRAM(s) Oral at bedtime  oxyCODONE  ER Tablet 20 milliGRAM(s) Oral every 12 hours  senna 2 Tablet(s) Oral at bedtime  tamsulosin 0.4 milliGRAM(s) Oral at bedtime  traZODone 50 milliGRAM(s) Oral <User Schedule>    MEDICATIONS  (PRN):  acetaminophen   Tablet .. 650 milliGRAM(s) Oral every 6 hours PRN Temp greater or equal to 38C (100.4F), Mild Pain (1 - 3)  bisacodyl Suppository 10 milliGRAM(s) Rectal <User Schedule> PRN no bowel movement for 2 days.  HYDROmorphone   Tablet 2 milliGRAM(s) Oral every 6 hours PRN Breakthrougth pain  lidocaine   Patch 1 Patch Transdermal every 24 hours PRN Severe pain  lidocaine 2% Gel 1 Application(s) Topical four times a day PRN pain  magnesium hydroxide Suspension 30 milliLiter(s) Oral every 12 hours PRN Constipation  ondansetron    Tablet 4 milliGRAM(s) Oral every 8 hours PRN Nausea and/or Vomiting  oxyCODONE    IR 5 milliGRAM(s) Oral every 4 hours PRN Moderate Pain (4 - 6)  oxyCODONE    IR 10 milliGRAM(s) Oral every 4 hours PRN Severe Pain (7 - 10)    Vital Signs Last 24 Hrs  T(F): 98.5 (25 Sep 2020 08:32), Max: 100.3 (24 Sep 2020 19:47)  HR: 119 (25 Sep 2020 08:32) (116 - 119)  BP: 138/86 (25 Sep 2020 08:32) (125/74 - 138/86)  RR: 15 (25 Sep 2020 08:32) (15 - 15)  SpO2: 95% (25 Sep 2020 08:32) (95% - 98%)  I&O's Summary    25 Sep 2020 07:01  -  25 Sep 2020 13:16  --------------------------------------------------------  IN: 0 mL / OUT: 300 mL / NET: -300 mL        PHYSICAL EXAM:    General: NAD  ENT: MMM  Neck: Supple, No JVD  Lungs: Clear to auscultation bilaterally, good air entry, non-labored breathing  Cardio: RRR, S1/S2, No murmur  Abdomen: Soft, Nontender, Nondistended; Bowel sounds present  Extremities: b/l LE edema redness R>L but seems a little better than before   Neuro :  A/O x 3, b/l LE motor still weak but feels getting stronger     LABS:                        8.2    7.63  )-----------( 394      ( 24 Sep 2020 07:10 )             26.9       09-24    136  |  98  |  6   ----------------------------<  102  3.6   |  31  |  0.65    Ca    8.5      24 Sep 2020 07:10       eGFR if Non African American: 126 mL/min/1.73M2 (09-24-20 @ 07:10)  eGFR if : 146 mL/min/1.73M2 (09-24-20 @ 07:10)                                     RADIOLOGY & ADDITIONAL TESTS:    Care Discussed with Consultants/Other Providers:

## 2020-09-25 NOTE — PROGRESS NOTE ADULT - ASSESSMENT
35M admitted to [facility] on 8/22/20 with back pain, found to have fractures of L1-L5 pedicles bilaterally, leading to widening of the spinal canal with anterolisthesis of L5 on S1 with the entire L5 vertebral body width, disruption of the ALL and PLL at L5-S1 level, probable epidural hemorrhage and hemorrhage within the foramina at L1-L5, and enlargement/edema of bilateral psoas muscle.  Patient underwent evacuation of epidural hematoma, T11-pelvis posterior spinal fusion, and L2-S1 Laminectomy on 8/23/20 and subsequent L5-S1 anterior interbody fusion and L3-4 lateral interbody fusion on 9/1.  Hospital course c/b acute blood loss anemia requiring multiple transfusions, traumatic rhabdomyolysis and intractable pain.  Patient now admitted for a multidisciplinary rehab program- pt/ot/dvt ppx, pain meds.    #s/p epidural hematoma  #T11-pelvis posterior spinal fusion  #L2-S1 Laminectomy   #L5-S1 anterior interbody fusion and L3-4 lateral interbody fusion on 9/1  Cont w/ comprehensive rehab program   Pain control as per primary team     # B/l LE edema ,Improving  #B/L DVT  Extensive bilateral DVT with increased clot burden compared to 09/06/2020 seen on repeat US duplex  but no intervention planned per vascular   cont on therapeutic lovenox   Elevate LE, pain control    #Hyponatremia  -resolved     #Leukocytosis  -resolved    #Intermittent urinary retention  -voiding; low PVR  -cont flomax    #Anemia , likely post op anemia  relatively stable   s/p multiple PRBCs at OSH  monitor h&h, keep hb >7    #Rhabdomyolysis- 2/2 trauma  -resolved    #DVT ppx:   -IVC filter in place,   -s/c lovenox 100 BID

## 2020-09-25 NOTE — PROGRESS NOTE ADULT - ASSESSMENT
ASSESSMENT/PLAN  BRUNO CHO is a 35M admitted to [facility] on 8/22/20 with back pain, found to have fractures of L1-L5 pedicles bilaterally, leading to widening of the spinal canal with anterolisthesis of L5 on S1 with the entire L5 vertebral body width, disruption of the ALL and PLL at L5-S1 level, probable epidural hemorrhage and hemorrhage within the foramina at L1-L5, and enlargement/edema of bilateral psoas muscle.  Patient underwent evacuation of epidural hematoma, T11-pelvis posterior spinal fusion, and L2-S1 Laminectomy on 8/23/20 and subsequent L5-S1 anterior interbody fusion and L3-4 lateral interbody fusion on 9/1.  Hospital course c/b acute blood loss anemia requiring multiple transfusions, traumatic rhabodmyolysis, and intractable pain.  Patient now admitted for a multidisciplinary rehab program. 09-04-20 @ 14:37    Comprehensive Multidisciplinary Rehab Program:  - Start comprehensive rehab program of PT/OT - 3 hours a day, 5 days a week  - P&O as needed    -------------  MEDICAL MANAGEMENT     #Traumatic Spinal Cord Injury  - pt with complete anterolisthesis of L5 on S1, multiple pedicular fractures, and epidural hemorrhage   - s/p T11-pelvis posterior spinal fusion, and L2-S1 Laminectomy on 8/23/20 and subsequent L5-S1 anterior interbody fusion and L3-4 lateral interbody fusion on 9/1  - pain control as below  - PT/OT  - spinal & Fall precautions  - TLSO/AFOs when OOB  - monitor bladder/bowel function out of concern for neurogenic bowel  - JETHRO drain removed; off keflex  - staples/sutures removed 9/23    #Tachycardia  - per chart review, pt tachycardic prior to transfer to [facility], but no dopplers/CTA obtained  - CTA 9/6 inconclusive re: PE  - Doppler 9/6+B/L DVT   - TTE 9/9 w/o evidence of right heart strain     #Pain Control   9/14/20- oxycodone 5-10 mg for moderate-severe pain, oxycontin 20 mg Q12h, valium 5 mg three times a day prn for spasms, Gabapentin increased to 400 mg three times a day. Dilaudid 2 mg Q4h for breakthrough pain.    9/15/20: plan to increase oxycontin to 30 mg Q12h, Gabapentin to 600 mg TID. SBP > 120,   9/22 - consider switching to morphine-based long-acting and increasing gabapentin  9/22 - increased gabapentin    #adjustment issues  - c/w psychological support   - c/w cymbalta    #Rhabdomyolysis (resolved)  - 2/2 trauma   - largely resolved: <900 on 8/31  - encourage PO fluids    #Bladder Management  - Neurogenic bladder (resolved)  - francis removed 9/4 am  - PVRs minimal   - c/w flomax.     #IVC filter  - placed 8/26  - will need removed in 5-6 months     #DVT   - IVC filter  - SCDs  - tachycardia: Doppler and cTA chest to eval for DVT and PE. however per chart review pt appears to have been tachycardic for some time prior to arrival in rehab.  - doppler showed bilateral DVTs, vascular surgery consult- continue to monitor, okay to continue therapy.   - RLE edema: got 2 days of lasix few days ago, improved.   - 9/17: Doppler positive for worsening BLE clots, occlusive clots in RLE, nonocclusive clots in LLE. Surgeon Dr. Rivera cleared patient to start on full anticoagulation.  Now on 100 mg Lovenox Q12h. Discussed result and therapy with patient. Vascular surgery - continue full anticoagulation treatment for 6-12 months. Seen by vascular 9/22: continue elevation x24h and ace wraps when OOB. Bedside therapies 9/22.   - Hb low but stable - likely due to increased clot burden with worsening DVTs. Continue to monitor.     #Insomnia  - trazodone 50 mg at night.     Outpatient Follow-up:    Gabrielle Villegas  ORTHOPAEDIC SURGERY  [address] [facility], [address]  [place] [address]  Phone: [phone]  Fax: [phone]  Follow Up Time:     Dave Rivera  ORTHOPAEDIC SURGERY  [address] [place] [address]  Phone: [phone]  Fax: [phone]    IDT Rounds 9/24: Pt making good functional gains. CG for LBD, tub transfers min/mod assist with sliding board.  Bed-wheelchair transfers with CG.  Standing with mod assist of 1-2 people.  Tentative d/c date 10/12 home.

## 2020-09-26 PROCEDURE — 99232 SBSQ HOSP IP/OBS MODERATE 35: CPT

## 2020-09-26 RX ADMIN — OXYCODONE HYDROCHLORIDE 10 MILLIGRAM(S): 5 TABLET ORAL at 14:00

## 2020-09-26 RX ADMIN — HYDROMORPHONE HYDROCHLORIDE 2 MILLIGRAM(S): 2 INJECTION INTRAMUSCULAR; INTRAVENOUS; SUBCUTANEOUS at 20:13

## 2020-09-26 RX ADMIN — OXYCODONE HYDROCHLORIDE 10 MILLIGRAM(S): 5 TABLET ORAL at 23:00

## 2020-09-26 RX ADMIN — HYDROMORPHONE HYDROCHLORIDE 2 MILLIGRAM(S): 2 INJECTION INTRAMUSCULAR; INTRAVENOUS; SUBCUTANEOUS at 21:18

## 2020-09-26 RX ADMIN — OXYCODONE HYDROCHLORIDE 20 MILLIGRAM(S): 5 TABLET ORAL at 06:03

## 2020-09-26 RX ADMIN — OXYCODONE HYDROCHLORIDE 10 MILLIGRAM(S): 5 TABLET ORAL at 00:10

## 2020-09-26 RX ADMIN — GABAPENTIN 800 MILLIGRAM(S): 400 CAPSULE ORAL at 22:18

## 2020-09-26 RX ADMIN — Medication 1 TABLET(S): at 06:03

## 2020-09-26 RX ADMIN — Medication 975 MILLIGRAM(S): at 01:00

## 2020-09-26 RX ADMIN — GABAPENTIN 800 MILLIGRAM(S): 400 CAPSULE ORAL at 06:03

## 2020-09-26 RX ADMIN — TAMSULOSIN HYDROCHLORIDE 0.4 MILLIGRAM(S): 0.4 CAPSULE ORAL at 22:20

## 2020-09-26 RX ADMIN — OXYCODONE HYDROCHLORIDE 20 MILLIGRAM(S): 5 TABLET ORAL at 18:12

## 2020-09-26 RX ADMIN — OXYCODONE HYDROCHLORIDE 10 MILLIGRAM(S): 5 TABLET ORAL at 09:40

## 2020-09-26 RX ADMIN — OXYCODONE HYDROCHLORIDE 10 MILLIGRAM(S): 5 TABLET ORAL at 01:00

## 2020-09-26 RX ADMIN — GABAPENTIN 800 MILLIGRAM(S): 400 CAPSULE ORAL at 13:36

## 2020-09-26 RX ADMIN — Medication 1 TABLET(S): at 18:12

## 2020-09-26 RX ADMIN — Medication 975 MILLIGRAM(S): at 06:31

## 2020-09-26 RX ADMIN — Medication 975 MILLIGRAM(S): at 00:10

## 2020-09-26 RX ADMIN — GABAPENTIN 200 MILLIGRAM(S): 400 CAPSULE ORAL at 22:18

## 2020-09-26 RX ADMIN — OXYCODONE HYDROCHLORIDE 10 MILLIGRAM(S): 5 TABLET ORAL at 18:13

## 2020-09-26 RX ADMIN — ENOXAPARIN SODIUM 100 MILLIGRAM(S): 100 INJECTION SUBCUTANEOUS at 22:17

## 2020-09-26 RX ADMIN — DULOXETINE HYDROCHLORIDE 60 MILLIGRAM(S): 30 CAPSULE, DELAYED RELEASE ORAL at 11:53

## 2020-09-26 RX ADMIN — OXYCODONE HYDROCHLORIDE 10 MILLIGRAM(S): 5 TABLET ORAL at 04:50

## 2020-09-26 RX ADMIN — OXYCODONE HYDROCHLORIDE 10 MILLIGRAM(S): 5 TABLET ORAL at 22:20

## 2020-09-26 RX ADMIN — OXYCODONE HYDROCHLORIDE 10 MILLIGRAM(S): 5 TABLET ORAL at 22:18

## 2020-09-26 RX ADMIN — OXYCODONE HYDROCHLORIDE 10 MILLIGRAM(S): 5 TABLET ORAL at 10:00

## 2020-09-26 RX ADMIN — Medication 975 MILLIGRAM(S): at 18:12

## 2020-09-26 RX ADMIN — OXYCODONE HYDROCHLORIDE 10 MILLIGRAM(S): 5 TABLET ORAL at 04:10

## 2020-09-26 RX ADMIN — Medication 975 MILLIGRAM(S): at 11:52

## 2020-09-26 RX ADMIN — Medication 975 MILLIGRAM(S): at 06:03

## 2020-09-26 RX ADMIN — Medication 975 MILLIGRAM(S): at 19:00

## 2020-09-26 RX ADMIN — Medication 975 MILLIGRAM(S): at 12:15

## 2020-09-26 RX ADMIN — OXYCODONE HYDROCHLORIDE 10 MILLIGRAM(S): 5 TABLET ORAL at 13:36

## 2020-09-26 RX ADMIN — OXYCODONE HYDROCHLORIDE 20 MILLIGRAM(S): 5 TABLET ORAL at 06:31

## 2020-09-26 RX ADMIN — OXYCODONE HYDROCHLORIDE 20 MILLIGRAM(S): 5 TABLET ORAL at 19:19

## 2020-09-26 RX ADMIN — SENNA PLUS 2 TABLET(S): 8.6 TABLET ORAL at 22:20

## 2020-09-26 RX ADMIN — ENOXAPARIN SODIUM 100 MILLIGRAM(S): 100 INJECTION SUBCUTANEOUS at 09:39

## 2020-09-26 NOTE — PROGRESS NOTE ADULT - SUBJECTIVE AND OBJECTIVE BOX
Patient is a 35y old  Male who presents with a chief complaint of Traumatic Spinal Cord Disfunction: 04.211 incomplete paraplegia (25 Sep 2020 13:16)      Patient seen and examined at bedside.  pain still present but better controlled   no chest pain or SOB    ALLERGIES:  No Known Allergies    MEDICATIONS  (STANDING):  acetaminophen   Tablet .. 975 milliGRAM(s) Oral every 6 hours  DULoxetine 60 milliGRAM(s) Oral daily  enoxaparin Injectable 100 milliGRAM(s) SubCutaneous every 12 hours  famotidine    Tablet 20 milliGRAM(s) Oral <User Schedule>  gabapentin 800 milliGRAM(s) Oral three times a day  gabapentin 200 milliGRAM(s) Oral <User Schedule>  influenza   Vaccine 0.5 milliLiter(s) IntraMuscular once  lactobacillus acidophilus 1 Tablet(s) Oral two times a day  melatonin 3 milliGRAM(s) Oral at bedtime  oxyCODONE  ER Tablet 20 milliGRAM(s) Oral every 12 hours  senna 2 Tablet(s) Oral at bedtime  tamsulosin 0.4 milliGRAM(s) Oral at bedtime  traZODone 50 milliGRAM(s) Oral <User Schedule>    MEDICATIONS  (PRN):  acetaminophen   Tablet .. 650 milliGRAM(s) Oral every 6 hours PRN Temp greater or equal to 38C (100.4F), Mild Pain (1 - 3)  bisacodyl Suppository 10 milliGRAM(s) Rectal <User Schedule> PRN no bowel movement for 2 days.  HYDROmorphone   Tablet 2 milliGRAM(s) Oral every 6 hours PRN Breakthrougth pain  lidocaine   Patch 1 Patch Transdermal every 24 hours PRN Severe pain  lidocaine 2% Gel 1 Application(s) Topical four times a day PRN pain  magnesium hydroxide Suspension 30 milliLiter(s) Oral every 12 hours PRN Constipation  ondansetron    Tablet 4 milliGRAM(s) Oral every 8 hours PRN Nausea and/or Vomiting  oxyCODONE    IR 10 milliGRAM(s) Oral every 4 hours PRN Severe Pain (7 - 10)  oxyCODONE    IR 5 milliGRAM(s) Oral every 4 hours PRN Moderate Pain (4 - 6)    Vital Signs Last 24 Hrs  T(F): 98.7 (26 Sep 2020 08:00), Max: 98.7 (26 Sep 2020 08:00)  HR: 112 (26 Sep 2020 08:00) (105 - 112)  BP: 127/75 (26 Sep 2020 08:00) (127/75 - 136/80)  RR: 16 (26 Sep 2020 08:00) (16 - 16)  SpO2: 98% (26 Sep 2020 08:00) (97% - 98%)  I&O's Summary    25 Sep 2020 07:01  -  26 Sep 2020 07:00  --------------------------------------------------------  IN: 0 mL / OUT: 300 mL / NET: -300 mL        PHYSICAL EXAM:  General: NAD  ENT: MMM  Neck: Supple, No JVD  Lungs: Clear bilaterally, good air entry, non-labored breathing  Cardio: RRR, S1/S2, No murmur  Abdomen: Soft, Nontender, Nondistended; Bowel sounds present  Extremities: b/l LE edema R>L but better than before   Neuro :  A/O x 3, b/l LE motor still weak but feels getting stronger     LABS:                        8.2    7.63  )-----------( 394      ( 24 Sep 2020 07:10 )             26.9       09-24    136  |  98  |  6   ----------------------------<  102  3.6   |  31  |  0.65  e  Ca    8.5      24 Sep 2020 07:10       eGFR if Non African American: 126 mL/min/1.73M2 (09-24-20 @ 07:10)  eGFR if : 146 mL/min/1.73M2 (09-24-20 @ 07:10)                                     RADIOLOGY & ADDITIONAL TESTS:    Care Discussed with Consultants/Other Providers:

## 2020-09-26 NOTE — PROGRESS NOTE ADULT - SUBJECTIVE AND OBJECTIVE BOX
Cc: Gait dysfunction    HPI: Patient with no new medical issues today.  Pain controlled, no chest pain, no N/V, no Fevers/Chills. No other new ROS  Has been tolerating rehabilitation program.  awaiting drop arm commode to toilet OOB  +void +BM    acetaminophen   Tablet .. 650 milliGRAM(s) Oral every 6 hours PRN  acetaminophen   Tablet .. 975 milliGRAM(s) Oral every 6 hours  bisacodyl Suppository 10 milliGRAM(s) Rectal <User Schedule> PRN  DULoxetine 60 milliGRAM(s) Oral daily  enoxaparin Injectable 100 milliGRAM(s) SubCutaneous every 12 hours  famotidine    Tablet 20 milliGRAM(s) Oral <User Schedule>  gabapentin 800 milliGRAM(s) Oral three times a day  gabapentin 200 milliGRAM(s) Oral <User Schedule>  HYDROmorphone   Tablet 2 milliGRAM(s) Oral every 6 hours PRN  influenza   Vaccine 0.5 milliLiter(s) IntraMuscular once  lactobacillus acidophilus 1 Tablet(s) Oral two times a day  lidocaine   Patch 1 Patch Transdermal every 24 hours PRN  lidocaine 2% Gel 1 Application(s) Topical four times a day PRN  magnesium hydroxide Suspension 30 milliLiter(s) Oral every 12 hours PRN  melatonin 3 milliGRAM(s) Oral at bedtime  ondansetron    Tablet 4 milliGRAM(s) Oral every 8 hours PRN  oxyCODONE    IR 5 milliGRAM(s) Oral every 4 hours PRN  oxyCODONE    IR 10 milliGRAM(s) Oral every 4 hours PRN  oxyCODONE  ER Tablet 20 milliGRAM(s) Oral every 12 hours  senna 2 Tablet(s) Oral at bedtime  tamsulosin 0.4 milliGRAM(s) Oral at bedtime  traZODone 50 milliGRAM(s) Oral <User Schedule>      T(C): 37.1 (09-26-20 @ 08:00), Max: 37.1 (09-26-20 @ 08:00)  HR: 112 (09-26-20 @ 08:00) (105 - 112)  BP: 127/75 (09-26-20 @ 08:00) (127/75 - 136/80)  RR: 16 (09-26-20 @ 08:00) (16 - 16)  SpO2: 98% (09-26-20 @ 08:00) (97% - 98%)    In NAD  HEENT- EOMI  Heart- RRR, S1S2  Lungs- CTA bl.  Abd- + BS, NT  Ext- No calf pain, Left >Right LE swelling  Neuro- Exam unchanged          Imp: Patient with diagnosis of  incomplete paraplegia  admitted for comprehensive acute rehabilitation.    Plan:  - Continue therapies  - DVT prophylaxis-  on therapeutic Lovenox  - Skin- Turn q2h, check skin daily  - Continue current medications; patient medically stable.   - Patient is stable to continue current rehabilitation program.

## 2020-09-26 NOTE — PROGRESS NOTE ADULT - ASSESSMENT
35M admitted to [facility] on 8/22/20 with back pain, found to have fractures of L1-L5 pedicles bilaterally, leading to widening of the spinal canal with anterolisthesis of L5 on S1 with the entire L5 vertebral body width, disruption of the ALL and PLL at L5-S1 level, probable epidural hemorrhage and hemorrhage within the foramina at L1-L5, and enlargement/edema of bilateral psoas muscle.  Patient underwent evacuation of epidural hematoma, T11-pelvis posterior spinal fusion, and L2-S1 Laminectomy on 8/23/20 and subsequent L5-S1 anterior interbody fusion and L3-4 lateral interbody fusion on 9/1.  Hospital course c/b acute blood loss anemia requiring multiple transfusions, traumatic rhabdomyolysis and intractable pain.  Patient now admitted for a multidisciplinary rehab program- pt/ot/dvt ppx, pain meds.    #s/p epidural hematoma  #T11-pelvis posterior spinal fusion  #L2-S1 Laminectomy   #L5-S1 anterior interbody fusion and L3-4 lateral interbody fusion on 9/1  Cont w/ comprehensive rehab program   Pain control as per primary team     # B/l LE edema ,Improving  #B/L DVT  cont on therapeutic lovenox   Elevate LE, pain control  Extensive bilateral DVT with increased clot burden compared to 09/06/2020 seen on repeat US duplex; but no intervention planned per vascular     #Hyponatremia  -resolved     #Leukocytosis  -resolved    #Intermittent urinary retention  -voiding; low PVR  -cont flomax    #Anemia , likely post op anemia  relatively stable   s/p multiple PRBCs at OSH  monitor h&h, keep hb >7    #Rhabdomyolysis- 2/2 trauma  -resolved    #DVT ppx:   -IVC filter in place,   -s/c lovenox 100 BID

## 2020-09-27 PROCEDURE — 99232 SBSQ HOSP IP/OBS MODERATE 35: CPT

## 2020-09-27 RX ORDER — HYDROMORPHONE HYDROCHLORIDE 2 MG/ML
1 INJECTION INTRAMUSCULAR; INTRAVENOUS; SUBCUTANEOUS ONCE
Refills: 0 | Status: DISCONTINUED | OUTPATIENT
Start: 2020-09-27 | End: 2020-09-27

## 2020-09-27 RX ADMIN — ENOXAPARIN SODIUM 100 MILLIGRAM(S): 100 INJECTION SUBCUTANEOUS at 23:00

## 2020-09-27 RX ADMIN — Medication 3 MILLIGRAM(S): at 23:05

## 2020-09-27 RX ADMIN — GABAPENTIN 200 MILLIGRAM(S): 400 CAPSULE ORAL at 23:04

## 2020-09-27 RX ADMIN — OXYCODONE HYDROCHLORIDE 20 MILLIGRAM(S): 5 TABLET ORAL at 06:39

## 2020-09-27 RX ADMIN — Medication 975 MILLIGRAM(S): at 05:52

## 2020-09-27 RX ADMIN — HYDROMORPHONE HYDROCHLORIDE 2 MILLIGRAM(S): 2 INJECTION INTRAMUSCULAR; INTRAVENOUS; SUBCUTANEOUS at 23:05

## 2020-09-27 RX ADMIN — HYDROMORPHONE HYDROCHLORIDE 2 MILLIGRAM(S): 2 INJECTION INTRAMUSCULAR; INTRAVENOUS; SUBCUTANEOUS at 16:30

## 2020-09-27 RX ADMIN — SENNA PLUS 2 TABLET(S): 8.6 TABLET ORAL at 23:04

## 2020-09-27 RX ADMIN — TAMSULOSIN HYDROCHLORIDE 0.4 MILLIGRAM(S): 0.4 CAPSULE ORAL at 23:04

## 2020-09-27 RX ADMIN — HYDROMORPHONE HYDROCHLORIDE 1 MILLIGRAM(S): 2 INJECTION INTRAMUSCULAR; INTRAVENOUS; SUBCUTANEOUS at 11:55

## 2020-09-27 RX ADMIN — OXYCODONE HYDROCHLORIDE 10 MILLIGRAM(S): 5 TABLET ORAL at 05:52

## 2020-09-27 RX ADMIN — Medication 975 MILLIGRAM(S): at 12:15

## 2020-09-27 RX ADMIN — OXYCODONE HYDROCHLORIDE 20 MILLIGRAM(S): 5 TABLET ORAL at 18:18

## 2020-09-27 RX ADMIN — HYDROMORPHONE HYDROCHLORIDE 2 MILLIGRAM(S): 2 INJECTION INTRAMUSCULAR; INTRAVENOUS; SUBCUTANEOUS at 23:45

## 2020-09-27 RX ADMIN — OXYCODONE HYDROCHLORIDE 10 MILLIGRAM(S): 5 TABLET ORAL at 15:35

## 2020-09-27 RX ADMIN — HYDROMORPHONE HYDROCHLORIDE 2 MILLIGRAM(S): 2 INJECTION INTRAMUSCULAR; INTRAVENOUS; SUBCUTANEOUS at 17:00

## 2020-09-27 RX ADMIN — OXYCODONE HYDROCHLORIDE 10 MILLIGRAM(S): 5 TABLET ORAL at 10:49

## 2020-09-27 RX ADMIN — OXYCODONE HYDROCHLORIDE 10 MILLIGRAM(S): 5 TABLET ORAL at 03:00

## 2020-09-27 RX ADMIN — Medication 975 MILLIGRAM(S): at 23:05

## 2020-09-27 RX ADMIN — ENOXAPARIN SODIUM 100 MILLIGRAM(S): 100 INJECTION SUBCUTANEOUS at 10:51

## 2020-09-27 RX ADMIN — OXYCODONE HYDROCHLORIDE 10 MILLIGRAM(S): 5 TABLET ORAL at 23:45

## 2020-09-27 RX ADMIN — Medication 1 TABLET(S): at 18:19

## 2020-09-27 RX ADMIN — GABAPENTIN 800 MILLIGRAM(S): 400 CAPSULE ORAL at 23:04

## 2020-09-27 RX ADMIN — OXYCODONE HYDROCHLORIDE 10 MILLIGRAM(S): 5 TABLET ORAL at 20:00

## 2020-09-27 RX ADMIN — Medication 975 MILLIGRAM(S): at 06:39

## 2020-09-27 RX ADMIN — Medication 975 MILLIGRAM(S): at 11:55

## 2020-09-27 RX ADMIN — OXYCODONE HYDROCHLORIDE 20 MILLIGRAM(S): 5 TABLET ORAL at 05:52

## 2020-09-27 RX ADMIN — HYDROMORPHONE HYDROCHLORIDE 2 MILLIGRAM(S): 2 INJECTION INTRAMUSCULAR; INTRAVENOUS; SUBCUTANEOUS at 02:00

## 2020-09-27 RX ADMIN — HYDROMORPHONE HYDROCHLORIDE 1 MILLIGRAM(S): 2 INJECTION INTRAMUSCULAR; INTRAVENOUS; SUBCUTANEOUS at 01:20

## 2020-09-27 RX ADMIN — Medication 975 MILLIGRAM(S): at 23:44

## 2020-09-27 RX ADMIN — OXYCODONE HYDROCHLORIDE 10 MILLIGRAM(S): 5 TABLET ORAL at 15:08

## 2020-09-27 RX ADMIN — Medication 975 MILLIGRAM(S): at 18:18

## 2020-09-27 RX ADMIN — HYDROMORPHONE HYDROCHLORIDE 1 MILLIGRAM(S): 2 INJECTION INTRAMUSCULAR; INTRAVENOUS; SUBCUTANEOUS at 12:30

## 2020-09-27 RX ADMIN — Medication 1 TABLET(S): at 05:52

## 2020-09-27 RX ADMIN — OXYCODONE HYDROCHLORIDE 10 MILLIGRAM(S): 5 TABLET ORAL at 19:15

## 2020-09-27 RX ADMIN — HYDROMORPHONE HYDROCHLORIDE 2 MILLIGRAM(S): 2 INJECTION INTRAMUSCULAR; INTRAVENOUS; SUBCUTANEOUS at 03:00

## 2020-09-27 RX ADMIN — GABAPENTIN 800 MILLIGRAM(S): 400 CAPSULE ORAL at 14:01

## 2020-09-27 RX ADMIN — Medication 975 MILLIGRAM(S): at 01:20

## 2020-09-27 RX ADMIN — OXYCODONE HYDROCHLORIDE 10 MILLIGRAM(S): 5 TABLET ORAL at 23:02

## 2020-09-27 RX ADMIN — Medication 975 MILLIGRAM(S): at 00:04

## 2020-09-27 RX ADMIN — Medication 3 MILLIGRAM(S): at 00:05

## 2020-09-27 RX ADMIN — OXYCODONE HYDROCHLORIDE 10 MILLIGRAM(S): 5 TABLET ORAL at 02:00

## 2020-09-27 RX ADMIN — Medication 975 MILLIGRAM(S): at 19:02

## 2020-09-27 RX ADMIN — HYDROMORPHONE HYDROCHLORIDE 1 MILLIGRAM(S): 2 INJECTION INTRAMUSCULAR; INTRAVENOUS; SUBCUTANEOUS at 00:21

## 2020-09-27 RX ADMIN — DULOXETINE HYDROCHLORIDE 60 MILLIGRAM(S): 30 CAPSULE, DELAYED RELEASE ORAL at 11:54

## 2020-09-27 RX ADMIN — GABAPENTIN 800 MILLIGRAM(S): 400 CAPSULE ORAL at 05:52

## 2020-09-27 RX ADMIN — OXYCODONE HYDROCHLORIDE 10 MILLIGRAM(S): 5 TABLET ORAL at 06:39

## 2020-09-27 RX ADMIN — OXYCODONE HYDROCHLORIDE 20 MILLIGRAM(S): 5 TABLET ORAL at 19:03

## 2020-09-27 NOTE — PROGRESS NOTE ADULT - SUBJECTIVE AND OBJECTIVE BOX
Cc: Gait dysfunction    HPI: Patient with no new medical issues today.  Requesting additional pain meds, no chest pain, no N/V, no Fevers/Chills. No other new ROS  Has been tolerating rehabilitation program.  moved bowels in commode at bedside yesterday  no need for sliding board  +void +BM    MEDICATIONS  (STANDING):  acetaminophen   Tablet .. 975 milliGRAM(s) Oral every 6 hours  DULoxetine 60 milliGRAM(s) Oral daily  enoxaparin Injectable 100 milliGRAM(s) SubCutaneous every 12 hours  famotidine    Tablet 20 milliGRAM(s) Oral <User Schedule>  gabapentin 200 milliGRAM(s) Oral <User Schedule>  gabapentin 800 milliGRAM(s) Oral three times a day  influenza   Vaccine 0.5 milliLiter(s) IntraMuscular once  lactobacillus acidophilus 1 Tablet(s) Oral two times a day  melatonin 3 milliGRAM(s) Oral at bedtime  oxyCODONE  ER Tablet 20 milliGRAM(s) Oral every 12 hours  senna 2 Tablet(s) Oral at bedtime  tamsulosin 0.4 milliGRAM(s) Oral at bedtime  traZODone 50 milliGRAM(s) Oral <User Schedule>    MEDICATIONS  (PRN):  acetaminophen   Tablet .. 650 milliGRAM(s) Oral every 6 hours PRN Temp greater or equal to 38C (100.4F), Mild Pain (1 - 3)  bisacodyl Suppository 10 milliGRAM(s) Rectal <User Schedule> PRN no bowel movement for 2 days.  HYDROmorphone   Tablet 2 milliGRAM(s) Oral every 6 hours PRN Breakthrougth pain  lidocaine   Patch 1 Patch Transdermal every 24 hours PRN Severe pain  lidocaine 2% Gel 1 Application(s) Topical four times a day PRN pain  magnesium hydroxide Suspension 30 milliLiter(s) Oral every 12 hours PRN Constipation  ondansetron    Tablet 4 milliGRAM(s) Oral every 8 hours PRN Nausea and/or Vomiting  oxyCODONE    IR 5 milliGRAM(s) Oral every 4 hours PRN Moderate Pain (4 - 6)  oxyCODONE    IR 10 milliGRAM(s) Oral every 4 hours PRN Severe Pain (7 - 10)                  CAPILLARY BLOOD GLUCOSE          Vital Signs Last 24 Hrs  T(C): 37.1 (27 Sep 2020 11:00), Max: 37.1 (27 Sep 2020 11:00)  T(F): 98.8 (27 Sep 2020 11:00), Max: 98.8 (27 Sep 2020 11:00)  HR: 112 (27 Sep 2020 11:00) (107 - 112)  BP: 131/79 (27 Sep 2020 11:00) (130/79 - 131/79)  BP(mean): --  RR: 15 (27 Sep 2020 11:00) (15 - 15)  SpO2: 99% (27 Sep 2020 11:00) (98% - 99%)            In NAD  HEENT- EOMI  Heart- RRR, S1S2  Lungs- CTA bl.  Abd- + BS, NT  Ext- No calf pain, Left >Right LE swelling  Neuro- Exam unchanged          Imp: Patient with diagnosis of  incomplete paraplegia  admitted for comprehensive acute rehabilitation.    Plan:  - Continue therapies  - DVT prophylaxis-  on therapeutic Lovenox  - Skin- Turn q2h, check skin daily  - Continue current medications; patient medically stable.   - Patient is stable to continue current rehabilitation program.

## 2020-09-27 NOTE — PROGRESS NOTE ADULT - SUBJECTIVE AND OBJECTIVE BOX
Patient is a 35y old  Male who presents with a chief complaint of Traumatic Spinal Cord Disfunction: 04.211 incomplete paraplegia (27 Sep 2020 13:13)      Patient seen and examined at bedside.  pain still on and off  right leg swelling better  no chest pain or worsening SOB    ALLERGIES:  No Known Allergies    MEDICATIONS  (STANDING):  acetaminophen   Tablet .. 975 milliGRAM(s) Oral every 6 hours  DULoxetine 60 milliGRAM(s) Oral daily  enoxaparin Injectable 100 milliGRAM(s) SubCutaneous every 12 hours  famotidine    Tablet 20 milliGRAM(s) Oral <User Schedule>  gabapentin 800 milliGRAM(s) Oral three times a day  gabapentin 200 milliGRAM(s) Oral <User Schedule>  influenza   Vaccine 0.5 milliLiter(s) IntraMuscular once  lactobacillus acidophilus 1 Tablet(s) Oral two times a day  melatonin 3 milliGRAM(s) Oral at bedtime  oxyCODONE  ER Tablet 20 milliGRAM(s) Oral every 12 hours  senna 2 Tablet(s) Oral at bedtime  tamsulosin 0.4 milliGRAM(s) Oral at bedtime  traZODone 50 milliGRAM(s) Oral <User Schedule>    MEDICATIONS  (PRN):  acetaminophen   Tablet .. 650 milliGRAM(s) Oral every 6 hours PRN Temp greater or equal to 38C (100.4F), Mild Pain (1 - 3)  bisacodyl Suppository 10 milliGRAM(s) Rectal <User Schedule> PRN no bowel movement for 2 days.  HYDROmorphone   Tablet 2 milliGRAM(s) Oral every 6 hours PRN Breakthrougth pain  lidocaine   Patch 1 Patch Transdermal every 24 hours PRN Severe pain  lidocaine 2% Gel 1 Application(s) Topical four times a day PRN pain  magnesium hydroxide Suspension 30 milliLiter(s) Oral every 12 hours PRN Constipation  ondansetron    Tablet 4 milliGRAM(s) Oral every 8 hours PRN Nausea and/or Vomiting  oxyCODONE    IR 5 milliGRAM(s) Oral every 4 hours PRN Moderate Pain (4 - 6)  oxyCODONE    IR 10 milliGRAM(s) Oral every 4 hours PRN Severe Pain (7 - 10)    Vital Signs Last 24 Hrs  T(F): 98.8 (27 Sep 2020 11:00), Max: 98.8 (27 Sep 2020 11:00)  HR: 112 (27 Sep 2020 11:00) (107 - 112)  BP: 131/79 (27 Sep 2020 11:00) (130/79 - 131/79)  RR: 15 (27 Sep 2020 11:00) (15 - 15)  SpO2: 99% (27 Sep 2020 11:00) (98% - 99%)  I&O's Summary      PHYSICAL EXAM:  General: NAD  ENT: MMM  Neck: Supple, No JVD  Lungs: Clear bilaterally, good air entry, non-labored breathing  Cardio: RRR, S1/S2, No murmur  Abdomen: Soft, Nontender, Nondistended; Bowel sounds present  Extremities: b/l LE edema R>L but better than before   Neuro :  A/O x 3, b/l LE motor still weak but feels getting stronger   skin: multiple areas of healing ulcer/bruises over torso    LABS:                                                RADIOLOGY & ADDITIONAL TESTS:    Care Discussed with Consultants/Other Providers:

## 2020-09-27 NOTE — PROGRESS NOTE ADULT - ASSESSMENT
35M admitted to [facility] on 8/22/20 with back pain, found to have fractures of L1-L5 pedicles bilaterally, leading to widening of the spinal canal with anterolisthesis of L5 on S1 with the entire L5 vertebral body width, disruption of the ALL and PLL at L5-S1 level, probable epidural hemorrhage and hemorrhage within the foramina at L1-L5, and enlargement/edema of bilateral psoas muscle.  Patient underwent evacuation of epidural hematoma, T11-pelvis posterior spinal fusion, and L2-S1 Laminectomy on 8/23/20 and subsequent L5-S1 anterior interbody fusion and L3-4 lateral interbody fusion on 9/1.  Hospital course c/b acute blood loss anemia requiring multiple transfusions, traumatic rhabdomyolysis and intractable pain.  Patient now admitted for a multidisciplinary rehab program- pt/ot/dvt ppx, pain meds.    #s/p epidural hematoma  #T11-pelvis posterior spinal fusion  #L2-S1 Laminectomy   #L5-S1 anterior interbody fusion and L3-4 lateral interbody fusion on 9/1  Cont w/ comprehensive rehab program   Pain control as per primary team     # B/l LE edema ,Improving  #B/L DVT  cont on therapeutic lovenox   Elevate LE, pain control  Extensive bilateral DVT with increased clot burden compared to 09/06/2020 seen on repeat US duplex; but no intervention planned per vascular     #Hyponatremia  -resolved     #Leukocytosis  -resolved    #Intermittent urinary retention  -voiding; low PVR  -cont flomax    #Anemia , likely post op anemia  relatively stable   s/p multiple PRBCs at OSH  monitor h&h, keep hb >7    #Rhabdomyolysis- 2/2 trauma  -resolved    #DVT ppx:   -IVC filter in place,   -s/c lovenox 100 BID

## 2020-09-28 LAB
ANION GAP SERPL CALC-SCNC: 6 MMOL/L — SIGNIFICANT CHANGE UP (ref 5–17)
BASOPHILS # BLD AUTO: 0.03 K/UL — SIGNIFICANT CHANGE UP (ref 0–0.2)
BASOPHILS NFR BLD AUTO: 0.4 % — SIGNIFICANT CHANGE UP (ref 0–2)
BUN SERPL-MCNC: 6 MG/DL — LOW (ref 7–23)
CALCIUM SERPL-MCNC: 8.6 MG/DL — SIGNIFICANT CHANGE UP (ref 8.4–10.5)
CHLORIDE SERPL-SCNC: 101 MMOL/L — SIGNIFICANT CHANGE UP (ref 96–108)
CO2 SERPL-SCNC: 31 MMOL/L — SIGNIFICANT CHANGE UP (ref 22–31)
CREAT SERPL-MCNC: 0.71 MG/DL — SIGNIFICANT CHANGE UP (ref 0.5–1.3)
EOSINOPHIL # BLD AUTO: 0.17 K/UL — SIGNIFICANT CHANGE UP (ref 0–0.5)
EOSINOPHIL NFR BLD AUTO: 2 % — SIGNIFICANT CHANGE UP (ref 0–6)
GLUCOSE SERPL-MCNC: 103 MG/DL — HIGH (ref 70–99)
HCT VFR BLD CALC: 29.2 % — LOW (ref 39–50)
HGB BLD-MCNC: 8.8 G/DL — LOW (ref 13–17)
IMM GRANULOCYTES NFR BLD AUTO: 1.1 % — SIGNIFICANT CHANGE UP (ref 0–1.5)
LYMPHOCYTES # BLD AUTO: 1.24 K/UL — SIGNIFICANT CHANGE UP (ref 1–3.3)
LYMPHOCYTES # BLD AUTO: 14.7 % — SIGNIFICANT CHANGE UP (ref 13–44)
MCHC RBC-ENTMCNC: 27.2 PG — SIGNIFICANT CHANGE UP (ref 27–34)
MCHC RBC-ENTMCNC: 30.1 GM/DL — LOW (ref 32–36)
MCV RBC AUTO: 90.1 FL — SIGNIFICANT CHANGE UP (ref 80–100)
MONOCYTES # BLD AUTO: 0.55 K/UL — SIGNIFICANT CHANGE UP (ref 0–0.9)
MONOCYTES NFR BLD AUTO: 6.5 % — SIGNIFICANT CHANGE UP (ref 2–14)
NEUTROPHILS # BLD AUTO: 6.34 K/UL — SIGNIFICANT CHANGE UP (ref 1.8–7.4)
NEUTROPHILS NFR BLD AUTO: 75.3 % — SIGNIFICANT CHANGE UP (ref 43–77)
NRBC # BLD: 0 /100 WBCS — SIGNIFICANT CHANGE UP (ref 0–0)
PLATELET # BLD AUTO: 406 K/UL — HIGH (ref 150–400)
POTASSIUM SERPL-MCNC: 3.7 MMOL/L — SIGNIFICANT CHANGE UP (ref 3.5–5.3)
POTASSIUM SERPL-SCNC: 3.7 MMOL/L — SIGNIFICANT CHANGE UP (ref 3.5–5.3)
RBC # BLD: 3.24 M/UL — LOW (ref 4.2–5.8)
RBC # FLD: 14.6 % — HIGH (ref 10.3–14.5)
SODIUM SERPL-SCNC: 138 MMOL/L — SIGNIFICANT CHANGE UP (ref 135–145)
WBC # BLD: 8.42 K/UL — SIGNIFICANT CHANGE UP (ref 3.8–10.5)
WBC # FLD AUTO: 8.42 K/UL — SIGNIFICANT CHANGE UP (ref 3.8–10.5)

## 2020-09-28 PROCEDURE — 99232 SBSQ HOSP IP/OBS MODERATE 35: CPT

## 2020-09-28 RX ORDER — OXYCODONE HYDROCHLORIDE 5 MG/1
10 TABLET ORAL EVERY 4 HOURS
Refills: 0 | Status: DISCONTINUED | OUTPATIENT
Start: 2020-09-28 | End: 2020-10-01

## 2020-09-28 RX ORDER — OXYCODONE HYDROCHLORIDE 5 MG/1
5 TABLET ORAL ONCE
Refills: 0 | Status: DISCONTINUED | OUTPATIENT
Start: 2020-09-28 | End: 2020-09-28

## 2020-09-28 RX ORDER — LIDOCAINE 4 G/100G
2 CREAM TOPICAL
Refills: 0 | Status: DISCONTINUED | OUTPATIENT
Start: 2020-09-28 | End: 2020-10-12

## 2020-09-28 RX ORDER — OXYCODONE HYDROCHLORIDE 5 MG/1
5 TABLET ORAL EVERY 4 HOURS
Refills: 0 | Status: DISCONTINUED | OUTPATIENT
Start: 2020-09-28 | End: 2020-10-01

## 2020-09-28 RX ORDER — MORPHINE SULFATE 50 MG/1
15 CAPSULE, EXTENDED RELEASE ORAL
Refills: 0 | Status: DISCONTINUED | OUTPATIENT
Start: 2020-09-28 | End: 2020-09-28

## 2020-09-28 RX ORDER — ACETAMINOPHEN 500 MG
650 TABLET ORAL EVERY 6 HOURS
Refills: 0 | Status: DISCONTINUED | OUTPATIENT
Start: 2020-09-28 | End: 2020-09-28

## 2020-09-28 RX ORDER — MORPHINE SULFATE 50 MG/1
15 CAPSULE, EXTENDED RELEASE ORAL
Refills: 0 | Status: DISCONTINUED | OUTPATIENT
Start: 2020-09-28 | End: 2020-10-01

## 2020-09-28 RX ADMIN — DULOXETINE HYDROCHLORIDE 60 MILLIGRAM(S): 30 CAPSULE, DELAYED RELEASE ORAL at 11:40

## 2020-09-28 RX ADMIN — Medication 975 MILLIGRAM(S): at 11:41

## 2020-09-28 RX ADMIN — OXYCODONE HYDROCHLORIDE 10 MILLIGRAM(S): 5 TABLET ORAL at 20:49

## 2020-09-28 RX ADMIN — Medication 975 MILLIGRAM(S): at 11:40

## 2020-09-28 RX ADMIN — OXYCODONE HYDROCHLORIDE 20 MILLIGRAM(S): 5 TABLET ORAL at 05:13

## 2020-09-28 RX ADMIN — OXYCODONE HYDROCHLORIDE 10 MILLIGRAM(S): 5 TABLET ORAL at 11:01

## 2020-09-28 RX ADMIN — GABAPENTIN 800 MILLIGRAM(S): 400 CAPSULE ORAL at 05:12

## 2020-09-28 RX ADMIN — OXYCODONE HYDROCHLORIDE 20 MILLIGRAM(S): 5 TABLET ORAL at 06:18

## 2020-09-28 RX ADMIN — TAMSULOSIN HYDROCHLORIDE 0.4 MILLIGRAM(S): 0.4 CAPSULE ORAL at 21:01

## 2020-09-28 RX ADMIN — OXYCODONE HYDROCHLORIDE 5 MILLIGRAM(S): 5 TABLET ORAL at 14:12

## 2020-09-28 RX ADMIN — OXYCODONE HYDROCHLORIDE 10 MILLIGRAM(S): 5 TABLET ORAL at 11:42

## 2020-09-28 RX ADMIN — Medication 975 MILLIGRAM(S): at 05:13

## 2020-09-28 RX ADMIN — HYDROMORPHONE HYDROCHLORIDE 2 MILLIGRAM(S): 2 INJECTION INTRAMUSCULAR; INTRAVENOUS; SUBCUTANEOUS at 05:13

## 2020-09-28 RX ADMIN — ENOXAPARIN SODIUM 100 MILLIGRAM(S): 100 INJECTION SUBCUTANEOUS at 10:50

## 2020-09-28 RX ADMIN — OXYCODONE HYDROCHLORIDE 10 MILLIGRAM(S): 5 TABLET ORAL at 15:21

## 2020-09-28 RX ADMIN — OXYCODONE HYDROCHLORIDE 10 MILLIGRAM(S): 5 TABLET ORAL at 02:51

## 2020-09-28 RX ADMIN — GABAPENTIN 800 MILLIGRAM(S): 400 CAPSULE ORAL at 13:07

## 2020-09-28 RX ADMIN — OXYCODONE HYDROCHLORIDE 10 MILLIGRAM(S): 5 TABLET ORAL at 03:00

## 2020-09-28 RX ADMIN — OXYCODONE HYDROCHLORIDE 10 MILLIGRAM(S): 5 TABLET ORAL at 14:48

## 2020-09-28 RX ADMIN — OXYCODONE HYDROCHLORIDE 10 MILLIGRAM(S): 5 TABLET ORAL at 21:30

## 2020-09-28 RX ADMIN — Medication 975 MILLIGRAM(S): at 06:18

## 2020-09-28 RX ADMIN — SENNA PLUS 2 TABLET(S): 8.6 TABLET ORAL at 21:00

## 2020-09-28 RX ADMIN — OXYCODONE HYDROCHLORIDE 10 MILLIGRAM(S): 5 TABLET ORAL at 07:53

## 2020-09-28 RX ADMIN — Medication 1 TABLET(S): at 05:12

## 2020-09-28 RX ADMIN — MORPHINE SULFATE 15 MILLIGRAM(S): 50 CAPSULE, EXTENDED RELEASE ORAL at 18:01

## 2020-09-28 RX ADMIN — ENOXAPARIN SODIUM 100 MILLIGRAM(S): 100 INJECTION SUBCUTANEOUS at 21:00

## 2020-09-28 RX ADMIN — Medication 3 MILLIGRAM(S): at 21:01

## 2020-09-28 RX ADMIN — GABAPENTIN 200 MILLIGRAM(S): 400 CAPSULE ORAL at 21:01

## 2020-09-28 RX ADMIN — Medication 1 TABLET(S): at 18:01

## 2020-09-28 RX ADMIN — OXYCODONE HYDROCHLORIDE 5 MILLIGRAM(S): 5 TABLET ORAL at 13:06

## 2020-09-28 RX ADMIN — MORPHINE SULFATE 15 MILLIGRAM(S): 50 CAPSULE, EXTENDED RELEASE ORAL at 18:51

## 2020-09-28 RX ADMIN — GABAPENTIN 800 MILLIGRAM(S): 400 CAPSULE ORAL at 21:01

## 2020-09-28 RX ADMIN — OXYCODONE HYDROCHLORIDE 10 MILLIGRAM(S): 5 TABLET ORAL at 06:58

## 2020-09-28 RX ADMIN — HYDROMORPHONE HYDROCHLORIDE 2 MILLIGRAM(S): 2 INJECTION INTRAMUSCULAR; INTRAVENOUS; SUBCUTANEOUS at 06:18

## 2020-09-28 NOTE — PROVIDER CONTACT NOTE (OTHER) - ACTION/TREATMENT ORDERED:
Dr Garcia aware, will continue to monitor
oxycodone 5mg IR given, gabapentin given as scheduled. will continue to monitor
Continue to monitor urine output and encourage frequent voiding.
MD states to allow pt to urinate and then preform PVR and contact MD with amount if elevated. Will continue to monitor.

## 2020-09-28 NOTE — PROVIDER CONTACT NOTE (OTHER) - BACKGROUND
oxycodone IR 10mg given 1100, acetaminophen 975mg given at 1140. pt declined earlier offer of lidocaine patches.

## 2020-09-28 NOTE — PROGRESS NOTE ADULT - SUBJECTIVE AND OBJECTIVE BOX
SUBJECTIVE & OVERNIGHT EVENTS:  Patient seen and examined.  No acute events overnight.  Noting increased pain after decrease in pain meds over the weekend.  Pain remains unchanged: "electric" like pain in feet, legs, originating from back.  Pt rates pain 7-8/10 and states it's nearly constant.  He required breakthrough dosing of Dilaudid several times over the weekend but states that the dilaudid only helps for a brief amount of time (<1.5hrs).  Discussed option of switching to morphine-based medication with patient, who is amenable.  He also agreed to holding dilaudid today.  +BM. Participating in therapies well.  No fevers, chills, sweats, diarrhea.       [X] Constitutional WNL        [X] Cardio WNL              [X] Resp WNL  [X] GI WNL                            [X]  WNL                    [] Heme +B/L LE DVTs  [X] Endo WNL                       [] Skin +spinal incision                  [X] MSK WNL  [] Neuro +LE weakness                     [X] Cognitive WNL         [] Psych +Depression/anxiety    OBJECTIVE  Vital Signs Last 24 Hrs  T(C): 36.8 (27 Sep 2020 23:05), Max: 36.8 (27 Sep 2020 23:05)  T(F): 98.3 (27 Sep 2020 23:05), Max: 98.3 (27 Sep 2020 23:05)  HR: 105 (27 Sep 2020 23:05) (105 - 105)  BP: 133/73 (27 Sep 2020 23:05) (133/73 - 133/73)  RR: 14 (27 Sep 2020 23:05) (14 - 14)  SpO2: 99% (27 Sep 2020 23:05) (99% - 99%)      PHYSICAL EXAM  Constitutional: NAD  HEENT: NCAT, EOMI, handling secretions well  Cardio: well-perfused  Resp: symmetric chest-rise, no increased WOB  GI: non-distended  : no francis  Extremities: well-perfused      LABS:                        8.8    8.42  )-----------( 406      ( 28 Sep 2020 06:00 )             29.2     28 Sep 2020 06:00    138    |  101    |  6      ----------------------------<  103    3.7     |  31     |  0.71     Ca    8.6        28 Sep 2020 06:00              MEDICATIONS  (STANDING):  DULoxetine 60 milliGRAM(s) Oral daily  enoxaparin Injectable 100 milliGRAM(s) SubCutaneous every 12 hours  famotidine    Tablet 20 milliGRAM(s) Oral <User Schedule>  gabapentin 800 milliGRAM(s) Oral three times a day  gabapentin 200 milliGRAM(s) Oral <User Schedule>  influenza   Vaccine 0.5 milliLiter(s) IntraMuscular once  lactobacillus acidophilus 1 Tablet(s) Oral two times a day  melatonin 3 milliGRAM(s) Oral at bedtime  morphine ER Tablet 15 milliGRAM(s) Oral two times a day  senna 2 Tablet(s) Oral at bedtime  tamsulosin 0.4 milliGRAM(s) Oral at bedtime  traZODone 50 milliGRAM(s) Oral <User Schedule>    MEDICATIONS  (PRN):  acetaminophen   Tablet .. 650 milliGRAM(s) Oral every 6 hours PRN Temp greater or equal to 38C (100.4F), Mild Pain (1 - 3)  bisacodyl Suppository 10 milliGRAM(s) Rectal <User Schedule> PRN no bowel movement for 2 days.  lidocaine   Patch 1 Patch Transdermal every 24 hours PRN Severe pain  lidocaine 2% Gel 1 Application(s) Topical four times a day PRN pain  magnesium hydroxide Suspension 30 milliLiter(s) Oral every 12 hours PRN Constipation  ondansetron    Tablet 4 milliGRAM(s) Oral every 8 hours PRN Nausea and/or Vomiting  oxyCODONE    IR 5 milliGRAM(s) Oral every 4 hours PRN Moderate Pain (4 - 6)  oxyCODONE    IR 10 milliGRAM(s) Oral every 4 hours PRN Severe Pain (7 - 10)

## 2020-09-28 NOTE — PROVIDER CONTACT NOTE (OTHER) - DATE AND TIME:
06-Sep-2020 20:31
13-Sep-2020 01:04
17-Sep-2020 18:45
28-Sep-2020 13:09
28-Sep-2020 14:01
52yo M with PMH of CAD, MI s/p PCI, COPD, asthma, MARYAM, Crohn's s/p stricturoplasty x 2 and SBR 3/2018 now p/w abd pain and distention x 1 day. Patient states he was in his usual state of health following is surgery in March until yesterday afternoon when he noted abd pain which he associated to eating.  He then was awoken overnight with severe 6/10, non radiating mid abdominal pain. He noted a small amount of diarrhea yesterday and nausea but no emesis.  He has not passed gas since yesterday morning. Denies fever, chills, CP, SOB, BRBPR.

## 2020-09-28 NOTE — PROGRESS NOTE ADULT - ASSESSMENT
35M admitted to [facility] on 8/22/20 with back pain, found to have fractures of L1-L5 pedicles bilaterally, leading to widening of the spinal canal with anterolisthesis of L5 on S1 with the entire L5 vertebral body width, disruption of the ALL and PLL at L5-S1 level, probable epidural hemorrhage and hemorrhage within the foramina at L1-L5, and enlargement/edema of bilateral psoas muscle.  Patient underwent evacuation of epidural hematoma, T11-pelvis posterior spinal fusion, and L2-S1 Laminectomy on 8/23/20 and subsequent L5-S1 anterior interbody fusion and L3-4 lateral interbody fusion on 9/1.  Hospital course c/b acute blood loss anemia requiring multiple transfusions, traumatic rhabdomyolysis and intractable pain.  Patient now admitted for a multidisciplinary rehab program- pt/ot/dvt ppx, pain meds.    #s/p Epidural hematoma  #T11-pelvis posterior spinal fusion  #L2-S1 Laminectomy   #L5-S1 anterior interbody fusion and L3-4 lateral interbody fusion on 9/1  Cont w/ comprehensive rehab program   Pain control as per primary team     #B/l LE edema ,Improving  #B/L DVT  cont on therapeutic lovenox   Elevate LE, pain control  Extensive bilateral DVT with increased clot burden compared to 09/06/2020 seen on repeat US duplex; but no intervention planned per vascular     #Hyponatremia: resolved     #Leukocytosis: resolved    #Intermittent urinary retention  voiding; low PVR  cont flomax    #Anemia , likely post op anemia  relatively stable   s/p multiple PRBCs at OSH  monitor h&h, keep hb >7    #Rhabdomyolysis- 2/2 trauma: resolved    #DVT ppx: s/c lovenox, IVC filter in place    case d/w Dr. arnett

## 2020-09-28 NOTE — PROGRESS NOTE ADULT - ASSESSMENT
ASSESSMENT/PLAN  BRUNO CHO is a 35M admitted to [facility] on 8/22/20 with back pain, found to have fractures of L1-L5 pedicles bilaterally, leading to widening of the spinal canal with anterolisthesis of L5 on S1 with the entire L5 vertebral body width, disruption of the ALL and PLL at L5-S1 level, probable epidural hemorrhage and hemorrhage within the foramina at L1-L5, and enlargement/edema of bilateral psoas muscle.  Patient underwent evacuation of epidural hematoma, T11-pelvis posterior spinal fusion, and L2-S1 Laminectomy on 8/23/20 and subsequent L5-S1 anterior interbody fusion and L3-4 lateral interbody fusion on 9/1.  Hospital course c/b acute blood loss anemia requiring multiple transfusions, traumatic rhabodmyolysis, and intractable pain.  Patient now admitted for a multidisciplinary rehab program. 09-04-20 @ 14:37    Comprehensive Multidisciplinary Rehab Program:  - Start comprehensive rehab program of PT/OT - 3 hours a day, 5 days a week  - P&O as needed    -------------  MEDICAL MANAGEMENT     #Traumatic Spinal Cord Injury  - pt with complete anterolisthesis of L5 on S1, multiple pedicular fractures, and epidural hemorrhage   - s/p T11-pelvis posterior spinal fusion, and L2-S1 Laminectomy on 8/23/20 and subsequent L5-S1 anterior interbody fusion and L3-4 lateral interbody fusion on 9/1  - pain control as below  - PT/OT  - spinal & Fall precautions  - TLSO/AFOs when OOB  - monitor bladder/bowel function out of concern for neurogenic bowel  - JETHRO drain removed; off keflex  - staples/sutures removed 9/23    #Tachycardia  - per chart review, pt tachycardic prior to transfer to [facility], but no dopplers/CTA obtained  - CTA 9/6 inconclusive re: PE  - Doppler 9/6+B/L DVT   - TTE 9/9 w/o evidence of right heart strain     #Pain Control   9/14/20- oxycodone 5-10 mg for moderate-severe pain, oxycontin 20 mg Q12h, valium 5 mg three times a day prn for spasms, Gabapentin increased to 400 mg three times a day. Dilaudid 2 mg Q4h for breakthrough pain.    9/15/20: plan to increase oxycontin to 30 mg Q12h, Gabapentin to 600 mg TID. SBP > 120,   9/22 - consider switching to morphine-based long-acting and increasing gabapentin  9/22 - increased gabapentin  9/25 - decreased dilaudid interval.   9/28 - switched from oxycontin to ms contin; d/c'd dilaudid  -plan to increase gabapentin to 1000 TID 9/30  -consider addition TCA and/or switching to lyrica if pain remains uncontrolled    #adjustment issues  - c/w psychological support   - c/w cymbalta    #Rhabdomyolysis (resolved)  - 2/2 trauma   - largely resolved: <900 on 8/31  - encourage PO fluids    #Bladder Management  - Neurogenic bladder (resolved)  - francis removed 9/4 am  - PVRs minimal   - c/w flomax.     #IVC filter  - placed 8/26  - will need removed in 5-6 months     #DVT   - IVC filter  - SCDs  - tachycardia: Doppler and cTA chest to eval for DVT and PE. however per chart review pt appears to have been tachycardic for some time prior to arrival in rehab.  - doppler showed bilateral DVTs, vascular surgery consult- continue to monitor, okay to continue therapy.   - RLE edema: got 2 days of lasix few days ago, improved.   - 9/17: Doppler positive for worsening BLE clots, occlusive clots in RLE, nonocclusive clots in LLE. Surgeon Dr. Rivera cleared patient to start on full anticoagulation.  Now on 100 mg Lovenox Q12h. Discussed result and therapy with patient. Vascular surgery - continue full anticoagulation treatment for 6-12 months. Seen by vascular 9/22: continue elevation x24h and ace wraps when OOB. Bedside therapies 9/22.   - Hb low but stable - likely due to increased clot burden with worsening DVTs. Continue to monitor.     #Insomnia  - trazodone 50 mg at night.     Outpatient Follow-up:    Gabrielle Villegas  ORTHOPAEDIC SURGERY  [address], [address]  [place] [address]  Phone: [phone]  Fax: [phone]  Follow Up Time:     Dave Rivera  ORTHOPAEDIC SURGERY  [address] [place] [address]  Phone: [phone]  Fax: [phone]    IDT Rounds 9/24: Pt making good functional gains. CG for LBD, tub transfers min/mod assist with sliding board.  Bed-wheelchair transfers with CG.  Standing with mod assist of 1-2 people.  Tentative d/c date 10/12 home.

## 2020-09-29 PROCEDURE — 99232 SBSQ HOSP IP/OBS MODERATE 35: CPT

## 2020-09-29 RX ORDER — OXYCODONE HYDROCHLORIDE 5 MG/1
5 TABLET ORAL DAILY
Refills: 0 | Status: DISCONTINUED | OUTPATIENT
Start: 2020-09-29 | End: 2020-10-01

## 2020-09-29 RX ADMIN — OXYCODONE HYDROCHLORIDE 10 MILLIGRAM(S): 5 TABLET ORAL at 08:23

## 2020-09-29 RX ADMIN — OXYCODONE HYDROCHLORIDE 10 MILLIGRAM(S): 5 TABLET ORAL at 20:44

## 2020-09-29 RX ADMIN — OXYCODONE HYDROCHLORIDE 10 MILLIGRAM(S): 5 TABLET ORAL at 09:16

## 2020-09-29 RX ADMIN — ENOXAPARIN SODIUM 100 MILLIGRAM(S): 100 INJECTION SUBCUTANEOUS at 21:50

## 2020-09-29 RX ADMIN — OXYCODONE HYDROCHLORIDE 10 MILLIGRAM(S): 5 TABLET ORAL at 04:00

## 2020-09-29 RX ADMIN — OXYCODONE HYDROCHLORIDE 10 MILLIGRAM(S): 5 TABLET ORAL at 03:00

## 2020-09-29 RX ADMIN — SENNA PLUS 2 TABLET(S): 8.6 TABLET ORAL at 21:51

## 2020-09-29 RX ADMIN — OXYCODONE HYDROCHLORIDE 10 MILLIGRAM(S): 5 TABLET ORAL at 13:30

## 2020-09-29 RX ADMIN — OXYCODONE HYDROCHLORIDE 10 MILLIGRAM(S): 5 TABLET ORAL at 17:59

## 2020-09-29 RX ADMIN — OXYCODONE HYDROCHLORIDE 10 MILLIGRAM(S): 5 TABLET ORAL at 21:30

## 2020-09-29 RX ADMIN — ENOXAPARIN SODIUM 100 MILLIGRAM(S): 100 INJECTION SUBCUTANEOUS at 11:08

## 2020-09-29 RX ADMIN — GABAPENTIN 800 MILLIGRAM(S): 400 CAPSULE ORAL at 05:54

## 2020-09-29 RX ADMIN — Medication 1 TABLET(S): at 05:54

## 2020-09-29 RX ADMIN — MORPHINE SULFATE 15 MILLIGRAM(S): 50 CAPSULE, EXTENDED RELEASE ORAL at 05:54

## 2020-09-29 RX ADMIN — OXYCODONE HYDROCHLORIDE 10 MILLIGRAM(S): 5 TABLET ORAL at 16:42

## 2020-09-29 RX ADMIN — MORPHINE SULFATE 15 MILLIGRAM(S): 50 CAPSULE, EXTENDED RELEASE ORAL at 06:57

## 2020-09-29 RX ADMIN — LIDOCAINE 2 PATCH: 4 CREAM TOPICAL at 07:00

## 2020-09-29 RX ADMIN — MORPHINE SULFATE 15 MILLIGRAM(S): 50 CAPSULE, EXTENDED RELEASE ORAL at 17:59

## 2020-09-29 RX ADMIN — GABAPENTIN 800 MILLIGRAM(S): 400 CAPSULE ORAL at 13:30

## 2020-09-29 RX ADMIN — DULOXETINE HYDROCHLORIDE 60 MILLIGRAM(S): 30 CAPSULE, DELAYED RELEASE ORAL at 11:09

## 2020-09-29 RX ADMIN — MORPHINE SULFATE 15 MILLIGRAM(S): 50 CAPSULE, EXTENDED RELEASE ORAL at 18:41

## 2020-09-29 RX ADMIN — GABAPENTIN 200 MILLIGRAM(S): 400 CAPSULE ORAL at 21:51

## 2020-09-29 RX ADMIN — LIDOCAINE 2 PATCH: 4 CREAM TOPICAL at 06:05

## 2020-09-29 RX ADMIN — Medication 3 MILLIGRAM(S): at 21:52

## 2020-09-29 RX ADMIN — OXYCODONE HYDROCHLORIDE 10 MILLIGRAM(S): 5 TABLET ORAL at 12:44

## 2020-09-29 RX ADMIN — Medication 1 TABLET(S): at 17:58

## 2020-09-29 RX ADMIN — TAMSULOSIN HYDROCHLORIDE 0.4 MILLIGRAM(S): 0.4 CAPSULE ORAL at 21:51

## 2020-09-29 RX ADMIN — GABAPENTIN 800 MILLIGRAM(S): 400 CAPSULE ORAL at 21:51

## 2020-09-29 RX ADMIN — LIDOCAINE 2 PATCH: 4 CREAM TOPICAL at 18:40

## 2020-09-29 NOTE — PROGRESS NOTE ADULT - ASSESSMENT
35M admitted to [facility] on 8/22/20 with back pain, found to have fractures of L1-L5 pedicles bilaterally, leading to widening of the spinal canal with anterolisthesis of L5 on S1 with the entire L5 vertebral body width, disruption of the ALL and PLL at L5-S1 level, probable epidural hemorrhage and hemorrhage within the foramina at L1-L5, and enlargement/edema of bilateral psoas muscle.  Patient underwent evacuation of epidural hematoma, T11-pelvis posterior spinal fusion, and L2-S1 Laminectomy on 8/23/20 and subsequent L5-S1 anterior interbody fusion and L3-4 lateral interbody fusion on 9/1.  Hospital course c/b acute blood loss anemia requiring multiple transfusions, traumatic rhabdomyolysis and intractable pain.  Patient now admitted for a multidisciplinary rehab program- pt/ot/dvt ppx, pain meds.    #s/p Epidural hematoma  #T11-pelvis posterior spinal fusion  #L2-S1 Laminectomy   #L5-S1 anterior interbody fusion and L3-4 lateral interbody fusion on 9/1  Cont w/ comprehensive rehab program   Optimize Pain meds per primary team ---on MS contin now , and oxycodone IR for moderate and severe pain.     #B/l LE edema ,Improving  #B/L DVT  cont on therapeutic lovenox   Elevate LE, pain control  Extensive bilateral DVT with increased clot burden compared to 09/06/2020 seen on repeat US duplex; but no intervention planned per vascular     #Hyponatremia: resolved     #Leukocytosis: resolved    #Intermittent urinary retention  voiding; low PVR  cont flomax    #Anemia , likely post op anemia  relatively stable   s/p multiple PRBCs at OSH  monitor h&h, keep hb >7    #Rhabdomyolysis- 2/2 trauma: resolved    #DVT ppx: s/c lovenox, IVC filter in place

## 2020-09-29 NOTE — PROGRESS NOTE ADULT - SUBJECTIVE AND OBJECTIVE BOX
Patient is a 35y old  Male who presents with a chief complaint of Traumatic Spinal Cord Disfunction: 04.211 incomplete paraplegia (28 Sep 2020 11:15)      Patient seen and examined at bedside. No overnight events reported.   Pt continues to have pain right lower extremity , martín during physical therapy.   No other issues.      ALLERGIES:  No Known Allergies    MEDICATIONS  (STANDING):  DULoxetine 60 milliGRAM(s) Oral daily  enoxaparin Injectable 100 milliGRAM(s) SubCutaneous every 12 hours  famotidine    Tablet 20 milliGRAM(s) Oral <User Schedule>  gabapentin 800 milliGRAM(s) Oral three times a day  gabapentin 200 milliGRAM(s) Oral <User Schedule>  influenza   Vaccine 0.5 milliLiter(s) IntraMuscular once  lactobacillus acidophilus 1 Tablet(s) Oral two times a day  lidocaine   Patch 2 Patch Transdermal <User Schedule>  melatonin 3 milliGRAM(s) Oral at bedtime  morphine ER Tablet 15 milliGRAM(s) Oral <User Schedule>  senna 2 Tablet(s) Oral at bedtime  tamsulosin 0.4 milliGRAM(s) Oral at bedtime  traZODone 50 milliGRAM(s) Oral <User Schedule>    MEDICATIONS  (PRN):  acetaminophen   Tablet .. 650 milliGRAM(s) Oral every 6 hours PRN Temp greater or equal to 38C (100.4F), Mild Pain (1 - 3)  bisacodyl Suppository 10 milliGRAM(s) Rectal <User Schedule> PRN no bowel movement for 2 days.  lidocaine 2% Gel 1 Application(s) Topical four times a day PRN pain  magnesium hydroxide Suspension 30 milliLiter(s) Oral every 12 hours PRN Constipation  ondansetron    Tablet 4 milliGRAM(s) Oral every 8 hours PRN Nausea and/or Vomiting  oxyCODONE    IR 5 milliGRAM(s) Oral every 4 hours PRN Moderate Pain (4 - 6)  oxyCODONE    IR 10 milliGRAM(s) Oral every 4 hours PRN Severe Pain (7 - 10)    Vital Signs Last 24 Hrs  T(F): 99.1 (29 Sep 2020 08:04), Max: 99.1 (29 Sep 2020 08:04)  HR: 117 (29 Sep 2020 08:04) (101 - 117)  BP: 136/75 (29 Sep 2020 08:04) (126/87 - 139/83)  RR: 16 (29 Sep 2020 08:04) (16 - 16)  SpO2: 99% (29 Sep 2020 08:04) (98% - 99%)  I&O's Summary    PHYSICAL EXAM:  General: NAD, A/O x 3  ENT: MMM, no thrush  Neck: Supple, No JVD  Lungs: Clear to auscultation bilaterally, good air entry, non-labored breathing  Cardio: RRR, S1/S2, No murmur  Abdomen: Soft, Nontender, Nondistended; Bowel sounds present  Extremities: b/l LE edema R>L   Neuro :  A/O x 3, b/l LE motor weakness  Skin: multiple areas of healing ulcer/bruises over torso    LABS:                        8.8    8.42  )-----------( 406      ( 28 Sep 2020 06:00 )             29.2     09-28    138  |  101  |  6   ----------------------------<  103  3.7   |  31  |  0.71    Ca    8.6      28 Sep 2020 06:00          eGFR if Non African American: 121 mL/min/1.73M2 (09-28-20 @ 06:00)  eGFR if : 141 mL/min/1.73M2 (09-28-20 @ 06:00)    RADIOLOGY & ADDITIONAL TESTS:    Care Discussed with Consultants/Other Providers:

## 2020-09-29 NOTE — PROGRESS NOTE ADULT - ASSESSMENT
ASSESSMENT/PLAN  BRUNO CHO is a 35M admitted to [facility] on 8/22/20 with back pain, found to have fractures of L1-L5 pedicles bilaterally, leading to widening of the spinal canal with anterolisthesis of L5 on S1 with the entire L5 vertebral body width, disruption of the ALL and PLL at L5-S1 level, probable epidural hemorrhage and hemorrhage within the foramina at L1-L5, and enlargement/edema of bilateral psoas muscle.  Patient underwent evacuation of epidural hematoma, T11-pelvis posterior spinal fusion, and L2-S1 Laminectomy on 8/23/20 and subsequent L5-S1 anterior interbody fusion and L3-4 lateral interbody fusion on 9/1.  Hospital course c/b acute blood loss anemia requiring multiple transfusions, traumatic rhabodmyolysis, and intractable pain.  Patient now admitted for a multidisciplinary rehab program. 09-04-20 @ 14:37    Comprehensive Multidisciplinary Rehab Program:  - Start comprehensive rehab program of PT/OT - 3 hours a day, 5 days a week  - P&O as needed    -------------  MEDICAL MANAGEMENT     #Traumatic Spinal Cord Injury  - pt with complete anterolisthesis of L5 on S1, multiple pedicular fractures, and epidural hemorrhage   - s/p T11-pelvis posterior spinal fusion, and L2-S1 Laminectomy on 8/23/20 and subsequent L5-S1 anterior interbody fusion and L3-4 lateral interbody fusion on 9/1  - pain control as below  - PT/OT  - spinal & Fall precautions  - TLSO/AFOs when OOB  - monitor bladder/bowel function out of concern for neurogenic bowel  - JETHRO drain removed; off keflex  - staples/sutures removed 9/23    #Tachycardia  - per chart review, pt tachycardic prior to transfer to [facility], but no dopplers/CTA obtained  - CTA 9/6 inconclusive re: PE  - Doppler 9/6+B/L DVT   - TTE 9/9 w/o evidence of right heart strain     #Pain Control  - 9/14/20- oxycodone 5-10 mg for moderate-severe pain, oxycontin 20 mg Q12h, valium 5 mg three times a day prn for spasms, Gabapentin increased to 400 mg three times a day. Dilaudid 2 mg Q4h for breakthrough pain.   - 9/15/20: plan to increase oxycontin to 30 mg Q12h, Gabapentin to 600 mg TID. SBP > 120,   - 9/22 - consider switching to morphine-based long-acting and increasing gabapentin  - 9/22 - increased gabapentin  - 9/25 - decreased dilaudid interval.   - 9/28 - switched from oxycontin to ms contin; d/c'd dilaudid  - plan to increase gabapentin to 1000 TID 9/30  - consider addition TCA and/or switching to lyrica if pain remains uncontrolled    #adjustment issues  - c/w psychological support   - c/w cymbalta    #Rhabdomyolysis (resolved)  - 2/2 trauma   - largely resolved: <900 on 8/31  - encourage PO fluids    #Bladder Management  - Neurogenic bladder (resolved)  - francis removed 9/4 am  - PVRs minimal   - c/w flomax.     #IVC filter  - placed 8/26  - will need removed in 5-6 months     #DVT   - IVC filter  - SCDs  - tachycardia: Doppler and cTA chest to eval for DVT and PE. however per chart review pt appears to have been tachycardic for some time prior to arrival in rehab.  - doppler showed bilateral DVTs, vascular surgery consult- continue to monitor, okay to continue therapy.   - RLE edema: got 2 days of lasix few days ago, improved.   - 9/17: Doppler positive for worsening BLE clots, occlusive clots in RLE, nonocclusive clots in LLE. Surgeon Dr. Rivera cleared patient to start on full anticoagulation.  Now on 100 mg Lovenox Q12h. Discussed result and therapy with patient. Vascular surgery - continue full anticoagulation treatment for 6-12 months. Seen by vascular 9/22: continue elevation x24h and ace wraps when OOB. Bedside therapies 9/22.   - Hb low but stable - likely due to increased clot burden with worsening DVTs. Continue to monitor.     #Insomnia  - trazodone 50 mg at night.     Outpatient Follow-up:    Gabrielle Villegas  ORTHOPAEDIC SURGERY  [address], [address]  [place] [address]  Phone: [phone]  Fax: [phone]  Follow Up Time:     Dave Rivera  ORTHOPAEDIC SURGERY  [address] [place] [address]  Phone: [phone]  Fax: [phone]    IDT Rounds 9/24: Pt making good functional gains. CG for LBD, tub transfers min/mod assist with sliding board.  Bed-wheelchair transfers with CG.  Standing with mod assist of 1-2 people.  Tentative d/c date 10/12 home.

## 2020-09-29 NOTE — PROGRESS NOTE ADULT - SUBJECTIVE AND OBJECTIVE BOX
SUBJECTIVE & OVERNIGHT EVENTS:  Patient seen and examined.  No acute events overnight.  Had increased pain yesterday after dilaudid was discontinued.  Requested extra oxycodone doses for pain.  Reports that he slept 5-6 hours last night and that pain has improved since his first dose of MS contin last evening.  However, PT this morning noted pt complaining of significant pain and with some despondence during therapy.  Rehab team reiterated risks associated with narcotics and that our goal is not for him to be pain free, but to have a level of pain that is tolerable.  Patient expressed agreement and understanding.  He denies any signs or symptoms of opioid withdrawals (no sweats, fatigue, diarrhea).  Voiding independently and tolerating therapies; no new complaints.      [X] Constitutional WNL        [X] Cardio WNL              [X] Resp WNL  [X] GI WNL                            [X]  WNL                    [] Heme +B/L LE DVTs  [X] Endo WNL                       [] Skin +spinal incision                  [X] MSK WNL  [] Neuro +LE weakness                     [X] Cognitive WNL         [] Psych +Depression/anxiety    OBJECTIVE  Vital Signs Last 24 Hrs  T(C): 37.3 (29 Sep 2020 08:04), Max: 37.3 (29 Sep 2020 08:04)  T(F): 99.1 (29 Sep 2020 08:04), Max: 99.1 (29 Sep 2020 08:04)  HR: 117 (29 Sep 2020 08:04) (101 - 117)  BP: 136/75 (29 Sep 2020 08:04) (126/87 - 139/83)  BP(mean): --  RR: 16 (29 Sep 2020 08:04) (16 - 16)  SpO2: 99% (29 Sep 2020 08:04) (98% - 99%)      PHYSICAL EXAM  Constitutional: NAD  HEENT: NCAT, EOMI, handling secretions well  Cardio: well-perfused  Resp: symmetric chest-rise, no increased WOB  GI: non-distended  : no francis  Extremities: well-perfused      LABS:                        8.8    8.42  )-----------( 406      ( 28 Sep 2020 06:00 )             29.2     28 Sep 2020 06:00    138    |  101    |  6      ----------------------------<  103    3.7     |  31     |  0.71     Ca    8.6        28 Sep 2020 06:00              MEDICATIONS  (STANDING):  DULoxetine 60 milliGRAM(s) Oral daily  enoxaparin Injectable 100 milliGRAM(s) SubCutaneous every 12 hours  famotidine    Tablet 20 milliGRAM(s) Oral <User Schedule>  gabapentin 800 milliGRAM(s) Oral three times a day  gabapentin 200 milliGRAM(s) Oral <User Schedule>  influenza   Vaccine 0.5 milliLiter(s) IntraMuscular once  lactobacillus acidophilus 1 Tablet(s) Oral two times a day  lidocaine   Patch 2 Patch Transdermal <User Schedule>  melatonin 3 milliGRAM(s) Oral at bedtime  morphine ER Tablet 15 milliGRAM(s) Oral <User Schedule>  senna 2 Tablet(s) Oral at bedtime  tamsulosin 0.4 milliGRAM(s) Oral at bedtime  traZODone 50 milliGRAM(s) Oral <User Schedule>    MEDICATIONS  (PRN):  acetaminophen   Tablet .. 650 milliGRAM(s) Oral every 6 hours PRN Temp greater or equal to 38C (100.4F), Mild Pain (1 - 3)  bisacodyl Suppository 10 milliGRAM(s) Rectal <User Schedule> PRN no bowel movement for 2 days.  lidocaine 2% Gel 1 Application(s) Topical four times a day PRN pain  magnesium hydroxide Suspension 30 milliLiter(s) Oral every 12 hours PRN Constipation  ondansetron    Tablet 4 milliGRAM(s) Oral every 8 hours PRN Nausea and/or Vomiting  oxyCODONE    IR 5 milliGRAM(s) Oral daily PRN Severe Pain (7 - 10)  oxyCODONE    IR 5 milliGRAM(s) Oral every 4 hours PRN Moderate Pain (4 - 6)  oxyCODONE    IR 10 milliGRAM(s) Oral every 4 hours PRN Severe Pain (7 - 10)

## 2020-09-29 NOTE — CHART NOTE - NSCHARTNOTEFT_GEN_A_CORE
Nutrition Follow Up Note  Hospital Course   (Per Electronic Medical Record)    Source:  Patient [X]  Medical Record [X]      Diet:   Regular Diet w/ Thin Liquids  Tolerates Diet Well  No Chewing/Swallowing Difficulties  No Recent Nausea, Vomiting, Diarrhea or Constipation  Consumes % of Meals (as Per Documentation)  States Good PO Intake/Appetite    Enteral/Parenteral Nutrition: Not Applicable    Current Weight: 239.6lb on 9/6  Weights Currently Stable @This Time  Obtain Weights Weekly     Pertinent Medications: MEDICATIONS  (STANDING):  DULoxetine 60 milliGRAM(s) Oral daily  enoxaparin Injectable 100 milliGRAM(s) SubCutaneous every 12 hours  famotidine    Tablet 20 milliGRAM(s) Oral <User Schedule>  gabapentin 800 milliGRAM(s) Oral three times a day  gabapentin 200 milliGRAM(s) Oral <User Schedule>  influenza   Vaccine 0.5 milliLiter(s) IntraMuscular once  lactobacillus acidophilus 1 Tablet(s) Oral two times a day  lidocaine   Patch 2 Patch Transdermal <User Schedule>  melatonin 3 milliGRAM(s) Oral at bedtime  morphine ER Tablet 15 milliGRAM(s) Oral <User Schedule>  senna 2 Tablet(s) Oral at bedtime  tamsulosin 0.4 milliGRAM(s) Oral at bedtime  traZODone 50 milliGRAM(s) Oral <User Schedule>    MEDICATIONS  (PRN):  acetaminophen   Tablet .. 650 milliGRAM(s) Oral every 6 hours PRN Temp greater or equal to 38C (100.4F), Mild Pain (1 - 3)  bisacodyl Suppository 10 milliGRAM(s) Rectal <User Schedule> PRN no bowel movement for 2 days.  lidocaine 2% Gel 1 Application(s) Topical four times a day PRN pain  magnesium hydroxide Suspension 30 milliLiter(s) Oral every 12 hours PRN Constipation  ondansetron    Tablet 4 milliGRAM(s) Oral every 8 hours PRN Nausea and/or Vomiting  oxyCODONE    IR 5 milliGRAM(s) Oral daily PRN Severe Pain (7 - 10)  oxyCODONE    IR 5 milliGRAM(s) Oral every 4 hours PRN Moderate Pain (4 - 6)  oxyCODONE    IR 10 milliGRAM(s) Oral every 4 hours PRN Severe Pain (7 - 10)    Pertinent Labs:  09-28 Na138 mmol/L Glu 103 mg/dL<H> K+ 3.7 mmol/L Cr  0.71 mg/dL BUN 6 mg/dL<L>    Skin: No Pressure Ulcers  Multiple Surgical Incisions  (as Per Nursing Flow Sheet)    Edema: +1 Edema Noted to Right Lower Extremity   (as Per Documentation)     Last Bowel Movement: on 9/27    Estimated Needs:   [X] No Change Since Previous Assessment    Previous Nutrition Diagnosis:   Obese    Nutrition Diagnosis is [X] Ongoing - Continue Nutrition Plan of Care     New Nutrition Diagnosis: [X] Not Applicable    Interventions:   1. Recommend Continue Nutrition Plan of Care     Monitoring & Evaluation:   [X] Weights   [X] PO Intake   [X] Skin Integrity   [X] Follow Up (Per Protocol)  [X] Tolerance to Diet Prescription   [X] Other: Labs     Registered Dietitian/Nutritionist Remains Available.  Chandu Fonseca RDN    Pager # [phone]  Phone# [phone]

## 2020-09-30 PROCEDURE — 99232 SBSQ HOSP IP/OBS MODERATE 35: CPT

## 2020-09-30 RX ORDER — METHOCARBAMOL 500 MG/1
750 TABLET, FILM COATED ORAL
Refills: 0 | Status: DISCONTINUED | OUTPATIENT
Start: 2020-09-30 | End: 2020-10-12

## 2020-09-30 RX ORDER — GABAPENTIN 400 MG/1
1000 CAPSULE ORAL THREE TIMES A DAY
Refills: 0 | Status: DISCONTINUED | OUTPATIENT
Start: 2020-09-30 | End: 2020-10-12

## 2020-09-30 RX ADMIN — OXYCODONE HYDROCHLORIDE 10 MILLIGRAM(S): 5 TABLET ORAL at 07:00

## 2020-09-30 RX ADMIN — OXYCODONE HYDROCHLORIDE 10 MILLIGRAM(S): 5 TABLET ORAL at 23:05

## 2020-09-30 RX ADMIN — ENOXAPARIN SODIUM 100 MILLIGRAM(S): 100 INJECTION SUBCUTANEOUS at 12:12

## 2020-09-30 RX ADMIN — GABAPENTIN 1000 MILLIGRAM(S): 400 CAPSULE ORAL at 21:04

## 2020-09-30 RX ADMIN — OXYCODONE HYDROCHLORIDE 10 MILLIGRAM(S): 5 TABLET ORAL at 19:30

## 2020-09-30 RX ADMIN — OXYCODONE HYDROCHLORIDE 10 MILLIGRAM(S): 5 TABLET ORAL at 11:03

## 2020-09-30 RX ADMIN — Medication 3 MILLIGRAM(S): at 21:03

## 2020-09-30 RX ADMIN — ENOXAPARIN SODIUM 100 MILLIGRAM(S): 100 INJECTION SUBCUTANEOUS at 21:03

## 2020-09-30 RX ADMIN — MORPHINE SULFATE 15 MILLIGRAM(S): 50 CAPSULE, EXTENDED RELEASE ORAL at 17:40

## 2020-09-30 RX ADMIN — LIDOCAINE 2 PATCH: 4 CREAM TOPICAL at 07:16

## 2020-09-30 RX ADMIN — GABAPENTIN 800 MILLIGRAM(S): 400 CAPSULE ORAL at 06:03

## 2020-09-30 RX ADMIN — LIDOCAINE 2 PATCH: 4 CREAM TOPICAL at 18:26

## 2020-09-30 RX ADMIN — MORPHINE SULFATE 15 MILLIGRAM(S): 50 CAPSULE, EXTENDED RELEASE ORAL at 18:26

## 2020-09-30 RX ADMIN — DULOXETINE HYDROCHLORIDE 60 MILLIGRAM(S): 30 CAPSULE, DELAYED RELEASE ORAL at 12:12

## 2020-09-30 RX ADMIN — METHOCARBAMOL 750 MILLIGRAM(S): 500 TABLET, FILM COATED ORAL at 21:03

## 2020-09-30 RX ADMIN — Medication 1 TABLET(S): at 17:40

## 2020-09-30 RX ADMIN — OXYCODONE HYDROCHLORIDE 10 MILLIGRAM(S): 5 TABLET ORAL at 18:54

## 2020-09-30 RX ADMIN — LIDOCAINE 2 PATCH: 4 CREAM TOPICAL at 06:03

## 2020-09-30 RX ADMIN — MORPHINE SULFATE 15 MILLIGRAM(S): 50 CAPSULE, EXTENDED RELEASE ORAL at 07:00

## 2020-09-30 RX ADMIN — Medication 1 TABLET(S): at 06:03

## 2020-09-30 RX ADMIN — TAMSULOSIN HYDROCHLORIDE 0.4 MILLIGRAM(S): 0.4 CAPSULE ORAL at 21:05

## 2020-09-30 RX ADMIN — OXYCODONE HYDROCHLORIDE 10 MILLIGRAM(S): 5 TABLET ORAL at 10:32

## 2020-09-30 RX ADMIN — OXYCODONE HYDROCHLORIDE 10 MILLIGRAM(S): 5 TABLET ORAL at 23:49

## 2020-09-30 RX ADMIN — OXYCODONE HYDROCHLORIDE 10 MILLIGRAM(S): 5 TABLET ORAL at 15:39

## 2020-09-30 RX ADMIN — OXYCODONE HYDROCHLORIDE 10 MILLIGRAM(S): 5 TABLET ORAL at 06:03

## 2020-09-30 RX ADMIN — OXYCODONE HYDROCHLORIDE 10 MILLIGRAM(S): 5 TABLET ORAL at 01:12

## 2020-09-30 RX ADMIN — OXYCODONE HYDROCHLORIDE 10 MILLIGRAM(S): 5 TABLET ORAL at 02:00

## 2020-09-30 RX ADMIN — MORPHINE SULFATE 15 MILLIGRAM(S): 50 CAPSULE, EXTENDED RELEASE ORAL at 06:03

## 2020-09-30 RX ADMIN — OXYCODONE HYDROCHLORIDE 10 MILLIGRAM(S): 5 TABLET ORAL at 14:37

## 2020-09-30 RX ADMIN — GABAPENTIN 1000 MILLIGRAM(S): 400 CAPSULE ORAL at 14:06

## 2020-09-30 NOTE — PROGRESS NOTE ADULT - SUBJECTIVE AND OBJECTIVE BOX
Patient is a 35y old  Male who presents with a chief complaint of Traumatic Spinal Cord Disfunction: 04.211 incomplete paraplegia (29 Sep 2020 11:33)    Interval Hx  Patient seen and examined at bedside.   No overnight events reported.   Pain tolerable today, feels symptomatically better than yesterday.  Denies new symptoms.    ALLERGIES:  No Known Allergies    MEDICATIONS  (STANDING):  DULoxetine 60 milliGRAM(s) Oral daily  enoxaparin Injectable 100 milliGRAM(s) SubCutaneous every 12 hours  famotidine    Tablet 20 milliGRAM(s) Oral <User Schedule>  gabapentin 800 milliGRAM(s) Oral three times a day  gabapentin 200 milliGRAM(s) Oral <User Schedule>  influenza   Vaccine 0.5 milliLiter(s) IntraMuscular once  lactobacillus acidophilus 1 Tablet(s) Oral two times a day  lidocaine   Patch 2 Patch Transdermal <User Schedule>  melatonin 3 milliGRAM(s) Oral at bedtime  morphine ER Tablet 15 milliGRAM(s) Oral <User Schedule>  senna 2 Tablet(s) Oral at bedtime  tamsulosin 0.4 milliGRAM(s) Oral at bedtime  traZODone 50 milliGRAM(s) Oral <User Schedule>    MEDICATIONS  (PRN):  acetaminophen   Tablet .. 650 milliGRAM(s) Oral every 6 hours PRN Temp greater or equal to 38C (100.4F), Mild Pain (1 - 3)  bisacodyl Suppository 10 milliGRAM(s) Rectal <User Schedule> PRN no bowel movement for 2 days.  lidocaine 2% Gel 1 Application(s) Topical four times a day PRN pain  magnesium hydroxide Suspension 30 milliLiter(s) Oral every 12 hours PRN Constipation  ondansetron    Tablet 4 milliGRAM(s) Oral every 8 hours PRN Nausea and/or Vomiting  oxyCODONE    IR 5 milliGRAM(s) Oral every 4 hours PRN Moderate Pain (4 - 6)  oxyCODONE    IR 10 milliGRAM(s) Oral every 4 hours PRN Severe Pain (7 - 10)  oxyCODONE    IR 5 milliGRAM(s) Oral daily PRN Severe Pain (7 - 10)    Vital Signs Last 24 Hrs  T(F): 98.1 (30 Sep 2020 07:40), Max: 98.4 (29 Sep 2020 20:48)  HR: 95 (30 Sep 2020 07:40) (95 - 105)  BP: 139/93 (30 Sep 2020 07:40) (119/86 - 139/93)  RR: 16 (30 Sep 2020 07:40) (16 - 17)  SpO2: 99% (30 Sep 2020 07:40) (98% - 99%)  I&O's Summary    PHYSICAL EXAM:  General: NAD, A/O x 3  ENT: MMM, no thrush  Neck: Supple, No JVD  Lungs: Clear to auscultation bilaterally, good air entry, non-labored breathing  Cardio: RRR, S1/S2, No murmur  Abdomen: Soft, Nontender, Nondistended; Bowel sounds present  Extremities: b/l LE edema R>L ---- IMproved  Neuro :  A/O x 3, b/l LE motor weakness  Skin: multiple areas of healing ulcer/bruises over torso      LABS:                        8.8    8.42  )-----------( 406      ( 28 Sep 2020 06:00 )             29.2     09-28    138  |  101  |  6   ----------------------------<  103  3.7   |  31  |  0.71    Ca    8.6      28 Sep 2020 06:00          eGFR if Non African American: 121 mL/min/1.73M2 (09-28-20 @ 06:00)  eGFR if : 141 mL/min/1.73M2 (09-28-20 @ 06:00)          RADIOLOGY & ADDITIONAL TESTS:    Care Discussed with Consultants/Other Providers:

## 2020-09-30 NOTE — PROGRESS NOTE ADULT - ASSESSMENT
35M admitted to [facility] on 8/22/20 with back pain, found to have fractures of L1-L5 pedicles bilaterally, leading to widening of the spinal canal with anterolisthesis of L5 on S1 with the entire L5 vertebral body width, disruption of the ALL and PLL at L5-S1 level, probable epidural hemorrhage and hemorrhage within the foramina at L1-L5, and enlargement/edema of bilateral psoas muscle.  Patient underwent evacuation of epidural hematoma, T11-pelvis posterior spinal fusion, and L2-S1 Laminectomy on 8/23/20 and subsequent L5-S1 anterior interbody fusion and L3-4 lateral interbody fusion on 9/1.  Hospital course c/b acute blood loss anemia requiring multiple transfusions, traumatic rhabdomyolysis and intractable pain.  Patient now admitted for a multidisciplinary rehab program- pt/ot/dvt ppx, pain meds.    #s/p Epidural hematoma  #T11-pelvis posterior spinal fusion  #L2-S1 Laminectomy   #L5-S1 anterior interbody fusion and L3-4 lateral interbody fusion on 9/1  Cont w/ comprehensive rehab program   Pain more controlled today --- Pain meds optimized by per primary team yesterday---on MS contin now , and oxycodone IR for moderate and severe pain.     #B/l LE edema ,Improving  #B/L DVT  cont on therapeutic lovenox   Elevate LE, pain control  Extensive bilateral DVT with increased clot burden compared to 09/06/2020 seen on repeat US duplex; but no intervention planned per vascular     #Hyponatremia: resolved     #Leukocytosis: resolved    #Intermittent urinary retention  voiding; low PVR  cont flomax    #Anemia , likely post op anemia  relatively stable   s/p multiple PRBCs at OSH  monitor h&h, keep hb >7    #Rhabdomyolysis- 2/2 trauma: resolved    #DVT ppx: s/c lovenox, IVC filter in place

## 2020-09-30 NOTE — PROGRESS NOTE ADULT - SUBJECTIVE AND OBJECTIVE BOX
SUBJECTIVE & OVERNIGHT EVENTS:  Patient seen and evaluated this morning. No acute events overnight. Participating well in therapy. Pain is 7-9/10 but better controlled with MS contin compared to oxycodone. reports pain is mostly like spasms in lower back and raditates to legs, Now sleeping well at night. walked in therapy today. Denies chest pain, SOB, nausea, vomiting, constipation or diarrhea. Slept well last night.       [X] Constitutional WNL        [X] Cardio WNL              [X] Resp WNL  [X] GI WNL                            [X]  WNL                    [] Heme +B/L LE DVTs  [X] Endo WNL                       [] Skin +spinal incision                  [X] MSK WNL  [] Neuro +LE weakness                     [X] Cognitive WNL         [] Psych +Depression/anxiety    OBJECTIVE  Vital Signs Last 24 Hrs   Vital Signs Last 24 Hrs  T(C): 36.7 (30 Sep 2020 07:40), Max: 36.9 (29 Sep 2020 20:48)  T(F): 98.1 (30 Sep 2020 07:40), Max: 98.4 (29 Sep 2020 20:48)  HR: 95 (30 Sep 2020 07:40) (95 - 105)  BP: 139/93 (30 Sep 2020 07:40) (119/86 - 139/93)  BP(mean): --  RR: 16 (30 Sep 2020 07:40) (16 - 17)  SpO2: 99% (30 Sep 2020 07:40) (98% - 99%)      PHYSICAL EXAM  Constitutional: NAD  HEENT: NCAT, EOMI, handling secretions well  Cardio: well-perfused  Resp: symmetric chest-rise, no increased WOB  GI: non-distended  : no francis  Extremities: well-perfused      LABS:                        8.8    8.42  )-----------( 406      ( 28 Sep 2020 06:00 )             29.2     28 Sep 2020 06:00    138    |  101    |  6      ----------------------------<  103    3.7     |  31     |  0.71     Ca    8.6        28 Sep 2020 06:00              MEDICATIONS  (STANDING):  DULoxetine 60 milliGRAM(s) Oral daily  enoxaparin Injectable 100 milliGRAM(s) SubCutaneous every 12 hours  famotidine    Tablet 20 milliGRAM(s) Oral <User Schedule>  gabapentin 800 milliGRAM(s) Oral three times a day  gabapentin 200 milliGRAM(s) Oral <User Schedule>  influenza   Vaccine 0.5 milliLiter(s) IntraMuscular once  lactobacillus acidophilus 1 Tablet(s) Oral two times a day  lidocaine   Patch 2 Patch Transdermal <User Schedule>  melatonin 3 milliGRAM(s) Oral at bedtime  morphine ER Tablet 15 milliGRAM(s) Oral <User Schedule>  senna 2 Tablet(s) Oral at bedtime  tamsulosin 0.4 milliGRAM(s) Oral at bedtime  traZODone 50 milliGRAM(s) Oral <User Schedule>    MEDICATIONS  (PRN):  acetaminophen   Tablet .. 650 milliGRAM(s) Oral every 6 hours PRN Temp greater or equal to 38C (100.4F), Mild Pain (1 - 3)  bisacodyl Suppository 10 milliGRAM(s) Rectal <User Schedule> PRN no bowel movement for 2 days.  lidocaine 2% Gel 1 Application(s) Topical four times a day PRN pain  magnesium hydroxide Suspension 30 milliLiter(s) Oral every 12 hours PRN Constipation  ondansetron    Tablet 4 milliGRAM(s) Oral every 8 hours PRN Nausea and/or Vomiting  oxyCODONE    IR 5 milliGRAM(s) Oral daily PRN Severe Pain (7 - 10)  oxyCODONE    IR 5 milliGRAM(s) Oral every 4 hours PRN Moderate Pain (4 - 6)  oxyCODONE    IR 10 milliGRAM(s) Oral every 4 hours PRN Severe Pain (7 - 10)

## 2020-09-30 NOTE — PROGRESS NOTE ADULT - ASSESSMENT
ASSESSMENT/PLAN  BRUNO CHO is a 35M admitted to [facility] on 8/22/20 with back pain, found to have fractures of L1-L5 pedicles bilaterally, leading to widening of the spinal canal with anterolisthesis of L5 on S1 with the entire L5 vertebral body width, disruption of the ALL and PLL at L5-S1 level, probable epidural hemorrhage and hemorrhage within the foramina at L1-L5, and enlargement/edema of bilateral psoas muscle.  Patient underwent evacuation of epidural hematoma, T11-pelvis posterior spinal fusion, and L2-S1 Laminectomy on 8/23/20 and subsequent L5-S1 anterior interbody fusion and L3-4 lateral interbody fusion on 9/1.  Hospital course c/b acute blood loss anemia requiring multiple transfusions, traumatic rhabodmyolysis, and intractable pain.  Patient now admitted for a multidisciplinary rehab program. 09-04-20 @ 14:37    Comprehensive Multidisciplinary Rehab Program:  - Start comprehensive rehab program of PT/OT - 3 hours a day, 5 days a week  - P&O as needed    -------------  MEDICAL MANAGEMENT     #Traumatic Spinal Cord Injury  - pt with complete anterolisthesis of L5 on S1, multiple pedicular fractures, and epidural hemorrhage   - s/p T11-pelvis posterior spinal fusion, and L2-S1 Laminectomy on 8/23/20 and subsequent L5-S1 anterior interbody fusion and L3-4 lateral interbody fusion on 9/1  - pain control as below  - PT/OT  - spinal & Fall precautions  - TLSO/AFOs when OOB  - monitor bladder/bowel function out of concern for neurogenic bowel  - JETHRO drain removed; off keflex  - staples/sutures removed 9/23    #Tachycardia  - per chart review, pt tachycardic prior to transfer to [facility], but no dopplers/CTA obtained  - CTA 9/6 inconclusive re: PE  - Doppler 9/6+B/L DVT   - TTE 9/9 w/o evidence of right heart strain     #Pain Control  - 9/14/20- oxycodone 5-10 mg for moderate-severe pain, oxycontin 20 mg Q12h, valium 5 mg three times a day prn for spasms, Gabapentin increased to 400 mg three times a day. Dilaudid 2 mg Q4h for breakthrough pain.   - 9/15/20: plan to increase oxycontin to 30 mg Q12h, Gabapentin to 600 mg TID. SBP > 120,   - 9/22 - consider switching to morphine-based long-acting and increasing gabapentin  - 9/22 - increased gabapentin  - 9/25 - decreased dilaudid interval.   - 9/28 - switched from oxycontin to ms contin; d/c'd dilaudid    increased gabapentin to 1000 TID  (9/30)  - Added methocarbamol 750 mg BID for spasms. (9/30)  - consider addition TCA and/or switching to lyrica if pain remains uncontrolled       #adjustment issues  - c/w psychological support   - c/w cymbalta    #Rhabdomyolysis (resolved)  - 2/2 trauma   - largely resolved: <900 on 8/31  - encourage PO fluids    #Bladder Management  - Neurogenic bladder (resolved)  - francis removed 9/4 am  - PVRs minimal   - c/w flomax.     #IVC filter  - placed 8/26  - will need removed in 5-6 months     #DVT   - IVC filter  - SCDs  - tachycardia: Doppler and cTA chest to eval for DVT and PE. however per chart review pt appears to have been tachycardic for some time prior to arrival in rehab.  - doppler showed bilateral DVTs, vascular surgery consult- continue to monitor, okay to continue therapy.   - RLE edema: got 2 days of lasix few days ago, improved.   - 9/17: Doppler positive for worsening BLE clots, occlusive clots in RLE, nonocclusive clots in LLE. Surgeon Dr. Rivera cleared patient to start on full anticoagulation.  Now on 100 mg Lovenox Q12h. Discussed result and therapy with patient. Vascular surgery - continue full anticoagulation treatment for 6-12 months. Seen by vascular 9/22: continue elevation x24h and ace wraps when OOB. Bedside therapies 9/22.   - Hb low but stable - likely due to increased clot burden with worsening DVTs. Continue to monitor.     #Insomnia  - trazodone 50 mg at night.     Outpatient Follow-up:    Gabrielle Villegas  ORTHOPAEDIC SURGERY  [address] [facility], [address] [place] [id number]  Phone: [phone]  Fax: [phone]  Follow Up Time:     Dave Rivera  ORTHOPAEDIC SURGERY  [address] [place] [address]  Phone: [phone]  Fax: [phone]    IDT Rounds 9/24: Pt making good functional gains. CG for LBD, tub transfers min/mod assist with sliding board.  Bed-wheelchair transfers with CG.  Standing with mod assist of 1-2 people.  Tentative d/c date 10/12 home.

## 2020-10-01 LAB
ANION GAP SERPL CALC-SCNC: 7 MMOL/L — SIGNIFICANT CHANGE UP (ref 5–17)
BASOPHILS # BLD AUTO: 0.03 K/UL — SIGNIFICANT CHANGE UP (ref 0–0.2)
BASOPHILS NFR BLD AUTO: 0.4 % — SIGNIFICANT CHANGE UP (ref 0–2)
BUN SERPL-MCNC: 5 MG/DL — LOW (ref 7–23)
CALCIUM SERPL-MCNC: 8.8 MG/DL — SIGNIFICANT CHANGE UP (ref 8.4–10.5)
CHLORIDE SERPL-SCNC: 101 MMOL/L — SIGNIFICANT CHANGE UP (ref 96–108)
CO2 SERPL-SCNC: 30 MMOL/L — SIGNIFICANT CHANGE UP (ref 22–31)
CREAT SERPL-MCNC: 0.73 MG/DL — SIGNIFICANT CHANGE UP (ref 0.5–1.3)
EOSINOPHIL # BLD AUTO: 0.11 K/UL — SIGNIFICANT CHANGE UP (ref 0–0.5)
EOSINOPHIL NFR BLD AUTO: 1.6 % — SIGNIFICANT CHANGE UP (ref 0–6)
GLUCOSE SERPL-MCNC: 106 MG/DL — HIGH (ref 70–99)
HCT VFR BLD CALC: 31.1 % — LOW (ref 39–50)
HGB BLD-MCNC: 9.4 G/DL — LOW (ref 13–17)
IMM GRANULOCYTES NFR BLD AUTO: 1 % — SIGNIFICANT CHANGE UP (ref 0–1.5)
LYMPHOCYTES # BLD AUTO: 1.08 K/UL — SIGNIFICANT CHANGE UP (ref 1–3.3)
LYMPHOCYTES # BLD AUTO: 15.4 % — SIGNIFICANT CHANGE UP (ref 13–44)
MCHC RBC-ENTMCNC: 27.3 PG — SIGNIFICANT CHANGE UP (ref 27–34)
MCHC RBC-ENTMCNC: 30.2 GM/DL — LOW (ref 32–36)
MCV RBC AUTO: 90.4 FL — SIGNIFICANT CHANGE UP (ref 80–100)
MONOCYTES # BLD AUTO: 0.46 K/UL — SIGNIFICANT CHANGE UP (ref 0–0.9)
MONOCYTES NFR BLD AUTO: 6.6 % — SIGNIFICANT CHANGE UP (ref 2–14)
NEUTROPHILS # BLD AUTO: 5.27 K/UL — SIGNIFICANT CHANGE UP (ref 1.8–7.4)
NEUTROPHILS NFR BLD AUTO: 75 % — SIGNIFICANT CHANGE UP (ref 43–77)
NRBC # BLD: 0 /100 WBCS — SIGNIFICANT CHANGE UP (ref 0–0)
PLATELET # BLD AUTO: 491 K/UL — HIGH (ref 150–400)
POTASSIUM SERPL-MCNC: 4 MMOL/L — SIGNIFICANT CHANGE UP (ref 3.5–5.3)
POTASSIUM SERPL-SCNC: 4 MMOL/L — SIGNIFICANT CHANGE UP (ref 3.5–5.3)
RBC # BLD: 3.44 M/UL — LOW (ref 4.2–5.8)
RBC # FLD: 14.7 % — HIGH (ref 10.3–14.5)
SODIUM SERPL-SCNC: 138 MMOL/L — SIGNIFICANT CHANGE UP (ref 135–145)
WBC # BLD: 7.02 K/UL — SIGNIFICANT CHANGE UP (ref 3.8–10.5)
WBC # FLD AUTO: 7.02 K/UL — SIGNIFICANT CHANGE UP (ref 3.8–10.5)

## 2020-10-01 PROCEDURE — 99232 SBSQ HOSP IP/OBS MODERATE 35: CPT

## 2020-10-01 PROCEDURE — 99233 SBSQ HOSP IP/OBS HIGH 50: CPT

## 2020-10-01 RX ORDER — OXYCODONE HYDROCHLORIDE 5 MG/1
10 TABLET ORAL EVERY 4 HOURS
Refills: 0 | Status: DISCONTINUED | OUTPATIENT
Start: 2020-10-01 | End: 2020-10-05

## 2020-10-01 RX ORDER — OXYCODONE HYDROCHLORIDE 5 MG/1
5 TABLET ORAL DAILY
Refills: 0 | Status: DISCONTINUED | OUTPATIENT
Start: 2020-10-01 | End: 2020-10-05

## 2020-10-01 RX ORDER — MORPHINE SULFATE 50 MG/1
15 CAPSULE, EXTENDED RELEASE ORAL
Refills: 0 | Status: DISCONTINUED | OUTPATIENT
Start: 2020-10-01 | End: 2020-10-05

## 2020-10-01 RX ORDER — OXYCODONE HYDROCHLORIDE 5 MG/1
5 TABLET ORAL EVERY 4 HOURS
Refills: 0 | Status: DISCONTINUED | OUTPATIENT
Start: 2020-10-01 | End: 2020-10-05

## 2020-10-01 RX ADMIN — SENNA PLUS 2 TABLET(S): 8.6 TABLET ORAL at 21:45

## 2020-10-01 RX ADMIN — DULOXETINE HYDROCHLORIDE 60 MILLIGRAM(S): 30 CAPSULE, DELAYED RELEASE ORAL at 11:28

## 2020-10-01 RX ADMIN — OXYCODONE HYDROCHLORIDE 10 MILLIGRAM(S): 5 TABLET ORAL at 21:45

## 2020-10-01 RX ADMIN — LIDOCAINE 2 PATCH: 4 CREAM TOPICAL at 17:03

## 2020-10-01 RX ADMIN — LIDOCAINE 2 PATCH: 4 CREAM TOPICAL at 08:31

## 2020-10-01 RX ADMIN — OXYCODONE HYDROCHLORIDE 10 MILLIGRAM(S): 5 TABLET ORAL at 12:45

## 2020-10-01 RX ADMIN — MORPHINE SULFATE 15 MILLIGRAM(S): 50 CAPSULE, EXTENDED RELEASE ORAL at 06:21

## 2020-10-01 RX ADMIN — OXYCODONE HYDROCHLORIDE 10 MILLIGRAM(S): 5 TABLET ORAL at 07:57

## 2020-10-01 RX ADMIN — GABAPENTIN 1000 MILLIGRAM(S): 400 CAPSULE ORAL at 14:28

## 2020-10-01 RX ADMIN — OXYCODONE HYDROCHLORIDE 10 MILLIGRAM(S): 5 TABLET ORAL at 17:03

## 2020-10-01 RX ADMIN — OXYCODONE HYDROCHLORIDE 10 MILLIGRAM(S): 5 TABLET ORAL at 03:13

## 2020-10-01 RX ADMIN — ENOXAPARIN SODIUM 100 MILLIGRAM(S): 100 INJECTION SUBCUTANEOUS at 11:27

## 2020-10-01 RX ADMIN — TAMSULOSIN HYDROCHLORIDE 0.4 MILLIGRAM(S): 0.4 CAPSULE ORAL at 21:45

## 2020-10-01 RX ADMIN — Medication 1 TABLET(S): at 06:22

## 2020-10-01 RX ADMIN — MORPHINE SULFATE 15 MILLIGRAM(S): 50 CAPSULE, EXTENDED RELEASE ORAL at 17:04

## 2020-10-01 RX ADMIN — ENOXAPARIN SODIUM 100 MILLIGRAM(S): 100 INJECTION SUBCUTANEOUS at 21:50

## 2020-10-01 RX ADMIN — GABAPENTIN 1000 MILLIGRAM(S): 400 CAPSULE ORAL at 06:22

## 2020-10-01 RX ADMIN — OXYCODONE HYDROCHLORIDE 10 MILLIGRAM(S): 5 TABLET ORAL at 14:29

## 2020-10-01 RX ADMIN — OXYCODONE HYDROCHLORIDE 10 MILLIGRAM(S): 5 TABLET ORAL at 08:46

## 2020-10-01 RX ADMIN — GABAPENTIN 1000 MILLIGRAM(S): 400 CAPSULE ORAL at 21:44

## 2020-10-01 RX ADMIN — OXYCODONE HYDROCHLORIDE 10 MILLIGRAM(S): 5 TABLET ORAL at 17:04

## 2020-10-01 RX ADMIN — Medication 1 TABLET(S): at 17:03

## 2020-10-01 RX ADMIN — MORPHINE SULFATE 15 MILLIGRAM(S): 50 CAPSULE, EXTENDED RELEASE ORAL at 17:02

## 2020-10-01 RX ADMIN — OXYCODONE HYDROCHLORIDE 10 MILLIGRAM(S): 5 TABLET ORAL at 04:00

## 2020-10-01 RX ADMIN — METHOCARBAMOL 750 MILLIGRAM(S): 500 TABLET, FILM COATED ORAL at 21:50

## 2020-10-01 RX ADMIN — METHOCARBAMOL 750 MILLIGRAM(S): 500 TABLET, FILM COATED ORAL at 08:30

## 2020-10-01 RX ADMIN — Medication 3 MILLIGRAM(S): at 21:52

## 2020-10-01 RX ADMIN — OXYCODONE HYDROCHLORIDE 10 MILLIGRAM(S): 5 TABLET ORAL at 22:45

## 2020-10-01 NOTE — PROGRESS NOTE ADULT - SUBJECTIVE AND OBJECTIVE BOX
Patient is a 35y old  Male who presents with a chief complaint of Traumatic Spinal Cord Disfunction: 04.211 incomplete paraplegia (30 Sep 2020 15:11)    Interval Hx  Patient seen and examined at bedside.  No overnight events reported.   Reports pain right lower extremity.    ALLERGIES:  No Known Allergies    MEDICATIONS  (STANDING):  DULoxetine 60 milliGRAM(s) Oral daily  enoxaparin Injectable 100 milliGRAM(s) SubCutaneous every 12 hours  famotidine    Tablet 20 milliGRAM(s) Oral <User Schedule>  gabapentin 1000 milliGRAM(s) Oral three times a day  influenza   Vaccine 0.5 milliLiter(s) IntraMuscular once  lactobacillus acidophilus 1 Tablet(s) Oral two times a day  lidocaine   Patch 2 Patch Transdermal <User Schedule>  melatonin 3 milliGRAM(s) Oral at bedtime  methocarbamol 750 milliGRAM(s) Oral <User Schedule>  morphine ER Tablet 15 milliGRAM(s) Oral <User Schedule>  senna 2 Tablet(s) Oral at bedtime  tamsulosin 0.4 milliGRAM(s) Oral at bedtime  traZODone 50 milliGRAM(s) Oral <User Schedule>    MEDICATIONS  (PRN):  acetaminophen   Tablet .. 650 milliGRAM(s) Oral every 6 hours PRN Temp greater or equal to 38C (100.4F), Mild Pain (1 - 3)  bisacodyl Suppository 10 milliGRAM(s) Rectal <User Schedule> PRN no bowel movement for 2 days.  lidocaine 2% Gel 1 Application(s) Topical four times a day PRN pain  magnesium hydroxide Suspension 30 milliLiter(s) Oral every 12 hours PRN Constipation  ondansetron    Tablet 4 milliGRAM(s) Oral every 8 hours PRN Nausea and/or Vomiting  oxyCODONE    IR 5 milliGRAM(s) Oral daily PRN Severe Pain (7 - 10)  oxyCODONE    IR 5 milliGRAM(s) Oral every 4 hours PRN Moderate Pain (4 - 6)  oxyCODONE    IR 10 milliGRAM(s) Oral every 4 hours PRN Severe Pain (7 - 10)    Vital Signs Last 24 Hrs  T(F): 98.4 (01 Oct 2020 08:40), Max: 98.4 (01 Oct 2020 08:40)  HR: 101 (01 Oct 2020 08:40) (101 - 101)  BP: 124/78 (01 Oct 2020 08:40) (124/78 - 124/78)  RR: 15 (01 Oct 2020 08:40) (15 - 15)  SpO2: 100% (01 Oct 2020 08:40) (100% - 100%)  I&O's Summary    PHYSICAL EXAM:  General: NAD, A/O x 3  ENT: MMM, no thrush  Neck: Supple, No JVD  Lungs: Clear to auscultation bilaterally, good air entry, non-labored breathing  Cardio: RRR, S1/S2, No murmur  Abdomen: Soft, Nontender, Nondistended; Bowel sounds present  Extremities: b/l LE edema R>L ----Improved  Neuro :  A/O x 3, b/l LE motor weakness  Skin: multiple areas of healing ulcer/bruises over torso    LABS:                        9.4    7.02  )-----------( 491      ( 01 Oct 2020 06:30 )             31.1     10-01    138  |  101  |  5   ----------------------------<  106  4.0   |  30  |  0.73    Ca    8.8      01 Oct 2020 06:30          eGFR if : 139 mL/min/1.73M2 (10-01-20 @ 06:30)  eGFR if Non African American: 120 mL/min/1.73M2 (10-01-20 @ 06:30)        RADIOLOGY & ADDITIONAL TESTS:    Care Discussed with Consultants/Other Providers:

## 2020-10-01 NOTE — PROGRESS NOTE ADULT - ASSESSMENT
35M admitted to [facility] on 8/22/20 with back pain, found to have fractures of L1-L5 pedicles bilaterally, leading to widening of the spinal canal with anterolisthesis of L5 on S1 with the entire L5 vertebral body width, disruption of the ALL and PLL at L5-S1 level, probable epidural hemorrhage and hemorrhage within the foramina at L1-L5, and enlargement/edema of bilateral psoas muscle.  Patient underwent evacuation of epidural hematoma, T11-pelvis posterior spinal fusion, and L2-S1 Laminectomy on 8/23/20 and subsequent L5-S1 anterior interbody fusion and L3-4 lateral interbody fusion on 9/1.  Hospital course c/b acute blood loss anemia requiring multiple transfusions, traumatic rhabdomyolysis and intractable pain.  Patient now admitted for a multidisciplinary rehab program- pt/ot/dvt ppx, pain meds.    #s/p Epidural hematoma  #T11-pelvis posterior spinal fusion  #L2-S1 Laminectomy   #L5-S1 anterior interbody fusion and L3-4 lateral interbody fusion on 9/1  Cont w/ comprehensive rehab program  Pain mx per primary team     #B/l LE edema ,Improving  #B/L DVT  cont on therapeutic lovenox   Elevate LE, pain control  Extensive bilateral DVT with increased clot burden compared to 09/06/2020 seen on repeat US duplex; no intervention planned per vascular , cont on AC    #Hyponatremia: resolved     #Leukocytosis: resolved    #Intermittent urinary retention  voiding; low PVR  cont flomax    #Anemia , likely post op anemia  relatively stable   s/p multiple PRBCs at OSH  monitor h&h, keep hb >7    #DVT ppx: s/c lovenox, IVC filter in place    Remains stable  case d/w Dr. arnett

## 2020-10-01 NOTE — PROGRESS NOTE ADULT - SUBJECTIVE AND OBJECTIVE BOX
SUBJECTIVE & OVERNIGHT EVENTS:  Patient seen and evaluated this morning. No acute events overnight. In better spirits this morning.  Back pain has decreased for the time being.  +BM.  Participating in therapies. No new complaints.       [X] Constitutional WNL        [X] Cardio WNL              [X] Resp WNL  [X] GI WNL                            [X]  WNL                    [] Heme +B/L LE DVTs  [X] Endo WNL                       [] Skin +spinal incision                  [X] MSK WNL  [] Neuro +LE weakness                     [X] Cognitive WNL         [] Psych +Depression/anxiety    OBJECTIVE  Vital Signs Last 24 Hrs  T(C): 36.9 (01 Oct 2020 08:40), Max: 36.9 (01 Oct 2020 08:40)  T(F): 98.4 (01 Oct 2020 08:40), Max: 98.4 (01 Oct 2020 08:40)  HR: 101 (01 Oct 2020 08:40) (101 - 101)  BP: 124/78 (01 Oct 2020 08:40) (124/78 - 124/78)  RR: 15 (01 Oct 2020 08:40) (15 - 15)  SpO2: 100% (01 Oct 2020 08:40) (100% - 100%)      PHYSICAL EXAM  Constitutional: NAD  HEENT: NCAT, EOMI, handling secretions well  Cardio: well-perfused  Resp: symmetric chest-rise, no increased WOB  GI: non-distended  : no francis  Extremities: well-perfused      LABS:               9.4    7.02  )-----------( 491      ( 01 Oct 2020 06:30 )             31.1     01 Oct 2020 06:30    138    |  101    |  5      ----------------------------<  106    4.0     |  30     |  0.73     Ca    8.8        01 Oct 2020 06:30              MEDICATIONS  (STANDING):  DULoxetine 60 milliGRAM(s) Oral daily  enoxaparin Injectable 100 milliGRAM(s) SubCutaneous every 12 hours  famotidine    Tablet 20 milliGRAM(s) Oral <User Schedule>  gabapentin 1000 milliGRAM(s) Oral three times a day  influenza   Vaccine 0.5 milliLiter(s) IntraMuscular once  lactobacillus acidophilus 1 Tablet(s) Oral two times a day  lidocaine   Patch 2 Patch Transdermal <User Schedule>  melatonin 3 milliGRAM(s) Oral at bedtime  methocarbamol 750 milliGRAM(s) Oral <User Schedule>  morphine ER Tablet 15 milliGRAM(s) Oral <User Schedule>  senna 2 Tablet(s) Oral at bedtime  tamsulosin 0.4 milliGRAM(s) Oral at bedtime  traZODone 50 milliGRAM(s) Oral <User Schedule>    MEDICATIONS  (PRN):  acetaminophen   Tablet .. 650 milliGRAM(s) Oral every 6 hours PRN Temp greater or equal to 38C (100.4F), Mild Pain (1 - 3)  bisacodyl Suppository 10 milliGRAM(s) Rectal <User Schedule> PRN no bowel movement for 2 days.  lidocaine 2% Gel 1 Application(s) Topical four times a day PRN pain  magnesium hydroxide Suspension 30 milliLiter(s) Oral every 12 hours PRN Constipation  ondansetron    Tablet 4 milliGRAM(s) Oral every 8 hours PRN Nausea and/or Vomiting  oxyCODONE    IR 5 milliGRAM(s) Oral every 4 hours PRN Moderate Pain (4 - 6)  oxyCODONE    IR 10 milliGRAM(s) Oral every 4 hours PRN Severe Pain (7 - 10)  oxyCODONE    IR 5 milliGRAM(s) Oral daily PRN Severe Pain (7 - 10)

## 2020-10-01 NOTE — PROGRESS NOTE ADULT - ASSESSMENT
ASSESSMENT/PLAN  BRUNO CHO is a 35M admitted to [facility] on 8/22/20 with back pain, found to have fractures of L1-L5 pedicles bilaterally, leading to widening of the spinal canal with anterolisthesis of L5 on S1 with the entire L5 vertebral body width, disruption of the ALL and PLL at L5-S1 level, probable epidural hemorrhage and hemorrhage within the foramina at L1-L5, and enlargement/edema of bilateral psoas muscle.  Patient underwent evacuation of epidural hematoma, T11-pelvis posterior spinal fusion, and L2-S1 Laminectomy on 8/23/20 and subsequent L5-S1 anterior interbody fusion and L3-4 lateral interbody fusion on 9/1.  Hospital course c/b acute blood loss anemia requiring multiple transfusions, traumatic rhabodmyolysis, and intractable pain.  Patient now admitted for a multidisciplinary rehab program. 09-04-20 @ 14:37    Comprehensive Multidisciplinary Rehab Program:  - Start comprehensive rehab program of PT/OT - 3 hours a day, 5 days a week  - P&O as needed    -------------  MEDICAL MANAGEMENT     #Traumatic Spinal Cord Injury  - pt with complete anterolisthesis of L5 on S1, multiple pedicular fractures, and epidural hemorrhage   - s/p T11-pelvis posterior spinal fusion, and L2-S1 Laminectomy on 8/23/20 and subsequent L5-S1 anterior interbody fusion and L3-4 lateral interbody fusion on 9/1  - pain control as below  - PT/OT  - spinal & Fall precautions  - TLSO/AFOs when OOB  - monitor bladder/bowel function out of concern for neurogenic bowel  - JETHRO drain removed; off keflex  - staples/sutures removed 9/23    #Tachycardia  - per chart review, pt tachycardic prior to transfer to [facility], but no dopplers/CTA obtained  - CTA 9/6 inconclusive re: PE  - Doppler 9/6+B/L DVT   - TTE 9/9 w/o evidence of right heart strain     #Pain Control  - 9/14/20- oxycodone 5-10 mg for moderate-severe pain, oxycontin 20 mg Q12h, valium 5 mg three times a day prn for spasms, Gabapentin increased to 400 mg three times a day. Dilaudid 2 mg Q4h for breakthrough pain.   - 9/15/20: plan to increase oxycontin to 30 mg Q12h, Gabapentin to 600 mg TID. SBP > 120,   - 9/22 - consider switching to morphine-based long-acting and increasing gabapentin  - 9/22 - increased gabapentin  - 9/25 - decreased dilaudid interval.   - 9/28 - switched from oxycontin to ms contin; d/c'd dilaudid  - 9/30 - increased gabapentin to 1000 TID; added methocarbamol 750 mg BID for spasms  - consider addition TCA and/or switching to lyrica if pain remains uncontrolled    #adjustment issues  - c/w psychological support   - c/w cymbalta    #Rhabdomyolysis (resolved)  - 2/2 trauma   - largely resolved: <900 on 8/31  - encourage PO fluids    #Bladder Management  - Neurogenic bladder (resolved)  - francis removed 9/4 am  - PVRs minimal   - c/w flomax.     #IVC filter  - placed 8/26  - will need removed in 5-6 months     #DVT   - IVC filter  - SCDs  - tachycardia: Doppler and cTA chest to eval for DVT and PE. however per chart review pt appears to have been tachycardic for some time prior to arrival in rehab.  - doppler showed bilateral DVTs, vascular surgery consult- continue to monitor, okay to continue therapy.   - RLE edema: got 2 days of lasix few days ago, improved.   - 9/17: Doppler positive for worsening BLE clots, occlusive clots in RLE, nonocclusive clots in LLE. Surgeon Dr. Rivear cleared patient to start on full anticoagulation.  Now on 100 mg Lovenox Q12h. Discussed result and therapy with patient. Vascular surgery - continue full anticoagulation treatment for 6-12 months. Seen by vascular 9/22: continue elevation x24h and ace wraps when OOB. Bedside therapies 9/22.   - Hb low but stable - likely due to increased clot burden with worsening DVTs. Continue to monitor.     #Insomnia  - trazodone 50 mg at night.     Outpatient Follow-up:    Gabrielle Villegas  ORTHOPAEDIC SURGERY  [address] [facility], [address]  [place] [address]  Phone: [phone]  Fax: [phone]  Follow Up Time:     Dave Rivera  ORTHOPAEDIC SURGERY  [address] [place] [address]  Phone: [phone]  Fax: [phone]    IDT Rounds 10/1: Pt making good functional gains. popover transfers close supervision.  Toileting/LBD mod assist. Ambulates 10' with RW and mod assist.

## 2020-10-02 PROCEDURE — 99232 SBSQ HOSP IP/OBS MODERATE 35: CPT

## 2020-10-02 RX ADMIN — OXYCODONE HYDROCHLORIDE 10 MILLIGRAM(S): 5 TABLET ORAL at 21:51

## 2020-10-02 RX ADMIN — GABAPENTIN 1000 MILLIGRAM(S): 400 CAPSULE ORAL at 21:12

## 2020-10-02 RX ADMIN — OXYCODONE HYDROCHLORIDE 10 MILLIGRAM(S): 5 TABLET ORAL at 17:10

## 2020-10-02 RX ADMIN — MORPHINE SULFATE 15 MILLIGRAM(S): 50 CAPSULE, EXTENDED RELEASE ORAL at 07:05

## 2020-10-02 RX ADMIN — MORPHINE SULFATE 15 MILLIGRAM(S): 50 CAPSULE, EXTENDED RELEASE ORAL at 18:12

## 2020-10-02 RX ADMIN — OXYCODONE HYDROCHLORIDE 10 MILLIGRAM(S): 5 TABLET ORAL at 09:00

## 2020-10-02 RX ADMIN — ENOXAPARIN SODIUM 100 MILLIGRAM(S): 100 INJECTION SUBCUTANEOUS at 21:12

## 2020-10-02 RX ADMIN — OXYCODONE HYDROCHLORIDE 10 MILLIGRAM(S): 5 TABLET ORAL at 16:40

## 2020-10-02 RX ADMIN — Medication 1 TABLET(S): at 06:28

## 2020-10-02 RX ADMIN — OXYCODONE HYDROCHLORIDE 10 MILLIGRAM(S): 5 TABLET ORAL at 02:29

## 2020-10-02 RX ADMIN — Medication 1 TABLET(S): at 17:42

## 2020-10-02 RX ADMIN — OXYCODONE HYDROCHLORIDE 10 MILLIGRAM(S): 5 TABLET ORAL at 20:51

## 2020-10-02 RX ADMIN — Medication 3 MILLIGRAM(S): at 21:14

## 2020-10-02 RX ADMIN — OXYCODONE HYDROCHLORIDE 10 MILLIGRAM(S): 5 TABLET ORAL at 13:30

## 2020-10-02 RX ADMIN — OXYCODONE HYDROCHLORIDE 10 MILLIGRAM(S): 5 TABLET ORAL at 12:48

## 2020-10-02 RX ADMIN — MORPHINE SULFATE 15 MILLIGRAM(S): 50 CAPSULE, EXTENDED RELEASE ORAL at 17:42

## 2020-10-02 RX ADMIN — DULOXETINE HYDROCHLORIDE 60 MILLIGRAM(S): 30 CAPSULE, DELAYED RELEASE ORAL at 12:48

## 2020-10-02 RX ADMIN — GABAPENTIN 1000 MILLIGRAM(S): 400 CAPSULE ORAL at 14:48

## 2020-10-02 RX ADMIN — SENNA PLUS 2 TABLET(S): 8.6 TABLET ORAL at 21:12

## 2020-10-02 RX ADMIN — METHOCARBAMOL 750 MILLIGRAM(S): 500 TABLET, FILM COATED ORAL at 08:25

## 2020-10-02 RX ADMIN — MORPHINE SULFATE 15 MILLIGRAM(S): 50 CAPSULE, EXTENDED RELEASE ORAL at 06:29

## 2020-10-02 RX ADMIN — ENOXAPARIN SODIUM 100 MILLIGRAM(S): 100 INJECTION SUBCUTANEOUS at 10:45

## 2020-10-02 RX ADMIN — METHOCARBAMOL 750 MILLIGRAM(S): 500 TABLET, FILM COATED ORAL at 21:12

## 2020-10-02 RX ADMIN — LIDOCAINE 2 PATCH: 4 CREAM TOPICAL at 06:32

## 2020-10-02 RX ADMIN — TAMSULOSIN HYDROCHLORIDE 0.4 MILLIGRAM(S): 0.4 CAPSULE ORAL at 21:12

## 2020-10-02 RX ADMIN — GABAPENTIN 1000 MILLIGRAM(S): 400 CAPSULE ORAL at 06:28

## 2020-10-02 RX ADMIN — OXYCODONE HYDROCHLORIDE 10 MILLIGRAM(S): 5 TABLET ORAL at 04:02

## 2020-10-02 RX ADMIN — OXYCODONE HYDROCHLORIDE 10 MILLIGRAM(S): 5 TABLET ORAL at 08:24

## 2020-10-02 NOTE — PROGRESS NOTE ADULT - SUBJECTIVE AND OBJECTIVE BOX
Patient is a 35y old  Male who presents with a chief complaint of Traumatic Spinal Cord Disfunction: 04.211 incomplete paraplegia (02 Oct 2020 11:51)    Interval Hx  Patient seen and examined at bedside.  No overnight events reported.   Continues to have pain right LE, swelling better.    ALLERGIES:  No Known Allergies    MEDICATIONS  (STANDING):  DULoxetine 60 milliGRAM(s) Oral daily  enoxaparin Injectable 100 milliGRAM(s) SubCutaneous every 12 hours  famotidine    Tablet 20 milliGRAM(s) Oral <User Schedule>  gabapentin 1000 milliGRAM(s) Oral three times a day  influenza   Vaccine 0.5 milliLiter(s) IntraMuscular once  lactobacillus acidophilus 1 Tablet(s) Oral two times a day  lidocaine   Patch 2 Patch Transdermal <User Schedule>  melatonin 3 milliGRAM(s) Oral at bedtime  methocarbamol 750 milliGRAM(s) Oral <User Schedule>  morphine ER Tablet 15 milliGRAM(s) Oral <User Schedule>  senna 2 Tablet(s) Oral at bedtime  tamsulosin 0.4 milliGRAM(s) Oral at bedtime  traZODone 50 milliGRAM(s) Oral <User Schedule>    MEDICATIONS  (PRN):  acetaminophen   Tablet .. 650 milliGRAM(s) Oral every 6 hours PRN Temp greater or equal to 38C (100.4F), Mild Pain (1 - 3)  bisacodyl Suppository 10 milliGRAM(s) Rectal <User Schedule> PRN no bowel movement for 2 days.  lidocaine 2% Gel 1 Application(s) Topical four times a day PRN pain  magnesium hydroxide Suspension 30 milliLiter(s) Oral every 12 hours PRN Constipation  ondansetron    Tablet 4 milliGRAM(s) Oral every 8 hours PRN Nausea and/or Vomiting  oxyCODONE    IR 5 milliGRAM(s) Oral daily PRN Severe Pain (7 - 10)  oxyCODONE    IR 10 milliGRAM(s) Oral every 4 hours PRN Severe Pain (7 - 10)  oxyCODONE    IR 5 milliGRAM(s) Oral every 4 hours PRN Moderate Pain (4 - 6)    Vital Signs Last 24 Hrs  T(F): 98.7 (02 Oct 2020 08:44), Max: 98.7 (02 Oct 2020 08:44)  HR: 107 (02 Oct 2020 08:44) (101 - 107)  BP: 116/78 (02 Oct 2020 08:44) (115/84 - 116/78)  RR: 14 (02 Oct 2020 08:44) (14 - 16)  SpO2: 98% (02 Oct 2020 08:44) (98% - 99%)  I&O's Summary    PHYSICAL EXAM:  General: NAD, A/O x 3  ENT: MMM, no thrush  Neck: Supple, No JVD  Lungs: Clear to auscultation bilaterally, good air entry, non-labored breathing  Cardio: RRR, S1/S2, No murmur  Abdomen: Soft, Nontender, Nondistended; Bowel sounds present  Extremities: b/l LE edema R>L ----Improved  Neuro :  A/O x 3, b/l LE motor weakness  Skin: multiple areas of healing ulcer/bruises over torso      LABS:                        9.4    7.02  )-----------( 491      ( 01 Oct 2020 06:30 )             31.1     10-01    138  |  101  |  5   ----------------------------<  106  4.0   |  30  |  0.73    Ca    8.8      01 Oct 2020 06:30          eGFR if : 139 mL/min/1.73M2 (10-01-20 @ 06:30)  eGFR if Non African American: 120 mL/min/1.73M2 (10-01-20 @ 06:30)          RADIOLOGY & ADDITIONAL TESTS:    Care Discussed with Consultants/Other Providers: Dr. Pettit

## 2020-10-02 NOTE — PROGRESS NOTE ADULT - ASSESSMENT
35M admitted to [facility] on 8/22/20 with back pain, found to have fractures of L1-L5 pedicles bilaterally, leading to widening of the spinal canal with anterolisthesis of L5 on S1 with the entire L5 vertebral body width, disruption of the ALL and PLL at L5-S1 level, probable epidural hemorrhage and hemorrhage within the foramina at L1-L5, and enlargement/edema of bilateral psoas muscle.  Patient underwent evacuation of epidural hematoma, T11-pelvis posterior spinal fusion, and L2-S1 Laminectomy on 8/23/20 and subsequent L5-S1 anterior interbody fusion and L3-4 lateral interbody fusion on 9/1.  Hospital course c/b acute blood loss anemia requiring multiple transfusions, traumatic rhabdomyolysis and intractable pain.  Patient now admitted for a multidisciplinary rehab program- pt/ot/dvt ppx, pain meds.    #s/p Epidural hematoma  #T11-pelvis posterior spinal fusion  #L2-S1 Laminectomy   #L5-S1 anterior interbody fusion and L3-4 lateral interbody fusion on 9/1  Cont w/ comprehensive rehab program  Pain mx per primary team     #B/l LE edema ,Improving  #B/L DVT  cont on therapeutic lovenox   Elevate LE, pain control  Extensive bilateral DVT with increased clot burden compared to 09/06/2020 seen on repeat US duplex; no intervention planned per vascular , cont on AC    #Hyponatremia: resolved     #Leukocytosis: resolved    #Intermittent urinary retention  voiding; low PVR  cont flomax    #Anemia , likely post op anemia  relatively stable   s/p multiple PRBCs at OSH  monitor h&h, keep hb >7    #DVT ppx: s/c lovenox, IVC filter in place    Remains Medically stable  case d/w Dr. arnett

## 2020-10-02 NOTE — PROGRESS NOTE ADULT - SUBJECTIVE AND OBJECTIVE BOX
SUBJECTIVE & OVERNIGHT EVENTS:  Patient seen and evaluated this morning. No acute events overnight.  Walked in parallel bars yesterday so in very good spirits.  Back/leg pain is stable.  +BM. No new complaints.        [X] Constitutional WNL        [X] Cardio WNL              [X] Resp WNL  [X] GI WNL                            [X]  WNL                    [] Heme +B/L LE DVTs  [X] Endo WNL                       [] Skin +spinal incision                  [X] MSK WNL  [] Neuro +LE weakness                     [X] Cognitive WNL         [] Psych +Depression/anxiety    OBJECTIVE  Vital Signs Last 24 Hrs  T(C): 37.1 (02 Oct 2020 08:44), Max: 37.1 (02 Oct 2020 08:44)  T(F): 98.7 (02 Oct 2020 08:44), Max: 98.7 (02 Oct 2020 08:44)  HR: 107 (02 Oct 2020 08:44) (101 - 107)  BP: 116/78 (02 Oct 2020 08:44) (115/84 - 116/78)  BP(mean): --  RR: 14 (02 Oct 2020 08:44) (14 - 16)  SpO2: 98% (02 Oct 2020 08:44) (98% - 99%)      PHYSICAL EXAM  Constitutional: NAD  HEENT: NCAT, EOMI, handling secretions well  Cardio: well-perfused  Resp: symmetric chest-rise, no increased WOB  GI: non-distended  : no francis  Extremities: well-perfused      LABS:               9.4    7.02  )-----------( 491      ( 01 Oct 2020 06:30 )             31.1     01 Oct 2020 06:30    138    |  101    |  5      ----------------------------<  106    4.0     |  30     |  0.73     Ca    8.8        01 Oct 2020 06:30              MEDICATIONS  (STANDING):  DULoxetine 60 milliGRAM(s) Oral daily  enoxaparin Injectable 100 milliGRAM(s) SubCutaneous every 12 hours  famotidine    Tablet 20 milliGRAM(s) Oral <User Schedule>  gabapentin 1000 milliGRAM(s) Oral three times a day  influenza   Vaccine 0.5 milliLiter(s) IntraMuscular once  lactobacillus acidophilus 1 Tablet(s) Oral two times a day  lidocaine   Patch 2 Patch Transdermal <User Schedule>  melatonin 3 milliGRAM(s) Oral at bedtime  methocarbamol 750 milliGRAM(s) Oral <User Schedule>  morphine ER Tablet 15 milliGRAM(s) Oral <User Schedule>  senna 2 Tablet(s) Oral at bedtime  tamsulosin 0.4 milliGRAM(s) Oral at bedtime  traZODone 50 milliGRAM(s) Oral <User Schedule>    MEDICATIONS  (PRN):  acetaminophen   Tablet .. 650 milliGRAM(s) Oral every 6 hours PRN Temp greater or equal to 38C (100.4F), Mild Pain (1 - 3)  bisacodyl Suppository 10 milliGRAM(s) Rectal <User Schedule> PRN no bowel movement for 2 days.  lidocaine 2% Gel 1 Application(s) Topical four times a day PRN pain  magnesium hydroxide Suspension 30 milliLiter(s) Oral every 12 hours PRN Constipation  ondansetron    Tablet 4 milliGRAM(s) Oral every 8 hours PRN Nausea and/or Vomiting  oxyCODONE    IR 5 milliGRAM(s) Oral every 4 hours PRN Moderate Pain (4 - 6)  oxyCODONE    IR 10 milliGRAM(s) Oral every 4 hours PRN Severe Pain (7 - 10)  oxyCODONE    IR 5 milliGRAM(s) Oral daily PRN Severe Pain (7 - 10)

## 2020-10-02 NOTE — PROGRESS NOTE ADULT - ASSESSMENT
ASSESSMENT/PLAN  BRUNO CHO is a 35M admitted to [facility] on 8/22/20 with back pain, found to have fractures of L1-L5 pedicles bilaterally, leading to widening of the spinal canal with anterolisthesis of L5 on S1 with the entire L5 vertebral body width, disruption of the ALL and PLL at L5-S1 level, probable epidural hemorrhage and hemorrhage within the foramina at L1-L5, and enlargement/edema of bilateral psoas muscle.  Patient underwent evacuation of epidural hematoma, T11-pelvis posterior spinal fusion, and L2-S1 Laminectomy on 8/23/20 and subsequent L5-S1 anterior interbody fusion and L3-4 lateral interbody fusion on 9/1.  Hospital course c/b acute blood loss anemia requiring multiple transfusions, traumatic rhabodmyolysis, and intractable pain.  Patient now admitted for a multidisciplinary rehab program. 09-04-20 @ 14:37    Comprehensive Multidisciplinary Rehab Program:  - Start comprehensive rehab program of PT/OT - 3 hours a day, 5 days a week  - P&O as needed    -------------  MEDICAL MANAGEMENT     #Traumatic Spinal Cord Injury  - pt with complete anterolisthesis of L5 on S1, multiple pedicular fractures, and epidural hemorrhage   - s/p T11-pelvis posterior spinal fusion, and L2-S1 Laminectomy on 8/23/20 and subsequent L5-S1 anterior interbody fusion and L3-4 lateral interbody fusion on 9/1  - pain control as below  - PT/OT  - spinal & Fall precautions  - TLSO/AFOs when OOB  - monitor bladder/bowel function out of concern for neurogenic bowel  - JETHRO drain removed; off keflex  - staples/sutures removed 9/23    #Tachycardia  - per chart review, pt tachycardic prior to transfer to [facility], but no dopplers/CTA obtained  - CTA 9/6 inconclusive re: PE  - Doppler 9/6+B/L DVT   - TTE 9/9 w/o evidence of right heart strain     #Pain Control  - 9/14/20- oxycodone 5-10 mg for moderate-severe pain, oxycontin 20 mg Q12h, valium 5 mg three times a day prn for spasms, Gabapentin increased to 400 mg three times a day. Dilaudid 2 mg Q4h for breakthrough pain.   - 9/15/20: plan to increase oxycontin to 30 mg Q12h, Gabapentin to 600 mg TID. SBP > 120,   - 9/22 - consider switching to morphine-based long-acting and increasing gabapentin  - 9/22 - increased gabapentin  - 9/25 - decreased dilaudid interval.   - 9/28 - switched from oxycontin to ms contin; d/c'd dilaudid  - 9/30 - increased gabapentin to 1000 TID; added methocarbamol 750 mg BID for spasms  - consider addition TCA and/or switching to lyrica if pain remains uncontrolled    #adjustment issues  - c/w psychological support   - c/w cymbalta    #Rhabdomyolysis (resolved)  - 2/2 trauma   - largely resolved: <900 on 8/31  - encourage PO fluids    #Bladder Management  - Neurogenic bladder (resolved)  - francis removed 9/4 am  - PVRs minimal   - c/w flomax.     #IVC filter  - placed 8/26  - will need removed in 5-6 months     #DVT   - IVC filter  - SCDs  - tachycardia: Doppler and cTA chest to eval for DVT and PE. however per chart review pt appears to have been tachycardic for some time prior to arrival in rehab.  - doppler showed bilateral DVTs, vascular surgery consult- continue to monitor, okay to continue therapy.   - RLE edema: got 2 days of lasix few days ago, improved.   - 9/17: Doppler positive for worsening BLE clots, occlusive clots in RLE, nonocclusive clots in LLE. Surgeon Dr. Rivera cleared patient to start on full anticoagulation.  Now on 100 mg Lovenox Q12h. Discussed result and therapy with patient. Vascular surgery - continue full anticoagulation treatment for 6-12 months. Seen by vascular 9/22: continue elevation x24h and ace wraps when OOB. Bedside therapies 9/22.   - Hb low but stable - likely due to increased clot burden with worsening DVTs. Continue to monitor.     #Insomnia  - trazodone 50 mg at night.     Outpatient Follow-up:    Gabrielle Villegas  ORTHOPAEDIC SURGERY  [address], [address]  [place] [address]  Phone: [phone]  Fax: [phone]  Follow Up Time:     Dave Rivera  ORTHOPAEDIC SURGERY  [address] [place] [address]  Phone: [phone]  Fax: [phone]    IDT Rounds 10/1: Pt making good functional gains. popover transfers close supervision.  Toileting/LBD mod assist. Ambulates 10' with RW and mod assist.

## 2020-10-02 NOTE — PROGRESS NOTE BEHAVIORAL HEALTH - NSBHCHARTREVIEWIMAGING_PSY_A_CORE FT
EXAM:  US DPLX LWR EXT VEINS COMPL BI    PROCEDURE DATE:  09/17/2020    INTERPRETATION:  CLINICAL INFORMATION: Bilateral DVT with worsening swelling  COMPARISON: 09/06/2020  TECHNIQUE: Duplex sonography of the BILATERAL LOWER extremity veins with color and spectral Doppler, with and without compression.  FINDINGS:  There is occlusive thrombus extending from the right common femoral, superficial femoral, mid and distal popliteal, gastrocnemius, peroneal and posterior tibial veins. Nonocclusive thrombus within the proximal popliteal vein. The right soleal vein is patent.  There is nonocclusive thrombus within the left common femoral, superficial femoral and proximal to mid popliteal veins. Occlusive thrombus within the distal popliteal, posterior tibial, peroneal, soleal and gastrocnemius veins.  IMPRESSION:  Extensive bilateral DVT with increased clot burden compared to 09/06/2020.  Occlusive DVT seen throughout the right lower extremity (aside from partially occlusive thrombus within the proximal popliteal vein). The right soleal vein (below the knee) is patent.  Nonocclusive DVT within the left common femoral, superficial femoral and proximal to mid popliteal veins. Occlusive DVT throughout the left calf.  SANTOSH GIANG M.D., ATTENDING RADIOLOGIST  This document has been electronically signed. Sep 17 2020 11:20AM  < end of copied text >
EXAM:  US DPLX LWR EXT VEINS COMPL BI    PROCEDURE DATE:  09/17/2020    INTERPRETATION:  CLINICAL INFORMATION: Bilateral DVT with worsening swelling  COMPARISON: 09/06/2020  TECHNIQUE: Duplex sonography of the BILATERAL LOWER extremity veins with color and spectral Doppler, with and without compression.  FINDINGS:  There is occlusive thrombus extending from the right common femoral, superficial femoral, mid and distal popliteal, gastrocnemius, peroneal and posterior tibial veins. Nonocclusive thrombus within the proximal popliteal vein. The right soleal vein is patent.  There is nonocclusive thrombus within the left common femoral, superficial femoral and proximal to mid popliteal veins. Occlusive thrombus within the distal popliteal, posterior tibial, peroneal, soleal and gastrocnemius veins.  IMPRESSION:  Extensive bilateral DVT with increased clot burden compared to 09/06/2020.  Occlusive DVT seen throughout the right lower extremity (aside from partially occlusive thrombus within the proximal popliteal vein). The right soleal vein (below the knee) is patent.  Nonocclusive DVT within the left common femoral, superficial femoral and proximal to mid popliteal veins. Occlusive DVT throughout the left calf.  EXAM:  SPINE LUMBOSACRAL TRAUMA                          PROCEDURE DATE:  09/04/2020    INTERPRETATION:  PROCEDURE: AP and lateral views of the lumbosacral spine.  CLINICAL INFORMATION: Back pain.  COMPARISON: 8/22/2020  FINDINGS:  The patient is status post spinal fusion and discectomy/spacer placement. Hardware appears to be in place. There are midline skin staples.  The heights of the vertebral bodies are preserved. No lytic or blastic lesions are identified.  IMPRESSION:  Status post spinal fusion and discectomy/spacer placement.

## 2020-10-03 PROCEDURE — 99232 SBSQ HOSP IP/OBS MODERATE 35: CPT

## 2020-10-03 RX ADMIN — OXYCODONE HYDROCHLORIDE 10 MILLIGRAM(S): 5 TABLET ORAL at 22:22

## 2020-10-03 RX ADMIN — OXYCODONE HYDROCHLORIDE 10 MILLIGRAM(S): 5 TABLET ORAL at 11:44

## 2020-10-03 RX ADMIN — DULOXETINE HYDROCHLORIDE 60 MILLIGRAM(S): 30 CAPSULE, DELAYED RELEASE ORAL at 11:44

## 2020-10-03 RX ADMIN — Medication 1 TABLET(S): at 06:10

## 2020-10-03 RX ADMIN — GABAPENTIN 1000 MILLIGRAM(S): 400 CAPSULE ORAL at 06:10

## 2020-10-03 RX ADMIN — ENOXAPARIN SODIUM 100 MILLIGRAM(S): 100 INJECTION SUBCUTANEOUS at 11:43

## 2020-10-03 RX ADMIN — GABAPENTIN 1000 MILLIGRAM(S): 400 CAPSULE ORAL at 21:12

## 2020-10-03 RX ADMIN — MORPHINE SULFATE 15 MILLIGRAM(S): 50 CAPSULE, EXTENDED RELEASE ORAL at 07:44

## 2020-10-03 RX ADMIN — OXYCODONE HYDROCHLORIDE 10 MILLIGRAM(S): 5 TABLET ORAL at 01:51

## 2020-10-03 RX ADMIN — TAMSULOSIN HYDROCHLORIDE 0.4 MILLIGRAM(S): 0.4 CAPSULE ORAL at 21:12

## 2020-10-03 RX ADMIN — MORPHINE SULFATE 15 MILLIGRAM(S): 50 CAPSULE, EXTENDED RELEASE ORAL at 17:54

## 2020-10-03 RX ADMIN — OXYCODONE HYDROCHLORIDE 10 MILLIGRAM(S): 5 TABLET ORAL at 12:30

## 2020-10-03 RX ADMIN — LIDOCAINE 2 PATCH: 4 CREAM TOPICAL at 07:53

## 2020-10-03 RX ADMIN — MORPHINE SULFATE 15 MILLIGRAM(S): 50 CAPSULE, EXTENDED RELEASE ORAL at 19:00

## 2020-10-03 RX ADMIN — LIDOCAINE 2 PATCH: 4 CREAM TOPICAL at 19:39

## 2020-10-03 RX ADMIN — GABAPENTIN 1000 MILLIGRAM(S): 400 CAPSULE ORAL at 13:05

## 2020-10-03 RX ADMIN — LIDOCAINE 2 PATCH: 4 CREAM TOPICAL at 19:00

## 2020-10-03 RX ADMIN — ENOXAPARIN SODIUM 100 MILLIGRAM(S): 100 INJECTION SUBCUTANEOUS at 21:10

## 2020-10-03 RX ADMIN — OXYCODONE HYDROCHLORIDE 10 MILLIGRAM(S): 5 TABLET ORAL at 11:40

## 2020-10-03 RX ADMIN — OXYCODONE HYDROCHLORIDE 10 MILLIGRAM(S): 5 TABLET ORAL at 16:03

## 2020-10-03 RX ADMIN — OXYCODONE HYDROCHLORIDE 10 MILLIGRAM(S): 5 TABLET ORAL at 07:53

## 2020-10-03 RX ADMIN — Medication 3 MILLIGRAM(S): at 21:58

## 2020-10-03 RX ADMIN — MORPHINE SULFATE 15 MILLIGRAM(S): 50 CAPSULE, EXTENDED RELEASE ORAL at 06:09

## 2020-10-03 RX ADMIN — OXYCODONE HYDROCHLORIDE 10 MILLIGRAM(S): 5 TABLET ORAL at 21:10

## 2020-10-03 RX ADMIN — Medication 1 TABLET(S): at 17:54

## 2020-10-03 RX ADMIN — METHOCARBAMOL 750 MILLIGRAM(S): 500 TABLET, FILM COATED ORAL at 21:58

## 2020-10-03 RX ADMIN — OXYCODONE HYDROCHLORIDE 10 MILLIGRAM(S): 5 TABLET ORAL at 02:51

## 2020-10-03 RX ADMIN — METHOCARBAMOL 750 MILLIGRAM(S): 500 TABLET, FILM COATED ORAL at 07:53

## 2020-10-03 NOTE — PROGRESS NOTE ADULT - SUBJECTIVE AND OBJECTIVE BOX
Pt. seen and examined at bedside.  No overnight events.  Pain manageable.        REVIEW OF SYSTEMS  Constitutional - No fever,  No fatigue  Neurological - No headaches, No loss of strength  Musculoskeletal - No joint pain, No joint swelling, No muscle pain    VITALS  T(C): 36.8 (10-02-20 @ 20:30), Max: 36.8 (10-02-20 @ 20:30)  HR: 96 (10-02-20 @ 20:30) (96 - 96)  BP: 135/89 (10-02-20 @ 20:30) (135/89 - 135/89)  RR: 16 (10-02-20 @ 20:30) (16 - 16)  SpO2: 99% (10-02-20 @ 20:30) (99% - 99%)  Wt(kg): --       MEDICATIONS   acetaminophen   Tablet .. 650 milliGRAM(s) every 6 hours PRN  bisacodyl Suppository 10 milliGRAM(s) <User Schedule> PRN  DULoxetine 60 milliGRAM(s) daily  enoxaparin Injectable 100 milliGRAM(s) every 12 hours  famotidine    Tablet 20 milliGRAM(s) <User Schedule>  gabapentin 1000 milliGRAM(s) three times a day  influenza   Vaccine 0.5 milliLiter(s) once  lactobacillus acidophilus 1 Tablet(s) two times a day  lidocaine   Patch 2 Patch <User Schedule>  lidocaine 2% Gel 1 Application(s) four times a day PRN  magnesium hydroxide Suspension 30 milliLiter(s) every 12 hours PRN  melatonin 3 milliGRAM(s) at bedtime  methocarbamol 750 milliGRAM(s) <User Schedule>  morphine ER Tablet 15 milliGRAM(s) <User Schedule>  ondansetron    Tablet 4 milliGRAM(s) every 8 hours PRN  oxyCODONE    IR 5 milliGRAM(s) daily PRN  oxyCODONE    IR 10 milliGRAM(s) every 4 hours PRN  oxyCODONE    IR 5 milliGRAM(s) every 4 hours PRN  senna 2 Tablet(s) at bedtime  tamsulosin 0.4 milliGRAM(s) at bedtime  traZODone 50 milliGRAM(s) <User Schedule>      RECENT LABS/IMAGING                        ---------  PHYSICAL EXAM  Constitutional - NAD, Comfortable  Pulm - Breathing comfortably, No wheezing  Abd - Soft, NTND  INCISIONS: C/D/I  Extremities - No edema, No calf tenderness  Neurologic Exam -                    Cognitive - Awake, Alert     Communication - Fluent     Motor - No focal deficits     Sensory - Intact to LT  Psychiatric - Mood WNL, Affect WNL    ASSESSMENT/PLAN  35y Male with functional deficits s/p traumatic fall -> L1-5 PEDICLE FX'S -> L5-S1 ANTEROLISTHESIS -> SPINAL HEMORRHAGE -> S/P T11 - PELVIS PSF.  Continue current medical management  Pain - Tylenol PRN; OXY 10 MG Q4'; MORPHINE ER; METHOCARBAMOL; LIDO; DULOXETINE  DVT PPX - enoxaparin Injectable 100 milliGRAM(s) every 12 hours  Active issues - MONITOR FOR OPIOID WITHDRAWAL  Continue 3hrs a day of comprehensive rehab program.

## 2020-10-04 PROCEDURE — 99232 SBSQ HOSP IP/OBS MODERATE 35: CPT

## 2020-10-04 RX ADMIN — LIDOCAINE 2 PATCH: 4 CREAM TOPICAL at 21:04

## 2020-10-04 RX ADMIN — OXYCODONE HYDROCHLORIDE 10 MILLIGRAM(S): 5 TABLET ORAL at 01:06

## 2020-10-04 RX ADMIN — OXYCODONE HYDROCHLORIDE 10 MILLIGRAM(S): 5 TABLET ORAL at 10:00

## 2020-10-04 RX ADMIN — GABAPENTIN 1000 MILLIGRAM(S): 400 CAPSULE ORAL at 21:00

## 2020-10-04 RX ADMIN — OXYCODONE HYDROCHLORIDE 10 MILLIGRAM(S): 5 TABLET ORAL at 03:00

## 2020-10-04 RX ADMIN — GABAPENTIN 1000 MILLIGRAM(S): 400 CAPSULE ORAL at 13:07

## 2020-10-04 RX ADMIN — DULOXETINE HYDROCHLORIDE 60 MILLIGRAM(S): 30 CAPSULE, DELAYED RELEASE ORAL at 12:20

## 2020-10-04 RX ADMIN — Medication 3 MILLIGRAM(S): at 21:04

## 2020-10-04 RX ADMIN — MORPHINE SULFATE 15 MILLIGRAM(S): 50 CAPSULE, EXTENDED RELEASE ORAL at 19:00

## 2020-10-04 RX ADMIN — MORPHINE SULFATE 15 MILLIGRAM(S): 50 CAPSULE, EXTENDED RELEASE ORAL at 05:05

## 2020-10-04 RX ADMIN — LIDOCAINE 2 PATCH: 4 CREAM TOPICAL at 20:49

## 2020-10-04 RX ADMIN — OXYCODONE HYDROCHLORIDE 10 MILLIGRAM(S): 5 TABLET ORAL at 13:08

## 2020-10-04 RX ADMIN — OXYCODONE HYDROCHLORIDE 10 MILLIGRAM(S): 5 TABLET ORAL at 19:00

## 2020-10-04 RX ADMIN — GABAPENTIN 1000 MILLIGRAM(S): 400 CAPSULE ORAL at 07:01

## 2020-10-04 RX ADMIN — ENOXAPARIN SODIUM 100 MILLIGRAM(S): 100 INJECTION SUBCUTANEOUS at 20:58

## 2020-10-04 RX ADMIN — METHOCARBAMOL 750 MILLIGRAM(S): 500 TABLET, FILM COATED ORAL at 21:02

## 2020-10-04 RX ADMIN — ENOXAPARIN SODIUM 100 MILLIGRAM(S): 100 INJECTION SUBCUTANEOUS at 10:14

## 2020-10-04 RX ADMIN — MORPHINE SULFATE 15 MILLIGRAM(S): 50 CAPSULE, EXTENDED RELEASE ORAL at 17:02

## 2020-10-04 RX ADMIN — Medication 1 TABLET(S): at 17:02

## 2020-10-04 RX ADMIN — OXYCODONE HYDROCHLORIDE 10 MILLIGRAM(S): 5 TABLET ORAL at 21:21

## 2020-10-04 RX ADMIN — OXYCODONE HYDROCHLORIDE 10 MILLIGRAM(S): 5 TABLET ORAL at 06:00

## 2020-10-04 RX ADMIN — OXYCODONE HYDROCHLORIDE 10 MILLIGRAM(S): 5 TABLET ORAL at 05:05

## 2020-10-04 RX ADMIN — OXYCODONE HYDROCHLORIDE 10 MILLIGRAM(S): 5 TABLET ORAL at 17:02

## 2020-10-04 RX ADMIN — MORPHINE SULFATE 15 MILLIGRAM(S): 50 CAPSULE, EXTENDED RELEASE ORAL at 06:00

## 2020-10-04 RX ADMIN — METHOCARBAMOL 750 MILLIGRAM(S): 500 TABLET, FILM COATED ORAL at 10:14

## 2020-10-04 RX ADMIN — Medication 1 TABLET(S): at 05:05

## 2020-10-04 RX ADMIN — OXYCODONE HYDROCHLORIDE 10 MILLIGRAM(S): 5 TABLET ORAL at 14:00

## 2020-10-04 RX ADMIN — OXYCODONE HYDROCHLORIDE 10 MILLIGRAM(S): 5 TABLET ORAL at 09:02

## 2020-10-04 RX ADMIN — TAMSULOSIN HYDROCHLORIDE 0.4 MILLIGRAM(S): 0.4 CAPSULE ORAL at 21:01

## 2020-10-04 RX ADMIN — OXYCODONE HYDROCHLORIDE 10 MILLIGRAM(S): 5 TABLET ORAL at 20:58

## 2020-10-04 RX ADMIN — LIDOCAINE 2 PATCH: 4 CREAM TOPICAL at 09:01

## 2020-10-04 NOTE — PROGRESS NOTE ADULT - SUBJECTIVE AND OBJECTIVE BOX
HPI: 34yo Male with no signifcant PMHx admitted to [facility] on 8/22/20 with back pain after falling down six steps at home during night. On admission BAL<10. Utox done after receiving pain meds and Valium in ED positive only for benzos and opiates. Imaging revealed fractures of L1-L5 pedicles bilaterally, leading to widening of the spinal canal with anterolisthesis of L5 on S1 with the entire L5 vertebral body width, disruption of the ALL and PLL at L5-S1 level, probable epidural hemorrhage and hemorrhage within the foramina at L1-L5, and enlargement/edema of bilateral psoas muscle.  Ortho consulted. Patient is s/p evacuation of epidural hematoma, T11-pelvis posterior spinal fusion, and L2-S1 Laminectomy on 8/23/20 with Dr. Villegas. Intraoperatively, pt received 4U PRBCs, 1U platelets, and 1U FFP due to 2200mL EBL. Post-operatively, he was admitted to CCU for pressure support and required additional blood products due to continued bleeding into JETHRO drains.  He was febrile after transfusions but remained without other signs of infection.  IVC filter placed 8/26 as patient unable to be pharmacologically anticoagulated due to extensive surgical requirements.  IVC filter placed by Dr. Merchant on 8/26, with plan to remove in 5-6 months.  He was seen by nephro fo CHELO (multifactorial) and elevated CPK, and started on IVF.  He is also s/p t L5-S1 anterior interbody fusion and L3-4 lateral interbody fusion on 9/1/20. TLSO and AFOs provided at [facility]. Man removed 9/4/20. Patient voiding without need for SC. Patient cleared for discharge to acute inpatient rehab at [facility].    (04 Sep 2020 14:36)      Subjective  No new complaints  Pain controlled    PAST MEDICAL & SURGICAL HISTORY:  No pertinent past medical history    No significant past surgical history        MedsMEDICATIONS  (STANDING):  DULoxetine 60 milliGRAM(s) Oral daily  enoxaparin Injectable 100 milliGRAM(s) SubCutaneous every 12 hours  famotidine    Tablet 20 milliGRAM(s) Oral <User Schedule>  gabapentin 1000 milliGRAM(s) Oral three times a day  influenza   Vaccine 0.5 milliLiter(s) IntraMuscular once  lactobacillus acidophilus 1 Tablet(s) Oral two times a day  lidocaine   Patch 2 Patch Transdermal <User Schedule>  melatonin 3 milliGRAM(s) Oral at bedtime  methocarbamol 750 milliGRAM(s) Oral <User Schedule>  morphine ER Tablet 15 milliGRAM(s) Oral <User Schedule>  senna 2 Tablet(s) Oral at bedtime  tamsulosin 0.4 milliGRAM(s) Oral at bedtime  traZODone 50 milliGRAM(s) Oral <User Schedule>    MEDICATIONS  (PRN):  acetaminophen   Tablet .. 650 milliGRAM(s) Oral every 6 hours PRN Temp greater or equal to 38C (100.4F), Mild Pain (1 - 3)  bisacodyl Suppository 10 milliGRAM(s) Rectal <User Schedule> PRN no bowel movement for 2 days.  lidocaine 2% Gel 1 Application(s) Topical four times a day PRN pain  magnesium hydroxide Suspension 30 milliLiter(s) Oral every 12 hours PRN Constipation  ondansetron    Tablet 4 milliGRAM(s) Oral every 8 hours PRN Nausea and/or Vomiting  oxyCODONE    IR 5 milliGRAM(s) Oral daily PRN Severe Pain (7 - 10)  oxyCODONE    IR 5 milliGRAM(s) Oral every 4 hours PRN Moderate Pain (4 - 6)  oxyCODONE    IR 10 milliGRAM(s) Oral every 4 hours PRN Severe Pain (7 - 10)      Vital Signs Last 24 Hrs  T(C): 36.7 (03 Oct 2020 21:13), Max: 36.7 (03 Oct 2020 21:13)  T(F): 98 (03 Oct 2020 21:13), Max: 98 (03 Oct 2020 21:13)  HR: 100 (03 Oct 2020 21:13) (100 - 100)  BP: 128/82 (03 Oct 2020 21:13) (128/82 - 128/82)  BP(mean): --  RR: 15 (03 Oct 2020 21:13) (15 - 15)  SpO2: 99% (03 Oct 2020 21:13) (99% - 99%)  I&O's Summary      PHYSICAL EXAM:  GENERAL: NAD  NECK: Supple  NERVOUS SYSTEM:  awake and alert  HEART: S1s2 NL , RRR  CHEST/LUNG: Clear to percussion bilaterally  ABDOMEN: Soft, Nontender, Nondistended; Bowel sounds present  EXTREMITIES:  No edema    Care Discussed with Consultants/Other Providers [ x] YES  [ ] NO    Epidural hematoma s/p evacuation, s/p T11-pelvis posterior spinal fusion/L2-S1 Laminectomy/L5-S1 anterior interbody fusion/L3-4 lateral interbody fusion  PT/OT per rehab  Pain control    DVT s/p IVC filter  Lovenox    Anemia  h/h stable   Monitor for now    Thrombocytosis  Reactive  monitor for now

## 2020-10-04 NOTE — PROGRESS NOTE ADULT - SUBJECTIVE AND OBJECTIVE BOX
Pt. seen and examined at bedside.  No overnight events.  Doing S-S transfers in therapy.      REVIEW OF SYSTEMS  Constitutional - No fever,  No fatigue  Neurological - No headaches, No loss of strength  Musculoskeletal - +++ LBP    VITALS  T(C): 36.7 (10-03-20 @ 21:13), Max: 36.7 (10-03-20 @ 21:13)  HR: 100 (10-03-20 @ 21:13) (100 - 100)  BP: 128/82 (10-03-20 @ 21:13) (128/82 - 128/82)  RR: 15 (10-03-20 @ 21:13) (15 - 15)  SpO2: 99% (10-03-20 @ 21:13) (99% - 99%)  Wt(kg): --       MEDICATIONS   acetaminophen   Tablet .. 650 milliGRAM(s) every 6 hours PRN  bisacodyl Suppository 10 milliGRAM(s) <User Schedule> PRN  DULoxetine 60 milliGRAM(s) daily  enoxaparin Injectable 100 milliGRAM(s) every 12 hours  famotidine    Tablet 20 milliGRAM(s) <User Schedule>  gabapentin 1000 milliGRAM(s) three times a day  influenza   Vaccine 0.5 milliLiter(s) once  lactobacillus acidophilus 1 Tablet(s) two times a day  lidocaine   Patch 2 Patch <User Schedule>  lidocaine 2% Gel 1 Application(s) four times a day PRN  magnesium hydroxide Suspension 30 milliLiter(s) every 12 hours PRN  melatonin 3 milliGRAM(s) at bedtime  methocarbamol 750 milliGRAM(s) <User Schedule>  morphine ER Tablet 15 milliGRAM(s) <User Schedule>  ondansetron    Tablet 4 milliGRAM(s) every 8 hours PRN  oxyCODONE    IR 5 milliGRAM(s) daily PRN  oxyCODONE    IR 5 milliGRAM(s) every 4 hours PRN  oxyCODONE    IR 10 milliGRAM(s) every 4 hours PRN  senna 2 Tablet(s) at bedtime  tamsulosin 0.4 milliGRAM(s) at bedtime  traZODone 50 milliGRAM(s) <User Schedule>      RECENT LABS/IMAGING                        ---------  PHYSICAL EXAM  Constitutional - NAD, Comfortable  Pulm - Breathing comfortably, No wheezing  Abd - OBESE, Soft, NTND  Extremities - No edema, No calf tenderness  INCISIONS - C/D/I  Neurologic Exam -                    Cognitive - Awake, Alert     Communication - Fluent     Motor - No focal deficits     Sensory - Intact to LT  Psychiatric - Mood WNL, Affect WNL    ASSESSMENT/PLAN  35y Male with functional deficits s/p ANT/POST/IB SPINAL FUSIONS 2' TRAUMATIC FALL -> L1-5 PEDICLE FXS, L5-S1 SPONDY, ALL/PLL TEARS  Continue current medical management  Pain - Tylenol PRN; OXY; MORPHINE ER; VAN; LIDO; METHOCARBAMOL  DVT PPX - enoxaparin Injectable 100 milliGRAM(s) every 12 hours  Active issues - PAIN MANAGEMENT -> WATCH FOR OPIOID WITHDRAWAL  Continue 3hrs a day of comprehensive rehab program.

## 2020-10-05 LAB
ANION GAP SERPL CALC-SCNC: 6 MMOL/L — SIGNIFICANT CHANGE UP (ref 5–17)
BASOPHILS # BLD AUTO: 0.05 K/UL — SIGNIFICANT CHANGE UP (ref 0–0.2)
BASOPHILS NFR BLD AUTO: 0.7 % — SIGNIFICANT CHANGE UP (ref 0–2)
BUN SERPL-MCNC: 6 MG/DL — LOW (ref 7–23)
CALCIUM SERPL-MCNC: 8.9 MG/DL — SIGNIFICANT CHANGE UP (ref 8.4–10.5)
CHLORIDE SERPL-SCNC: 99 MMOL/L — SIGNIFICANT CHANGE UP (ref 96–108)
CO2 SERPL-SCNC: 29 MMOL/L — SIGNIFICANT CHANGE UP (ref 22–31)
CREAT SERPL-MCNC: 0.74 MG/DL — SIGNIFICANT CHANGE UP (ref 0.5–1.3)
EOSINOPHIL # BLD AUTO: 0.13 K/UL — SIGNIFICANT CHANGE UP (ref 0–0.5)
EOSINOPHIL NFR BLD AUTO: 1.9 % — SIGNIFICANT CHANGE UP (ref 0–6)
GLUCOSE SERPL-MCNC: 115 MG/DL — HIGH (ref 70–99)
HCT VFR BLD CALC: 31.7 % — LOW (ref 39–50)
HGB BLD-MCNC: 9.9 G/DL — LOW (ref 13–17)
IMM GRANULOCYTES NFR BLD AUTO: 0.4 % — SIGNIFICANT CHANGE UP (ref 0–1.5)
LYMPHOCYTES # BLD AUTO: 1.04 K/UL — SIGNIFICANT CHANGE UP (ref 1–3.3)
LYMPHOCYTES # BLD AUTO: 15.6 % — SIGNIFICANT CHANGE UP (ref 13–44)
MCHC RBC-ENTMCNC: 28 PG — SIGNIFICANT CHANGE UP (ref 27–34)
MCHC RBC-ENTMCNC: 31.2 GM/DL — LOW (ref 32–36)
MCV RBC AUTO: 89.5 FL — SIGNIFICANT CHANGE UP (ref 80–100)
MONOCYTES # BLD AUTO: 0.49 K/UL — SIGNIFICANT CHANGE UP (ref 0–0.9)
MONOCYTES NFR BLD AUTO: 7.3 % — SIGNIFICANT CHANGE UP (ref 2–14)
NEUTROPHILS # BLD AUTO: 4.93 K/UL — SIGNIFICANT CHANGE UP (ref 1.8–7.4)
NEUTROPHILS NFR BLD AUTO: 74.1 % — SIGNIFICANT CHANGE UP (ref 43–77)
NRBC # BLD: 0 /100 WBCS — SIGNIFICANT CHANGE UP (ref 0–0)
PLATELET # BLD AUTO: 486 K/UL — HIGH (ref 150–400)
POTASSIUM SERPL-MCNC: 3.9 MMOL/L — SIGNIFICANT CHANGE UP (ref 3.5–5.3)
POTASSIUM SERPL-SCNC: 3.9 MMOL/L — SIGNIFICANT CHANGE UP (ref 3.5–5.3)
RBC # BLD: 3.54 M/UL — LOW (ref 4.2–5.8)
RBC # FLD: 14.6 % — HIGH (ref 10.3–14.5)
SODIUM SERPL-SCNC: 134 MMOL/L — LOW (ref 135–145)
WBC # BLD: 6.67 K/UL — SIGNIFICANT CHANGE UP (ref 3.8–10.5)
WBC # FLD AUTO: 6.67 K/UL — SIGNIFICANT CHANGE UP (ref 3.8–10.5)

## 2020-10-05 RX ORDER — OXYCODONE HYDROCHLORIDE 5 MG/1
5 TABLET ORAL EVERY 4 HOURS
Refills: 0 | Status: DISCONTINUED | OUTPATIENT
Start: 2020-10-05 | End: 2020-10-08

## 2020-10-05 RX ORDER — MORPHINE SULFATE 50 MG/1
15 CAPSULE, EXTENDED RELEASE ORAL
Refills: 0 | Status: DISCONTINUED | OUTPATIENT
Start: 2020-10-05 | End: 2020-10-08

## 2020-10-05 RX ORDER — OXYCODONE HYDROCHLORIDE 5 MG/1
5 TABLET ORAL DAILY
Refills: 0 | Status: DISCONTINUED | OUTPATIENT
Start: 2020-10-05 | End: 2020-10-05

## 2020-10-05 RX ORDER — OXYCODONE HYDROCHLORIDE 5 MG/1
10 TABLET ORAL EVERY 4 HOURS
Refills: 0 | Status: DISCONTINUED | OUTPATIENT
Start: 2020-10-05 | End: 2020-10-08

## 2020-10-05 RX ADMIN — OXYCODONE HYDROCHLORIDE 10 MILLIGRAM(S): 5 TABLET ORAL at 12:59

## 2020-10-05 RX ADMIN — MORPHINE SULFATE 15 MILLIGRAM(S): 50 CAPSULE, EXTENDED RELEASE ORAL at 18:59

## 2020-10-05 RX ADMIN — Medication 1 TABLET(S): at 04:55

## 2020-10-05 RX ADMIN — OXYCODONE HYDROCHLORIDE 10 MILLIGRAM(S): 5 TABLET ORAL at 00:50

## 2020-10-05 RX ADMIN — OXYCODONE HYDROCHLORIDE 10 MILLIGRAM(S): 5 TABLET ORAL at 22:00

## 2020-10-05 RX ADMIN — OXYCODONE HYDROCHLORIDE 10 MILLIGRAM(S): 5 TABLET ORAL at 19:25

## 2020-10-05 RX ADMIN — OXYCODONE HYDROCHLORIDE 10 MILLIGRAM(S): 5 TABLET ORAL at 13:00

## 2020-10-05 RX ADMIN — MORPHINE SULFATE 15 MILLIGRAM(S): 50 CAPSULE, EXTENDED RELEASE ORAL at 12:56

## 2020-10-05 RX ADMIN — DULOXETINE HYDROCHLORIDE 60 MILLIGRAM(S): 30 CAPSULE, DELAYED RELEASE ORAL at 11:53

## 2020-10-05 RX ADMIN — TAMSULOSIN HYDROCHLORIDE 0.4 MILLIGRAM(S): 0.4 CAPSULE ORAL at 21:05

## 2020-10-05 RX ADMIN — GABAPENTIN 1000 MILLIGRAM(S): 400 CAPSULE ORAL at 21:06

## 2020-10-05 RX ADMIN — MORPHINE SULFATE 15 MILLIGRAM(S): 50 CAPSULE, EXTENDED RELEASE ORAL at 04:54

## 2020-10-05 RX ADMIN — HYDROMORPHONE HYDROCHLORIDE 2 MILLIGRAM(S): 2 INJECTION INTRAMUSCULAR; INTRAVENOUS; SUBCUTANEOUS at 12:59

## 2020-10-05 RX ADMIN — MORPHINE SULFATE 15 MILLIGRAM(S): 50 CAPSULE, EXTENDED RELEASE ORAL at 06:40

## 2020-10-05 RX ADMIN — OXYCODONE HYDROCHLORIDE 10 MILLIGRAM(S): 5 TABLET ORAL at 04:54

## 2020-10-05 RX ADMIN — OXYCODONE HYDROCHLORIDE 10 MILLIGRAM(S): 5 TABLET ORAL at 08:58

## 2020-10-05 RX ADMIN — OXYCODONE HYDROCHLORIDE 30 MILLIGRAM(S): 5 TABLET ORAL at 18:58

## 2020-10-05 RX ADMIN — Medication 1 TABLET(S): at 17:00

## 2020-10-05 RX ADMIN — GABAPENTIN 1000 MILLIGRAM(S): 400 CAPSULE ORAL at 12:59

## 2020-10-05 RX ADMIN — OXYCODONE HYDROCHLORIDE 10 MILLIGRAM(S): 5 TABLET ORAL at 06:41

## 2020-10-05 RX ADMIN — HYDROMORPHONE HYDROCHLORIDE 2 MILLIGRAM(S): 2 INJECTION INTRAMUSCULAR; INTRAVENOUS; SUBCUTANEOUS at 16:37

## 2020-10-05 RX ADMIN — MORPHINE SULFATE 15 MILLIGRAM(S): 50 CAPSULE, EXTENDED RELEASE ORAL at 18:58

## 2020-10-05 RX ADMIN — ENOXAPARIN SODIUM 100 MILLIGRAM(S): 100 INJECTION SUBCUTANEOUS at 21:06

## 2020-10-05 RX ADMIN — OXYCODONE HYDROCHLORIDE 10 MILLIGRAM(S): 5 TABLET ORAL at 17:00

## 2020-10-05 RX ADMIN — OXYCODONE HYDROCHLORIDE 10 MILLIGRAM(S): 5 TABLET ORAL at 01:30

## 2020-10-05 RX ADMIN — OXYCODONE HYDROCHLORIDE 10 MILLIGRAM(S): 5 TABLET ORAL at 18:58

## 2020-10-05 RX ADMIN — ENOXAPARIN SODIUM 100 MILLIGRAM(S): 100 INJECTION SUBCUTANEOUS at 11:53

## 2020-10-05 RX ADMIN — HYDROMORPHONE HYDROCHLORIDE 2 MILLIGRAM(S): 2 INJECTION INTRAMUSCULAR; INTRAVENOUS; SUBCUTANEOUS at 19:25

## 2020-10-05 RX ADMIN — METHOCARBAMOL 750 MILLIGRAM(S): 500 TABLET, FILM COATED ORAL at 21:05

## 2020-10-05 RX ADMIN — LIDOCAINE 2 PATCH: 4 CREAM TOPICAL at 20:46

## 2020-10-05 RX ADMIN — METHOCARBAMOL 750 MILLIGRAM(S): 500 TABLET, FILM COATED ORAL at 08:59

## 2020-10-05 RX ADMIN — OXYCODONE HYDROCHLORIDE 30 MILLIGRAM(S): 5 TABLET ORAL at 18:57

## 2020-10-05 RX ADMIN — OXYCODONE HYDROCHLORIDE 10 MILLIGRAM(S): 5 TABLET ORAL at 21:04

## 2020-10-05 RX ADMIN — OXYCODONE HYDROCHLORIDE 30 MILLIGRAM(S): 5 TABLET ORAL at 13:00

## 2020-10-05 RX ADMIN — GABAPENTIN 1000 MILLIGRAM(S): 400 CAPSULE ORAL at 04:56

## 2020-10-05 RX ADMIN — Medication 3 MILLIGRAM(S): at 21:05

## 2020-10-05 RX ADMIN — OXYCODONE HYDROCHLORIDE 10 MILLIGRAM(S): 5 TABLET ORAL at 18:59

## 2020-10-05 RX ADMIN — LIDOCAINE 2 PATCH: 4 CREAM TOPICAL at 11:48

## 2020-10-05 NOTE — PROGRESS NOTE ADULT - ASSESSMENT
ASSESSMENT/PLAN  BRUNO CHO is a 35M admitted to [facility] on 8/22/20 with back pain, found to have fractures of L1-L5 pedicles bilaterally, leading to widening of the spinal canal with anterolisthesis of L5 on S1 with the entire L5 vertebral body width, disruption of the ALL and PLL at L5-S1 level, probable epidural hemorrhage and hemorrhage within the foramina at L1-L5, and enlargement/edema of bilateral psoas muscle.  Patient underwent evacuation of epidural hematoma, T11-pelvis posterior spinal fusion, and L2-S1 Laminectomy on 8/23/20 and subsequent L5-S1 anterior interbody fusion and L3-4 lateral interbody fusion on 9/1.  Hospital course c/b acute blood loss anemia requiring multiple transfusions, traumatic rhabodmyolysis, and intractable pain.  Patient now admitted for a multidisciplinary rehab program. 09-04-20 @ 14:37    Comprehensive Multidisciplinary Rehab Program:  - Start comprehensive rehab program of PT/OT - 3 hours a day, 5 days a week  - P&O as needed    -------------  MEDICAL MANAGEMENT     #Traumatic Spinal Cord Injury  - pt with complete anterolisthesis of L5 on S1, multiple pedicular fractures, and epidural hemorrhage   - s/p T11-pelvis posterior spinal fusion, and L2-S1 Laminectomy on 8/23/20 and subsequent L5-S1 anterior interbody fusion and L3-4 lateral interbody fusion on 9/1  - pain control as below  - PT/OT  - spinal & Fall precautions  - TLSO/AFOs when OOB  - monitor bladder/bowel function out of concern for neurogenic bowel  - JETHRO drain removed; off keflex  - staples/sutures removed 9/23    #Tachycardia  - per chart review, pt tachycardic prior to transfer to [facility], but no dopplers/CTA obtained  - CTA 9/6 inconclusive re: PE  - Doppler 9/6+B/L DVT   - TTE 9/9 w/o evidence of right heart strain     #Pain Control  - 9/14/20- oxycodone 5-10 mg for moderate-severe pain, oxycontin 20 mg Q12h, valium 5 mg three times a day prn for spasms, Gabapentin increased to 400 mg three times a day. Dilaudid 2 mg Q4h for breakthrough pain.   - 9/15/20: plan to increase oxycontin to 30 mg Q12h, Gabapentin to 600 mg TID. SBP > 120,   - 9/22 - consider switching to morphine-based long-acting and increasing gabapentin  - 9/22 - increased gabapentin  - 9/25 - decreased dilaudid interval.   - 9/28 - switched from oxycontin to ms contin; d/c'd dilaudid  - 9/30 - increased gabapentin to 1000 TID; added methocarbamol 750 mg BID for spasms  - consider addition TCA and/or switching to lyrica if pain remains uncontrolled    #adjustment issues  - c/w psychological support   - c/w cymbalta    #Rhabdomyolysis (resolved)  - 2/2 trauma   - largely resolved: <900 on 8/31  - encourage PO fluids    #Bladder Management  - Neurogenic bladder (resolved)  - francis removed 9/4 am  - PVRs minimal   - c/w flomax.     #IVC filter  - placed 8/26  - will need removed in 5-6 months     #DVT   - IVC filter  - SCDs  - tachycardia: Doppler and cTA chest to eval for DVT and PE. however per chart review pt appears to have been tachycardic for some time prior to arrival in rehab.  - doppler showed bilateral DVTs, vascular surgery consult- continue to monitor, okay to continue therapy.   - RLE edema: got 2 days of lasix few days ago, improved.   - 9/17: Doppler positive for worsening BLE clots, occlusive clots in RLE, nonocclusive clots in LLE. Surgeon Dr. Rivera cleared patient to start on full anticoagulation.  Now on 100 mg Lovenox Q12h. Discussed result and therapy with patient. Vascular surgery - continue full anticoagulation treatment for 6-12 months. Seen by vascular 9/22: continue elevation x24h and ace wraps when OOB. Bedside therapies 9/22.   - Hb low but stable - likely due to increased clot burden with worsening DVTs. Continue to monitor.     #Insomnia  - trazodone 50 mg at night.     Outpatient Follow-up:    Gabrielle Villegas  ORTHOPAEDIC SURGERY  [address], [address]  [place] [address]  Phone: [phone]  Fax: [phone]  Follow Up Time:     Dave Rivera  ORTHOPAEDIC SURGERY  [address] [place] [address]  Phone: [phone]  Fax: [phone]    IDT Rounds 10/1: Pt making good functional gains. popover transfers close supervision.  Toileting/LBD mod assist. Ambulates 10' with RW and mod assist.

## 2020-10-05 NOTE — PROGRESS NOTE ADULT - SUBJECTIVE AND OBJECTIVE BOX
SUBJECTIVE & OVERNIGHT EVENTS:  Patient seen and evaluated this morning. No acute events overnight.  Back pain is controlled.  +BM.  Denies fevers, chills, n/v/d.  Discussed with patient that he needs to start finding a pain management physician and informed of how to go about doing so.  Pt expressed understanding and agreement.     [X] Constitutional WNL        [X] Cardio WNL              [X] Resp WNL  [X] GI WNL                            [X]  WNL                    [] Heme +B/L LE DVTs  [X] Endo WNL                       [] Skin +spinal incision                  [X] MSK WNL  [] Neuro +LE weakness                     [X] Cognitive WNL         [] Psych +Depression/anxiety      OBJECTIVE  Vital Signs Last 24 Hrs  T(C): 36.9 (05 Oct 2020 08:42), Max: 37.1 (04 Oct 2020 21:04)  T(F): 98.5 (05 Oct 2020 08:42), Max: 98.7 (04 Oct 2020 21:04)  HR: 86 (05 Oct 2020 08:42) (86 - 102)  BP: 123/75 (05 Oct 2020 08:42) (115/75 - 124/83)  RR: 18 (05 Oct 2020 08:42) (15 - 18)  SpO2: 100% (05 Oct 2020 08:42) (99% - 100%)    PHYSICAL EXAM  Constitutional: NAD  HEENT: NCAT, EOMI, handling secretions well  Cardio: well-perfused  Resp: symmetric chest-rise, no increased WOB  GI: non-distended  : no francis  Extremities: well-perfused    LABS:                        9.9    6.67  )-----------( 486      ( 05 Oct 2020 06:00 )             31.7     05 Oct 2020 06:00    134    |  99     |  6      ----------------------------<  115    3.9     |  29     |  0.74     Ca    8.9        05 Oct 2020 06:00              MEDICATIONS  (STANDING):  DULoxetine 60 milliGRAM(s) Oral daily  enoxaparin Injectable 100 milliGRAM(s) SubCutaneous every 12 hours  famotidine    Tablet 20 milliGRAM(s) Oral <User Schedule>  gabapentin 1000 milliGRAM(s) Oral three times a day  influenza   Vaccine 0.5 milliLiter(s) IntraMuscular once  lactobacillus acidophilus 1 Tablet(s) Oral two times a day  lidocaine   Patch 2 Patch Transdermal <User Schedule>  melatonin 3 milliGRAM(s) Oral at bedtime  methocarbamol 750 milliGRAM(s) Oral <User Schedule>  morphine ER Tablet 15 milliGRAM(s) Oral <User Schedule>  senna 2 Tablet(s) Oral at bedtime  tamsulosin 0.4 milliGRAM(s) Oral at bedtime  traZODone 50 milliGRAM(s) Oral <User Schedule>    MEDICATIONS  (PRN):  acetaminophen   Tablet .. 650 milliGRAM(s) Oral every 6 hours PRN Temp greater or equal to 38C (100.4F), Mild Pain (1 - 3)  bisacodyl Suppository 10 milliGRAM(s) Rectal <User Schedule> PRN no bowel movement for 2 days.  lidocaine 2% Gel 1 Application(s) Topical four times a day PRN pain  magnesium hydroxide Suspension 30 milliLiter(s) Oral every 12 hours PRN Constipation  ondansetron    Tablet 4 milliGRAM(s) Oral every 8 hours PRN Nausea and/or Vomiting  oxyCODONE    IR 5 milliGRAM(s) Oral daily PRN Severe Pain (7 - 10)  oxyCODONE    IR 5 milliGRAM(s) Oral every 4 hours PRN Moderate Pain (4 - 6)  oxyCODONE    IR 10 milliGRAM(s) Oral every 4 hours PRN Severe Pain (7 - 10)

## 2020-10-06 ENCOUNTER — TRANSCRIPTION ENCOUNTER (OUTPATIENT)
Age: 35
End: 2020-10-06

## 2020-10-06 PROCEDURE — 99232 SBSQ HOSP IP/OBS MODERATE 35: CPT

## 2020-10-06 RX ORDER — APIXABAN 2.5 MG/1
5 TABLET, FILM COATED ORAL EVERY 12 HOURS
Refills: 0 | Status: DISCONTINUED | OUTPATIENT
Start: 2020-10-13 | End: 2020-10-12

## 2020-10-06 RX ORDER — APIXABAN 2.5 MG/1
10 TABLET, FILM COATED ORAL EVERY 12 HOURS
Refills: 0 | Status: DISCONTINUED | OUTPATIENT
Start: 2020-10-06 | End: 2020-10-07

## 2020-10-06 RX ADMIN — TAMSULOSIN HYDROCHLORIDE 0.4 MILLIGRAM(S): 0.4 CAPSULE ORAL at 21:33

## 2020-10-06 RX ADMIN — OXYCODONE HYDROCHLORIDE 10 MILLIGRAM(S): 5 TABLET ORAL at 21:08

## 2020-10-06 RX ADMIN — OXYCODONE HYDROCHLORIDE 10 MILLIGRAM(S): 5 TABLET ORAL at 11:49

## 2020-10-06 RX ADMIN — DULOXETINE HYDROCHLORIDE 60 MILLIGRAM(S): 30 CAPSULE, DELAYED RELEASE ORAL at 11:07

## 2020-10-06 RX ADMIN — APIXABAN 10 MILLIGRAM(S): 2.5 TABLET, FILM COATED ORAL at 21:34

## 2020-10-06 RX ADMIN — OXYCODONE HYDROCHLORIDE 10 MILLIGRAM(S): 5 TABLET ORAL at 02:00

## 2020-10-06 RX ADMIN — METHOCARBAMOL 750 MILLIGRAM(S): 500 TABLET, FILM COATED ORAL at 07:44

## 2020-10-06 RX ADMIN — LIDOCAINE 2 PATCH: 4 CREAM TOPICAL at 18:56

## 2020-10-06 RX ADMIN — MORPHINE SULFATE 15 MILLIGRAM(S): 50 CAPSULE, EXTENDED RELEASE ORAL at 18:54

## 2020-10-06 RX ADMIN — MORPHINE SULFATE 15 MILLIGRAM(S): 50 CAPSULE, EXTENDED RELEASE ORAL at 05:20

## 2020-10-06 RX ADMIN — MORPHINE SULFATE 15 MILLIGRAM(S): 50 CAPSULE, EXTENDED RELEASE ORAL at 06:08

## 2020-10-06 RX ADMIN — Medication 1 TABLET(S): at 05:19

## 2020-10-06 RX ADMIN — Medication 3 MILLIGRAM(S): at 21:34

## 2020-10-06 RX ADMIN — MORPHINE SULFATE 15 MILLIGRAM(S): 50 CAPSULE, EXTENDED RELEASE ORAL at 17:39

## 2020-10-06 RX ADMIN — LIDOCAINE 2 PATCH: 4 CREAM TOPICAL at 19:52

## 2020-10-06 RX ADMIN — ENOXAPARIN SODIUM 100 MILLIGRAM(S): 100 INJECTION SUBCUTANEOUS at 11:07

## 2020-10-06 RX ADMIN — OXYCODONE HYDROCHLORIDE 10 MILLIGRAM(S): 5 TABLET ORAL at 16:52

## 2020-10-06 RX ADMIN — OXYCODONE HYDROCHLORIDE 10 MILLIGRAM(S): 5 TABLET ORAL at 07:45

## 2020-10-06 RX ADMIN — GABAPENTIN 1000 MILLIGRAM(S): 400 CAPSULE ORAL at 05:19

## 2020-10-06 RX ADMIN — OXYCODONE HYDROCHLORIDE 10 MILLIGRAM(S): 5 TABLET ORAL at 16:02

## 2020-10-06 RX ADMIN — SENNA PLUS 2 TABLET(S): 8.6 TABLET ORAL at 21:34

## 2020-10-06 RX ADMIN — GABAPENTIN 1000 MILLIGRAM(S): 400 CAPSULE ORAL at 13:39

## 2020-10-06 RX ADMIN — GABAPENTIN 1000 MILLIGRAM(S): 400 CAPSULE ORAL at 21:33

## 2020-10-06 RX ADMIN — Medication 650 MILLIGRAM(S): at 06:09

## 2020-10-06 RX ADMIN — LIDOCAINE 2 PATCH: 4 CREAM TOPICAL at 00:21

## 2020-10-06 RX ADMIN — LIDOCAINE 2 PATCH: 4 CREAM TOPICAL at 07:47

## 2020-10-06 RX ADMIN — Medication 650 MILLIGRAM(S): at 05:20

## 2020-10-06 RX ADMIN — OXYCODONE HYDROCHLORIDE 10 MILLIGRAM(S): 5 TABLET ORAL at 12:22

## 2020-10-06 RX ADMIN — OXYCODONE HYDROCHLORIDE 10 MILLIGRAM(S): 5 TABLET ORAL at 08:52

## 2020-10-06 RX ADMIN — METHOCARBAMOL 750 MILLIGRAM(S): 500 TABLET, FILM COATED ORAL at 21:34

## 2020-10-06 RX ADMIN — OXYCODONE HYDROCHLORIDE 10 MILLIGRAM(S): 5 TABLET ORAL at 20:16

## 2020-10-06 RX ADMIN — Medication 1 TABLET(S): at 17:39

## 2020-10-06 RX ADMIN — OXYCODONE HYDROCHLORIDE 10 MILLIGRAM(S): 5 TABLET ORAL at 01:04

## 2020-10-06 NOTE — PROGRESS NOTE BEHAVIORAL HEALTH - NSBHCHARTREVIEWLAB_PSY_A_CORE FT
8.2    7.63  )-----------( 394      ( 24 Sep 2020 07:10 )             26.9    09-24    136  |  98  |  6<L>  ----------------------------<  102<H>  3.6   |  31  |  0.65    Ca    8.5      24 Sep 2020 07:10
8.6    8.87  )-----------( 408      ( 21 Sep 2020 08:00 )             26.8       09-21    132<L>  |  94<L>  |  8   ----------------------------<  114<H>  3.8   |  32<H>  |  0.66    Ca    8.4      21 Sep 2020 08:00  Phos  4.7     09-20  Mg     1.8     09-20
9.5    10.12 )-----------( 298      ( 14 Sep 2020 05:15 )             29.5   09-14    133<L>  |  97  |  12  ----------------------------<  100<H>  4.3   |  28  |  0.80    Ca    8.7      14 Sep 2020 05:15
9.9    6.67  )-----------( 486      ( 05 Oct 2020 06:00 )             31.7     10-05    134<L>  |  99  |  6<L>  ----------------------------<  115<H>  3.9   |  29  |  0.74    Ca    8.9      05 Oct 2020 06:00
LABS:                        8.8    12.56 )-----------( 376      ( 19 Sep 2020 08:50 )             27.5     09-18    128<L>  |  91<L>  |  9   ----------------------------<  121<H>  4.3   |  29  |  0.77    Ca    8.4      18 Sep 2020 06:00        Urinalysis Basic - ( 18 Sep 2020 12:45 )    Color: Yellow / Appearance: Clear / S.010 / pH: x  Gluc: x / Ketone: Negative  / Bili: Negative / Urobili: Negative   Blood: x / Protein: 30 mg/dL / Nitrite: Negative   Leuk Esterase: Negative / RBC: 0-4 /HPF / WBC 0-2 /HPF   Sq Epi: x / Non Sq Epi: Neg.-Few / Bacteria: Negative /HPF       CAPILLARY BLOOD GLUCOSE            Urinalysis Basic - ( 18 Sep 2020 12:45 )    Color: Yellow / Appearance: Clear / S.010 / pH: x  Gluc: x / Ketone: Negative  / Bili: Negative / Urobili: Negative   Blood: x / Protein: 30 mg/dL / Nitrite: Negative   Leuk Esterase: Negative / RBC: 0-4 /HPF / WBC 0-2 /HPF   Sq Epi: x / Non Sq Epi: Neg.-Few / Bacteria: Negative /HPF        RADIOLOGY & ADDITIONAL TESTS:    Consultant(s) Notes Reviewed:  [x ] YES  [ ] NO  Care Discussed with Consultants/Other Providers [ x] YES  [ ] NO  Imaging Personally Reviewed:  [ ] YES  [ ] NO
9.4    7.02  )-----------( 491      ( 01 Oct 2020 06:30 )             31.1   10-01    138  |  101  |  5<L>  ----------------------------<  106<H>  4.0   |  30  |  0.73    Ca    8.8      01 Oct 2020 06:30

## 2020-10-06 NOTE — DISCHARGE NOTE PROVIDER - NSDCCPCAREPLAN_GEN_ALL_CORE_FT
PRINCIPAL DISCHARGE DIAGNOSIS  Diagnosis: S/P spinal surgery  Assessment and Plan of Treatment: You were admitted to rehab because you had surgery on your spine.  Please continue to take medications, follow spinal precautions, and participate in therapies as instructed after discharge from rehab.  Follow up with your surgeons (Livan Villegas and Miguel) within 1 week of discharge from rehab.  Follow up with your primary care doctor within 1-2 weeks of discharge from rehab.  You should also follow up with your rehab doctor, Dr. Pettit, within 1 month of discharge from rehab.  You are also advised to follow up with all of your other doctors within 1-2 weeks of discharge from rehab.  Bring all of your discharge documentation to each doctor's appointment.  If you experience worsening pain, weakness, numbness, or tingling, please seek immediate medical attention.      SECONDARY DISCHARGE DIAGNOSES  Diagnosis: DVT, lower extremity  Assessment and Plan of Treatment: You had blood clots in both of your legs.  Please continue medications as instructed and follow up with vascular surgery within 2-3 weeks of discharge from rehab.  You will also need to see vascular surgery when it is time to remove IVC filter.     PRINCIPAL DISCHARGE DIAGNOSIS  Diagnosis: S/P spinal surgery  Assessment and Plan of Treatment: You were admitted to rehab because you had surgery on your spine.  Please continue to take medications, follow spinal precautions, and participate in therapies as instructed after discharge from rehab.  Follow up with your surgeons (Livan Villegas and Miguel) within 1 week of discharge from rehab.  Follow up with your primary care doctor within 1-2 weeks of discharge from rehab.  You should also follow up with your rehab doctor, Dr. Pettit, within 1 month of discharge from rehab.  You are also advised to follow up with all of your other doctors within 1-2 weeks of discharge from rehab.  Bring all of your discharge documentation to each doctor's appointment.  If you experience worsening pain, weakness, numbness, or tingling, please seek immediate medical attention.      SECONDARY DISCHARGE DIAGNOSES  Diagnosis: DVT, lower extremity  Assessment and Plan of Treatment: You had blood clots in both of your legs.  Please continue medications as instructed and follow up with vascular surgery within 2-3 weeks of discharge from rehab.  You will also need to see interventional radiolgoy (Dr. Merchant) when it is time to remove IVC filter (January-February 2021).

## 2020-10-06 NOTE — DISCHARGE NOTE PROVIDER - CARE PROVIDER_API CALL
Brando Pettit)  PhysicalRehab Medicine  [address] [place] [address]  Phone: [phone]  Fax: [phone]  Follow Up Time: 1 month    Gabrielle Villegas  ORTHOPAEDIC SURGERY  [address] [facility], [address]  [place] [address]  Phone: [phone]  Fax: [phone]  Follow Up Time: 1 week    Dave Rivera  ORTHOPAEDIC SURGERY  [address] [place] [address]  Phone: [phone]  Fax: [phone]  Follow Up Time: 1 week    Matthew Nice  SURGERY  [address] [place] [address], [address]  [place], [place] [id number]  Phone: [phone]  Fax: [phone]  Follow Up Time: 2 weeks   Brando Pettit)  PhysicalRehab Medicine  [address] [place] [address]  Phone: [phone]  Fax: [phone]  Follow Up Time: 1 month    Gabrielle Villegas  ORTHOPAEDIC SURGERY  [address] [facility], [address]  [place] [address]  Phone: [phone]  Fax: [phone]  Follow Up Time: 1 week    Dave Rivera  ORTHOPAEDIC SURGERY  [address] [place] [address]  Phone: [phone]  Fax: [phone]  Follow Up Time: 1 week    Matthew Nice  SURGERY  [address] [facility], [address]  [place], [place] [id number]  Phone: [phone]  Fax: [phone]  Follow Up Time: 2 weeks    Kristin Alicia  [facility] - RADIOLOGY  [address] [place] [address]  Phone: [phone]  Fax: [phone]  Follow Up Time: 1 month

## 2020-10-06 NOTE — PROGRESS NOTE ADULT - SUBJECTIVE AND OBJECTIVE BOX
SUBJECTIVE & OVERNIGHT EVENTS:  Patient seen and evaluated this morning. No acute events overnight.  Mood continues to improve. Pt able to complete step yesterday without assistance.  Back pain is controlled and he has been pushing interval between PRN oxycodone to 5 hours with some success.  +BM, no dysuria.  Eating, sleeping well.  No new complaints.      [X] Constitutional WNL        [X] Cardio WNL              [X] Resp WNL  [X] GI WNL                            [X]  WNL                    [] Heme +B/L LE DVTs  [X] Endo WNL                       [] Skin +spinal incision                  [X] MSK WNL  [] Neuro +LE weakness                     [X] Cognitive WNL         [] Psych +mood is improving      OBJECTIVE  Vital Signs Last 24 Hrs  T(C): 36.6 (06 Oct 2020 07:40), Max: 36.9 (05 Oct 2020 20:43)  T(F): 97.9 (06 Oct 2020 07:40), Max: 98.5 (05 Oct 2020 20:43)  HR: 96 (06 Oct 2020 07:40) (96 - 102)  BP: 147/92 (06 Oct 2020 07:40) (127/778 - 147/92)  BP(mean): --  RR: 16 (06 Oct 2020 07:40) (16 - 16)  SpO2: 97% (06 Oct 2020 07:40) (97% - 98%)    PHYSICAL EXAM  Constitutional: NAD  HEENT: NCAT, EOMI, handling secretions well  Cardio: well-perfused  Resp: symmetric chest-rise, no increased WOB  GI: non-distended  : no francis  Extremities: well-perfused    LABS:                        9.9    6.67  )-----------( 486      ( 05 Oct 2020 06:00 )             31.7     05 Oct 2020 06:00    134    |  99     |  6      ----------------------------<  115    3.9     |  29     |  0.74     Ca    8.9        05 Oct 2020 06:00              MEDICATIONS  (STANDING):  DULoxetine 60 milliGRAM(s) Oral daily  enoxaparin Injectable 100 milliGRAM(s) SubCutaneous every 12 hours  famotidine    Tablet 20 milliGRAM(s) Oral <User Schedule>  gabapentin 1000 milliGRAM(s) Oral three times a day  influenza   Vaccine 0.5 milliLiter(s) IntraMuscular once  lactobacillus acidophilus 1 Tablet(s) Oral two times a day  lidocaine   Patch 2 Patch Transdermal <User Schedule>  melatonin 3 milliGRAM(s) Oral at bedtime  methocarbamol 750 milliGRAM(s) Oral <User Schedule>  morphine ER Tablet 15 milliGRAM(s) Oral <User Schedule>  senna 2 Tablet(s) Oral at bedtime  tamsulosin 0.4 milliGRAM(s) Oral at bedtime  traZODone 50 milliGRAM(s) Oral <User Schedule>    MEDICATIONS  (PRN):  acetaminophen   Tablet .. 650 milliGRAM(s) Oral every 6 hours PRN Temp greater or equal to 38C (100.4F), Mild Pain (1 - 3)  bisacodyl Suppository 10 milliGRAM(s) Rectal <User Schedule> PRN no bowel movement for 2 days.  lidocaine 2% Gel 1 Application(s) Topical four times a day PRN pain  magnesium hydroxide Suspension 30 milliLiter(s) Oral every 12 hours PRN Constipation  ondansetron    Tablet 4 milliGRAM(s) Oral every 8 hours PRN Nausea and/or Vomiting  oxyCODONE    IR 10 milliGRAM(s) Oral every 4 hours PRN Severe Pain (7 - 10)  oxyCODONE    IR 5 milliGRAM(s) Oral every 4 hours PRN Moderate Pain (4 - 6)

## 2020-10-06 NOTE — DISCHARGE NOTE PROVIDER - NSDCFUADDINST_GEN_ALL_CORE_FT
Please follow up with orthopaedic surgery, vascular surgery (or hematology), and pain management within 1-2 weeks of discharge.

## 2020-10-06 NOTE — DISCHARGE NOTE PROVIDER - HOSPITAL COURSE
HPI: 34yo Male with no signifcant PMHx admitted to [facility] on 8/22/20 with back pain after falling down six steps at home during night. On admission BAL<10. Utox done after receiving pain meds and Valium in ED positive only for benzos and opiates. Imaging revealed fractures of L1-L5 pedicles bilaterally, leading to widening of the spinal canal with anterolisthesis of L5 on S1 with the entire L5 vertebral body width, disruption of the ALL and PLL at L5-S1 level, probable epidural hemorrhage and hemorrhage within the foramina at L1-L5, and enlargement/edema of bilateral psoas muscle.  Ortho consulted. Patient is s/p evacuation of epidural hematoma, T11-pelvis posterior spinal fusion, and L2-S1 Laminectomy on 8/23/20 with Dr. Villegas. Intraoperatively, pt received 4U PRBCs, 1U platelets, and 1U FFP due to 2200mL EBL. Post-operatively, he was admitted to CCU for pressure support and required additional blood products due to continued bleeding into JETHRO drains.  He was febrile after transfusions but remained without other signs of infection.  IVC filter placed 8/26 as patient unable to be pharmacologically anticoagulated due to extensive surgical requirements.  IVC filter placed by Dr. Merchant on 8/26, with plan to remove in 5-6 months.  He was seen by nephro fo CHELO (multifactorial) and elevated CPK, and started on IVF.  He is also s/p t L5-S1 anterior interbody fusion and L3-4 lateral interbody fusion on 9/1/20. TLSO and AFOs provided at [facility]. Man removed 9/4/20. Patient voiding without need for SC. Patient cleared for discharge to acute inpatient rehab at [facility].    Patient seen and examined upon arrival. He refused full skin exam initially stating he was in too much pain - requested again after receiving Dilaudid, still too uncomfortable to turn to remove sliding bags placed by EMS. He complains of bilateral hamstring tightness and states it hurts too much to bend his knees for motor exam. Low back pain became worse yesterday after Xray when he states he was forced to sit up upright for imaging. Patient agreeable to skin exam after Valium, required max assist.    Patient participated in daily therapies and made good functional gains.  Rehab course notable for persistent tachycardia.  CTA on 9/6 non-diagnostic but doppler showed b/l LE DVT.  TTE with no right heart strain.  No AC due to recent major surgery.  Worsening swellin gto b/l LE noted on 9/17 and doppler with worsening DT burden and occlusive RLE DVT.  Sugergy cleared for full-dose anticoagulation and started on lovenox.  Pain medications titrated, though patient with difficult to control pain.  Pain control improved with MS contin rather than oxycontin.  Pt counseled numerous times concerning risks of opioid use and dependence.      Patient seen and examined on day of discharge.  Medications, medication side effects, and discharge instructions were reviewed with the patient, who expressed understanding of all information.  Patient was medically and functionally optimized and cleared for discharge.      ------------------------------------------- HPI: 34yo Male with no signifcant PMHx admitted to [facility] on 8/22/20 with back pain after falling down six steps at home during night. On admission BAL<10. Utox done after receiving pain meds and Valium in ED positive only for benzos and opiates. Imaging revealed fractures of L1-L5 pedicles bilaterally, leading to widening of the spinal canal with anterolisthesis of L5 on S1 with the entire L5 vertebral body width, disruption of the ALL and PLL at L5-S1 level, probable epidural hemorrhage and hemorrhage within the foramina at L1-L5, and enlargement/edema of bilateral psoas muscle.  Ortho consulted. Patient is s/p evacuation of epidural hematoma, T11-pelvis posterior spinal fusion, and L2-S1 Laminectomy on 8/23/20 with Dr. Villegas. Intraoperatively, pt received 4U PRBCs, 1U platelets, and 1U FFP due to 2200mL EBL. Post-operatively, he was admitted to CCU for pressure support and required additional blood products due to continued bleeding into JETHRO drains.  He was febrile after transfusions but remained without other signs of infection.  IVC filter placed 8/26 as patient unable to be pharmacologically anticoagulated due to extensive surgical requirements.  IVC filter placed by Dr. Merchant on 8/26, with plan to remove in 5-6 months.  He was seen by nephro fo CHELO (multifactorial) and elevated CPK, and started on IVF.  He is also s/p t L5-S1 anterior interbody fusion and L3-4 lateral interbody fusion on 9/1/20. TLSO and AFOs provided at [facility]. Francis removed 9/4/20. Patient voiding without need for SC. Patient cleared for discharge to acute inpatient rehab at [facility].  Patient seen and examined upon arrival. He refused full skin exam initially stating he was in too much pain - requested again after receiving Dilaudid, still too uncomfortable to turn to remove sliding bags placed by EMS. He complains of bilateral hamstring tightness and states it hurts too much to bend his knees for motor exam. Low back pain became worse yesterday after Xray when he states he was forced to sit up upright for imaging. Patient agreeable to skin exam after Valium, required max assist.  Patient participated in daily therapies and made good functional gains.  Rehab course notable for persistent tachycardia.  CTA on 9/6 non-diagnostic but doppler showed b/l LE DVT.  TTE with no right heart strain.  No AC due to recent major surgery.  Worsening swellin gto b/l LE noted on 9/17 and doppler with worsening DT burden and occlusive RLE DVT.  Sugergy cleared for full-dose anticoagulation and started on lovenox.  Pain medications titrated, though patient with difficult to control pain.  Pain control improved with MS contin rather than oxycontin.  Pt counseled numerous times concerning risks of opioid use and dependence.    Patient seen and examined on day of discharge.  Medications, medication side effects, and discharge instructions were reviewed with the patient, who expressed understanding of all information.  Patient was medically and functionally optimized and cleared for discharge.      -------------------------------------------    #Traumatic Spinal Cord Injury  - pt with complete anterolisthesis of L5 on S1, multiple pedicular fractures, and epidural hemorrhage   - s/p T11-pelvis posterior spinal fusion, and L2-S1 Laminectomy on 8/23/20 and subsequent L5-S1 anterior interbody fusion and L3-4 lateral interbody fusion on 9/1  - pain control as below  - PT/OT  - spinal & Fall precautions  - TLSO/AFOs when OOB  - monitor bladder/bowel function out of concern for neurogenic bowel  - JETHRO drain removed; off keflex  - staples/sutures removed 9/23  - contacted by ortho resident 10/6 who requested HLA, vit D workup to evaluate underlying reason for severity of injury given relatively minimal trauma (fall down 6 stairs)    #Tachycardia (resolving)  - per chart review, pt tachycardic prior to transfer to [facility], but no dopplers/CTA obtained  - CTA 9/6 inconclusive re: PE  - Doppler 9/6+B/L DVT   - TTE 9/9 w/o evidence of right heart strain     #Pain Control  - 9/14/20- oxycodone 5-10 mg for moderate-severe pain, oxycontin 20 mg Q12h, valium 5 mg three times a day prn for spasms, Gabapentin increased to 400 mg three times a day. Dilaudid 2 mg Q4h for breakthrough pain.   - 9/15/20: plan to increase oxycontin to 30 mg Q12h, Gabapentin to 600 mg TID. SBP > 120,   - 9/22 - consider switching to morphine-based long-acting and increasing gabapentin  - 9/22 - increased gabapentin  - 9/25 - decreased dilaudid interval.   - 9/28 - switched from oxycontin to ms contin; d/c'd dilaudid  - 9/30 - increased gabapentin to 1000 TID; added methocarbamol 750 mg BID for spasms  - consider addition TCA and/or switching to lyrica if pain remains uncontrolled  - plan to decerase oxycodone to 2.5 mg for moderate pain and 5 mg for severe pain (10/8/2020)  - 10/9: Educated patient about side effects of cannabis and specially along taking it with narcotics. Educated patient not to use street drugs and it's dangerous side effects. Called psychiatry team to discuss with patient. Patient to get appointment with pain management physician.   10/12: Patient has pain management  appointment [facility] at 3 pm with Dr. huff on 10/15/2020. Narcan kit given at time of discharge, family and patient educated by pharmacist about nacane use/direction.     #adjustment issues (resolving)  - c/w psychological support   - c/w cymbalta    #Rhabdomyolysis (resolved)  - 2/2 trauma   - largely resolved: <900 on 8/31  - encourage PO fluids    #Bladder Management  - Neurogenic bladder (resolved)  - francis removed 9/4 am  - PVRs minimal   - c/w flomax.     #IVC filter  - placed 8/26  - will need removed in 5-6 months     #DVT   - IVC filter  - SCDs  - tachycardia: Doppler and cTA chest to eval for DVT and PE. however per chart review pt appears to have been tachycardic for some time prior to arrival in rehab.  - doppler showed bilateral DVTs, vascular surgery consult- continue to monitor, okay to continue therapy.   - RLE edema: got 2 days of lasix few days ago, improved.   - 9/17: Doppler positive for worsening BLE clots, occlusive clots in RLE, nonocclusive clots in LLE. Surgeon Dr. Rivera cleared patient to start on full anticoagulation.  Now on 100 mg Lovenox Q12h. Discussed result and therapy with patient. Vascular surgery - continue full anticoagulation treatment for 6-12 months. Seen by vascular 9/22: continue elevation x24h and ace wraps when OOB. Bedside therapies 9/22.   - Hb low but stable - likely due to increased clot burden with worsening DVTs. Continue to monitor.     #Insomnia  - trazodone 50 mg at night.

## 2020-10-06 NOTE — CHART NOTE - NSCHARTNOTEFT_GEN_A_CORE
Nutrition Follow Up Note  Hospital Course   (Per Electronic Medical Record)    Source:  Patient [X]  Medical Record [X]      Diet:   Regular Diet w/ Thin Liquids  Tolerates Diet Well  No Chewing/Swallowing Difficulties  No Recent Nausea, Vomiting, Diarrhea or Constipation  Consumes 100% of Meals (as Per Documentation)  Obtained Food Preferences from Patient    Enteral/Parenteral Nutrition: Not Applicable    Current Weight: 239.6lb on 9/6  Obtain New Weight  Weights Currently Stable @This Time     Pertinent Medications: MEDICATIONS  (STANDING):  DULoxetine 60 milliGRAM(s) Oral daily  enoxaparin Injectable 100 milliGRAM(s) SubCutaneous every 12 hours  famotidine    Tablet 20 milliGRAM(s) Oral <User Schedule>  gabapentin 1000 milliGRAM(s) Oral three times a day  influenza   Vaccine 0.5 milliLiter(s) IntraMuscular once  lactobacillus acidophilus 1 Tablet(s) Oral two times a day  lidocaine   Patch 2 Patch Transdermal <User Schedule>  melatonin 3 milliGRAM(s) Oral at bedtime  methocarbamol 750 milliGRAM(s) Oral <User Schedule>  morphine ER Tablet 15 milliGRAM(s) Oral <User Schedule>  senna 2 Tablet(s) Oral at bedtime  tamsulosin 0.4 milliGRAM(s) Oral at bedtime  traZODone 50 milliGRAM(s) Oral <User Schedule>    MEDICATIONS  (PRN):  acetaminophen   Tablet .. 650 milliGRAM(s) Oral every 6 hours PRN Temp greater or equal to 38C (100.4F), Mild Pain (1 - 3)  bisacodyl Suppository 10 milliGRAM(s) Rectal <User Schedule> PRN no bowel movement for 2 days.  lidocaine 2% Gel 1 Application(s) Topical four times a day PRN pain  magnesium hydroxide Suspension 30 milliLiter(s) Oral every 12 hours PRN Constipation  ondansetron    Tablet 4 milliGRAM(s) Oral every 8 hours PRN Nausea and/or Vomiting  oxyCODONE    IR 10 milliGRAM(s) Oral every 4 hours PRN Severe Pain (7 - 10)  oxyCODONE    IR 5 milliGRAM(s) Oral every 4 hours PRN Moderate Pain (4 - 6)    Pertinent Labs:  10-05 Na134 mmol/L<L> Glu 115 mg/dL<H> K+ 3.9 mmol/L Cr  0.74 mg/dL BUN 6 mg/dL<L>    Skin: No Pressure Ulcers  Multiple Surgical Incisions  (as Per Nursing Flow Sheet)     Edema: None Noted Recently    Last Bowel Movement: on 10/5    Estimated Needs:   [X] No Change Since Previous Assessment    Previous Nutrition Diagnosis:   Obese    Nutrition Diagnosis is [X] Ongoing - Continue Nutrition Plan of Care     New Nutrition Diagnosis: [X] Not Applicable    Interventions:   1. Recommend Continue Nutrition Plan of Care     Monitoring & Evaluation:   [X] Weights   [X] PO Intake   [X] Skin Integrity   [X] Follow Up (Per Protocol)  [X] Tolerance to Diet Prescription   [X] Other: Labs     Registered Dietitian/Nutritionist Remains Available.  Chandu Fonseca RDN    Pager # [phone]  Phone# [phone]

## 2020-10-06 NOTE — DISCHARGE NOTE PROVIDER - CARE PROVIDERS DIRECT ADDRESSES
[email or web address],DirectAddress_Unknown,DirectAddress_Unknown,DirectAddress_Unknown ,[email or web address],DirectAddress_Unknown,DirectAddress_Unknown,DirectAddress_[email or web address]

## 2020-10-06 NOTE — PROGRESS NOTE ADULT - ASSESSMENT
ASSESSMENT/PLAN  BRUNO CHO is a 35M admitted to [facility] on 8/22/20 with back pain, found to have fractures of L1-L5 pedicles bilaterally, leading to widening of the spinal canal with anterolisthesis of L5 on S1 with the entire L5 vertebral body width, disruption of the ALL and PLL at L5-S1 level, probable epidural hemorrhage and hemorrhage within the foramina at L1-L5, and enlargement/edema of bilateral psoas muscle.  Patient underwent evacuation of epidural hematoma, T11-pelvis posterior spinal fusion, and L2-S1 Laminectomy on 8/23/20 and subsequent L5-S1 anterior interbody fusion and L3-4 lateral interbody fusion on 9/1.  Hospital course c/b acute blood loss anemia requiring multiple transfusions, traumatic rhabodmyolysis, and intractable pain.  Patient now admitted for a multidisciplinary rehab program. 09-04-20 @ 14:37    Comprehensive Multidisciplinary Rehab Program:  - Start comprehensive rehab program of PT/OT - 3 hours a day, 5 days a week  - P&O as needed    -------------  MEDICAL MANAGEMENT     #Traumatic Spinal Cord Injury  - pt with complete anterolisthesis of L5 on S1, multiple pedicular fractures, and epidural hemorrhage   - s/p T11-pelvis posterior spinal fusion, and L2-S1 Laminectomy on 8/23/20 and subsequent L5-S1 anterior interbody fusion and L3-4 lateral interbody fusion on 9/1  - pain control as below  - PT/OT  - spinal & Fall precautions  - TLSO/AFOs when OOB  - monitor bladder/bowel function out of concern for neurogenic bowel  - JETHRO drain removed; off keflex  - staples/sutures removed 9/23  - contacted by ortho resident 10/6 who requested HLA, vit D workup to evaluate underlying reason for severity of injury given relatively minimal trauma (fall down 6 stairs)    #Tachycardia (resolving)  - per chart review, pt tachycardic prior to transfer to [facility], but no dopplers/CTA obtained  - CTA 9/6 inconclusive re: PE  - Doppler 9/6+B/L DVT   - TTE 9/9 w/o evidence of right heart strain     #Pain Control  - 9/14/20- oxycodone 5-10 mg for moderate-severe pain, oxycontin 20 mg Q12h, valium 5 mg three times a day prn for spasms, Gabapentin increased to 400 mg three times a day. Dilaudid 2 mg Q4h for breakthrough pain.   - 9/15/20: plan to increase oxycontin to 30 mg Q12h, Gabapentin to 600 mg TID. SBP > 120,   - 9/22 - consider switching to morphine-based long-acting and increasing gabapentin  - 9/22 - increased gabapentin  - 9/25 - decreased dilaudid interval.   - 9/28 - switched from oxycontin to ms contin; d/c'd dilaudid  - 9/30 - increased gabapentin to 1000 TID; added methocarbamol 750 mg BID for spasms  - consider addition TCA and/or switching to lyrica if pain remains uncontrolled    #adjustment issues (resolving)  - c/w psychological support   - c/w cymbalta    #Rhabdomyolysis (resolved)  - 2/2 trauma   - largely resolved: <900 on 8/31  - encourage PO fluids    #Bladder Management  - Neurogenic bladder (resolved)  - francis removed 9/4 am  - PVRs minimal   - c/w flomax.     #IVC filter  - placed 8/26  - will need removed in 5-6 months     #DVT   - IVC filter  - SCDs  - tachycardia: Doppler and cTA chest to eval for DVT and PE. however per chart review pt appears to have been tachycardic for some time prior to arrival in rehab.  - doppler showed bilateral DVTs, vascular surgery consult- continue to monitor, okay to continue therapy.   - RLE edema: got 2 days of lasix few days ago, improved.   - 9/17: Doppler positive for worsening BLE clots, occlusive clots in RLE, nonocclusive clots in LLE. Surgeon Dr. Rivera cleared patient to start on full anticoagulation.  Now on 100 mg Lovenox Q12h. Discussed result and therapy with patient. Vascular surgery - continue full anticoagulation treatment for 6-12 months. Seen by vascular 9/22: continue elevation x24h and ace wraps when OOB. Bedside therapies 9/22.   - Hb low but stable - likely due to increased clot burden with worsening DVTs. Continue to monitor.     #Insomnia  - trazodone 50 mg at night.     Outpatient Follow-up:    Gabrielle Villegas  ORTHOPAEDIC SURGERY  [address], [address]  [place] [address]  Phone: [phone]  Fax: [phone]  Follow Up Time:     Dave Rivera  ORTHOPAEDIC SURGERY  [address] [place] [address]  Phone: [phone]  Fax: [phone]    IDT Rounds 10/1: Pt making good functional gains. popover transfers close supervision.  Toileting/LBD mod assist. Ambulates 10' with RW and mod assist.

## 2020-10-06 NOTE — DISCHARGE NOTE PROVIDER - NSDCMRMEDTOKEN_GEN_ALL_CORE_FT
Bilateral Ankle-Foot Orthoses: Difficulty ambulating   R26.2  diazePAM 5 mg oral tablet: 1 tab(s) orally every 8 hours  famotidine 20 mg oral tablet: 1 tab(s) orally every 12 hours, As needed, Dyspepsia  HYDROmorphone 2 mg oral tablet: 1 tab(s) orally every 4 hours, As Needed  Keflex 500 mg oral capsule: 1 cap(s) orally 3 times a day     Please take until the drain is out.  magnesium hydroxide 8% oral suspension: 30 milliliter(s) orally every 12 hours, As needed, Constipation  oxyCODONE 10 mg oral tablet, extended release: 1 tab(s) orally every 12 hours  senna oral tablet: 2 tab(s) orally once a day (at bedtime)  TLSO Brace: Spine surgery and difficulty walking    R26.2  M43.26   apixaban 5 mg oral tablet: 1 tab(s) orally every 12 hours  apixaban 5 mg oral tablet: 2 tab(s) orally every 12 hours  Bilateral Ankle-Foot Orthoses: Difficulty ambulating   R26.2  diazePAM 5 mg oral tablet: 1 tab(s) orally every 8 hours  famotidine 20 mg oral tablet: 1 tab(s) orally every 12 hours, As needed, Dyspepsia  HYDROmorphone 2 mg oral tablet: 1 tab(s) orally every 4 hours, As Needed  Keflex 500 mg oral capsule: 1 cap(s) orally 3 times a day     Please take until the drain is out.  magnesium hydroxide 8% oral suspension: 30 milliliter(s) orally every 12 hours, As needed, Constipation  oxyCODONE 10 mg oral tablet, extended release: 1 tab(s) orally every 12 hours  senna oral tablet: 2 tab(s) orally once a day (at bedtime)  TLSO Brace: Spine surgery and difficulty walking    R26.2  M43.26   acetaminophen 325 mg oral tablet: 2 tab(s) orally every 6 hours, As needed, Temp greater or equal to 38C (100.4F), Mild Pain (1 - 3)  apixaban 5 mg oral tablet: 2 tab(s) orally every 12 hours  apixaban 5 mg oral tablet: 1 tab(s) orally every 12 hours  Bilateral Ankle-Foot Orthoses: Difficulty ambulating   R26.2  bisacodyl 10 mg rectal suppository: 1 suppository(ies) rectal , As needed, no bowel movement for 2 days.  DULoxetine 60 mg oral delayed release capsule: 1 cap(s) orally once a day  famotidine 20 mg oral tablet: 1 tab(s) orally every 12 hours, As needed, Dyspepsia  gabapentin 100 mg oral capsule: 1 cap(s) orally 3 times a day  with three 300mg capsules for a total dosage of 1000mg three times a day  gabapentin 300 mg oral capsule: 3 cap(s) orally 3 times a day   lidocaine 5% topical film: Apply topically to affected area once a day, As Needed - for moderate pain  melatonin 3 mg oral tablet: 1 tab(s) orally once a day (at bedtime)  methocarbamol 750 mg oral tablet: 1 tab(s) orally 2 times a day   morphine 15 mg/8 to 12 hr oral tablet, extended release: 1 tab(s) orally   Multiple Vitamins oral tablet: 1 tab(s) orally once a day  oxyCODONE 5 mg oral tablet: 1 tab(s) orally every 4 hours, As needed, Severe Pain (7 - 10)  senna oral tablet: 2 tab(s) orally once a day (at bedtime)  tamsulosin 0.4 mg oral capsule: 1 cap(s) orally once a day (at bedtime)  TLSO Brace: Spine surgery and difficulty walking    R26.2  M43.26

## 2020-10-06 NOTE — DISCHARGE NOTE PROVIDER - PROVIDER TOKENS
PROVIDER:[TOKEN:[[id number]],FOLLOWUP:[1 month]],PROVIDER:[TOKEN:[[id number]],FOLLOWUP:[1 week]],PROVIDER:[TOKEN:[[id number]],FOLLOWUP:[1 week]],PROVIDER:[TOKEN:[[id number]],FOLLOWUP:[2 weeks]] PROVIDER:[TOKEN:[[id number]],FOLLOWUP:[1 month]],PROVIDER:[TOKEN:[[id number]],FOLLOWUP:[1 week]],PROVIDER:[TOKEN:[[id number]],FOLLOWUP:[1 week]],PROVIDER:[TOKEN:[[id number]],FOLLOWUP:[2 weeks]],PROVIDER:[TOKEN:[[id number]],FOLLOWUP:[1 month]]

## 2020-10-06 NOTE — DISCHARGE NOTE PROVIDER - NSDCFUADDAPPT_GEN_ALL_CORE_FT
Pain management clinic  Dr. Palomares at [facility]  appointment on 10/15/2020 at 3 pm.

## 2020-10-07 LAB — VIT D25+D1,25 OH+D1,25 PNL SERPL-MCNC: 21.1 PG/ML — SIGNIFICANT CHANGE UP (ref 19.9–79.3)

## 2020-10-07 PROCEDURE — 99232 SBSQ HOSP IP/OBS MODERATE 35: CPT

## 2020-10-07 RX ORDER — APIXABAN 2.5 MG/1
10 TABLET, FILM COATED ORAL EVERY 12 HOURS
Refills: 0 | Status: DISCONTINUED | OUTPATIENT
Start: 2020-10-07 | End: 2020-10-12

## 2020-10-07 RX ADMIN — OXYCODONE HYDROCHLORIDE 10 MILLIGRAM(S): 5 TABLET ORAL at 06:58

## 2020-10-07 RX ADMIN — OXYCODONE HYDROCHLORIDE 10 MILLIGRAM(S): 5 TABLET ORAL at 18:54

## 2020-10-07 RX ADMIN — LIDOCAINE 2 PATCH: 4 CREAM TOPICAL at 20:05

## 2020-10-07 RX ADMIN — OXYCODONE HYDROCHLORIDE 10 MILLIGRAM(S): 5 TABLET ORAL at 11:30

## 2020-10-07 RX ADMIN — Medication 1 TABLET(S): at 18:23

## 2020-10-07 RX ADMIN — MORPHINE SULFATE 15 MILLIGRAM(S): 50 CAPSULE, EXTENDED RELEASE ORAL at 05:50

## 2020-10-07 RX ADMIN — OXYCODONE HYDROCHLORIDE 10 MILLIGRAM(S): 5 TABLET ORAL at 02:46

## 2020-10-07 RX ADMIN — METHOCARBAMOL 750 MILLIGRAM(S): 500 TABLET, FILM COATED ORAL at 21:20

## 2020-10-07 RX ADMIN — OXYCODONE HYDROCHLORIDE 10 MILLIGRAM(S): 5 TABLET ORAL at 19:54

## 2020-10-07 RX ADMIN — OXYCODONE HYDROCHLORIDE 10 MILLIGRAM(S): 5 TABLET ORAL at 14:51

## 2020-10-07 RX ADMIN — DULOXETINE HYDROCHLORIDE 60 MILLIGRAM(S): 30 CAPSULE, DELAYED RELEASE ORAL at 12:34

## 2020-10-07 RX ADMIN — OXYCODONE HYDROCHLORIDE 10 MILLIGRAM(S): 5 TABLET ORAL at 15:15

## 2020-10-07 RX ADMIN — LIDOCAINE 2 PATCH: 4 CREAM TOPICAL at 08:27

## 2020-10-07 RX ADMIN — METHOCARBAMOL 750 MILLIGRAM(S): 500 TABLET, FILM COATED ORAL at 08:26

## 2020-10-07 RX ADMIN — OXYCODONE HYDROCHLORIDE 10 MILLIGRAM(S): 5 TABLET ORAL at 10:41

## 2020-10-07 RX ADMIN — SENNA PLUS 2 TABLET(S): 8.6 TABLET ORAL at 21:20

## 2020-10-07 RX ADMIN — GABAPENTIN 1000 MILLIGRAM(S): 400 CAPSULE ORAL at 21:20

## 2020-10-07 RX ADMIN — Medication 1 TABLET(S): at 05:50

## 2020-10-07 RX ADMIN — OXYCODONE HYDROCHLORIDE 10 MILLIGRAM(S): 5 TABLET ORAL at 01:46

## 2020-10-07 RX ADMIN — MORPHINE SULFATE 15 MILLIGRAM(S): 50 CAPSULE, EXTENDED RELEASE ORAL at 19:13

## 2020-10-07 RX ADMIN — OXYCODONE HYDROCHLORIDE 10 MILLIGRAM(S): 5 TABLET ORAL at 05:54

## 2020-10-07 RX ADMIN — Medication 3 MILLIGRAM(S): at 21:22

## 2020-10-07 RX ADMIN — TAMSULOSIN HYDROCHLORIDE 0.4 MILLIGRAM(S): 0.4 CAPSULE ORAL at 21:20

## 2020-10-07 RX ADMIN — GABAPENTIN 1000 MILLIGRAM(S): 400 CAPSULE ORAL at 13:25

## 2020-10-07 RX ADMIN — LIDOCAINE 2 PATCH: 4 CREAM TOPICAL at 19:13

## 2020-10-07 RX ADMIN — GABAPENTIN 1000 MILLIGRAM(S): 400 CAPSULE ORAL at 05:51

## 2020-10-07 RX ADMIN — MORPHINE SULFATE 15 MILLIGRAM(S): 50 CAPSULE, EXTENDED RELEASE ORAL at 18:23

## 2020-10-07 RX ADMIN — MORPHINE SULFATE 15 MILLIGRAM(S): 50 CAPSULE, EXTENDED RELEASE ORAL at 06:58

## 2020-10-07 RX ADMIN — APIXABAN 10 MILLIGRAM(S): 2.5 TABLET, FILM COATED ORAL at 08:26

## 2020-10-07 RX ADMIN — APIXABAN 10 MILLIGRAM(S): 2.5 TABLET, FILM COATED ORAL at 18:23

## 2020-10-07 NOTE — PROGRESS NOTE ADULT - SUBJECTIVE AND OBJECTIVE BOX
Patient is a 35y old  Male who presents with a chief complaint of Traumatic Spinal Cord Disfunction: 04.211 incomplete paraplegia (07 Oct 2020 09:47)      Patient seen and examined at bedside. No acute overnight events. Denies fever, chills, chest pain, shortness of breath.     ALLERGIES:  No Known Allergies    MEDICATIONS  (STANDING):  apixaban 10 milliGRAM(s) Oral every 12 hours  DULoxetine 60 milliGRAM(s) Oral daily  famotidine    Tablet 20 milliGRAM(s) Oral <User Schedule>  gabapentin 1000 milliGRAM(s) Oral three times a day  influenza   Vaccine 0.5 milliLiter(s) IntraMuscular once  lactobacillus acidophilus 1 Tablet(s) Oral two times a day  lidocaine   Patch 2 Patch Transdermal <User Schedule>  melatonin 3 milliGRAM(s) Oral at bedtime  methocarbamol 750 milliGRAM(s) Oral <User Schedule>  morphine ER Tablet 15 milliGRAM(s) Oral <User Schedule>  senna 2 Tablet(s) Oral at bedtime  tamsulosin 0.4 milliGRAM(s) Oral at bedtime  traZODone 50 milliGRAM(s) Oral <User Schedule>    MEDICATIONS  (PRN):  acetaminophen   Tablet .. 650 milliGRAM(s) Oral every 6 hours PRN Temp greater or equal to 38C (100.4F), Mild Pain (1 - 3)  bisacodyl Suppository 10 milliGRAM(s) Rectal <User Schedule> PRN no bowel movement for 2 days.  lidocaine 2% Gel 1 Application(s) Topical four times a day PRN pain  magnesium hydroxide Suspension 30 milliLiter(s) Oral every 12 hours PRN Constipation  ondansetron    Tablet 4 milliGRAM(s) Oral every 8 hours PRN Nausea and/or Vomiting  oxyCODONE    IR 5 milliGRAM(s) Oral every 4 hours PRN Moderate Pain (4 - 6)  oxyCODONE    IR 10 milliGRAM(s) Oral every 4 hours PRN Severe Pain (7 - 10)    Vital Signs Last 24 Hrs  T(F): 98.6 (07 Oct 2020 08:23), Max: 98.6 (07 Oct 2020 08:23)  HR: 83 (07 Oct 2020 08:23) (83 - 92)  BP: 131/80 (07 Oct 2020 08:23) (117/82 - 131/80)  RR: 16 (07 Oct 2020 08:23) (16 - 18)  SpO2: 99% (07 Oct 2020 08:23) (99% - 99%)  I&O's Summary    PHYSICAL EXAM:  General: NAD, A/O x 3  ENT: MMM, no tonsilar exudate  Neck: Supple, No JVD  Lungs: Clear to auscultation bilaterally, no wheezes. Good air entry bilaterally   Cardio: RRR, S1/S2, No murmurs  Abdomen: Soft, Nontender, Nondistended; Bowel sounds present  Extremities: No calf tenderness, trace LE edema    LABS:                        9.9    6.67  )-----------( 486      ( 05 Oct 2020 06:00 )             31.7       10-05    134  |  99  |  6   ----------------------------<  115  3.9   |  29  |  0.74    Ca    8.9      05 Oct 2020 06:00     eGFR if Non African American: 119 mL/min/1.73M2 (10-05-20 @ 06:00)  eGFR if African American: 138 mL/min/1.73M2 (10-05-20 @ 06:00)    RADIOLOGY & ADDITIONAL TESTS: EKG, CXR    Care Discussed with Consultants/Other Providers: Yes

## 2020-10-07 NOTE — PROGRESS NOTE ADULT - ASSESSMENT
35M admitted to [facility] on 8/22/20 with back pain, found to have fractures of L1-L5 pedicles bilaterally, leading to widening of the spinal canal with anterolisthesis of L5 on S1 with the entire L5 vertebral body width, disruption of the ALL and PLL at L5-S1 level, probable epidural hemorrhage and hemorrhage within the foramina at L1-L5, and enlargement/edema of bilateral psoas muscle.  Patient underwent evacuation of epidural hematoma, T11-pelvis posterior spinal fusion, and L2-S1 Laminectomy on 8/23/20 and subsequent L5-S1 anterior interbody fusion and L3-4 lateral interbody fusion on 9/1.  Hospital course c/b acute blood loss anemia requiring multiple transfusions, traumatic rhabdomyolysis and intractable pain.  Patient now admitted for a multidisciplinary rehab program- pt/ot/dvt ppx, pain meds.    #s/p Epidural hematoma  #T11-pelvis posterior spinal fusion  #L2-S1 Laminectomy   #L5-S1 anterior interbody fusion and L3-4 lateral interbody fusion on 9/1  Cont w/ comprehensive rehab program  Pain mx per primary team     # DVT s/p IVC filter  - Eliquis    # Anemia  - stable  - continue to monitor    # Thrombocytosis  - likely reactive  - monitor

## 2020-10-07 NOTE — PROGRESS NOTE ADULT - ASSESSMENT
ASSESSMENT/PLAN  BRUNO CHO is a 35M admitted to [facility] on 8/22/20 with back pain, found to have fractures of L1-L5 pedicles bilaterally, leading to widening of the spinal canal with anterolisthesis of L5 on S1 with the entire L5 vertebral body width, disruption of the ALL and PLL at L5-S1 level, probable epidural hemorrhage and hemorrhage within the foramina at L1-L5, and enlargement/edema of bilateral psoas muscle.  Patient underwent evacuation of epidural hematoma, T11-pelvis posterior spinal fusion, and L2-S1 Laminectomy on 8/23/20 and subsequent L5-S1 anterior interbody fusion and L3-4 lateral interbody fusion on 9/1.  Hospital course c/b acute blood loss anemia requiring multiple transfusions, traumatic rhabodmyolysis, and intractable pain.  Patient now admitted for a multidisciplinary rehab program. 09-04-20 @ 14:37    Comprehensive Multidisciplinary Rehab Program:  - Start comprehensive rehab program of PT/OT - 3 hours a day, 5 days a week  - P&O as needed    -------------  MEDICAL MANAGEMENT     #Traumatic Spinal Cord Injury  - pt with complete anterolisthesis of L5 on S1, multiple pedicular fractures, and epidural hemorrhage   - s/p T11-pelvis posterior spinal fusion, and L2-S1 Laminectomy on 8/23/20 and subsequent L5-S1 anterior interbody fusion and L3-4 lateral interbody fusion on 9/1  - pain control as below  - PT/OT  - spinal & Fall precautions  - TLSO/AFOs when OOB  - monitor bladder/bowel function out of concern for neurogenic bowel  - JETHRO drain removed; off keflex  - staples/sutures removed 9/23  - contacted by ortho resident 10/6 who requested HLA, vit D workup to evaluate underlying reason for severity of injury given relatively minimal trauma (fall down 6 stairs)    #Tachycardia (resolving)  - per chart review, pt tachycardic prior to transfer to [facility], but no dopplers/CTA obtained  - CTA 9/6 inconclusive re: PE  - Doppler 9/6+B/L DVT   - TTE 9/9 w/o evidence of right heart strain     #Pain Control  - 9/14/20- oxycodone 5-10 mg for moderate-severe pain, oxycontin 20 mg Q12h, valium 5 mg three times a day prn for spasms, Gabapentin increased to 400 mg three times a day. Dilaudid 2 mg Q4h for breakthrough pain.   - 9/15/20: plan to increase oxycontin to 30 mg Q12h, Gabapentin to 600 mg TID. SBP > 120,   - 9/22 - consider switching to morphine-based long-acting and increasing gabapentin  - 9/22 - increased gabapentin  - 9/25 - decreased dilaudid interval.   - 9/28 - switched from oxycontin to ms contin; d/c'd dilaudid  - 9/30 - increased gabapentin to 1000 TID; added methocarbamol 750 mg BID for spasms  - consider addition TCA and/or switching to lyrica if pain remains uncontrolled    #adjustment issues (resolving)  - c/w psychological support   - c/w cymbalta    #Rhabdomyolysis (resolved)  - 2/2 trauma   - largely resolved: <900 on 8/31  - encourage PO fluids    #Bladder Management  - Neurogenic bladder (resolved)  - francis removed 9/4 am  - PVRs minimal   - c/w flomax.     #IVC filter  - placed 8/26  - will need removed in 5-6 months     #DVT   - IVC filter  - SCDs  - tachycardia: Doppler and cTA chest to eval for DVT and PE. however per chart review pt appears to have been tachycardic for some time prior to arrival in rehab.  - doppler showed bilateral DVTs, vascular surgery consult- continue to monitor, okay to continue therapy.   - RLE edema: got 2 days of lasix few days ago, improved.   - 9/17: Doppler positive for worsening BLE clots, occlusive clots in RLE, nonocclusive clots in LLE. Surgeon Dr. Rivera cleared patient to start on full anticoagulation.  Now on 100 mg Lovenox Q12h. Discussed result and therapy with patient. Vascular surgery - continue full anticoagulation treatment for 6-12 months. Seen by vascular 9/22: continue elevation x24h and ace wraps when OOB. Bedside therapies 9/22.   - Hb low but stable - likely due to increased clot burden with worsening DVTs. Continue to monitor.     #Insomnia  - trazodone 50 mg at night.     Outpatient Follow-up:    Gabrielle Villegas  ORTHOPAEDIC SURGERY  [address], [address]  [place] [address]  Phone: [phone]  Fax: [phone]  Follow Up Time:     Dave Rivera  ORTHOPAEDIC SURGERY  [address] [place] [address]  Phone: [phone]  Fax: [phone]    IDT Rounds 10/1: Pt making good functional gains. popover transfers close supervision.  Toileting/LBD mod assist. Ambulates 10' with RW and mod assist.

## 2020-10-07 NOTE — PROGRESS NOTE ADULT - SUBJECTIVE AND OBJECTIVE BOX
SUBJECTIVE & OVERNIGHT EVENTS:   Patient seen and evaluated this morning. No acute events overnight. Participating well in therapy. Pain is fairly controlled. Denies chest pain, SOB, nausea, vomiting, constipation or diarrhea. Slept well last night.     [X] Constitutional WNL        [X] Cardio WNL              [X] Resp WNL  [X] GI WNL                            [X]  WNL                    [] Heme +B/L LE DVTs  [X] Endo WNL                       [] Skin +spinal incision                  [X] MSK WNL  [] Neuro +LE weakness                     [X] Cognitive WNL         [] Psych +mood is improving      OBJECTIVE   Vital Signs Last 24 Hrs  T(C): 37 (07 Oct 2020 08:23), Max: 37 (07 Oct 2020 08:23)  T(F): 98.6 (07 Oct 2020 08:23), Max: 98.6 (07 Oct 2020 08:23)  HR: 83 (07 Oct 2020 08:23) (83 - 92)  BP: 131/80 (07 Oct 2020 08:23) (117/82 - 131/80)  BP(mean): --  RR: 16 (07 Oct 2020 08:23) (16 - 18)  SpO2: 99% (07 Oct 2020 08:23) (99% - 99%)    PHYSICAL EXAM  Constitutional: NAD  HEENT: NCAT, EOMI, handling secretions well  Cardio: well-perfused  Resp: symmetric chest-rise, no increased WOB  GI: non-distended  : no francis  Extremities: well-perfused    LABS:                       MEDICATIONS  (STANDING):  DULoxetine 60 milliGRAM(s) Oral daily  enoxaparin Injectable 100 milliGRAM(s) SubCutaneous every 12 hours  famotidine    Tablet 20 milliGRAM(s) Oral <User Schedule>  gabapentin 1000 milliGRAM(s) Oral three times a day  influenza   Vaccine 0.5 milliLiter(s) IntraMuscular once  lactobacillus acidophilus 1 Tablet(s) Oral two times a day  lidocaine   Patch 2 Patch Transdermal <User Schedule>  melatonin 3 milliGRAM(s) Oral at bedtime  methocarbamol 750 milliGRAM(s) Oral <User Schedule>  morphine ER Tablet 15 milliGRAM(s) Oral <User Schedule>  senna 2 Tablet(s) Oral at bedtime  tamsulosin 0.4 milliGRAM(s) Oral at bedtime  traZODone 50 milliGRAM(s) Oral <User Schedule>    MEDICATIONS  (PRN):  acetaminophen   Tablet .. 650 milliGRAM(s) Oral every 6 hours PRN Temp greater or equal to 38C (100.4F), Mild Pain (1 - 3)  bisacodyl Suppository 10 milliGRAM(s) Rectal <User Schedule> PRN no bowel movement for 2 days.  lidocaine 2% Gel 1 Application(s) Topical four times a day PRN pain  magnesium hydroxide Suspension 30 milliLiter(s) Oral every 12 hours PRN Constipation  ondansetron    Tablet 4 milliGRAM(s) Oral every 8 hours PRN Nausea and/or Vomiting  oxyCODONE    IR 10 milliGRAM(s) Oral every 4 hours PRN Severe Pain (7 - 10)  oxyCODONE    IR 5 milliGRAM(s) Oral every 4 hours PRN Moderate Pain (4 - 6)

## 2020-10-08 LAB
ANION GAP SERPL CALC-SCNC: 7 MMOL/L — SIGNIFICANT CHANGE UP (ref 5–17)
BASOPHILS # BLD AUTO: 0.03 K/UL — SIGNIFICANT CHANGE UP (ref 0–0.2)
BASOPHILS NFR BLD AUTO: 0.5 % — SIGNIFICANT CHANGE UP (ref 0–2)
BUN SERPL-MCNC: 8 MG/DL — SIGNIFICANT CHANGE UP (ref 7–23)
CALCIUM SERPL-MCNC: 9 MG/DL — SIGNIFICANT CHANGE UP (ref 8.4–10.5)
CHLORIDE SERPL-SCNC: 101 MMOL/L — SIGNIFICANT CHANGE UP (ref 96–108)
CO2 SERPL-SCNC: 31 MMOL/L — SIGNIFICANT CHANGE UP (ref 22–31)
CREAT SERPL-MCNC: 0.78 MG/DL — SIGNIFICANT CHANGE UP (ref 0.5–1.3)
EOSINOPHIL # BLD AUTO: 0.13 K/UL — SIGNIFICANT CHANGE UP (ref 0–0.5)
EOSINOPHIL NFR BLD AUTO: 2.1 % — SIGNIFICANT CHANGE UP (ref 0–6)
GLUCOSE SERPL-MCNC: 104 MG/DL — HIGH (ref 70–99)
HCT VFR BLD CALC: 31.7 % — LOW (ref 39–50)
HGB BLD-MCNC: 9.8 G/DL — LOW (ref 13–17)
IMM GRANULOCYTES NFR BLD AUTO: 0.5 % — SIGNIFICANT CHANGE UP (ref 0–1.5)
LYMPHOCYTES # BLD AUTO: 1.27 K/UL — SIGNIFICANT CHANGE UP (ref 1–3.3)
LYMPHOCYTES # BLD AUTO: 20.5 % — SIGNIFICANT CHANGE UP (ref 13–44)
MCHC RBC-ENTMCNC: 27.8 PG — SIGNIFICANT CHANGE UP (ref 27–34)
MCHC RBC-ENTMCNC: 30.9 GM/DL — LOW (ref 32–36)
MCV RBC AUTO: 89.8 FL — SIGNIFICANT CHANGE UP (ref 80–100)
MONOCYTES # BLD AUTO: 0.57 K/UL — SIGNIFICANT CHANGE UP (ref 0–0.9)
MONOCYTES NFR BLD AUTO: 9.2 % — SIGNIFICANT CHANGE UP (ref 2–14)
NEUTROPHILS # BLD AUTO: 4.17 K/UL — SIGNIFICANT CHANGE UP (ref 1.8–7.4)
NEUTROPHILS NFR BLD AUTO: 67.2 % — SIGNIFICANT CHANGE UP (ref 43–77)
NRBC # BLD: 0 /100 WBCS — SIGNIFICANT CHANGE UP (ref 0–0)
PLATELET # BLD AUTO: 447 K/UL — HIGH (ref 150–400)
POTASSIUM SERPL-MCNC: 3.9 MMOL/L — SIGNIFICANT CHANGE UP (ref 3.5–5.3)
POTASSIUM SERPL-SCNC: 3.9 MMOL/L — SIGNIFICANT CHANGE UP (ref 3.5–5.3)
RBC # BLD: 3.53 M/UL — LOW (ref 4.2–5.8)
RBC # FLD: 14.6 % — HIGH (ref 10.3–14.5)
SODIUM SERPL-SCNC: 139 MMOL/L — SIGNIFICANT CHANGE UP (ref 135–145)
WBC # BLD: 6.2 K/UL — SIGNIFICANT CHANGE UP (ref 3.8–10.5)
WBC # FLD AUTO: 6.2 K/UL — SIGNIFICANT CHANGE UP (ref 3.8–10.5)

## 2020-10-08 PROCEDURE — 99233 SBSQ HOSP IP/OBS HIGH 50: CPT

## 2020-10-08 RX ORDER — APIXABAN 2.5 MG/1
1 TABLET, FILM COATED ORAL
Qty: 0 | Refills: 0 | DISCHARGE
Start: 2020-10-08

## 2020-10-08 RX ORDER — APIXABAN 2.5 MG/1
1 TABLET, FILM COATED ORAL
Qty: 180 | Refills: 0
Start: 2020-10-08 | End: 2021-01-05

## 2020-10-08 RX ORDER — MORPHINE SULFATE 50 MG/1
15 CAPSULE, EXTENDED RELEASE ORAL
Refills: 0 | Status: DISCONTINUED | OUTPATIENT
Start: 2020-10-08 | End: 2020-10-12

## 2020-10-08 RX ORDER — METHOCARBAMOL 500 MG/1
1 TABLET, FILM COATED ORAL
Qty: 60 | Refills: 0
Start: 2020-10-08 | End: 2020-11-06

## 2020-10-08 RX ORDER — APIXABAN 2.5 MG/1
2 TABLET, FILM COATED ORAL
Qty: 4 | Refills: 0
Start: 2020-10-08 | End: 2020-10-08

## 2020-10-08 RX ORDER — LANOLIN ALCOHOL/MO/W.PET/CERES
1 CREAM (GRAM) TOPICAL
Qty: 0 | Refills: 0 | DISCHARGE
Start: 2020-10-08

## 2020-10-08 RX ORDER — APIXABAN 2.5 MG/1
2 TABLET, FILM COATED ORAL
Qty: 0 | Refills: 0 | DISCHARGE
Start: 2020-10-08

## 2020-10-08 RX ORDER — ACETAMINOPHEN 500 MG
650 TABLET ORAL EVERY 4 HOURS
Refills: 0 | Status: DISCONTINUED | OUTPATIENT
Start: 2020-10-08 | End: 2020-10-12

## 2020-10-08 RX ORDER — LIDOCAINE 4 G/100G
3 CREAM TOPICAL
Qty: 90 | Refills: 0
Start: 2020-10-08 | End: 2020-11-06

## 2020-10-08 RX ORDER — MORPHINE SULFATE 50 MG/1
1 CAPSULE, EXTENDED RELEASE ORAL
Qty: 0 | Refills: 0 | DISCHARGE
Start: 2020-10-08

## 2020-10-08 RX ORDER — ACETAMINOPHEN 500 MG
2 TABLET ORAL
Qty: 0 | Refills: 0 | DISCHARGE
Start: 2020-10-08

## 2020-10-08 RX ORDER — DULOXETINE HYDROCHLORIDE 30 MG/1
1 CAPSULE, DELAYED RELEASE ORAL
Qty: 30 | Refills: 0
Start: 2020-10-08 | End: 2020-11-06

## 2020-10-08 RX ORDER — FAMOTIDINE 10 MG/ML
1 INJECTION INTRAVENOUS
Qty: 60 | Refills: 0
Start: 2020-10-08 | End: 2020-11-06

## 2020-10-08 RX ORDER — OXYCODONE HYDROCHLORIDE 5 MG/1
1 TABLET ORAL
Qty: 0 | Refills: 0 | DISCHARGE
Start: 2020-10-08

## 2020-10-08 RX ORDER — GABAPENTIN 400 MG/1
1 CAPSULE ORAL
Qty: 90 | Refills: 0
Start: 2020-10-08 | End: 2020-11-06

## 2020-10-08 RX ORDER — TAMSULOSIN HYDROCHLORIDE 0.4 MG/1
1 CAPSULE ORAL
Qty: 30 | Refills: 0
Start: 2020-10-08 | End: 2020-11-06

## 2020-10-08 RX ORDER — GABAPENTIN 400 MG/1
3 CAPSULE ORAL
Qty: 270 | Refills: 0
Start: 2020-10-08 | End: 2020-11-06

## 2020-10-08 RX ORDER — OXYCODONE HYDROCHLORIDE 5 MG/1
5 TABLET ORAL EVERY 4 HOURS
Refills: 0 | Status: DISCONTINUED | OUTPATIENT
Start: 2020-10-08 | End: 2020-10-12

## 2020-10-08 RX ORDER — OXYCODONE HYDROCHLORIDE 5 MG/1
2.5 TABLET ORAL EVERY 4 HOURS
Refills: 0 | Status: DISCONTINUED | OUTPATIENT
Start: 2020-10-08 | End: 2020-10-12

## 2020-10-08 RX ORDER — SENNA PLUS 8.6 MG/1
2 TABLET ORAL
Qty: 0 | Refills: 0 | DISCHARGE
Start: 2020-10-08

## 2020-10-08 RX ADMIN — Medication 650 MILLIGRAM(S): at 16:15

## 2020-10-08 RX ADMIN — OXYCODONE HYDROCHLORIDE 5 MILLIGRAM(S): 5 TABLET ORAL at 15:26

## 2020-10-08 RX ADMIN — OXYCODONE HYDROCHLORIDE 10 MILLIGRAM(S): 5 TABLET ORAL at 07:04

## 2020-10-08 RX ADMIN — APIXABAN 10 MILLIGRAM(S): 2.5 TABLET, FILM COATED ORAL at 05:53

## 2020-10-08 RX ADMIN — LIDOCAINE 2 PATCH: 4 CREAM TOPICAL at 21:42

## 2020-10-08 RX ADMIN — OXYCODONE HYDROCHLORIDE 10 MILLIGRAM(S): 5 TABLET ORAL at 05:55

## 2020-10-08 RX ADMIN — OXYCODONE HYDROCHLORIDE 10 MILLIGRAM(S): 5 TABLET ORAL at 11:30

## 2020-10-08 RX ADMIN — Medication 650 MILLIGRAM(S): at 23:32

## 2020-10-08 RX ADMIN — GABAPENTIN 1000 MILLIGRAM(S): 400 CAPSULE ORAL at 05:53

## 2020-10-08 RX ADMIN — MORPHINE SULFATE 15 MILLIGRAM(S): 50 CAPSULE, EXTENDED RELEASE ORAL at 05:53

## 2020-10-08 RX ADMIN — GABAPENTIN 1000 MILLIGRAM(S): 400 CAPSULE ORAL at 13:10

## 2020-10-08 RX ADMIN — OXYCODONE HYDROCHLORIDE 10 MILLIGRAM(S): 5 TABLET ORAL at 10:29

## 2020-10-08 RX ADMIN — MORPHINE SULFATE 15 MILLIGRAM(S): 50 CAPSULE, EXTENDED RELEASE ORAL at 18:15

## 2020-10-08 RX ADMIN — DULOXETINE HYDROCHLORIDE 60 MILLIGRAM(S): 30 CAPSULE, DELAYED RELEASE ORAL at 12:56

## 2020-10-08 RX ADMIN — OXYCODONE HYDROCHLORIDE 5 MILLIGRAM(S): 5 TABLET ORAL at 20:34

## 2020-10-08 RX ADMIN — OXYCODONE HYDROCHLORIDE 5 MILLIGRAM(S): 5 TABLET ORAL at 23:33

## 2020-10-08 RX ADMIN — METHOCARBAMOL 750 MILLIGRAM(S): 500 TABLET, FILM COATED ORAL at 21:42

## 2020-10-08 RX ADMIN — APIXABAN 10 MILLIGRAM(S): 2.5 TABLET, FILM COATED ORAL at 18:15

## 2020-10-08 RX ADMIN — MORPHINE SULFATE 15 MILLIGRAM(S): 50 CAPSULE, EXTENDED RELEASE ORAL at 07:03

## 2020-10-08 RX ADMIN — GABAPENTIN 1000 MILLIGRAM(S): 400 CAPSULE ORAL at 21:41

## 2020-10-08 RX ADMIN — OXYCODONE HYDROCHLORIDE 10 MILLIGRAM(S): 5 TABLET ORAL at 01:44

## 2020-10-08 RX ADMIN — TAMSULOSIN HYDROCHLORIDE 0.4 MILLIGRAM(S): 0.4 CAPSULE ORAL at 21:42

## 2020-10-08 RX ADMIN — Medication 1 TABLET(S): at 18:15

## 2020-10-08 RX ADMIN — OXYCODONE HYDROCHLORIDE 5 MILLIGRAM(S): 5 TABLET ORAL at 19:39

## 2020-10-08 RX ADMIN — Medication 1 TABLET(S): at 05:53

## 2020-10-08 RX ADMIN — METHOCARBAMOL 750 MILLIGRAM(S): 500 TABLET, FILM COATED ORAL at 07:37

## 2020-10-08 RX ADMIN — OXYCODONE HYDROCHLORIDE 10 MILLIGRAM(S): 5 TABLET ORAL at 02:44

## 2020-10-08 RX ADMIN — LIDOCAINE 2 PATCH: 4 CREAM TOPICAL at 07:37

## 2020-10-08 RX ADMIN — OXYCODONE HYDROCHLORIDE 5 MILLIGRAM(S): 5 TABLET ORAL at 16:15

## 2020-10-08 RX ADMIN — Medication 3 MILLIGRAM(S): at 21:42

## 2020-10-08 RX ADMIN — Medication 650 MILLIGRAM(S): at 15:27

## 2020-10-08 RX ADMIN — MORPHINE SULFATE 15 MILLIGRAM(S): 50 CAPSULE, EXTENDED RELEASE ORAL at 19:17

## 2020-10-08 NOTE — PROGRESS NOTE ADULT - ASSESSMENT
ASSESSMENT/PLAN  BRUNO CHO is a 35M admitted to [facility] on 8/22/20 with back pain, found to have fractures of L1-L5 pedicles bilaterally, leading to widening of the spinal canal with anterolisthesis of L5 on S1 with the entire L5 vertebral body width, disruption of the ALL and PLL at L5-S1 level, probable epidural hemorrhage and hemorrhage within the foramina at L1-L5, and enlargement/edema of bilateral psoas muscle.  Patient underwent evacuation of epidural hematoma, T11-pelvis posterior spinal fusion, and L2-S1 Laminectomy on 8/23/20 and subsequent L5-S1 anterior interbody fusion and L3-4 lateral interbody fusion on 9/1.  Hospital course c/b acute blood loss anemia requiring multiple transfusions, traumatic rhabodmyolysis, and intractable pain.  Patient now admitted for a multidisciplinary rehab program. 09-04-20 @ 14:37    Comprehensive Multidisciplinary Rehab Program:  - Start comprehensive rehab program of PT/OT - 3 hours a day, 5 days a week  - P&O as needed    -------------  MEDICAL MANAGEMENT     #Traumatic Spinal Cord Injury  - pt with complete anterolisthesis of L5 on S1, multiple pedicular fractures, and epidural hemorrhage   - s/p T11-pelvis posterior spinal fusion, and L2-S1 Laminectomy on 8/23/20 and subsequent L5-S1 anterior interbody fusion and L3-4 lateral interbody fusion on 9/1  - pain control as below  - PT/OT  - spinal & Fall precautions  - TLSO/AFOs when OOB  - monitor bladder/bowel function out of concern for neurogenic bowel  - JETHRO drain removed; off keflex  - staples/sutures removed 9/23  - contacted by ortho resident 10/6 who requested HLA, vit D workup to evaluate underlying reason for severity of injury given relatively minimal trauma (fall down 6 stairs)    #Tachycardia (resolving)  - per chart review, pt tachycardic prior to transfer to [facility], but no dopplers/CTA obtained  - CTA 9/6 inconclusive re: PE  - Doppler 9/6+B/L DVT   - TTE 9/9 w/o evidence of right heart strain     #Pain Control  - 9/14/20- oxycodone 5-10 mg for moderate-severe pain, oxycontin 20 mg Q12h, valium 5 mg three times a day prn for spasms, Gabapentin increased to 400 mg three times a day. Dilaudid 2 mg Q4h for breakthrough pain.   - 9/15/20: plan to increase oxycontin to 30 mg Q12h, Gabapentin to 600 mg TID. SBP > 120,   - 9/22 - consider switching to morphine-based long-acting and increasing gabapentin  - 9/22 - increased gabapentin  - 9/25 - decreased dilaudid interval.   - 9/28 - switched from oxycontin to ms contin; d/c'd dilaudid  - 9/30 - increased gabapentin to 1000 TID; added methocarbamol 750 mg BID for spasms  - consider addition TCA and/or switching to lyrica if pain remains uncontrolled  - plan to decerase oxycodone to 2.5 mg for moderate pain and 5 mg for severe pain (10/8/2020)    #adjustment issues (resolving)  - c/w psychological support   - c/w cymbalta    #Rhabdomyolysis (resolved)  - 2/2 trauma   - largely resolved: <900 on 8/31  - encourage PO fluids    #Bladder Management  - Neurogenic bladder (resolved)  - francis removed 9/4 am  - PVRs minimal   - c/w flomax.     #IVC filter  - placed 8/26  - will need removed in 5-6 months     #DVT   - IVC filter  - SCDs  - tachycardia: Doppler and cTA chest to eval for DVT and PE. however per chart review pt appears to have been tachycardic for some time prior to arrival in rehab.  - doppler showed bilateral DVTs, vascular surgery consult- continue to monitor, okay to continue therapy.   - RLE edema: got 2 days of lasix few days ago, improved.   - 9/17: Doppler positive for worsening BLE clots, occlusive clots in RLE, nonocclusive clots in LLE. Surgeon Dr. Rivera cleared patient to start on full anticoagulation.  Now on 100 mg Lovenox Q12h. Discussed result and therapy with patient. Vascular surgery - continue full anticoagulation treatment for 6-12 months. Seen by vascular 9/22: continue elevation x24h and ace wraps when OOB. Bedside therapies 9/22.   - Hb low but stable - likely due to increased clot burden with worsening DVTs. Continue to monitor.     #Insomnia  - trazodone 50 mg at night.     Outpatient Follow-up:    Gabrielle Villegas S  ORTHOPAEDIC SURGERY  [address], [address]  [place] [address]  Phone: [phone]  Fax: [phone]  Follow Up Time:     Dave Rivera  ORTHOPAEDIC SURGERY  [address] [place] [address]  Phone: [phone]  Fax: [phone]    IDT Rounds 10/1: Pt making good functional gains. popover transfers close supervision.  Toileting/LBD mod assist. Ambulates 10' with RW and mod assist.

## 2020-10-09 PROCEDURE — 99233 SBSQ HOSP IP/OBS HIGH 50: CPT

## 2020-10-09 PROCEDURE — 99232 SBSQ HOSP IP/OBS MODERATE 35: CPT

## 2020-10-09 RX ADMIN — METHOCARBAMOL 750 MILLIGRAM(S): 500 TABLET, FILM COATED ORAL at 08:56

## 2020-10-09 RX ADMIN — OXYCODONE HYDROCHLORIDE 5 MILLIGRAM(S): 5 TABLET ORAL at 14:54

## 2020-10-09 RX ADMIN — OXYCODONE HYDROCHLORIDE 5 MILLIGRAM(S): 5 TABLET ORAL at 19:02

## 2020-10-09 RX ADMIN — GABAPENTIN 1000 MILLIGRAM(S): 400 CAPSULE ORAL at 13:31

## 2020-10-09 RX ADMIN — LIDOCAINE 2 PATCH: 4 CREAM TOPICAL at 19:52

## 2020-10-09 RX ADMIN — MORPHINE SULFATE 15 MILLIGRAM(S): 50 CAPSULE, EXTENDED RELEASE ORAL at 17:49

## 2020-10-09 RX ADMIN — DULOXETINE HYDROCHLORIDE 60 MILLIGRAM(S): 30 CAPSULE, DELAYED RELEASE ORAL at 11:30

## 2020-10-09 RX ADMIN — METHOCARBAMOL 750 MILLIGRAM(S): 500 TABLET, FILM COATED ORAL at 23:05

## 2020-10-09 RX ADMIN — GABAPENTIN 1000 MILLIGRAM(S): 400 CAPSULE ORAL at 23:25

## 2020-10-09 RX ADMIN — OXYCODONE HYDROCHLORIDE 5 MILLIGRAM(S): 5 TABLET ORAL at 10:41

## 2020-10-09 RX ADMIN — MORPHINE SULFATE 15 MILLIGRAM(S): 50 CAPSULE, EXTENDED RELEASE ORAL at 05:30

## 2020-10-09 RX ADMIN — Medication 650 MILLIGRAM(S): at 09:30

## 2020-10-09 RX ADMIN — MORPHINE SULFATE 15 MILLIGRAM(S): 50 CAPSULE, EXTENDED RELEASE ORAL at 18:19

## 2020-10-09 RX ADMIN — Medication 1 TABLET(S): at 17:49

## 2020-10-09 RX ADMIN — TAMSULOSIN HYDROCHLORIDE 0.4 MILLIGRAM(S): 0.4 CAPSULE ORAL at 23:04

## 2020-10-09 RX ADMIN — Medication 650 MILLIGRAM(S): at 11:11

## 2020-10-09 RX ADMIN — Medication 650 MILLIGRAM(S): at 10:41

## 2020-10-09 RX ADMIN — Medication 650 MILLIGRAM(S): at 19:02

## 2020-10-09 RX ADMIN — Medication 650 MILLIGRAM(S): at 14:54

## 2020-10-09 RX ADMIN — Medication 650 MILLIGRAM(S): at 19:32

## 2020-10-09 RX ADMIN — Medication 650 MILLIGRAM(S): at 15:24

## 2020-10-09 RX ADMIN — MORPHINE SULFATE 15 MILLIGRAM(S): 50 CAPSULE, EXTENDED RELEASE ORAL at 09:30

## 2020-10-09 RX ADMIN — GABAPENTIN 1000 MILLIGRAM(S): 400 CAPSULE ORAL at 05:30

## 2020-10-09 RX ADMIN — Medication 650 MILLIGRAM(S): at 05:31

## 2020-10-09 RX ADMIN — OXYCODONE HYDROCHLORIDE 5 MILLIGRAM(S): 5 TABLET ORAL at 12:33

## 2020-10-09 RX ADMIN — APIXABAN 10 MILLIGRAM(S): 2.5 TABLET, FILM COATED ORAL at 05:30

## 2020-10-09 RX ADMIN — OXYCODONE HYDROCHLORIDE 5 MILLIGRAM(S): 5 TABLET ORAL at 05:31

## 2020-10-09 RX ADMIN — LIDOCAINE 2 PATCH: 4 CREAM TOPICAL at 08:55

## 2020-10-09 RX ADMIN — LIDOCAINE 2 PATCH: 4 CREAM TOPICAL at 20:00

## 2020-10-09 RX ADMIN — OXYCODONE HYDROCHLORIDE 5 MILLIGRAM(S): 5 TABLET ORAL at 19:32

## 2020-10-09 RX ADMIN — OXYCODONE HYDROCHLORIDE 5 MILLIGRAM(S): 5 TABLET ORAL at 15:24

## 2020-10-09 RX ADMIN — OXYCODONE HYDROCHLORIDE 5 MILLIGRAM(S): 5 TABLET ORAL at 12:32

## 2020-10-09 RX ADMIN — Medication 650 MILLIGRAM(S): at 23:02

## 2020-10-09 RX ADMIN — APIXABAN 10 MILLIGRAM(S): 2.5 TABLET, FILM COATED ORAL at 17:49

## 2020-10-09 RX ADMIN — Medication 3 MILLIGRAM(S): at 23:25

## 2020-10-09 RX ADMIN — OXYCODONE HYDROCHLORIDE 5 MILLIGRAM(S): 5 TABLET ORAL at 23:03

## 2020-10-09 RX ADMIN — Medication 1 TABLET(S): at 05:30

## 2020-10-09 NOTE — PROGRESS NOTE ADULT - SUBJECTIVE AND OBJECTIVE BOX
Patient is a 35y old  Male who presents with a chief complaint of Traumatic Spinal Cord Disfunction: 04.211 incomplete paraplegia (07 Oct 2020 09:47)    Patient seen and examined at bedside. No acute overnight events. c/o pain in the back and legs, relieved with pain meds, Denies fever, chills, chest pain, shortness of breath.     Vital Signs Last 24 Hrs  T(C): 36.3 (09 Oct 2020 08:46), Max: 36.7 (08 Oct 2020 20:35)  T(F): 97.4 (09 Oct 2020 08:46), Max: 98.1 (08 Oct 2020 20:35)  HR: 96 (09 Oct 2020 08:46) (96 - 108)  BP: 149/84 (09 Oct 2020 08:46) (132/82 - 149/84)  BP(mean): --  RR: 16 (09 Oct 2020 08:46) (16 - 16)  SpO2: 99% (09 Oct 2020 08:46) (98% - 99%)    GENERAL- NAD  EAR/NOSE/MOUTH/THROAT - no pharyngeal exudates, no oral leisions,  MMM  EYES- PATRICIA, conjunctiva and Sclera clear  NECK- supple  RESPIRATORY-  clear to auscultation bilaterally  CARDIOVASCULAR - SIS2, RRR  GI - soft NT BS present  EXTREMITIES- no pedal edema  NEUROLOGY- b/l LE weakness  SKIN- no rashes, warm to touch  PSYCHIATRY- AAO X 3                9.8                  139  | 31   | 8            6.20  >-----------< 447     ------------------------< 104                   31.7                 3.9  | 101  | 0.78                                         Ca 9.0   Mg x     Ph x            MEDICATIONS  (STANDING):  apixaban 10 milliGRAM(s) Oral every 12 hours  DULoxetine 60 milliGRAM(s) Oral daily  famotidine    Tablet 20 milliGRAM(s) Oral <User Schedule>  gabapentin 1000 milliGRAM(s) Oral three times a day  influenza   Vaccine 0.5 milliLiter(s) IntraMuscular once  lactobacillus acidophilus 1 Tablet(s) Oral two times a day  lidocaine   Patch 2 Patch Transdermal <User Schedule>  melatonin 3 milliGRAM(s) Oral at bedtime  methocarbamol 750 milliGRAM(s) Oral <User Schedule>  morphine ER Tablet 15 milliGRAM(s) Oral <User Schedule>  senna 2 Tablet(s) Oral at bedtime  tamsulosin 0.4 milliGRAM(s) Oral at bedtime  traZODone 50 milliGRAM(s) Oral <User Schedule>    MEDICATIONS  (PRN):  acetaminophen   Tablet .. 650 milliGRAM(s) Oral every 4 hours PRN Temp greater or equal to 38C (100.4F), Mild Pain (1 - 3)  bisacodyl Suppository 10 milliGRAM(s) Rectal <User Schedule> PRN no bowel movement for 2 days.  lidocaine 2% Gel 1 Application(s) Topical four times a day PRN pain  magnesium hydroxide Suspension 30 milliLiter(s) Oral every 12 hours PRN Constipation  ondansetron    Tablet 4 milliGRAM(s) Oral every 8 hours PRN Nausea and/or Vomiting  oxyCODONE    IR 2.5 milliGRAM(s) Oral every 4 hours PRN Moderate Pain (4 - 6)  oxyCODONE    IR 5 milliGRAM(s) Oral every 4 hours PRN Severe Pain (7 - 10)

## 2020-10-09 NOTE — PROGRESS NOTE ADULT - ASSESSMENT
ASSESSMENT/PLAN  BRUNO CHO is a 35M admitted to [facility] on 8/22/20 with back pain, found to have fractures of L1-L5 pedicles bilaterally, leading to widening of the spinal canal with anterolisthesis of L5 on S1 with the entire L5 vertebral body width, disruption of the ALL and PLL at L5-S1 level, probable epidural hemorrhage and hemorrhage within the foramina at L1-L5, and enlargement/edema of bilateral psoas muscle.  Patient underwent evacuation of epidural hematoma, T11-pelvis posterior spinal fusion, and L2-S1 Laminectomy on 8/23/20 and subsequent L5-S1 anterior interbody fusion and L3-4 lateral interbody fusion on 9/1.  Hospital course c/b acute blood loss anemia requiring multiple transfusions, traumatic rhabodmyolysis, and intractable pain.  Patient now admitted for a multidisciplinary rehab program. 09-04-20 @ 14:37    Comprehensive Multidisciplinary Rehab Program:  - Start comprehensive rehab program of PT/OT - 3 hours a day, 5 days a week  - P&O as needed    -------------  MEDICAL MANAGEMENT     #Traumatic Spinal Cord Injury  - pt with complete anterolisthesis of L5 on S1, multiple pedicular fractures, and epidural hemorrhage   - s/p T11-pelvis posterior spinal fusion, and L2-S1 Laminectomy on 8/23/20 and subsequent L5-S1 anterior interbody fusion and L3-4 lateral interbody fusion on 9/1  - pain control as below  - PT/OT  - spinal & Fall precautions  - TLSO/AFOs when OOB  - monitor bladder/bowel function out of concern for neurogenic bowel  - JETHRO drain removed; off keflex  - staples/sutures removed 9/23  - contacted by ortho resident 10/6 who requested HLA, vit D workup to evaluate underlying reason for severity of injury given relatively minimal trauma (fall down 6 stairs)    #Tachycardia (resolving)  - per chart review, pt tachycardic prior to transfer to [facility], but no dopplers/CTA obtained  - CTA 9/6 inconclusive re: PE  - Doppler 9/6+B/L DVT   - TTE 9/9 w/o evidence of right heart strain     #Pain Control  - 9/14/20- oxycodone 5-10 mg for moderate-severe pain, oxycontin 20 mg Q12h, valium 5 mg three times a day prn for spasms, Gabapentin increased to 400 mg three times a day. Dilaudid 2 mg Q4h for breakthrough pain.   - 9/15/20: plan to increase oxycontin to 30 mg Q12h, Gabapentin to 600 mg TID. SBP > 120,   - 9/22 - consider switching to morphine-based long-acting and increasing gabapentin  - 9/22 - increased gabapentin  - 9/25 - decreased dilaudid interval.   - 9/28 - switched from oxycontin to ms contin; d/c'd dilaudid  - 9/30 - increased gabapentin to 1000 TID; added methocarbamol 750 mg BID for spasms  - consider addition TCA and/or switching to lyrica if pain remains uncontrolled  - plan to decerase oxycodone to 2.5 mg for moderate pain and 5 mg for severe pain (10/8/2020)  - 10/9: Educated patient about side effects of cannabis and specially along taking it with narcotics. Educated patient not to use street drugs and it's dangerous side effects. Called psychiatry team to discuss with patient. Patient to get appointment with pain management physician.     #adjustment issues (resolving)  - c/w psychological support   - c/w cymbalta    #Rhabdomyolysis (resolved)  - 2/2 trauma   - largely resolved: <900 on 8/31  - encourage PO fluids    #Bladder Management  - Neurogenic bladder (resolved)  - francis removed 9/4 am  - PVRs minimal   - c/w flomax.     #IVC filter  - placed 8/26  - will need removed in 5-6 months     #DVT   - IVC filter  - SCDs  - tachycardia: Doppler and cTA chest to eval for DVT and PE. however per chart review pt appears to have been tachycardic for some time prior to arrival in rehab.  - doppler showed bilateral DVTs, vascular surgery consult- continue to monitor, okay to continue therapy.   - RLE edema: got 2 days of lasix few days ago, improved.   - 9/17: Doppler positive for worsening BLE clots, occlusive clots in RLE, nonocclusive clots in LLE. Surgeon Dr. Rivera cleared patient to start on full anticoagulation.  Now on 100 mg Lovenox Q12h. Discussed result and therapy with patient. Vascular surgery - continue full anticoagulation treatment for 6-12 months. Seen by vascular 9/22: continue elevation x24h and ace wraps when OOB. Bedside therapies 9/22.   - Hb low but stable - likely due to increased clot burden with worsening DVTs. Continue to monitor.     #Insomnia  - trazodone 50 mg at night.     Outpatient Follow-up:    Gabrielle Villegas  ORTHOPAEDIC SURGERY  [address], [address]  [place] [address]  Phone: [phone]  Fax: [phone]  Follow Up Time:     Dave Rivera  ORTHOPAEDIC SURGERY  [address] [place] [address]  Phone: [phone]  Fax: [phone]    IDT Rounds 10/1: Pt making good functional gains. popover transfers close supervision.  Toileting/LBD mod assist. Ambulates 10' with RW and mod assist.

## 2020-10-09 NOTE — PROGRESS NOTE ADULT - SUBJECTIVE AND OBJECTIVE BOX
SUBJECTIVE & OVERNIGHT EVENTS:   Patient seen and evaluated this morning. No acute events overnight. Participating well in therapy.   Denies chest pain, SOB, nausea, vomiting, constipation or diarrhea. Slept well last night. patient reports using cannabis at home since age 15 and wants to continue using it once home. He reports has been trying to get pain management appointment but doesn't have one set up at this point.     [X] Constitutional WNL        [X] Cardio WNL              [X] Resp WNL  [X] GI WNL                            [X]  WNL                    [] Heme +B/L LE DVTs  [X] Endo WNL                       [] Skin +spinal incision                  [X] MSK WNL  [] Neuro +LE weakness                     [X] Cognitive WNL         [] Psych +mood is improving      OBJECTIVE   Vital Signs Last 24 Hrs  T(C): 36.3 (09 Oct 2020 08:46), Max: 36.7 (08 Oct 2020 20:35)  T(F): 97.4 (09 Oct 2020 08:46), Max: 98.1 (08 Oct 2020 20:35)  HR: 96 (09 Oct 2020 08:46) (96 - 108)  BP: 149/84 (09 Oct 2020 08:46) (132/82 - 149/84)  BP(mean): --  RR: 16 (09 Oct 2020 08:46) (16 - 16)  SpO2: 99% (09 Oct 2020 08:46) (98% - 99%)        PHYSICAL EXAM  Constitutional: NAD  HEENT: NCAT, EOMI, handling secretions well  Cardio: well-perfused  Resp: symmetric chest-rise, no increased WOB  GI: non-distended  : no francis  Extremities: well-perfused    LABS:                                9.8    6.20  )-----------( 447      ( 08 Oct 2020 06:00 )             31.7     10-08    139  |  101  |  8   ----------------------------<  104<H>  3.9   |  31  |  0.78    Ca    9.0      08 Oct 2020 06:00              MEDICATIONS  (STANDING):  DULoxetine 60 milliGRAM(s) Oral daily  enoxaparin Injectable 100 milliGRAM(s) SubCutaneous every 12 hours  famotidine    Tablet 20 milliGRAM(s) Oral <User Schedule>  gabapentin 1000 milliGRAM(s) Oral three times a day  influenza   Vaccine 0.5 milliLiter(s) IntraMuscular once  lactobacillus acidophilus 1 Tablet(s) Oral two times a day  lidocaine   Patch 2 Patch Transdermal <User Schedule>  melatonin 3 milliGRAM(s) Oral at bedtime  methocarbamol 750 milliGRAM(s) Oral <User Schedule>  morphine ER Tablet 15 milliGRAM(s) Oral <User Schedule>  senna 2 Tablet(s) Oral at bedtime  tamsulosin 0.4 milliGRAM(s) Oral at bedtime  traZODone 50 milliGRAM(s) Oral <User Schedule>    MEDICATIONS  (PRN):  acetaminophen   Tablet .. 650 milliGRAM(s) Oral every 6 hours PRN Temp greater or equal to 38C (100.4F), Mild Pain (1 - 3)  bisacodyl Suppository 10 milliGRAM(s) Rectal <User Schedule> PRN no bowel movement for 2 days.  lidocaine 2% Gel 1 Application(s) Topical four times a day PRN pain  magnesium hydroxide Suspension 30 milliLiter(s) Oral every 12 hours PRN Constipation  ondansetron    Tablet 4 milliGRAM(s) Oral every 8 hours PRN Nausea and/or Vomiting  oxyCODONE    IR 10 milliGRAM(s) Oral every 4 hours PRN Severe Pain (7 - 10)  oxyCODONE    IR 5 milliGRAM(s) Oral every 4 hours PRN Moderate Pain (4 - 6)

## 2020-10-09 NOTE — PROGRESS NOTE ADULT - ASSESSMENT
35M admitted to [facility] on 8/22/20 with back pain, found to have fractures of L1-L5 pedicles bilaterally, leading to widening of the spinal canal with anterolisthesis of L5 on S1 with the entire L5 vertebral body width, disruption of the ALL and PLL at L5-S1 level, probable epidural hemorrhage and hemorrhage within the foramina at L1-L5, and enlargement/edema of bilateral psoas muscle.  Patient underwent evacuation of epidural hematoma, T11-pelvis posterior spinal fusion, and L2-S1 Laminectomy on 8/23/20 and subsequent L5-S1 anterior interbody fusion and L3-4 lateral interbody fusion on 9/1.  Hospital course c/b acute blood loss anemia requiring multiple transfusions, traumatic rhabdomyolysis and intractable pain.  Patient now admitted for a multidisciplinary rehab program- pt/ot/dvt ppx, pain meds.      # DVT s/p IVC filter  - Eliquis    # Anemia  - stable  - continue to monitor    # Thrombocytosis  - likely reactive  - monitor    #gerd- famotidine    #neurogenic bladder- flomax    will follow  d/w dr. arnett

## 2020-10-10 PROCEDURE — 99232 SBSQ HOSP IP/OBS MODERATE 35: CPT

## 2020-10-10 PROCEDURE — 99232 SBSQ HOSP IP/OBS MODERATE 35: CPT | Mod: GC

## 2020-10-10 RX ADMIN — OXYCODONE HYDROCHLORIDE 5 MILLIGRAM(S): 5 TABLET ORAL at 15:10

## 2020-10-10 RX ADMIN — Medication 650 MILLIGRAM(S): at 15:11

## 2020-10-10 RX ADMIN — GABAPENTIN 1000 MILLIGRAM(S): 400 CAPSULE ORAL at 13:19

## 2020-10-10 RX ADMIN — GABAPENTIN 1000 MILLIGRAM(S): 400 CAPSULE ORAL at 06:50

## 2020-10-10 RX ADMIN — Medication 650 MILLIGRAM(S): at 20:20

## 2020-10-10 RX ADMIN — METHOCARBAMOL 750 MILLIGRAM(S): 500 TABLET, FILM COATED ORAL at 08:53

## 2020-10-10 RX ADMIN — OXYCODONE HYDROCHLORIDE 5 MILLIGRAM(S): 5 TABLET ORAL at 19:04

## 2020-10-10 RX ADMIN — Medication 650 MILLIGRAM(S): at 00:00

## 2020-10-10 RX ADMIN — OXYCODONE HYDROCHLORIDE 5 MILLIGRAM(S): 5 TABLET ORAL at 02:57

## 2020-10-10 RX ADMIN — MORPHINE SULFATE 15 MILLIGRAM(S): 50 CAPSULE, EXTENDED RELEASE ORAL at 18:40

## 2020-10-10 RX ADMIN — OXYCODONE HYDROCHLORIDE 5 MILLIGRAM(S): 5 TABLET ORAL at 21:21

## 2020-10-10 RX ADMIN — MORPHINE SULFATE 15 MILLIGRAM(S): 50 CAPSULE, EXTENDED RELEASE ORAL at 07:29

## 2020-10-10 RX ADMIN — OXYCODONE HYDROCHLORIDE 5 MILLIGRAM(S): 5 TABLET ORAL at 23:01

## 2020-10-10 RX ADMIN — APIXABAN 10 MILLIGRAM(S): 2.5 TABLET, FILM COATED ORAL at 06:50

## 2020-10-10 RX ADMIN — OXYCODONE HYDROCHLORIDE 5 MILLIGRAM(S): 5 TABLET ORAL at 03:33

## 2020-10-10 RX ADMIN — Medication 1 TABLET(S): at 06:52

## 2020-10-10 RX ADMIN — Medication 1 TABLET(S): at 17:34

## 2020-10-10 RX ADMIN — Medication 650 MILLIGRAM(S): at 06:51

## 2020-10-10 RX ADMIN — MORPHINE SULFATE 15 MILLIGRAM(S): 50 CAPSULE, EXTENDED RELEASE ORAL at 06:52

## 2020-10-10 RX ADMIN — Medication 650 MILLIGRAM(S): at 12:08

## 2020-10-10 RX ADMIN — LIDOCAINE 2 PATCH: 4 CREAM TOPICAL at 08:55

## 2020-10-10 RX ADMIN — Medication 650 MILLIGRAM(S): at 03:33

## 2020-10-10 RX ADMIN — APIXABAN 10 MILLIGRAM(S): 2.5 TABLET, FILM COATED ORAL at 17:35

## 2020-10-10 RX ADMIN — DULOXETINE HYDROCHLORIDE 60 MILLIGRAM(S): 30 CAPSULE, DELAYED RELEASE ORAL at 11:20

## 2020-10-10 RX ADMIN — Medication 650 MILLIGRAM(S): at 19:03

## 2020-10-10 RX ADMIN — Medication 650 MILLIGRAM(S): at 02:57

## 2020-10-10 RX ADMIN — OXYCODONE HYDROCHLORIDE 5 MILLIGRAM(S): 5 TABLET ORAL at 11:04

## 2020-10-10 RX ADMIN — METHOCARBAMOL 750 MILLIGRAM(S): 500 TABLET, FILM COATED ORAL at 23:01

## 2020-10-10 RX ADMIN — Medication 650 MILLIGRAM(S): at 11:04

## 2020-10-10 RX ADMIN — Medication 3 MILLIGRAM(S): at 23:01

## 2020-10-10 RX ADMIN — OXYCODONE HYDROCHLORIDE 5 MILLIGRAM(S): 5 TABLET ORAL at 00:00

## 2020-10-10 RX ADMIN — Medication 650 MILLIGRAM(S): at 07:29

## 2020-10-10 RX ADMIN — Medication 650 MILLIGRAM(S): at 16:18

## 2020-10-10 RX ADMIN — Medication 650 MILLIGRAM(S): at 23:00

## 2020-10-10 RX ADMIN — GABAPENTIN 1000 MILLIGRAM(S): 400 CAPSULE ORAL at 23:01

## 2020-10-10 RX ADMIN — MORPHINE SULFATE 15 MILLIGRAM(S): 50 CAPSULE, EXTENDED RELEASE ORAL at 17:35

## 2020-10-10 RX ADMIN — LIDOCAINE 2 PATCH: 4 CREAM TOPICAL at 20:20

## 2020-10-10 RX ADMIN — TAMSULOSIN HYDROCHLORIDE 0.4 MILLIGRAM(S): 0.4 CAPSULE ORAL at 23:00

## 2020-10-10 RX ADMIN — LIDOCAINE 2 PATCH: 4 CREAM TOPICAL at 18:42

## 2020-10-10 NOTE — PROGRESS NOTE ADULT - SUBJECTIVE AND OBJECTIVE BOX
Cc: Gait dysfunction, SCI     HPI: Patient with no new medical issues today.  Pain controlled, no chest pain, no N/V, no Fevers/Chills. No other new ROS  Has been tolerating rehabilitation program.    acetaminophen   Tablet .. 650 milliGRAM(s) Oral every 4 hours PRN  apixaban 10 milliGRAM(s) Oral every 12 hours  bisacodyl Suppository 10 milliGRAM(s) Rectal <User Schedule> PRN  DULoxetine 60 milliGRAM(s) Oral daily  famotidine    Tablet 20 milliGRAM(s) Oral <User Schedule>  gabapentin 1000 milliGRAM(s) Oral three times a day  influenza   Vaccine 0.5 milliLiter(s) IntraMuscular once  lactobacillus acidophilus 1 Tablet(s) Oral two times a day  lidocaine   Patch 2 Patch Transdermal <User Schedule>  lidocaine 2% Gel 1 Application(s) Topical four times a day PRN  magnesium hydroxide Suspension 30 milliLiter(s) Oral every 12 hours PRN  melatonin 3 milliGRAM(s) Oral at bedtime  methocarbamol 750 milliGRAM(s) Oral <User Schedule>  morphine ER Tablet 15 milliGRAM(s) Oral <User Schedule>  ondansetron    Tablet 4 milliGRAM(s) Oral every 8 hours PRN  oxyCODONE    IR 2.5 milliGRAM(s) Oral every 4 hours PRN  oxyCODONE    IR 5 milliGRAM(s) Oral every 4 hours PRN  senna 2 Tablet(s) Oral at bedtime  tamsulosin 0.4 milliGRAM(s) Oral at bedtime  traZODone 50 milliGRAM(s) Oral <User Schedule>      T(C): 36.6 (10-09-20 @ 21:13), Max: 36.6 (10-09-20 @ 21:13)  HR: 99 (10-09-20 @ 21:13) (99 - 99)  BP: 155/80 (10-09-20 @ 21:13) (155/80 - 155/80)  RR: 15 (10-09-20 @ 21:13) (15 - 15)  SpO2: 99% (10-09-20 @ 21:13) (99% - 99%)    In NAD  HEENT- EOMI  Heart- RRR, S1S2  Lungs- CTA bl.  Abd- + BS, NT  Ext- No calf pain  Neuro- Exam unchanged          Imp: Patient with diagnosis of SCI admitted for comprehensive acute rehabilitation.    Plan:  - Continue PT/OT/SLP  - DVT prophylaxis  - Skin- Turn q2h, check skin daily  - Continue current medications; patient medically stable.   -Active issues- none. BP elevated likely due to pain  - Patient is stable to continue current rehabilitation program.

## 2020-10-11 PROCEDURE — 99232 SBSQ HOSP IP/OBS MODERATE 35: CPT

## 2020-10-11 PROCEDURE — 99232 SBSQ HOSP IP/OBS MODERATE 35: CPT | Mod: GC

## 2020-10-11 RX ADMIN — MORPHINE SULFATE 15 MILLIGRAM(S): 50 CAPSULE, EXTENDED RELEASE ORAL at 06:50

## 2020-10-11 RX ADMIN — GABAPENTIN 1000 MILLIGRAM(S): 400 CAPSULE ORAL at 06:52

## 2020-10-11 RX ADMIN — Medication 650 MILLIGRAM(S): at 01:09

## 2020-10-11 RX ADMIN — MORPHINE SULFATE 15 MILLIGRAM(S): 50 CAPSULE, EXTENDED RELEASE ORAL at 17:31

## 2020-10-11 RX ADMIN — Medication 650 MILLIGRAM(S): at 12:12

## 2020-10-11 RX ADMIN — OXYCODONE HYDROCHLORIDE 5 MILLIGRAM(S): 5 TABLET ORAL at 15:15

## 2020-10-11 RX ADMIN — OXYCODONE HYDROCHLORIDE 5 MILLIGRAM(S): 5 TABLET ORAL at 03:03

## 2020-10-11 RX ADMIN — Medication 650 MILLIGRAM(S): at 20:22

## 2020-10-11 RX ADMIN — Medication 1 TABLET(S): at 17:31

## 2020-10-11 RX ADMIN — LIDOCAINE 2 PATCH: 4 CREAM TOPICAL at 06:54

## 2020-10-11 RX ADMIN — OXYCODONE HYDROCHLORIDE 5 MILLIGRAM(S): 5 TABLET ORAL at 23:13

## 2020-10-11 RX ADMIN — APIXABAN 10 MILLIGRAM(S): 2.5 TABLET, FILM COATED ORAL at 17:31

## 2020-10-11 RX ADMIN — GABAPENTIN 1000 MILLIGRAM(S): 400 CAPSULE ORAL at 23:12

## 2020-10-11 RX ADMIN — Medication 650 MILLIGRAM(S): at 15:16

## 2020-10-11 RX ADMIN — LIDOCAINE 2 PATCH: 4 CREAM TOPICAL at 18:37

## 2020-10-11 RX ADMIN — OXYCODONE HYDROCHLORIDE 5 MILLIGRAM(S): 5 TABLET ORAL at 12:12

## 2020-10-11 RX ADMIN — OXYCODONE HYDROCHLORIDE 5 MILLIGRAM(S): 5 TABLET ORAL at 19:09

## 2020-10-11 RX ADMIN — Medication 650 MILLIGRAM(S): at 03:03

## 2020-10-11 RX ADMIN — OXYCODONE HYDROCHLORIDE 5 MILLIGRAM(S): 5 TABLET ORAL at 11:07

## 2020-10-11 RX ADMIN — Medication 650 MILLIGRAM(S): at 06:59

## 2020-10-11 RX ADMIN — OXYCODONE HYDROCHLORIDE 5 MILLIGRAM(S): 5 TABLET ORAL at 06:51

## 2020-10-11 RX ADMIN — APIXABAN 10 MILLIGRAM(S): 2.5 TABLET, FILM COATED ORAL at 06:52

## 2020-10-11 RX ADMIN — Medication 650 MILLIGRAM(S): at 04:28

## 2020-10-11 RX ADMIN — OXYCODONE HYDROCHLORIDE 5 MILLIGRAM(S): 5 TABLET ORAL at 01:09

## 2020-10-11 RX ADMIN — Medication 3 MILLIGRAM(S): at 23:13

## 2020-10-11 RX ADMIN — OXYCODONE HYDROCHLORIDE 5 MILLIGRAM(S): 5 TABLET ORAL at 07:34

## 2020-10-11 RX ADMIN — Medication 650 MILLIGRAM(S): at 07:34

## 2020-10-11 RX ADMIN — MORPHINE SULFATE 15 MILLIGRAM(S): 50 CAPSULE, EXTENDED RELEASE ORAL at 18:37

## 2020-10-11 RX ADMIN — OXYCODONE HYDROCHLORIDE 5 MILLIGRAM(S): 5 TABLET ORAL at 04:00

## 2020-10-11 RX ADMIN — Medication 650 MILLIGRAM(S): at 17:29

## 2020-10-11 RX ADMIN — GABAPENTIN 1000 MILLIGRAM(S): 400 CAPSULE ORAL at 13:14

## 2020-10-11 RX ADMIN — TAMSULOSIN HYDROCHLORIDE 0.4 MILLIGRAM(S): 0.4 CAPSULE ORAL at 23:13

## 2020-10-11 RX ADMIN — Medication 650 MILLIGRAM(S): at 23:13

## 2020-10-11 RX ADMIN — LIDOCAINE 2 PATCH: 4 CREAM TOPICAL at 08:04

## 2020-10-11 RX ADMIN — METHOCARBAMOL 750 MILLIGRAM(S): 500 TABLET, FILM COATED ORAL at 23:13

## 2020-10-11 RX ADMIN — Medication 1 TABLET(S): at 06:51

## 2020-10-11 RX ADMIN — OXYCODONE HYDROCHLORIDE 5 MILLIGRAM(S): 5 TABLET ORAL at 20:20

## 2020-10-11 RX ADMIN — METHOCARBAMOL 750 MILLIGRAM(S): 500 TABLET, FILM COATED ORAL at 09:32

## 2020-10-11 RX ADMIN — MORPHINE SULFATE 15 MILLIGRAM(S): 50 CAPSULE, EXTENDED RELEASE ORAL at 07:34

## 2020-10-11 RX ADMIN — Medication 650 MILLIGRAM(S): at 19:09

## 2020-10-11 RX ADMIN — Medication 650 MILLIGRAM(S): at 11:07

## 2020-10-11 RX ADMIN — DULOXETINE HYDROCHLORIDE 60 MILLIGRAM(S): 30 CAPSULE, DELAYED RELEASE ORAL at 11:27

## 2020-10-11 RX ADMIN — OXYCODONE HYDROCHLORIDE 5 MILLIGRAM(S): 5 TABLET ORAL at 17:30

## 2020-10-11 NOTE — PROGRESS NOTE ADULT - ASSESSMENT
35M admitted to [facility] on 8/22/20 with back pain, found to have fractures of L1-L5 pedicles bilaterally, leading to widening of the spinal canal with anterolisthesis of L5 on S1 with the entire L5 vertebral body width, disruption of the ALL and PLL at L5-S1 level, probable epidural hemorrhage and hemorrhage within the foramina at L1-L5, and enlargement/edema of bilateral psoas muscle.  Patient underwent evacuation of epidural hematoma, T11-pelvis posterior spinal fusion, and L2-S1 Laminectomy on 8/23/20 and subsequent L5-S1 anterior interbody fusion and L3-4 lateral interbody fusion on 9/1.  Hospital course c/b acute blood loss anemia requiring multiple transfusions, traumatic rhabdomyolysis and intractable pain.  Patient now admitted for a multidisciplinary rehab program- pt/ot/dvt ppx, pain meds.      # DVT s/p IVC filter  - Eliquis    # Anemia  - stable  - continue to monitor    # Thrombocytosis  - likely reactive  - monitor    #gerd- famotidine    #neurogenic bladder- flomax    will follow

## 2020-10-11 NOTE — PROGRESS NOTE ADULT - SUBJECTIVE AND OBJECTIVE BOX
Cc: Gait dysfunction, SCI     HPI: Patient with no new medical issues today. has more pain when moving about. Psychiatry follow up appreciated   Pain controlled, no chest pain, no N/V, no Fevers/Chills. No other new ROS  Has been tolerating rehabilitation program.    acetaminophen   Tablet .. 650 milliGRAM(s) Oral every 4 hours PRN  apixaban 10 milliGRAM(s) Oral every 12 hours  bisacodyl Suppository 10 milliGRAM(s) Rectal <User Schedule> PRN  DULoxetine 60 milliGRAM(s) Oral daily  famotidine    Tablet 20 milliGRAM(s) Oral <User Schedule>  gabapentin 1000 milliGRAM(s) Oral three times a day  influenza   Vaccine 0.5 milliLiter(s) IntraMuscular once  lactobacillus acidophilus 1 Tablet(s) Oral two times a day  lidocaine   Patch 2 Patch Transdermal <User Schedule>  lidocaine 2% Gel 1 Application(s) Topical four times a day PRN  magnesium hydroxide Suspension 30 milliLiter(s) Oral every 12 hours PRN  melatonin 3 milliGRAM(s) Oral at bedtime  methocarbamol 750 milliGRAM(s) Oral <User Schedule>  morphine ER Tablet 15 milliGRAM(s) Oral <User Schedule>  ondansetron    Tablet 4 milliGRAM(s) Oral every 8 hours PRN  oxyCODONE    IR 2.5 milliGRAM(s) Oral every 4 hours PRN  oxyCODONE    IR 5 milliGRAM(s) Oral every 4 hours PRN  senna 2 Tablet(s) Oral at bedtime  tamsulosin 0.4 milliGRAM(s) Oral at bedtime  traZODone 50 milliGRAM(s) Oral <User Schedule>      Vital Signs Last 24 Hrs  T(C): 36.8 (11 Oct 2020 08:15), Max: 36.8 (11 Oct 2020 08:15)  T(F): 98.3 (11 Oct 2020 08:15), Max: 98.3 (11 Oct 2020 08:15)  HR: 95 (11 Oct 2020 08:15) (95 - 95)  BP: 142/92 (11 Oct 2020 08:15) (142/92 - 142/92)  BP(mean): --  RR: 15 (11 Oct 2020 08:15) (15 - 15)  SpO2: 99% (11 Oct 2020 08:15) (99% - 99%)    In NAD  HEENT- EOMI  Heart- RRR, S1S2  Lungs- CTA bl.  Abd- + BS, NT  Ext- No calf pain  Neuro- Exam unchanged          Imp: Patient with diagnosis of SCI admitted for comprehensive acute rehabilitation.    Plan:  - Continue PT/OT/SLP  - DVT prophylaxis  - Skin- Turn q2h, check skin daily  - Continue current medications; patient medically stable.   -Active issues- pain- continue current management   - Patient is stable to continue current rehabilitation program.

## 2020-10-11 NOTE — PROGRESS NOTE ADULT - SUBJECTIVE AND OBJECTIVE BOX
35y old  Male who presents with a chief complaint of Traumatic Spinal Cord Disfunction    Patient seen and examined at bedside. No acute overnight events. no fever, chills, chest pain, shortness of breath.       Vital Signs Last 24 Hrs  T(C): 36.8 (11 Oct 2020 08:15), Max: 36.8 (11 Oct 2020 08:15)  T(F): 98.3 (11 Oct 2020 08:15), Max: 98.3 (11 Oct 2020 08:15)  HR: 95 (11 Oct 2020 08:15) (95 - 95)  BP: 142/92 (11 Oct 2020 08:15) (142/92 - 142/92)  BP(mean): --  RR: 15 (11 Oct 2020 08:15) (15 - 15)  SpO2: 99% (11 Oct 2020 08:15) (99% - 99%)    GENERAL- NAD  EAR/NOSE/MOUTH/THROAT - no pharyngeal exudates, no oral lesions  MMM  EYES- PATRICIA, conjunctiva and Sclera clear  NECK- supple  RESPIRATORY-  clear to auscultation bilaterally  CARDIOVASCULAR - SIS2, RRR  GI - soft NT BS present  EXTREMITIES- no pedal edema  NEUROLOGY- b/l LE weakness  PSYCHIATRY- AAO X 3    MEDICATIONS  (STANDING):  apixaban 10 milliGRAM(s) Oral every 12 hours  DULoxetine 60 milliGRAM(s) Oral daily  famotidine    Tablet 20 milliGRAM(s) Oral <User Schedule>  gabapentin 1000 milliGRAM(s) Oral three times a day  influenza   Vaccine 0.5 milliLiter(s) IntraMuscular once  lactobacillus acidophilus 1 Tablet(s) Oral two times a day  lidocaine   Patch 2 Patch Transdermal <User Schedule>  melatonin 3 milliGRAM(s) Oral at bedtime  methocarbamol 750 milliGRAM(s) Oral <User Schedule>  morphine ER Tablet 15 milliGRAM(s) Oral <User Schedule>  senna 2 Tablet(s) Oral at bedtime  tamsulosin 0.4 milliGRAM(s) Oral at bedtime  traZODone 50 milliGRAM(s) Oral <User Schedule>    MEDICATIONS  (PRN):  acetaminophen   Tablet .. 650 milliGRAM(s) Oral every 4 hours PRN Temp greater or equal to 38C (100.4F), Mild Pain (1 - 3)  bisacodyl Suppository 10 milliGRAM(s) Rectal <User Schedule> PRN no bowel movement for 2 days.  lidocaine 2% Gel 1 Application(s) Topical four times a day PRN pain  magnesium hydroxide Suspension 30 milliLiter(s) Oral every 12 hours PRN Constipation  ondansetron    Tablet 4 milliGRAM(s) Oral every 8 hours PRN Nausea and/or Vomiting  oxyCODONE    IR 2.5 milliGRAM(s) Oral every 4 hours PRN Moderate Pain (4 - 6)  oxyCODONE    IR 5 milliGRAM(s) Oral every 4 hours PRN Severe Pain (7 - 10)

## 2020-10-12 ENCOUNTER — TRANSCRIPTION ENCOUNTER (OUTPATIENT)
Age: 35
End: 2020-10-12

## 2020-10-12 VITALS
HEART RATE: 84 BPM | OXYGEN SATURATION: 99 % | DIASTOLIC BLOOD PRESSURE: 85 MMHG | RESPIRATION RATE: 16 BRPM | SYSTOLIC BLOOD PRESSURE: 132 MMHG | TEMPERATURE: 99 F

## 2020-10-12 LAB
ANION GAP SERPL CALC-SCNC: 8 MMOL/L — SIGNIFICANT CHANGE UP (ref 5–17)
BASOPHILS # BLD AUTO: 0.02 K/UL — SIGNIFICANT CHANGE UP (ref 0–0.2)
BASOPHILS NFR BLD AUTO: 0.3 % — SIGNIFICANT CHANGE UP (ref 0–2)
BUN SERPL-MCNC: 8 MG/DL — SIGNIFICANT CHANGE UP (ref 7–23)
CALCIUM SERPL-MCNC: 9.1 MG/DL — SIGNIFICANT CHANGE UP (ref 8.4–10.5)
CHLORIDE SERPL-SCNC: 100 MMOL/L — SIGNIFICANT CHANGE UP (ref 96–108)
CO2 SERPL-SCNC: 30 MMOL/L — SIGNIFICANT CHANGE UP (ref 22–31)
CREAT SERPL-MCNC: 0.61 MG/DL — SIGNIFICANT CHANGE UP (ref 0.5–1.3)
EOSINOPHIL # BLD AUTO: 0.16 K/UL — SIGNIFICANT CHANGE UP (ref 0–0.5)
EOSINOPHIL NFR BLD AUTO: 2.7 % — SIGNIFICANT CHANGE UP (ref 0–6)
GLUCOSE SERPL-MCNC: 94 MG/DL — SIGNIFICANT CHANGE UP (ref 70–99)
HCT VFR BLD CALC: 35.5 % — LOW (ref 39–50)
HGB BLD-MCNC: 10.9 G/DL — LOW (ref 13–17)
IMM GRANULOCYTES NFR BLD AUTO: 0.3 % — SIGNIFICANT CHANGE UP (ref 0–1.5)
LYMPHOCYTES # BLD AUTO: 1.2 K/UL — SIGNIFICANT CHANGE UP (ref 1–3.3)
LYMPHOCYTES # BLD AUTO: 20.4 % — SIGNIFICANT CHANGE UP (ref 13–44)
MCHC RBC-ENTMCNC: 27.2 PG — SIGNIFICANT CHANGE UP (ref 27–34)
MCHC RBC-ENTMCNC: 30.7 GM/DL — LOW (ref 32–36)
MCV RBC AUTO: 88.5 FL — SIGNIFICANT CHANGE UP (ref 80–100)
MONOCYTES # BLD AUTO: 0.46 K/UL — SIGNIFICANT CHANGE UP (ref 0–0.9)
MONOCYTES NFR BLD AUTO: 7.8 % — SIGNIFICANT CHANGE UP (ref 2–14)
NEUTROPHILS # BLD AUTO: 4.02 K/UL — SIGNIFICANT CHANGE UP (ref 1.8–7.4)
NEUTROPHILS NFR BLD AUTO: 68.5 % — SIGNIFICANT CHANGE UP (ref 43–77)
NRBC # BLD: 0 /100 WBCS — SIGNIFICANT CHANGE UP (ref 0–0)
PLATELET # BLD AUTO: 423 K/UL — HIGH (ref 150–400)
POTASSIUM SERPL-MCNC: 4.2 MMOL/L — SIGNIFICANT CHANGE UP (ref 3.5–5.3)
POTASSIUM SERPL-SCNC: 4.2 MMOL/L — SIGNIFICANT CHANGE UP (ref 3.5–5.3)
RBC # BLD: 4.01 M/UL — LOW (ref 4.2–5.8)
RBC # FLD: 14.1 % — SIGNIFICANT CHANGE UP (ref 10.3–14.5)
SODIUM SERPL-SCNC: 138 MMOL/L — SIGNIFICANT CHANGE UP (ref 135–145)
WBC # BLD: 5.88 K/UL — SIGNIFICANT CHANGE UP (ref 3.8–10.5)
WBC # FLD AUTO: 5.88 K/UL — SIGNIFICANT CHANGE UP (ref 3.8–10.5)

## 2020-10-12 PROCEDURE — 99232 SBSQ HOSP IP/OBS MODERATE 35: CPT

## 2020-10-12 PROCEDURE — 99239 HOSP IP/OBS DSCHRG MGMT >30: CPT

## 2020-10-12 RX ORDER — OXYCODONE HYDROCHLORIDE 5 MG/1
1 TABLET ORAL
Qty: 30 | Refills: 0
Start: 2020-10-12 | End: 2020-10-16

## 2020-10-12 RX ORDER — LIDOCAINE 4 G/100G
2 CREAM TOPICAL
Qty: 60 | Refills: 0
Start: 2020-10-12 | End: 2020-11-10

## 2020-10-12 RX ORDER — OXYCODONE HYDROCHLORIDE 5 MG/1
1 TABLET ORAL
Qty: 28 | Refills: 0
Start: 2020-10-12 | End: 2020-10-18

## 2020-10-12 RX ORDER — MORPHINE SULFATE 50 MG/1
1 CAPSULE, EXTENDED RELEASE ORAL
Qty: 14 | Refills: 0
Start: 2020-10-12 | End: 2020-10-18

## 2020-10-12 RX ADMIN — Medication 650 MILLIGRAM(S): at 03:01

## 2020-10-12 RX ADMIN — LIDOCAINE 2 PATCH: 4 CREAM TOPICAL at 08:04

## 2020-10-12 RX ADMIN — OXYCODONE HYDROCHLORIDE 5 MILLIGRAM(S): 5 TABLET ORAL at 00:00

## 2020-10-12 RX ADMIN — OXYCODONE HYDROCHLORIDE 5 MILLIGRAM(S): 5 TABLET ORAL at 08:02

## 2020-10-12 RX ADMIN — OXYCODONE HYDROCHLORIDE 5 MILLIGRAM(S): 5 TABLET ORAL at 11:06

## 2020-10-12 RX ADMIN — Medication 1 TABLET(S): at 07:00

## 2020-10-12 RX ADMIN — MORPHINE SULFATE 15 MILLIGRAM(S): 50 CAPSULE, EXTENDED RELEASE ORAL at 07:01

## 2020-10-12 RX ADMIN — METHOCARBAMOL 750 MILLIGRAM(S): 500 TABLET, FILM COATED ORAL at 08:33

## 2020-10-12 RX ADMIN — Medication 650 MILLIGRAM(S): at 06:59

## 2020-10-12 RX ADMIN — GABAPENTIN 1000 MILLIGRAM(S): 400 CAPSULE ORAL at 13:45

## 2020-10-12 RX ADMIN — APIXABAN 10 MILLIGRAM(S): 2.5 TABLET, FILM COATED ORAL at 07:00

## 2020-10-12 RX ADMIN — Medication 650 MILLIGRAM(S): at 14:52

## 2020-10-12 RX ADMIN — Medication 650 MILLIGRAM(S): at 00:00

## 2020-10-12 RX ADMIN — DULOXETINE HYDROCHLORIDE 60 MILLIGRAM(S): 30 CAPSULE, DELAYED RELEASE ORAL at 13:45

## 2020-10-12 RX ADMIN — OXYCODONE HYDROCHLORIDE 5 MILLIGRAM(S): 5 TABLET ORAL at 14:56

## 2020-10-12 RX ADMIN — OXYCODONE HYDROCHLORIDE 5 MILLIGRAM(S): 5 TABLET ORAL at 03:01

## 2020-10-12 RX ADMIN — OXYCODONE HYDROCHLORIDE 5 MILLIGRAM(S): 5 TABLET ORAL at 06:52

## 2020-10-12 RX ADMIN — Medication 650 MILLIGRAM(S): at 08:02

## 2020-10-12 RX ADMIN — GABAPENTIN 1000 MILLIGRAM(S): 400 CAPSULE ORAL at 06:59

## 2020-10-12 RX ADMIN — OXYCODONE HYDROCHLORIDE 5 MILLIGRAM(S): 5 TABLET ORAL at 07:00

## 2020-10-12 RX ADMIN — Medication 650 MILLIGRAM(S): at 11:06

## 2020-10-12 RX ADMIN — MORPHINE SULFATE 15 MILLIGRAM(S): 50 CAPSULE, EXTENDED RELEASE ORAL at 08:02

## 2020-10-12 RX ADMIN — Medication 650 MILLIGRAM(S): at 04:44

## 2020-10-12 NOTE — PROGRESS NOTE BEHAVIORAL HEALTH - NSBHCHARTREVIEWINVESTIGATE_PSY_A_CORE FT
< from: 12 Lead ECG (09.07.20 @ 19:35) >  Ventricular Rate 113 BPM  Atrial Rate 113 BPM  P-R Interval 142 ms  QRS Duration 74 ms  Q-T Interval 318 ms  QTC Calculation(Bezet) 436 ms  P Axis 12 degrees  R Axis 6 degrees  T Axis 28 degrees  Diagnosis Line Sinus tachycardia  Otherwise normal ECG  No previous ECGs available  Confirmed by JARRELL BURGOS, SIGRID ([id number]) on 9/8/2020 9:43:05 AM  < end of copied text >
Ventricular Rate 113 BPM  Atrial Rate 113 BPM  P-R Interval 142 ms  QRS Duration 74 ms  Q-T Interval 318 ms  QTC Calculation(Bezet) 436 ms  P Axis 12 degrees  R Axis 6 degrees  T Axis 28 degrees  Diagnosis Line Sinus tachycardia  Otherwise normal ECG  No previous ECGs available  Confirmed by JARRELL BURGOS, SIGRID ([id number]) on 9/8/2020 9:43:05 AM

## 2020-10-12 NOTE — PROGRESS NOTE BEHAVIORAL HEALTH - SUMMARY
Today pt reports he is doing well and feeling better since last encounter. Pt has been sleeping well, his pain is lessening, and he is not waking up at night anymore. Appetite and energy is good. Pt was happy to state that he was walking today using a walker. Pt is looking forward to discharge. Mentions Cymbalta has been lifting his mood and helping with his pain. Pt smiled frequently through interview and offers no complaints. Rest of psychiatric ROS is negative. Will continue to monitor routinely and follow up for support.
Pt was smiling and verbalizing that his mood/anxiety has much improved compared to several weeks ago. Pt reports that he is also doing better in PT and is able to walk with assistance. Projected DC date is on next Monday 9/12/2020 and pt is looking forward to returning to his home environment. He denies issues with appetite/sleep/concentration/energy. Will continue to monitor routinely and follow up for psychological support. Can continue Cymbalta 60mg Q daily, Melatonin 3mg QHS and Trazadone 50mg QHS- monitor for further hyponatremia.
Today pt is c/o back pain  He reports that his mood is "okay" today. Pt is still concerned about his recovery and reports that contributes to his anxiety. Pt was changed to Cymbalta . Will increase to 40 mg  Psychiatry will continue to follow up and provide emotional support.
Today pt is c/o back pain 7/10 and stating "its hard to control the pain". He reports that his mood is "okay" today. Pt is still concerned about his recovery and reports that that contributes to his anxiety. Pt has only been on SSRI Lexapro for 3 days. Writer changed to Cymbalta as Cymbalta can assist with his depression/anxiety and pain. Pt offers no other complaints. Psychiatry will continue to follow up and provide emotional support.
Today pt reports that his mood is better and he feels less anxious. Pt states that therapy is going well. Rest of psychiatric ROS is unremarkable. He is currently on Cymbalta 40mg Q daily and tolerating well and making progress in relation to his psychological stress.
Pt was smiling and verbalizing that his mood/anxiety has much improved compared to several weeks ago. Pt reports that he is also doing better in PT and is able to walk with assistance. Projected DC date is on next Monday  10/12/2020 and pt is looking forward to returning to his home environment. He denies issues with appetite/sleep/concentration/energy. Will continue to monitor routinely and follow up for psychological support. Can continue Cymbalta 60mg Q daily, Melatonin 3mg QHS and Trazadone 50mg QHS- monitor for further hyponatremia.
Pt was smiling and verbalizing that his mood/anxiety has much improved compared to several weeks ago. Pt reports that he is also doing better in PT and is able to walk with assistance. The plan is to DC this afternoon when both parents are available and pt is looking forward to returning to his home environment. He denies issues with appetite/sleep/concentration/energy. Can continue Cymbalta 60mg Q daily, Melatonin 3mg QHS and Trazadone 50mg QHS- monitor for further hyponatremia.
Today pt stating his mood has been up and down. He reports feeling anxious and more concerned about his prognosis and the social factors that are associated with after being discharged. Pt states that therapy is going well. Rest of psychiatric ROS is unremarkable. He is currently on Cymbalta and tolerating well (increased to 40mg yesterday). Pt offers no other complaints. Rest of Psychiatric ROS are negative. Psychiatry will continue to follow up and provide emotional support.

## 2020-10-12 NOTE — PROGRESS NOTE ADULT - PROVIDER SPECIALTY LIST ADULT
Hospitalist
Physiatry
Physiatry
Psychology
Rehab Medicine
Vascular Surgery
Vascular Surgery
Rehab Medicine
Hospitalist
Rehab Medicine
Hospitalist

## 2020-10-12 NOTE — PROGRESS NOTE ADULT - ASSESSMENT
35M admitted to [facility] on 8/22/20 with back pain, found to have fractures of L1-L5 pedicles bilaterally, leading to widening of the spinal canal with anterolisthesis of L5 on S1 with the entire L5 vertebral body width, disruption of the ALL and PLL at L5-S1 level, probable epidural hemorrhage and hemorrhage within the foramina at L1-L5, and enlargement/edema of bilateral psoas muscle.  Patient underwent evacuation of epidural hematoma, T11-pelvis posterior spinal fusion, and L2-S1 Laminectomy on 8/23/20 and subsequent L5-S1 anterior interbody fusion and L3-4 lateral interbody fusion on 9/1.  Hospital course c/b acute blood loss anemia requiring multiple transfusions, traumatic rhabdomyolysis and intractable pain.  Patient now admitted for a multidisciplinary rehab program- pt/ot/dvt ppx, pain meds.      # DVT s/p IVC filter  - Eliquis    # Anemia  - stable  - continue to monitor    # Thrombocytosis  - likely reactive  - monitor    #gerd- famotidine    #neurogenic bladder- flomax    will follow

## 2020-10-12 NOTE — PROGRESS NOTE BEHAVIORAL HEALTH - THOUGHT PROCESS
Circumstantial
Linear
Linear
Normal reasoning/Linear
Circumstantial
Circumstantial
Normal reasoning/Linear

## 2020-10-12 NOTE — PROGRESS NOTE BEHAVIORAL HEALTH - NSBHFUPINTERVALHXFT_PSY_A_CORE
34yo Male with no significant PMHx admitted to [facility] on 8/22/20 with back pain after falling down six steps at home during night. On admission BAL<10. Utox done after receiving pain meds and Valium in ED positive only for benzos and opiates. Imaging revealed fractures of L1-L5 pedicles bilaterally, leading to widening of the spinal canal with anterolisthesis of L5 on S1 with the entire L5 vertebral body width, disruption of the ALL and PLL at L5-S1 level, probable epidural hemorrhage and hemorrhage within the foramina at L1-L5, and enlargement/edema of bilateral psoas muscle.  Ortho consulted. Patient is s/p evacuation of epidural hematoma, T11-pelvis posterior spinal fusion, and L2-S1 Laminectomy on 8/23/20 with Dr. Villegas. Intraoperatively, pt received 4U PRBCs, 1U platelets, and 1U FFP due to 2200mL EBL. Post-operatively, he was admitted to CCU for pressure support and required additional blood products due to continued bleeding into JETHRO drains.  He was febrile after transfusions but remained without other signs of infection.  IVC filter placed 8/26 as patient unable to be pharmacologically anticoagulated due to extensive surgical requirements.  IVC filter placed by Dr. Merchant on 8/26, with plan to remove in 5-6 months.  He was seen by nephro fo CHELO (multifactorial) and elevated CPK, and started on IVF.  He is also s/p t L5-S1 anterior interbody fusion and L3-4 lateral interbody fusion on 9/1/20. TLSO and AFOs provided at [facility]. Man removed 9/4/20. Patient voiding without need for SC. Patient cleared for discharge to acute inpatient rehab at [facility].    Psychiatry: Pt seen and evaluated today by writer . Pt originally asked to be seen by C/L service for evaluation of depression and anxiety. Today he states his mood has been up and down. He reports feeling anxious and more concerned about his prognosis and the social factors that are associated with after being discharged. He reports that he has pain in his right leg that associated with a recently diagnosed DVT. Pt states that therapy is going well. Rest of psychiatric ROS is unremarkable. Reports sleep is better. Denies changes to appetite. He is currently on Cymbalta and tolerating well (increased to 40mg yesterday). Pt offers no other complaints. Rest of Psychiatric ROS are negative. Psychiatry will continue to follow up and provide emotional support.
34yo Male with no significant PMHx admitted to [facility] on 8/22/20 with back pain after falling down six steps at home during night. On admission BAL<10. Utox done after receiving pain meds and Valium in ED positive only for benzos and opiates. Imaging revealed fractures of L1-L5 pedicles bilaterally, leading to widening of the spinal canal with anterolisthesis of L5 on S1 with the entire L5 vertebral body width, disruption of the ALL and PLL at L5-S1 level, probable epidural hemorrhage and hemorrhage within the foramina at L1-L5, and enlargement/edema of bilateral psoas muscle.  Ortho consulted. Patient is s/p evacuation of epidural hematoma, T11-pelvis posterior spinal fusion, and L2-S1 Laminectomy on 8/23/20 with Dr. Villegas. Intraoperatively, pt received 4U PRBCs, 1U platelets, and 1U FFP due to 2200mL EBL. Post-operatively, he was admitted to CCU for pressure support and required additional blood products due to continued bleeding into JETHRO drains.  He was febrile after transfusions but remained without other signs of infection.  IVC filter placed 8/26 as patient unable to be pharmacologically anticoagulated due to extensive surgical requirements.  IVC filter placed by Dr. Merchant on 8/26, with plan to remove in 5-6 months.  He was seen by nephro fo CHELO (multifactorial) and elevated CPK, and started on IVF.  He is also s/p t L5-S1 anterior interbody fusion and L3-4 lateral interbody fusion on 9/1/20. TLSO and AFOs provided at [facility]. Man removed 9/4/20. Patient voiding without need for SC. Patient cleared for discharge to acute inpatient rehab at [facility].    Psychiatry: Pt seen and evaluated today by writer . Pt originally asked to be seen by C/L service for evaluation of depression and anxiety. Today he reports that his mood is better and he feels less anxious. Pt smiled to writer which he has never done before. He stated to writer that the walked for the first time on his own today and he feels proud that he is making progress. Pt states that therapy is going well. Rest of psychiatric ROS is unremarkable. States he slept well last night. Denies changes to appetite. He is currently on Cymbalta 40mg Q daily and tolerating well. Pt offers no other complaints. Rest of Psychiatric ROS are negative. Psychiatry will continue to follow up and provide emotional support as needed
HPI:  Patient is a 35y old  Male who presents with a chief complaint of spinal cord injury    HPI: 34yo Male with no significant PMHx admitted to [facility] on 8/22/20 with back pain after falling down six steps at home during night. On admission BAL<10. Utox done after receiving pain meds and Valium in ED positive only for benzos and opiates. Imaging revealed fractures of L1-L5 pedicles bilaterally, leading to widening of the spinal canal with anterolisthesis of L5 on S1 with the entire L5 vertebral body width, disruption of the ALL and PLL at L5-S1 level, probable epidural hemorrhage and hemorrhage within the foramina at L1-L5, and enlargement/edema of bilateral psoas muscle.  Ortho consulted. Patient is s/p evacuation of epidural hematoma, T11-pelvis posterior spinal fusion, and L2-S1 Laminectomy on 8/23/20 with Dr. Villegas. Intraoperatively, pt received 4U PRBCs, 1U platelets, and 1U FFP due to 2200mL EBL. Post-operatively, he was admitted to CCU for pressure support and required additional blood products due to continued bleeding into JETHRO drains.  He was febrile after transfusions but remained without other signs of infection.  IVC filter placed 8/26 as patient unable to be pharmacologically anticoagulated due to extensive surgical requirements.  IVC filter placed by Dr. Merchant on 8/26, with plan to remove in 5-6 months.  He was seen by nephro fo CHELO (multifactorial) and elevated CPK, and started on IVF.  He is also s/p t L5-S1 anterior interbody fusion and L3-4 lateral interbody fusion on 9/1/20. TLSO and AFOs provided at [facility]. Man removed 9/4/20. Patient voiding without need for SC. Patient cleared for discharge to acute inpatient rehab at [facility].    Patient seen and examined upon arrival. He refused full skin exam initially stating he was in too much pain - requested again after receiving Dilaudid, still too uncomfortable to turn to remove sliding bags placed by EMS. He complains of bilateral hamstring tightness and states it hurts too much to bend his knees for motor exam. Low back pain became worse yesterday after Xray when he states he was forced to sit up upright for imaging. Patient agreeable to skin exam after Valium, required max assist. (04 Sep 2020 14:36)    Pt seen and evaluated , followed by C/L service for anxiety/ depression as well as demoralization after spinal injury. Father present at the bedside, patient agreed to evaluation with his father present. Pt states he has made progress and has been happy about being able to shower and being able to toilet independently.   Pt pain tolerance has been an issue while hospitalized, and writer had discussion with team attending regarding concerns about follow up post d/c.  Pt is in pre contemplative stage regarding cessation of cannabis, is in restricted environment and so is not using currently.   Pt does want to continue pain medications post d/c and writer spoke to patient and father, and both state they are having difficulties finding a local provider.   If they are not able to find one by Monday - they will follow up with surgeon and PCP regarding pain medications and then follow up with their referrals, writer is not able to "vet" people in their local neighborhood of [place] or outside of [facility], they agreed to take referrals also in [facility] and [place].   Pt rated his pain as 6.5 out of ten, and mainly in his back- will defer to nursing notes pain relief after being given medications.   Pt denied feeling either anxious, or depressed, felt he was unprepared to go home psychologically a week ago, but is prepared now, affect brighter.   Rest of psychiatric ROS negative.
HPI: 34yo Male with no significant PMHx admitted to [facility] on 8/22/20 with back pain after falling down six steps at home during night. On admission BAL<10. Utox done after receiving pain meds and Valium in ED positive only for benzos and opiates. Imaging revealed fractures of L1-L5 pedicles bilaterally, leading to widening of the spinal canal with anterolisthesis of L5 on S1 with the entire L5 vertebral body width, disruption of the ALL and PLL at L5-S1 level, probable epidural hemorrhage and hemorrhage within the foramina at L1-L5, and enlargement/edema of bilateral psoas muscle.  Ortho consulted. Patient is s/p evacuation of epidural hematoma, T11-pelvis posterior spinal fusion, and L2-S1 Laminectomy on 8/23/20 with Dr. Villegas. Intraoperatively, pt received 4U PRBCs, 1U platelets, and 1U FFP due to 2200mL EBL. Post-operatively, he was admitted to CCU for pressure support and required additional blood products due to continued bleeding into JETHRO drains.  He was febrile after transfusions but remained without other signs of infection.  IVC filter placed 8/26 as patient unable to be pharmacologically anticoagulated due to extensive surgical requirements.  IVC filter placed by Dr. Merchant on 8/26, with plan to remove in 5-6 months.  He was seen by nephro fo CHELO (multifactorial) and elevated CPK, and started on IVF.  He is also s/p t L5-S1 anterior interbody fusion and L3-4 lateral interbody fusion on 9/1/20. TLSO and AFOs provided at [facility]. Man removed 9/4/20. Patient voiding without need for SC. Patient cleared for discharge to acute inpatient rehab at [facility].    Patient seen and examined upon arrival. He refused full skin exam initially stating he was in too much pain - requested again after receiving Dilaudid, still too uncomfortable to turn to remove sliding bags placed by EMS. He complains of bilateral hamstring tightness and states it hurts too much to bend his knees for motor exam. Low back pain became worse yesterday after Xray when he states he was forced to sit up upright for imaging. Patient agreeable to skin exam after Valium, required max assist. (04 Sep 2020 14:36)    Psychiatry: Pt seen and evaluated by writer today. Pt originally asked to be seen by C/S service for evaluation of depression and anxiety. Today pt is c/o back pain 7/10 and stating "its hard to control the pain". He reports that his mood is "okay" today. Pt is still concerned about his recovery and reports that that contributes to his anxiety. Pt reports that therapy is going well. Rest of psychiatric ROS is unremarkable. Pt has only been on Lexapro for 3 days. Writer changed to Cymbalta as Cymbalta can assist with his depression/anxiety and pain. Pt offers no other complaints. Psychiatry will continue to follow up and provide emotional support.
HPI: 36yo Male with no significant PMHx admitted to [facility] on 8/22/20 with back pain after falling down six steps at home during night. On admission BAL<10. Utox done after receiving pain meds and Valium in ED positive only for benzos and opiates. Imaging revealed fractures of L1-L5 pedicles bilaterally, leading to widening of the spinal canal with anterolisthesis of L5 on S1 with the entire L5 vertebral body width, disruption of the ALL and PLL at L5-S1 level, probable epidural hemorrhage and hemorrhage within the foramina at L1-L5, and enlargement/edema of bilateral psoas muscle.  Ortho consulted. Patient is s/p evacuation of epidural hematoma, T11-pelvis posterior spinal fusion, and L2-S1 Laminectomy on 8/23/20 with Dr. Villegas. Intraoperatively, pt received 4U PRBCs, 1U platelets, and 1U FFP due to 2200mL EBL. Post-operatively, he was admitted to CCU for pressure support and required additional blood products due to continued bleeding into JETHRO drains.  He was febrile after transfusions but remained without other signs of infection.  IVC filter placed 8/26 as patient unable to be pharmacologically anticoagulated due to extensive surgical requirements.  IVC filter placed by Dr. Merchant on 8/26, with plan to remove in 5-6 months.  He was seen by nephro fo CHELO (multifactorial) and elevated CPK, and started on IVF.  He is also s/p t L5-S1 anterior interbody fusion and L3-4 lateral interbody fusion on 9/1/20. TLSO and AFOs provided at [facility]. Man removed 9/4/20. Patient voiding without need for SC. Patient cleared for discharge to acute inpatient rehab at [facility].      Psychiatry: Pt seen and evaluated by writer today. Pt originally asked to be seen by C/S service for evaluation of depression and anxiety. Today pt is c/o back pain and legs pain. Mom is at his bedside.. He reports that his mood is "okay" today. Pt is still concerned about his recovery and reports that  contributes to his anxiety. Pt reports that therapy is going well. Rest of psychiatric ROS is unremarkable. Tolerates Cymbalta well.. Pt offers no other complaints. Psychiatry will continue to follow up and provide emotional support.
HPI: 36yo Male with no significant PMHx admitted to [facility] on 8/22/20 with back pain after falling down six steps at home during night. On admission BAL<10. Utox done after receiving pain meds and Valium in ED positive only for benzos and opiates. Imaging revealed fractures of L1-L5 pedicles bilaterally, leading to widening of the spinal canal with anterolisthesis of L5 on S1 with the entire L5 vertebral body width, disruption of the ALL and PLL at L5-S1 level, probable epidural hemorrhage and hemorrhage within the foramina at L1-L5, and enlargement/edema of bilateral psoas muscle.  Ortho consulted. Patient is s/p evacuation of epidural hematoma, T11-pelvis posterior spinal fusion, and L2-S1 Laminectomy on 8/23/20 with Dr. Villegas. Intraoperatively, pt received 4U PRBCs, 1U platelets, and 1U FFP due to 2200mL EBL. Post-operatively, he was admitted to CCU for pressure support and required additional blood products due to continued bleeding into JETHRO drains.  He was febrile after transfusions but remained without other signs of infection.  IVC filter placed 8/26 as patient unable to be pharmacologically anticoagulated due to extensive surgical requirements.  IVC filter placed by Dr. Merchant on 8/26, with plan to remove in 5-6 months.  He was seen by nephro fo CHELO (multifactorial) and elevated CPK, and started on IVF.  He is also s/p t L5-S1 anterior interbody fusion and L3-4 lateral interbody fusion on 9/1/20. TLSO and AFOs provided at [facility]. Man removed 9/4/20. Patient voiding without need for SC. Patient cleared for discharge to acute inpatient rehab at [facility]. Patient seen and examined upon arrival. He refused full skin exam initially stating he was in too much pain - requested again after receiving Dilaudid, still too uncomfortable to turn to remove sliding bags placed by EMS. He complains of bilateral hamstring tightness and states it hurts too much to bend his knees for motor exam. Low back pain became worse yesterday after Xray when he states he was forced to sit up upright for imaging. Patient agreeable to skin exam after Valium, required max assist. (04 Sep 2020 14:36)    Pt seen and evaluated , followed by C/L service for anxiety/ depression as well as demoralization after spinal injury. Pt has made much improvement since he was first seen by writer. Today he is smiling and excited that he is being DC'D home this afternoon. He states he has to wait for his parents to pick him up. His endorsed that his mood/anxiety is stable at this time and offers no new complaints. Pt denies issues with sleep/energy/concentration. Pt states when he goes home he will f/u with his PMD. He was encouraged by writer to obtain a referral to a psychiatrist and a therapist so he can have his psychotropic medications continued and also receive some psychotherapy. Pt agreed that would be beneficial for his recovery. Rest of psychiatric ROS are negative.
Patient is a 35y old  Male who presents with a chief complaint of spinal cord injury 34yo Male with no signifcant PMHx admitted to [facility] on 8/22/20 with back pain after falling down six steps at home during night. On admission BAL<10. Utox done after receiving pain meds and Valium in ED positive only for benzos and opiates. Imaging revealed fractures of L1-L5 pedicles bilaterally, leading to widening of the spinal canal with anterolisthesis of L5 on S1 with the entire L5 vertebral body width, disruption of the ALL and PLL at L5-S1 level, probable epidural hemorrhage and hemorrhage within the foramina at L1-L5, and enlargement/edema of bilateral psoas muscle.  Ortho consulted. Patient is s/p evacuation of epidural hematoma, T11-pelvis posterior spinal fusion, and L2-S1 Laminectomy on 8/23/20 with Dr. Villegas. Intraoperatively, pt received 4U PRBCs, 1U platelets, and 1U FFP due to 2200mL EBL. Post-operatively, he was admitted to CCU for pressure support and required additional blood products due to continued bleeding into JETHRO drains.  He was febrile after transfusions but remained without other signs of infection.  IVC filter placed 8/26 as patient unable to be pharmacologically anticoagulated due to extensive surgical requirements.  IVC filter placed by Dr. Merchant on 8/26, with plan to remove in 5-6 months.  He was seen by nephro fo CHELO (multifactorial) and elevated CPK, and started on IVF.  He is also s/p t L5-S1 anterior interbody fusion and L3-4 lateral interbody fusion on 9/1/20. TLSO and AFOs provided at [facility]. Man removed 9/4/20. Patient voiding without need for SC. Patient cleared for discharge to acute inpatient rehab at [facility]. Patient seen and examined upon arrival. He refused full skin exam initially stating he was in too much pain - requested again after receiving Dilaudid, still too uncomfortable to turn to remove sliding bags placed by EMS. He complains of bilateral hamstring tightness and states it hurts too much to bend his knees for motor exam. Low back pain became worse yesterday after Xray when he states he was forced to sit up upright for imaging. Patient agreeable to skin exam after Valium, required max assist. (04 Sep 2020 14:36)    Psychiatry: Pt seen and evaluated today by writer. Pt originally asked to be seen by C/L service for evaluation of depression and anxiety. Today pt reports he is doing well and feeling better since last encounter. Pt states melatonin and trazodone have been working great for his sleep. His pain is lessening and he is not waking up in pain anymore. Appetite and energy is good. Pt was happy to state that he was walking today using a walker. He mentions he may be discharged on October 12th and is looking forward to it. Mentions Cymbalta has been lifting his mood and helping with his pain. Pt smiled frequently through interview and spoke about recently watching his favorite sports teams. Pt offers no complaints and rest of psychiatric ROS is negative. Will continue to monitor routinely and follow up for support.
Patient is a 35y old  Male who presents with a chief complaint of spinal cord injury 36yo Male with no signifcant PMHx admitted to [facility] on 8/22/20 with back pain after falling down six steps at home during night. On admission BAL<10. Utox done after receiving pain meds and Valium in ED positive only for benzos and opiates. Imaging revealed fractures of L1-L5 pedicles bilaterally, leading to widening of the spinal canal with anterolisthesis of L5 on S1 with the entire L5 vertebral body width, disruption of the ALL and PLL at L5-S1 level, probable epidural hemorrhage and hemorrhage within the foramina at L1-L5, and enlargement/edema of bilateral psoas muscle.  Ortho consulted. Patient is s/p evacuation of epidural hematoma, T11-pelvis posterior spinal fusion, and L2-S1 Laminectomy on 8/23/20 with Dr. Villegas. Intraoperatively, pt received 4U PRBCs, 1U platelets, and 1U FFP due to 2200mL EBL. Post-operatively, he was admitted to CCU for pressure support and required additional blood products due to continued bleeding into JETHRO drains.  He was febrile after transfusions but remained without other signs of infection.  IVC filter placed 8/26 as patient unable to be pharmacologically anticoagulated due to extensive surgical requirements.  IVC filter placed by Dr. Merchant on 8/26, with plan to remove in 5-6 months.  He was seen by nephro fo CHELO (multifactorial) and elevated CPK, and started on IVF.  He is also s/p t L5-S1 anterior interbody fusion and L3-4 lateral interbody fusion on 9/1/20. TLSO and AFOs provided at [facility]. Man removed 9/4/20. Patient voiding without need for SC. Patient cleared for discharge to acute inpatient rehab at [facility]. Patient seen and examined upon arrival. He refused full skin exam initially stating he was in too much pain - requested again after receiving Dilaudid, still too uncomfortable to turn to remove sliding bags placed by EMS. He complains of bilateral hamstring tightness and states it hurts too much to bend his knees for motor exam. Low back pain became worse yesterday after Xray when he states he was forced to sit up upright for imaging. Patient agreeable to skin exam after Valium, required max assist. (04 Sep 2020 14:36)    Psychiatry: Pt seen and evaluated today by writer. Pt originally asked to be seen by C/L service for evaluation of depression and anxiety. Today pt is smiling with writer and stating his mood/anxiety is much better than several weeks ago. He was interested in talking about sports during this encounter and is looking forward to watching baseball/basketball tonight. Pt reports that he is also doing better in PT and is able to walk with assistance. Projected DC date is on next Monday 9/12/2020 and pt is looking forward to returning to his home environment. He denies issues with appetite/sleep/concentration/energy. He states his pain is under control. He offers no complaints and rest of psychiatric ROS are negative. Will continue to monitor routinely and follow up for psychological support. Recommend to continue SNRI- Cymbalta 60mg Q daily for mood/anxiety, Melatonin 3mg QHS/Trazadone 50mg QHS for insomnia. Writer suggest monitoring for further hyponatremia. Literature suggest that medications that increase serotonin levels can induce hyponatremia and pt is on two psychotropic medications that would increase serotonin levels.
HPI:  Patient is a 35y old  Male who presents with a chief complaint of spinal cord injury    HPI: 36yo Male with no signifcant PMHx admitted to [facility] on 8/22/20 with back pain after falling down six steps at home during night. On admission BAL<10. Utox done after receiving pain meds and Valium in ED positive only for benzos and opiates. Imaging revealed fractures of L1-L5 pedicles bilaterally, leading to widening of the spinal canal with anterolisthesis of L5 on S1 with the entire L5 vertebral body width, disruption of the ALL and PLL at L5-S1 level, probable epidural hemorrhage and hemorrhage within the foramina at L1-L5, and enlargement/edema of bilateral psoas muscle.  Ortho consulted. Patient is s/p evacuation of epidural hematoma, T11-pelvis posterior spinal fusion, and L2-S1 Laminectomy on 8/23/20 with Dr. Villegas. Intraoperatively, pt received 4U PRBCs, 1U platelets, and 1U FFP due to 2200mL EBL. Post-operatively, he was admitted to CCU for pressure support and required additional blood products due to continued bleeding into JETHRO drains.  He was febrile after transfusions but remained without other signs of infection.  IVC filter placed 8/26 as patient unable to be pharmacologically anticoagulated due to extensive surgical requirements.  IVC filter placed by Dr. Merchant on 8/26, with plan to remove in 5-6 months.  He was seen by nephro fo CHELO (multifactorial) and elevated CPK, and started on IVF.  He is also s/p t L5-S1 anterior interbody fusion and L3-4 lateral interbody fusion on 9/1/20. TLSO and AFOs provided at [facility]. Man removed 9/4/20. Patient voiding without need for SC. Patient cleared for discharge to acute inpatient rehab at [facility].    Patient seen and examined upon arrival. He refused full skin exam initially stating he was in too much pain - requested again after receiving Dilaudid, still too uncomfortable to turn to remove sliding bags placed by EMS. He complains of bilateral hamstring tightness and states it hurts too much to bend his knees for motor exam. Low back pain became worse yesterday after Xray when he states he was forced to sit up upright for imaging. Patient agreeable to skin exam after Valium, required max assist. (04 Sep 2020 14:36)    Pt seen and evaluated today. Father at bedside visiting. Pt offers no new chief complaint, psychiatry asked to see patient due to spinal cord injury and concern about anxiety and depression. Pt denied depression as a clinical syndrome, but states he does have "up and down" mood, is concerned about his recovery, how long it will take, how much of his function he will get back. Pt states he has done well and not missed any therapy sessions this week.   He states he does the exercises that PT has shown him and pushes himself.   Rest of psychiatric ROS is unremarkable, started on Lexapro today.

## 2020-10-12 NOTE — PROGRESS NOTE BEHAVIORAL HEALTH - NSBHCHARTREVIEWVS_PSY_A_CORE FT
Vital Signs Last 24 Hrs  T(C): 36.8 (12 Sep 2020 07:33), Max: 36.8 (12 Sep 2020 07:33)  T(F): 98.3 (12 Sep 2020 07:33), Max: 98.3 (12 Sep 2020 07:33)  HR: 93 (12 Sep 2020 07:33) (93 - 111)  BP: 144/80 (12 Sep 2020 07:33) (129/84 - 144/80)  BP(mean): --  RR: 16 (12 Sep 2020 07:33) (16 - 18)  SpO2: 99% (12 Sep 2020 07:33) (98% - 99%)
ICU Vital Signs Last 24 Hrs  T(C): 36.6 (06 Oct 2020 07:40), Max: 36.9 (05 Oct 2020 20:43)  T(F): 97.9 (06 Oct 2020 07:40), Max: 98.5 (05 Oct 2020 20:43)  HR: 96 (06 Oct 2020 07:40) (96 - 102)  BP: 147/92 (06 Oct 2020 07:40) (127/778 - 147/92)  RR: 16 (06 Oct 2020 07:40) (16 - 16)  SpO2: 97% (06 Oct 2020 07:40) (97% - 98%)
ICU Vital Signs Last 24 Hrs  T(C): 36.7 (15 Sep 2020 09:24), Max: 36.9 (14 Sep 2020 20:15)  T(F): 98 (15 Sep 2020 09:24), Max: 98.5 (14 Sep 2020 20:15)  HR: 109 (15 Sep 2020 09:24) (108 - 109)  BP: 139/89 (15 Sep 2020 09:24) (133/91 - 139/89)  RR: 16 (15 Sep 2020 09:24) (15 - 16)  SpO2: 100% (15 Sep 2020 09:24) (98% - 100%)
ICU Vital Signs Last 24 Hrs  T(C): 36.9 (25 Sep 2020 08:32), Max: 37.9 (24 Sep 2020 19:47)  T(F): 98.5 (25 Sep 2020 08:32), Max: 100.3 (24 Sep 2020 19:47)  HR: 119 (25 Sep 2020 08:32) (116 - 119)  BP: 138/86 (25 Sep 2020 08:32) (125/74 - 138/86)  RR: 15 (25 Sep 2020 08:32) (15 - 15)  SpO2: 95% (25 Sep 2020 08:32) (95% - 98%)
ICU Vital Signs Last 24 Hrs  T(C): 37 (12 Oct 2020 07:13), Max: 37 (12 Oct 2020 07:13)  T(F): 98.6 (12 Oct 2020 07:13), Max: 98.6 (12 Oct 2020 07:13)  HR: 84 (12 Oct 2020 07:13) (84 - 99)  BP: 132/85 (12 Oct 2020 07:13) (132/85 - 148/83)  RR: 16 (12 Oct 2020 07:13) (15 - 16)  SpO2: 99% (12 Oct 2020 07:13) (99% - 99%)
T(C): 37.3 (21 Sep 2020 07:52), Max: 37.3 (21 Sep 2020 07:52)  T(F): 99.2 (21 Sep 2020 07:52), Max: 99.2 (21 Sep 2020 07:52)  HR: 109 (21 Sep 2020 07:52) (109 - 111)  BP: 120/68 (21 Sep 2020 07:52) (120/68 - 126/76)  RR: 18 (21 Sep 2020 07:52) (17 - 18)  SpO2: 96% (21 Sep 2020 07:52) (96% - 97%)
Vital Signs Last 24 Hrs  T(C): 36.6 (09 Oct 2020 21:13), Max: 36.6 (09 Oct 2020 21:13)  T(F): 97.8 (09 Oct 2020 21:13), Max: 97.8 (09 Oct 2020 21:13)  HR: 99 (09 Oct 2020 21:13) (99 - 99)  BP: 155/80 (09 Oct 2020 21:13) (155/80 - 155/80)  BP(mean): --  RR: 15 (09 Oct 2020 21:13) (15 - 15)  SpO2: 99% (09 Oct 2020 21:13) (99% - 99%)
Vital Signs Last 24 Hrs  T(C): 36.8 (19 Sep 2020 08:00), Max: 36.8 (19 Sep 2020 08:00)  T(F): 98.2 (19 Sep 2020 08:00), Max: 98.2 (19 Sep 2020 08:00)  HR: 120 (19 Sep 2020 08:00) (115 - 120)  BP: 105/68 (19 Sep 2020 08:00) (105/68 - 112/70)  BP(mean): --  RR: 16 (19 Sep 2020 08:00) (16 - 17)  SpO2: 98% (19 Sep 2020 08:00) (98% - 98%)
Vital Signs Last 24 Hrs  T(C): 37.1 (02 Oct 2020 08:44), Max: 37.1 (02 Oct 2020 08:44)  T(F): 98.7 (02 Oct 2020 08:44), Max: 98.7 (02 Oct 2020 08:44)  HR: 107 (02 Oct 2020 08:44) (101 - 107)  BP: 116/78 (02 Oct 2020 08:44) (115/84 - 116/78)  RR: 14 (02 Oct 2020 08:44) (14 - 16)  SpO2: 98% (02 Oct 2020 08:44) (98% - 99%)

## 2020-10-12 NOTE — PROGRESS NOTE BEHAVIORAL HEALTH - NSBHFUPREASONCONS_PSY_A_CORE
anxiety/depression
anxiety/depression/med management
anxiety/depression/med management
depression/anxiety
depression/med management
other substance
depression

## 2020-10-12 NOTE — DISCHARGE NOTE NURSING/CASE MANAGEMENT/SOCIAL WORK - NSDCFUADDAPPT_GEN_ALL_CORE_FT
Patient will schedule his follow-up with PCP DR. RANDI Elena  as well as following up with pain management. Patient will schedule his follow-up with PCP DR. RANDI Elena in [place].  A pain management appointment has been scheduled for Thursday, 10/15/20 @ 3:00 pm at [facility], located at [address] [place] [address]. [place], [place]  [phone] with DR. HAWTHORNE. APPOINTMENT WAS CONFIRMED WITH KELSIE IN OFFICE.

## 2020-10-12 NOTE — PROGRESS NOTE ADULT - SUBJECTIVE AND OBJECTIVE BOX
SUBJECTIVE & OVERNIGHT EVENTS:   Patient seen and evaluated this morning. No acute events overnight. Participating well in therapy.   Denies chest pain, SOB, nausea, vomiting, constipation or diarrhea. Slept well last night. seen by psychiatry. Denies any new complaints today, wants to go home. pain is 6/10 today.     [X] Constitutional WNL        [X] Cardio WNL              [X] Resp WNL  [X] GI WNL                            [X]  WNL                    [] Heme +B/L LE DVTs  [X] Endo WNL                       [] Skin +spinal incision                  [X] MSK WNL  [] Neuro +LE weakness                     [X] Cognitive WNL         [] Psych +mood is improving      OBJECTIVE   Vital Signs Last 24 Hrs  T(C): 37 (12 Oct 2020 07:13), Max: 37 (12 Oct 2020 07:13)  T(F): 98.6 (12 Oct 2020 07:13), Max: 98.6 (12 Oct 2020 07:13)  HR: 84 (12 Oct 2020 07:13) (84 - 99)  BP: 132/85 (12 Oct 2020 07:13) (132/85 - 148/83)  BP(mean): --  RR: 16 (12 Oct 2020 07:13) (15 - 16)  SpO2: 99% (12 Oct 2020 07:13) (99% - 99%)        PHYSICAL EXAM  Constitutional: NAD  HEENT: NCAT, EOMI, handling secretions well  Cardio: well-perfused  Resp: symmetric chest-rise, no increased WOB  GI: non-distended  : no francis  Extremities: well-perfused    LABS:                         10.9   5.88  )-----------( 423      ( 12 Oct 2020 05:30 )             35.5     10-12    138  |  100  |  8   ----------------------------<  94  4.2   |  30  |  0.61    Ca    9.1      12 Oct 2020 05:30                      MEDICATIONS  (STANDING):  DULoxetine 60 milliGRAM(s) Oral daily  enoxaparin Injectable 100 milliGRAM(s) SubCutaneous every 12 hours  famotidine    Tablet 20 milliGRAM(s) Oral <User Schedule>  gabapentin 1000 milliGRAM(s) Oral three times a day  influenza   Vaccine 0.5 milliLiter(s) IntraMuscular once  lactobacillus acidophilus 1 Tablet(s) Oral two times a day  lidocaine   Patch 2 Patch Transdermal <User Schedule>  melatonin 3 milliGRAM(s) Oral at bedtime  methocarbamol 750 milliGRAM(s) Oral <User Schedule>  morphine ER Tablet 15 milliGRAM(s) Oral <User Schedule>  senna 2 Tablet(s) Oral at bedtime  tamsulosin 0.4 milliGRAM(s) Oral at bedtime  traZODone 50 milliGRAM(s) Oral <User Schedule>    MEDICATIONS  (PRN):  acetaminophen   Tablet .. 650 milliGRAM(s) Oral every 6 hours PRN Temp greater or equal to 38C (100.4F), Mild Pain (1 - 3)  bisacodyl Suppository 10 milliGRAM(s) Rectal <User Schedule> PRN no bowel movement for 2 days.  lidocaine 2% Gel 1 Application(s) Topical four times a day PRN pain  magnesium hydroxide Suspension 30 milliLiter(s) Oral every 12 hours PRN Constipation  ondansetron    Tablet 4 milliGRAM(s) Oral every 8 hours PRN Nausea and/or Vomiting  oxyCODONE    IR 10 milliGRAM(s) Oral every 4 hours PRN Severe Pain (7 - 10)  oxyCODONE    IR 5 milliGRAM(s) Oral every 4 hours PRN Moderate Pain (4 - 6)

## 2020-10-12 NOTE — PROGRESS NOTE BEHAVIORAL HEALTH - MUSCLE TONE / STRENGTH
Normal muscle tone/strength
Abnormal muscle tone/strength
Abnormal muscle tone/strength
Normal muscle tone/strength
Normal muscle tone/strength

## 2020-10-12 NOTE — PROGRESS NOTE BEHAVIORAL HEALTH - ESTIMATED INTELLIGENCE
Average
Above average

## 2020-10-12 NOTE — PROGRESS NOTE BEHAVIORAL HEALTH - NSBHCONSULTRECOMMENDOTHER_PSY_A_CORE FT
Continue Duloxetine 60 mg PO qd for anxiety and depression. Continue Trazodone 50 mg PO and melatonin 3 mg PO at bedtime for sleep.
Recommend to continue SNRI- Cymbalta 60mg Q daily for mood/anxiety, Melatonin 3mg QHS/Trazadone 50mg QHS for insomnia.     Writer suggest monitoring for further hyponatremia. Literature suggest that medications that increase serotonin levels can induce hyponatremia and pt is on two psychotropic medications (Cymbalta/Trazadone) that would increase serotonin levels.
Starting dose Cymbalta 30mg Q daily. Will consider titrating upward next week if pt tolerates well this week.
Continue medication treatment.
Writer recommending changing SSRI (Lexapro) to SNRI (Cymbalta). Cymbalta would more than likely be more beneficial to improve patients mood/anxiety and pain.     Starting dose Cymbalta 30mg Q daily. Will consider titrating upward next week if pt tolerates well this week.
continue Cymbalta used as adjunct, standing narcotic prn medications have been lowered.
continue Cymbalta used as adjunct, standing narcotic prn medications have been lowered.
Continue medication treatment.

## 2020-10-12 NOTE — PROGRESS NOTE ADULT - ASSESSMENT
ASSESSMENT/PLAN  BRUNO CHO is a 35M admitted to [facility] on 8/22/20 with back pain, found to have fractures of L1-L5 pedicles bilaterally, leading to widening of the spinal canal with anterolisthesis of L5 on S1 with the entire L5 vertebral body width, disruption of the ALL and PLL at L5-S1 level, probable epidural hemorrhage and hemorrhage within the foramina at L1-L5, and enlargement/edema of bilateral psoas muscle.  Patient underwent evacuation of epidural hematoma, T11-pelvis posterior spinal fusion, and L2-S1 Laminectomy on 8/23/20 and subsequent L5-S1 anterior interbody fusion and L3-4 lateral interbody fusion on 9/1.  Hospital course c/b acute blood loss anemia requiring multiple transfusions, traumatic rhabodmyolysis, and intractable pain.  Patient now admitted for a multidisciplinary rehab program. 09-04-20 @ 14:37    Comprehensive Multidisciplinary Rehab Program:  - Start comprehensive rehab program of PT/OT - 3 hours a day, 5 days a week  - P&O as needed    -------------  MEDICAL MANAGEMENT     #Traumatic Spinal Cord Injury  - pt with complete anterolisthesis of L5 on S1, multiple pedicular fractures, and epidural hemorrhage   - s/p T11-pelvis posterior spinal fusion, and L2-S1 Laminectomy on 8/23/20 and subsequent L5-S1 anterior interbody fusion and L3-4 lateral interbody fusion on 9/1  - pain control as below  - PT/OT  - spinal & Fall precautions  - TLSO/AFOs when OOB  - monitor bladder/bowel function out of concern for neurogenic bowel  - JETHRO drain removed; off keflex  - staples/sutures removed 9/23  - contacted by ortho resident 10/6 who requested HLA, vit D workup to evaluate underlying reason for severity of injury given relatively minimal trauma (fall down 6 stairs)    #Tachycardia (resolving)  - per chart review, pt tachycardic prior to transfer to [facility], but no dopplers/CTA obtained  - CTA 9/6 inconclusive re: PE  - Doppler 9/6+B/L DVT   - TTE 9/9 w/o evidence of right heart strain     #Pain Control  - 9/14/20- oxycodone 5-10 mg for moderate-severe pain, oxycontin 20 mg Q12h, valium 5 mg three times a day prn for spasms, Gabapentin increased to 400 mg three times a day. Dilaudid 2 mg Q4h for breakthrough pain.   - 9/15/20: plan to increase oxycontin to 30 mg Q12h, Gabapentin to 600 mg TID. SBP > 120,   - 9/22 - consider switching to morphine-based long-acting and increasing gabapentin  - 9/22 - increased gabapentin  - 9/25 - decreased dilaudid interval.   - 9/28 - switched from oxycontin to ms contin; d/c'd dilaudid  - 9/30 - increased gabapentin to 1000 TID; added methocarbamol 750 mg BID for spasms  - consider addition TCA and/or switching to lyrica if pain remains uncontrolled  - plan to decerase oxycodone to 2.5 mg for moderate pain and 5 mg for severe pain (10/8/2020)  - 10/9: Educated patient about side effects of cannabis and specially along taking it with narcotics. Educated patient not to use street drugs and it's dangerous side effects. Called psychiatry team to discuss with patient. Patient to get appointment with pain management physician.     10/12: narcan kit given upon dishcarge, family and patient education done by pharmacy. cleared by psychiatry to be discharged home. got pain medicine appointment set up on 10/15/20.   #adjustment issues (resolving)  - c/w psychological support   - c/w cymbalta    #Rhabdomyolysis (resolved)  - 2/2 trauma   - largely resolved: <900 on 8/31  - encourage PO fluids    #Bladder Management  - Neurogenic bladder (resolved)  - francis removed 9/4 am  - PVRs minimal   - c/w flomax.     #IVC filter  - placed 8/26  - will need removed in 5-6 months     #DVT   - IVC filter  - SCDs  - tachycardia: Doppler and cTA chest to eval for DVT and PE. however per chart review pt appears to have been tachycardic for some time prior to arrival in rehab.  - doppler showed bilateral DVTs, vascular surgery consult- continue to monitor, okay to continue therapy.   - RLE edema: got 2 days of lasix few days ago, improved.   - 9/17: Doppler positive for worsening BLE clots, occlusive clots in RLE, nonocclusive clots in LLE. Surgeon Dr. Rivera cleared patient to start on full anticoagulation.  Now on 100 mg Lovenox Q12h. Discussed result and therapy with patient. Vascular surgery - continue full anticoagulation treatment for 6-12 months. Seen by vascular 9/22: continue elevation x24h and ace wraps when OOB. Bedside therapies 9/22.   - Hb low but stable - likely due to increased clot burden with worsening DVTs. Continue to monitor.     #Insomnia  - trazodone 50 mg at night.     Outpatient Follow-up:    Gabrielle Villegas  ORTHOPAEDIC SURGERY  [address] [facility], [address] [place] [address]  Phone: [phone]  Fax: [phone]  Follow Up Time:     Dave Rivera  ORTHOPAEDIC SURGERY  [address] [place] [address]  Phone: [phone]  Fax: [phone]    IDT Rounds 10/1: Pt making good functional gains. popover transfers close supervision.  Toileting/LBD mod assist. Ambulates 10' with RW and mod assist.

## 2020-10-12 NOTE — PROGRESS NOTE BEHAVIORAL HEALTH - THOUGHT CONTENT
Other/Unremarkable
Unremarkable
Unremarkable/Other
Unremarkable/Other
Other/Unremarkable
Unremarkable/Other
Unremarkable

## 2020-10-12 NOTE — PROGRESS NOTE BEHAVIORAL HEALTH - NSBHCONSULTFOLLOWAFTERCARE_PSY_A_CORE FT
Pt may benefit from supportive therapy as an outpatient.
Follow up with psychiatrist upon discharge to continue Duloxetine.
As per case coordinator, too soon for specifics, may benefit from supportive therapy as an outpatient.
Per treatment team.  Follow up with psychiatrist upon discharge to continue psychotropic medications.
As per case coordinator, too soon for specifics, may benefit from supportive therapy as an outpatient.
As per case coordinator, too soon for specifics, may benefit from supportive therapy as an outpatient.
Pt may benefit from supportive therapy as an outpatient.
consider [facility] [phone], [facility] [phone], [facility] [phone].  Patient aware that MD cannot speak to these providers expertise, these are just local practitioners available on website search.   Case coordinator can provide these numbers and other referrals on d/c summary.
consider [facility] [phone], [facility] [phone], [facility] [phone].  Patient aware that MD cannot speak to these providers expertise, these are just local practitioners available on website search.   Case coordinator can provide these numbers and other referrals on d/c summary.

## 2020-10-12 NOTE — DISCHARGE NOTE NURSING/CASE MANAGEMENT/SOCIAL WORK - PATIENT PORTAL LINK FT
You can access the FollowMyHealth Patient Portal offered by [facility] by registering at the following website: [email or web address]. By joining [employer]’s FollowMyHealth portal, you will also be able to view your health information using other applications (apps) compatible with our system.

## 2020-10-12 NOTE — PROGRESS NOTE BEHAVIORAL HEALTH - PRIMARY DX
Adjustment disorder with mixed anxiety and depressed mood

## 2020-10-12 NOTE — PROGRESS NOTE BEHAVIORAL HEALTH - RISK ASSESSMENT
Pt is at low risk for suicide. Pt was upbeat about his progression with PT/OT and is proud of his progress. Pt is responding well to psychotropic medications and has parents as social support.
Pt is at low risk for suicide. Pt was upbeat about his progression with PT/OT and is proud of his progress. Pt is responding well to psychotropic medications and has parents as social support.
Pt at low risk for dangerous behaviors. He has been cooperative with staff.
urine tox negative for THC.
Pt at low risk for dangerous behaviors. He has been cooperative with staff.
Pt at low risk for dangerous behaviors. He has been cooperative with staff.
pt with no suicidal or homicidal ideation, no history of mary or psychosis, is feeling more confident regarding discharge to home.   Agree however on decreasing risk by adding Narcan to discharge medications if d/c on opiates and with family education regarding same.
pt with no suicidal or homicidal ideation, no history of mary or psychosis, is feeling more confident regarding discharge to home.   Agree however on decreasing risk by adding Narcan to discharge medications if d/c on opiates and with family education regarding same.
Pt at low risk for dangerous behaviors. He has been cooperative with staff.

## 2020-10-12 NOTE — PROGRESS NOTE BEHAVIORAL HEALTH - NSBHCONSULTMEDS_PSY_A_CORE FT
MEDICATIONS  (STANDING):  DULoxetine 60 milliGRAM(s) Oral daily  enoxaparin Injectable 100 milliGRAM(s) SubCutaneous every 12 hours  famotidine    Tablet 20 milliGRAM(s) Oral <User Schedule>  gabapentin 1000 milliGRAM(s) Oral three times a day  influenza   Vaccine 0.5 milliLiter(s) IntraMuscular once  lactobacillus acidophilus 1 Tablet(s) Oral two times a day  lidocaine   Patch 2 Patch Transdermal <User Schedule>  melatonin 3 milliGRAM(s) Oral at bedtime  methocarbamol 750 milliGRAM(s) Oral <User Schedule>  morphine ER Tablet 15 milliGRAM(s) Oral <User Schedule>  senna 2 Tablet(s) Oral at bedtime  tamsulosin 0.4 milliGRAM(s) Oral at bedtime  traZODone 50 milliGRAM(s) Oral <User Schedule>    MEDICATIONS  (PRN):  acetaminophen   Tablet .. 650 milliGRAM(s) Oral every 6 hours PRN Temp greater or equal to 38C (100.4F), Mild Pain (1 - 3)  bisacodyl Suppository 10 milliGRAM(s) Rectal <User Schedule> PRN no bowel movement for 2 days.  lidocaine 2% Gel 1 Application(s) Topical four times a day PRN pain  magnesium hydroxide Suspension 30 milliLiter(s) Oral every 12 hours PRN Constipation  ondansetron    Tablet 4 milliGRAM(s) Oral every 8 hours PRN Nausea and/or Vomiting  oxyCODONE    IR 5 milliGRAM(s) Oral daily PRN Severe Pain (7 - 10)  oxyCODONE    IR 10 milliGRAM(s) Oral every 4 hours PRN Severe Pain (7 - 10)  oxyCODONE    IR 5 milliGRAM(s) Oral every 4 hours PRN Moderate Pain (4 - 6)
DULoxetine 60 milliGRAM(s) Oral daily  melatonin 3 milliGRAM(s) Oral at bedtime  traZODone 50 milliGRAM(s) Oral <User Schedule>
MEDICATIONS  (STANDING):  cephalexin 500 milliGRAM(s) Oral every 8 hours  DULoxetine 30 milliGRAM(s) Oral daily  gabapentin 600 milliGRAM(s) Oral three times a day  influenza   Vaccine 0.5 milliLiter(s) IntraMuscular once  lactobacillus acidophilus 1 Tablet(s) Oral two times a day  melatonin 3 milliGRAM(s) Oral at bedtime  oxyCODONE  ER Tablet 30 milliGRAM(s) Oral every 12 hours  senna 2 Tablet(s) Oral at bedtime  tamsulosin 0.4 milliGRAM(s) Oral at bedtime
MEDICATIONS  (STANDING):  cephalexin 500 milliGRAM(s) Oral every 8 hours  diazepam    Tablet 5 milliGRAM(s) Oral every 8 hours  escitalopram 10 milliGRAM(s) Oral daily  gabapentin 300 milliGRAM(s) Oral three times a day  HYDROmorphone   Tablet 2 milliGRAM(s) Oral every 4 hours  influenza   Vaccine 0.5 milliLiter(s) IntraMuscular once  lactobacillus acidophilus 1 Tablet(s) Oral two times a day  melatonin 3 milliGRAM(s) Oral at bedtime  oxyCODONE  ER Tablet 20 milliGRAM(s) Oral every 12 hours  senna 2 Tablet(s) Oral at bedtime
DULoxetine 40 milliGRAM(s) Oral daily  enoxaparin Injectable 100 milliGRAM(s) SubCutaneous every 12 hours  famotidine    Tablet 20 milliGRAM(s) Oral <User Schedule>  gabapentin 600 milliGRAM(s) Oral three times a day  influenza   Vaccine 0.5 milliLiter(s) IntraMuscular once  lactobacillus acidophilus 1 Tablet(s) Oral two times a day  melatonin 3 milliGRAM(s) Oral at bedtime  oxyCODONE  ER Tablet 30 milliGRAM(s) Oral every 12 hours  senna 2 Tablet(s) Oral at bedtime  tamsulosin 0.4 milliGRAM(s) Oral at bedtime  traZODone 50 milliGRAM(s) Oral <User Schedule>
MEDICATIONS  (STANDING):  cephalexin 500 milliGRAM(s) Oral every 8 hours  DULoxetine 30 milliGRAM(s) Oral daily  gabapentin 600 milliGRAM(s) Oral three times a day  influenza   Vaccine 0.5 milliLiter(s) IntraMuscular once  lactobacillus acidophilus 1 Tablet(s) Oral two times a day  melatonin 3 milliGRAM(s) Oral at bedtime  oxyCODONE  ER Tablet 30 milliGRAM(s) Oral every 12 hours  senna 2 Tablet(s) Oral at bedtime  tamsulosin 0.4 milliGRAM(s) Oral at bedtime
MEDICATIONS  (STANDING):  apixaban 10 milliGRAM(s) Oral every 12 hours  DULoxetine 60 milliGRAM(s) Oral daily  famotidine    Tablet 20 milliGRAM(s) Oral <User Schedule>  gabapentin 1000 milliGRAM(s) Oral three times a day  influenza   Vaccine 0.5 milliLiter(s) IntraMuscular once  lactobacillus acidophilus 1 Tablet(s) Oral two times a day  lidocaine   Patch 2 Patch Transdermal <User Schedule>  melatonin 3 milliGRAM(s) Oral at bedtime  methocarbamol 750 milliGRAM(s) Oral <User Schedule>  morphine ER Tablet 15 milliGRAM(s) Oral <User Schedule>  senna 2 Tablet(s) Oral at bedtime  tamsulosin 0.4 milliGRAM(s) Oral at bedtime  traZODone 50 milliGRAM(s) Oral <User Schedule>
MEDICATIONS  (STANDING):  apixaban 10 milliGRAM(s) Oral every 12 hours  DULoxetine 60 milliGRAM(s) Oral daily  famotidine    Tablet 20 milliGRAM(s) Oral <User Schedule>  gabapentin 1000 milliGRAM(s) Oral three times a day  influenza   Vaccine 0.5 milliLiter(s) IntraMuscular once  lactobacillus acidophilus 1 Tablet(s) Oral two times a day  lidocaine   Patch 2 Patch Transdermal <User Schedule>  melatonin 3 milliGRAM(s) Oral at bedtime  methocarbamol 750 milliGRAM(s) Oral <User Schedule>  morphine ER Tablet 15 milliGRAM(s) Oral <User Schedule>  senna 2 Tablet(s) Oral at bedtime  tamsulosin 0.4 milliGRAM(s) Oral at bedtime  traZODone 50 milliGRAM(s) Oral <User Schedule>
DULoxetine 40 milliGRAM(s) Oral daily  enoxaparin Injectable 100 milliGRAM(s) SubCutaneous every 12 hours  famotidine    Tablet 20 milliGRAM(s) Oral <User Schedule>  gabapentin 600 milliGRAM(s) Oral three times a day  influenza   Vaccine 0.5 milliLiter(s) IntraMuscular once  lactobacillus acidophilus 1 Tablet(s) Oral two times a day  melatonin 3 milliGRAM(s) Oral at bedtime  oxyCODONE  ER Tablet 30 milliGRAM(s) Oral every 12 hours  senna 2 Tablet(s) Oral at bedtime  tamsulosin 0.4 milliGRAM(s) Oral at bedtime  traZODone 50 milliGRAM(s) Oral <User Schedule>

## 2020-10-12 NOTE — PROGRESS NOTE BEHAVIORAL HEALTH - NSBHCONSFOLLOWNEEDS_PSY_A_CORE
no psychiatric contraindications to discharge

## 2020-10-12 NOTE — SBIRT NOTE ADULT - NSSBIRTDRGPOSREINDET_GEN_A_CORE
Patient is a chronic marijuana smoker. He does not wish treatment or a referral for treatment at this time.

## 2020-10-12 NOTE — PROGRESS NOTE ADULT - REASON FOR ADMISSION
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
Traumatic Spinal Cord Disfunction
Traumatic Spinal Cord Disfunction, incomplete paraplegia
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
incomplete paraplegia
incomplete paraplegia
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

## 2020-10-12 NOTE — PHARMACOTHERAPY INTERVENTION NOTE - COMMENTS
provided education to patient and family regarding all new medications including Eliquis. Provided counseling to patient's mother on nalaxone intra-nasal administration in case of emergency and provided Nalaxone kit # [id number]

## 2020-10-12 NOTE — PROGRESS NOTE BEHAVIORAL HEALTH - NSBHCONSULTFOLLOWDETAILS_PSY_A_CORE FT
Psychiatry will follow as needed.
recommend pain management follow up.
recommend pain management follow up.

## 2020-10-12 NOTE — PROGRESS NOTE BEHAVIORAL HEALTH - NSBHATTESTSEENBY_PSY_A_CORE
NP with telephonic supervision from Attending Psychiatrist
NP with telephonic supervision from Attending Psychiatrist
attending Psychiatrist without NP/Trainee
NP with telephonic supervision from Attending Psychiatrist
attending Psychiatrist without NP/Trainee
NP with telephonic supervision from Attending Psychiatrist
attending Psychiatrist without NP/Trainee

## 2020-10-12 NOTE — PROGRESS NOTE BEHAVIORAL HEALTH - NSBHPTASSESSDT_PSY_A_CORE
02-Oct-2020 11:00
06-Oct-2020 11:06
10-Oct-2020 18:56
12-Oct-2020 12:37
15-Sep-2020 11:06
19-Sep-2020 18:39
21-Sep-2020 13:00
25-Sep-2020 11:00
12-Sep-2020 17:21

## 2020-10-12 NOTE — PROGRESS NOTE BEHAVIORAL HEALTH - GAIT / STATION
Other
Abnormal gait / station
Other
Abnormal gait / station

## 2020-10-12 NOTE — PROGRESS NOTE BEHAVIORAL HEALTH - NS ED BHA REVIEW OF ED CHART AVAILABLE INVESTIGATIONS REVIEWED
None available
None available
Yes
None available
Yes

## 2020-10-12 NOTE — PROGRESS NOTE BEHAVIORAL HEALTH - BODY HABITUS
Well nourished
Other
Other
Well nourished

## 2020-10-12 NOTE — PROGRESS NOTE ADULT - SUBJECTIVE AND OBJECTIVE BOX
35y old Male who presents with a chief complaint of Traumatic Spinal Cord Disfunction    Patient seen and examined at bedside. No acute overnight events. no fever, chills, chest pain, shortness of breath.     Vital Signs Last 24 Hrs  T(C): 37 (12 Oct 2020 07:13), Max: 37 (12 Oct 2020 07:13)  T(F): 98.6 (12 Oct 2020 07:13), Max: 98.6 (12 Oct 2020 07:13)  HR: 84 (12 Oct 2020 07:13) (84 - 99)  BP: 132/85 (12 Oct 2020 07:13) (132/85 - 148/83)  BP(mean): --  RR: 16 (12 Oct 2020 07:13) (15 - 16)  SpO2: 99% (12 Oct 2020 07:13) (99% - 99%)    GENERAL- NAD  EAR/NOSE/MOUTH/THROAT - no pharyngeal exudates, no oral lesions  MMM  EYES- PATRICIA, conjunctiva and Sclera clear  NECK- supple  RESPIRATORY-  clear to auscultation bilaterally  CARDIOVASCULAR - SIS2, RRR  GI - soft NT BS present  EXTREMITIES- no pedal edema  NEUROLOGY- b/l LE weakness  PSYCHIATRY- AAO X 3                  10.9                 138  | 30   | 8            5.88  >-----------< 423     ------------------------< 94                    35.5                 4.2  | 100  | 0.61                                         Ca 9.1   Mg x     Ph x          MEDICATIONS  (STANDING):  apixaban 10 milliGRAM(s) Oral every 12 hours  DULoxetine 60 milliGRAM(s) Oral daily  famotidine  Tablet 20 milliGRAM(s) Oral <User Schedule>  gabapentin 1000 milliGRAM(s) Oral three times a day  influenza   Vaccine 0.5 milliLiter(s) IntraMuscular once  lactobacillus acidophilus 1 Tablet(s) Oral two times a day  lidocaine   Patch 2 Patch Transdermal <User Schedule>  melatonin 3 milliGRAM(s) Oral at bedtime  methocarbamol 750 milliGRAM(s) Oral <User Schedule>  morphine ER Tablet 15 milliGRAM(s) Oral <User Schedule>  senna 2 Tablet(s) Oral at bedtime  tamsulosin 0.4 milliGRAM(s) Oral at bedtime  traZODone 50 milliGRAM(s) Oral <User Schedule>    MEDICATIONS  (PRN):  acetaminophen   Tablet .. 650 milliGRAM(s) Oral every 4 hours PRN Temp greater or equal to 38C (100.4F), Mild Pain (1 - 3)  bisacodyl Suppository 10 milliGRAM(s) Rectal <User Schedule> PRN no bowel movement for 2 days.  lidocaine 2% Gel 1 Application(s) Topical four times a day PRN pain  magnesium hydroxide Suspension 30 milliLiter(s) Oral every 12 hours PRN Constipation  ondansetron    Tablet 4 milliGRAM(s) Oral every 8 hours PRN Nausea and/or Vomiting  oxyCODONE    IR 2.5 milliGRAM(s) Oral every 4 hours PRN Moderate Pain (4 - 6)  oxyCODONE    IR 5 milliGRAM(s) Oral every 4 hours PRN Severe Pain (7 - 10)

## 2020-10-12 NOTE — PROGRESS NOTE BEHAVIORAL HEALTH - NS ED BHA REVIEW OF ED CHART AVAILABLE IMAGING REVIEWED
None available
Yes
Yes

## 2020-10-13 LAB
HLA - A LOCUS MOLECULAR: SIGNIFICANT CHANGE UP
HLA - A LOCUS SEROLOGY: SIGNIFICANT CHANGE UP
HLA - B LOCUS MOLECULAR: SIGNIFICANT CHANGE UP
HLA - B LOCUS SEROLOGY: SIGNIFICANT CHANGE UP
HLA - BW LOCUS MOLECULAR: SIGNIFICANT CHANGE UP
HLA - C LOCUS MOLECULAR: SIGNIFICANT CHANGE UP
HLA - CW LOCUS SEROLOGY: SIGNIFICANT CHANGE UP

## 2020-10-26 NOTE — OCCUPATIONAL THERAPY INITIAL EVALUATION ADULT - LEVEL OF INDEPENDENCE: BED TO CHAIR, REHAB EVAL
maximum assist (25% patients effort) Ftsg Text: The defect edges were debeveled with a #15 scalpel blade.  Given the location of the defect, shape of the defect and the proximity to free margins a full thickness skin graft was deemed most appropriate.  Using a sterile surgical marker, the primary defect shape was transferred to the donor site. The area thus outlined was incised deep to adipose tissue with a #15 scalpel blade.  The harvested graft was then trimmed of adipose tissue until only dermis and epidermis was left.  The skin margins of the secondary defect were undermined to an appropriate distance in all directions utilizing iris scissors.  The secondary defect was closed with interrupted buried subcutaneous sutures.  The skin edges were then re-apposed with running  sutures.  The skin graft was then placed in the primary defect and oriented appropriately.

## 2020-10-29 PROCEDURE — 36415 COLL VENOUS BLD VENIPUNCTURE: CPT

## 2020-10-29 PROCEDURE — 84100 ASSAY OF PHOSPHORUS: CPT

## 2020-10-29 PROCEDURE — 83935 ASSAY OF URINE OSMOLALITY: CPT

## 2020-10-29 PROCEDURE — 97110 THERAPEUTIC EXERCISES: CPT

## 2020-10-29 PROCEDURE — 93970 EXTREMITY STUDY: CPT

## 2020-10-29 PROCEDURE — 83930 ASSAY OF BLOOD OSMOLALITY: CPT

## 2020-10-29 PROCEDURE — 82652 VIT D 1 25-DIHYDROXY: CPT

## 2020-10-29 PROCEDURE — 80053 COMPREHEN METABOLIC PANEL: CPT

## 2020-10-29 PROCEDURE — 84466 ASSAY OF TRANSFERRIN: CPT

## 2020-10-29 PROCEDURE — 85045 AUTOMATED RETICULOCYTE COUNT: CPT

## 2020-10-29 PROCEDURE — 93005 ELECTROCARDIOGRAM TRACING: CPT

## 2020-10-29 PROCEDURE — 97167 OT EVAL HIGH COMPLEX 60 MIN: CPT

## 2020-10-29 PROCEDURE — 83010 ASSAY OF HAPTOGLOBIN QUANT: CPT

## 2020-10-29 PROCEDURE — 85025 COMPLETE CBC W/AUTO DIFF WBC: CPT

## 2020-10-29 PROCEDURE — 82746 ASSAY OF FOLIC ACID SERUM: CPT

## 2020-10-29 PROCEDURE — 81001 URINALYSIS AUTO W/SCOPE: CPT

## 2020-10-29 PROCEDURE — 83550 IRON BINDING TEST: CPT

## 2020-10-29 PROCEDURE — 97535 SELF CARE MNGMENT TRAINING: CPT

## 2020-10-29 PROCEDURE — 97112 NEUROMUSCULAR REEDUCATION: CPT

## 2020-10-29 PROCEDURE — 71275 CT ANGIOGRAPHY CHEST: CPT

## 2020-10-29 PROCEDURE — 82550 ASSAY OF CK (CPK): CPT

## 2020-10-29 PROCEDURE — 97542 WHEELCHAIR MNGMENT TRAINING: CPT

## 2020-10-29 PROCEDURE — 83615 LACTATE (LD) (LDH) ENZYME: CPT

## 2020-10-29 PROCEDURE — 97163 PT EVAL HIGH COMPLEX 45 MIN: CPT

## 2020-10-29 PROCEDURE — 85027 COMPLETE CBC AUTOMATED: CPT

## 2020-10-29 PROCEDURE — 81374 HLA I TYPING 1 ANTIGEN LR: CPT

## 2020-10-29 PROCEDURE — 97116 GAIT TRAINING THERAPY: CPT

## 2020-10-29 PROCEDURE — 81372 HLA I TYPING COMPLETE LR: CPT

## 2020-10-29 PROCEDURE — 82728 ASSAY OF FERRITIN: CPT

## 2020-10-29 PROCEDURE — 97530 THERAPEUTIC ACTIVITIES: CPT

## 2020-10-29 PROCEDURE — 82607 VITAMIN B-12: CPT

## 2020-10-29 PROCEDURE — 83540 ASSAY OF IRON: CPT

## 2020-10-29 PROCEDURE — 80048 BASIC METABOLIC PNL TOTAL CA: CPT

## 2020-10-29 PROCEDURE — 83735 ASSAY OF MAGNESIUM: CPT

## 2020-10-29 PROCEDURE — 93306 TTE W/DOPPLER COMPLETE: CPT

## 2021-02-25 NOTE — PHYSICAL THERAPY INITIAL EVALUATION ADULT - GAIT TRAINING, PT EVAL
Pt will independently ambulate 200feet with rolling walker without loss of balance, by 2-3 weeks Skyrizi Counseling: I discussed with the patient the risks of risankizumab-rzaa including but not limited to immunosuppression, and serious infections.  The patient understands that monitoring is required including a PPD at baseline and must alert us or the primary physician if symptoms of infection or other concerning signs are noted.

## 2021-04-29 NOTE — DIETITIAN INITIAL EVALUATION ADULT. - LAB (SPECIFY)
Quality 47: Advance Care Plan: Advance Care Planning discussed and documented; advance care plan or surrogate decision maker documented in the medical record. Quality 226: Preventive Care And Screening: Tobacco Use: Screening And Cessation Intervention: Patient screened for tobacco use and is an ex/non-smoker Detail Level: Detailed Quality 111:Pneumonia Vaccination Status For Older Adults: Pneumococcal Vaccination Previously Received Quality 130: Documentation Of Current Medications In The Medical Record: Current Medications Documented Quality 431: Preventive Care And Screening: Unhealthy Alcohol Use - Screening: Patient screened for unhealthy alcohol use using a single question and scores less than 2 times per year Recommend MD order HgbA1c & lipid panel

## 2021-06-15 NOTE — SBIRT NOTE ADULT - NSSBIRTCONCERNEDDRINK_GEN_A_CORE
Writer called regarding referral for top surgery from Dr. Bray at [facility]. No answer, LVM with [facility] contact information.     Nancy Pina   No

## 2022-01-06 NOTE — OCCUPATIONAL THERAPY INITIAL EVALUATION ADULT - PAIN SENSATION, HEAD/NECK, REHAB EVAL
[facility] called patient over the phone and patient will discharge to SNF when ready.     PATSY Irving     within normal limits

## 2022-01-18 NOTE — PROVIDER CONTACT NOTE (OTHER) - ASSESSMENT
----- Message from Chandler Clemens DO sent at 1/18/2022  1:10 PM CST -----  Electrolytes are normal. Blood sugar is minimally elevated. Kidney tests are normal.  
./87 , RR16, Temp 98.5, sao2 98%RA. pt moving rle toes without difficulty. refuses to move to side due to pain
 despite patient urine output of 1000ml at time of bladder scan.
AOX4. Pt denies abdominal pain. Pt has been urinating through out shift. When time for PVR, pt states that he does not want to urinate. PVR [id number]=129. Pt encouraged to urinate. Pt states that he cannot urinate on command. Pt given 1hour 30mins. Pt did not urinate. Pt refuses straight cath at this time
pt is A&ox4.
temp 100F oral, NY: 116 (please see VS flowsheet). Not in any acute distress, asymptomatic

## 2022-02-17 NOTE — PHYSICAL THERAPY INITIAL EVALUATION ADULT - BALANCE TRAINING, PT EVAL
none Pt will increase static/dynamic standing balance to WFL to perform all functional mobility without LOB, by 2 weeks

## 2022-09-28 NOTE — PATIENT PROFILE ADULT - ARRIVAL FROM
Hospitals/Psychiatric Facilities

## 2022-10-16 NOTE — ED PROVIDER NOTE - NS ED SCRIBE ACP NAME FT
Bed: 48  Expected date:   Expected time:   Means of arrival:   Comments:  John/ RANDY   Annie Roberts

## 2022-10-20 NOTE — DISCHARGE NOTE PROVIDER - NSDCHC_MEDRECSTATUS_GEN_ALL_CORE
Admission Reconciliation is Not Complete  Discharge Reconciliation is Not Complete Admission Reconciliation is Completed  Discharge Reconciliation is Completed Bexarotene Pregnancy And Lactation Text: This medication is Pregnancy Category X and should not be given to women who are pregnant or may become pregnant. This medication should not be used if you are breast feeding.

## 2023-10-27 NOTE — PROGRESS NOTE ADULT - SUBJECTIVE AND OBJECTIVE BOX
"Per chart review, PA is still in \"initiation\" process.     Will postpone to follow-up.     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 No overnight events reported.   Reports pain in the right lower extremity but swelling getting better.  No other issues.    ALLERGIES:  No Known Allergies    MEDICATIONS  (STANDING):  acetaminophen   Tablet .. 975 milliGRAM(s) Oral every 6 hours  DULoxetine 60 milliGRAM(s) Oral daily  enoxaparin Injectable 100 milliGRAM(s) SubCutaneous every 12 hours  famotidine    Tablet 20 milliGRAM(s) Oral <User Schedule>  gabapentin 800 milliGRAM(s) Oral three times a day  gabapentin 200 milliGRAM(s) Oral <User Schedule>  influenza   Vaccine 0.5 milliLiter(s) IntraMuscular once  lactobacillus acidophilus 1 Tablet(s) Oral two times a day  melatonin 3 milliGRAM(s) Oral at bedtime  oxyCODONE  ER Tablet 20 milliGRAM(s) Oral every 12 hours  senna 2 Tablet(s) Oral at bedtime  tamsulosin 0.4 milliGRAM(s) Oral at bedtime  traZODone 50 milliGRAM(s) Oral <User Schedule>    MEDICATIONS  (PRN):  acetaminophen   Tablet .. 650 milliGRAM(s) Oral every 6 hours PRN Temp greater or equal to 38C (100.4F), Mild Pain (1 - 3)  bisacodyl Suppository 10 milliGRAM(s) Rectal <User Schedule> PRN no bowel movement for 2 days.  HYDROmorphone   Tablet 2 milliGRAM(s) Oral every 6 hours PRN Breakthrougth pain  lidocaine   Patch 1 Patch Transdermal every 24 hours PRN Severe pain  lidocaine 2% Gel 1 Application(s) Topical four times a day PRN pain  magnesium hydroxide Suspension 30 milliLiter(s) Oral every 12 hours PRN Constipation  ondansetron    Tablet 4 milliGRAM(s) Oral every 8 hours PRN Nausea and/or Vomiting  oxyCODONE    IR 5 milliGRAM(s) Oral every 4 hours PRN Moderate Pain (4 - 6)  oxyCODONE    IR 10 milliGRAM(s) Oral every 4 hours PRN Severe Pain (7 - 10)    Vital Signs Last 24 Hrs  T(F): 98.3 (27 Sep 2020 23:05), Max: 98.3 (27 Sep 2020 23:05)  HR: 105 (27 Sep 2020 23:05) (105 - 105)  BP: 133/73 (27 Sep 2020 23:05) (133/73 - 133/73)  RR: 14 (27 Sep 2020 23:05) (14 - 14)  SpO2: 99% (27 Sep 2020 23:05) (99% - 99%)  I&O's Summary    PHYSICAL EXAM:  General: NAD, A/O x 3  ENT: MMM, no thrush  Neck: Supple, No JVD  Lungs: Clear to auscultation bilaterally, good air entry, non-labored breathing  Cardio: RRR, S1/S2, No murmur  Abdomen: Soft, Nontender, Nondistended; Bowel sounds present  Extremities: b/l LE edema R>L   Neuro :  A/O x 3, b/l LE motor weak   Skin: multiple areas of healing ulcer/bruises over torso      LABS:                        8.8    8.42  )-----------( 406      ( 28 Sep 2020 06:00 )             29.2     09-28    138  |  101  |  6   ----------------------------<  103  3.7   |  31  |  0.71    Ca    8.6      28 Sep 2020 06:00          eGFR if Non African American: 121 mL/min/1.73M2 (09-28-20 @ 06:00)  eGFR if : 141 mL/min/1.73M2 (09-28-20 @ 06:00)        RADIOLOGY & ADDITIONAL TESTS:    Care Discussed with Consultants/Other Providers:

## 2024-08-01 NOTE — PROGRESS NOTE ADULT - SUBJECTIVE AND OBJECTIVE BOX
SUBJECTIVE & OVERNIGHT EVENTS:   Patient seen and evaluated this morning. No acute events overnight. Participating well in therapy. Pain is fairly controlled. Denies chest pain, SOB, nausea, vomiting, constipation or diarrhea. Slept well last night. Taking oxycodone Q4h exactly, puts timer to ask for meds.     [X] Constitutional WNL        [X] Cardio WNL              [X] Resp WNL  [X] GI WNL                            [X]  WNL                    [] Heme +B/L LE DVTs  [X] Endo WNL                       [] Skin +spinal incision                  [X] MSK WNL  [] Neuro +LE weakness                     [X] Cognitive WNL         [] Psych +mood is improving      OBJECTIVE   Vital Signs Last 24 Hrs  T(C): 36.4 (08 Oct 2020 07:38), Max: 36.7 (07 Oct 2020 19:52)  T(F): 97.6 (08 Oct 2020 07:38), Max: 98 (07 Oct 2020 19:52)  HR: 96 (08 Oct 2020 07:38) (82 - 96)  BP: 130/88 (08 Oct 2020 07:38) (130/88 - 150/95)  BP(mean): --  RR: 16 (08 Oct 2020 07:38) (16 - 18)  SpO2: 99% (08 Oct 2020 07:38) (99% - 100%)  PHYSICAL EXAM  Constitutional: NAD  HEENT: NCAT, EOMI, handling secretions well  Cardio: well-perfused  Resp: symmetric chest-rise, no increased WOB  GI: non-distended  : no francis  Extremities: well-perfused    LABS:                       MEDICATIONS  (STANDING):  DULoxetine 60 milliGRAM(s) Oral daily  enoxaparin Injectable 100 milliGRAM(s) SubCutaneous every 12 hours  famotidine    Tablet 20 milliGRAM(s) Oral <User Schedule>  gabapentin 1000 milliGRAM(s) Oral three times a day  influenza   Vaccine 0.5 milliLiter(s) IntraMuscular once  lactobacillus acidophilus 1 Tablet(s) Oral two times a day  lidocaine   Patch 2 Patch Transdermal <User Schedule>  melatonin 3 milliGRAM(s) Oral at bedtime  methocarbamol 750 milliGRAM(s) Oral <User Schedule>  morphine ER Tablet 15 milliGRAM(s) Oral <User Schedule>  senna 2 Tablet(s) Oral at bedtime  tamsulosin 0.4 milliGRAM(s) Oral at bedtime  traZODone 50 milliGRAM(s) Oral <User Schedule>    MEDICATIONS  (PRN):  acetaminophen   Tablet .. 650 milliGRAM(s) Oral every 6 hours PRN Temp greater or equal to 38C (100.4F), Mild Pain (1 - 3)  bisacodyl Suppository 10 milliGRAM(s) Rectal <User Schedule> PRN no bowel movement for 2 days.  lidocaine 2% Gel 1 Application(s) Topical four times a day PRN pain  magnesium hydroxide Suspension 30 milliLiter(s) Oral every 12 hours PRN Constipation  ondansetron    Tablet 4 milliGRAM(s) Oral every 8 hours PRN Nausea and/or Vomiting  oxyCODONE    IR 10 milliGRAM(s) Oral every 4 hours PRN Severe Pain (7 - 10)  oxyCODONE    IR 5 milliGRAM(s) Oral every 4 hours PRN Moderate Pain (4 - 6)             IV intact

## 2025-02-06 NOTE — ED PROVIDER NOTE - CPE EDP RESP NORM
Attempted to call patient to make aware that Dr Washington would like patient to have BMP prior to his appt in office on 2/21/25. Patient cell phone does not have voicemail and home phone not accepting calls. Did put note on patient hospital AVS and in appt notes for patient to be aware.    normal...
